# Patient Record
Sex: FEMALE | Race: WHITE | NOT HISPANIC OR LATINO | Employment: PART TIME | URBAN - METROPOLITAN AREA
[De-identification: names, ages, dates, MRNs, and addresses within clinical notes are randomized per-mention and may not be internally consistent; named-entity substitution may affect disease eponyms.]

---

## 2017-03-13 ENCOUNTER — TRANSCRIBE ORDERS (OUTPATIENT)
Dept: LAB | Facility: HOSPITAL | Age: 32
End: 2017-03-13

## 2017-03-13 ENCOUNTER — LAB (OUTPATIENT)
Dept: LAB | Facility: HOSPITAL | Age: 32
End: 2017-03-13

## 2017-03-13 DIAGNOSIS — Z98.84 STATUS POST LAPAROSCOPIC SLEEVE GASTRECTOMY: Primary | ICD-10-CM

## 2017-03-13 DIAGNOSIS — E55.9 VITAMIN D DEFICIENCY DISEASE: ICD-10-CM

## 2017-03-13 DIAGNOSIS — Z98.84 STATUS POST BARIATRIC SURGERY: ICD-10-CM

## 2017-03-13 DIAGNOSIS — R63.4 LOSS OF WEIGHT: Primary | ICD-10-CM

## 2017-03-13 DIAGNOSIS — Z98.84 STATUS POST LAPAROSCOPIC SLEEVE GASTRECTOMY: ICD-10-CM

## 2017-03-13 LAB
25(OH)D3 SERPL-MCNC: 34.8 NG/ML
ALBUMIN SERPL-MCNC: 4.2 G/DL (ref 3.2–4.8)
ALBUMIN/GLOB SERPL: 1.7 G/DL (ref 1.5–2.5)
ALP SERPL-CCNC: 82 U/L (ref 25–100)
ALT SERPL W P-5'-P-CCNC: 12 U/L (ref 7–40)
ANION GAP SERPL CALCULATED.3IONS-SCNC: 6 MMOL/L (ref 3–11)
ARTICHOKE IGE QN: 157 MG/DL (ref 0–130)
AST SERPL-CCNC: 14 U/L (ref 0–33)
BILIRUB SERPL-MCNC: 0.3 MG/DL (ref 0.3–1.2)
BUN BLD-MCNC: 9 MG/DL (ref 9–23)
BUN/CREAT SERPL: 11.3 (ref 7–25)
CALCIUM SPEC-SCNC: 9.7 MG/DL (ref 8.7–10.4)
CHLORIDE SERPL-SCNC: 104 MMOL/L (ref 99–109)
CHOLEST SERPL-MCNC: 214 MG/DL (ref 0–200)
CO2 SERPL-SCNC: 27 MMOL/L (ref 20–31)
CREAT BLD-MCNC: 0.8 MG/DL (ref 0.6–1.3)
DEPRECATED RDW RBC AUTO: 42.4 FL (ref 37–54)
ERYTHROCYTE [DISTWIDTH] IN BLOOD BY AUTOMATED COUNT: 13.2 % (ref 11.3–14.5)
FERRITIN SERPL-MCNC: 179 NG/ML (ref 10–291)
FOLATE SERPL-MCNC: 13.86 NG/ML (ref 3.2–20)
GFR SERPL CREATININE-BSD FRML MDRD: 84 ML/MIN/1.73
GLOBULIN UR ELPH-MCNC: 2.5 GM/DL
GLUCOSE BLD-MCNC: 82 MG/DL (ref 70–100)
HBA1C MFR BLD: 5.4 % (ref 4.8–5.6)
HCT VFR BLD AUTO: 39.6 % (ref 34.5–44)
HDLC SERPL-MCNC: 56 MG/DL (ref 40–60)
HGB BLD-MCNC: 12.7 G/DL (ref 11.5–15.5)
IRON 24H UR-MRATE: 68 MCG/DL (ref 50–175)
MAGNESIUM SERPL-MCNC: 2 MG/DL (ref 1.3–2.7)
MCH RBC QN AUTO: 28.1 PG (ref 27–31)
MCHC RBC AUTO-ENTMCNC: 32.1 G/DL (ref 32–36)
MCV RBC AUTO: 87.6 FL (ref 80–99)
PHOSPHATE SERPL-MCNC: 4.1 MG/DL (ref 2.4–5.1)
PLATELET # BLD AUTO: 356 10*3/MM3 (ref 150–450)
PMV BLD AUTO: 9.6 FL (ref 6–12)
POTASSIUM BLD-SCNC: 3.9 MMOL/L (ref 3.5–5.5)
PREALB SERPL-MCNC: 26.8 MG/DL (ref 10–40)
PROT SERPL-MCNC: 6.7 G/DL (ref 5.7–8.2)
RBC # BLD AUTO: 4.52 10*6/MM3 (ref 3.89–5.14)
SODIUM BLD-SCNC: 137 MMOL/L (ref 132–146)
TRIGL SERPL-MCNC: 137 MG/DL (ref 0–150)
TSH SERPL DL<=0.05 MIU/L-ACNC: 1.16 MIU/ML (ref 0.35–5.35)
WBC NRBC COR # BLD: 8.64 10*3/MM3 (ref 3.5–10.8)

## 2017-03-13 PROCEDURE — 83735 ASSAY OF MAGNESIUM: CPT | Performed by: SURGERY

## 2017-03-13 PROCEDURE — 84590 ASSAY OF VITAMIN A: CPT | Performed by: SURGERY

## 2017-03-13 PROCEDURE — 82746 ASSAY OF FOLIC ACID SERUM: CPT | Performed by: SURGERY

## 2017-03-13 PROCEDURE — 84446 ASSAY OF VITAMIN E: CPT | Performed by: SURGERY

## 2017-03-13 PROCEDURE — 36415 COLL VENOUS BLD VENIPUNCTURE: CPT | Performed by: SURGERY

## 2017-03-13 PROCEDURE — 85027 COMPLETE CBC AUTOMATED: CPT | Performed by: SURGERY

## 2017-03-13 PROCEDURE — 83970 ASSAY OF PARATHORMONE: CPT | Performed by: SURGERY

## 2017-03-13 PROCEDURE — 84425 ASSAY OF VITAMIN B-1: CPT | Performed by: SURGERY

## 2017-03-13 PROCEDURE — 82728 ASSAY OF FERRITIN: CPT | Performed by: SURGERY

## 2017-03-13 PROCEDURE — 84443 ASSAY THYROID STIM HORMONE: CPT | Performed by: SURGERY

## 2017-03-13 PROCEDURE — 83921 ORGANIC ACID SINGLE QUANT: CPT | Performed by: SURGERY

## 2017-03-13 PROCEDURE — 83540 ASSAY OF IRON: CPT | Performed by: SURGERY

## 2017-03-13 PROCEDURE — 80053 COMPREHEN METABOLIC PANEL: CPT | Performed by: SURGERY

## 2017-03-13 PROCEDURE — 83036 HEMOGLOBIN GLYCOSYLATED A1C: CPT | Performed by: SURGERY

## 2017-03-13 PROCEDURE — 80061 LIPID PANEL: CPT | Performed by: SURGERY

## 2017-03-13 PROCEDURE — 84100 ASSAY OF PHOSPHORUS: CPT | Performed by: SURGERY

## 2017-03-13 PROCEDURE — 82306 VITAMIN D 25 HYDROXY: CPT | Performed by: SURGERY

## 2017-03-13 PROCEDURE — 84134 ASSAY OF PREALBUMIN: CPT | Performed by: SURGERY

## 2017-03-14 LAB
CALCIUM SERPL-MCNC: 8.9 MG/DL (ref 8.7–10.2)
INTACT PTH: NORMAL
PTH-INTACT SERPL-MCNC: 32 PG/ML (ref 15–65)

## 2017-03-15 LAB
A-TOCOPHEROL VIT E SERPL-MCNC: 13.2 MG/L (ref 5.3–16.8)
VIT A SERPL-MCNC: 74 UG/DL (ref 20–65)

## 2017-03-16 LAB
METHYLMALONATE SERPL-SCNC: 120 NMOL/L (ref 0–378)
VIT B1 BLD-SCNC: 267.1 NMOL/L (ref 66.5–200)

## 2017-05-05 ENCOUNTER — OFFICE VISIT (OUTPATIENT)
Dept: INTERNAL MEDICINE | Facility: CLINIC | Age: 32
End: 2017-05-05

## 2017-05-05 DIAGNOSIS — F40.243 FEAR OF FLYING: Primary | ICD-10-CM

## 2017-05-05 PROCEDURE — 99211 OFF/OP EST MAY X REQ PHY/QHP: CPT | Performed by: FAMILY MEDICINE

## 2017-05-05 RX ORDER — ALPRAZOLAM 0.25 MG/1
0.25 TABLET ORAL AS NEEDED
Qty: 4 TABLET | Refills: 0 | Status: SHIPPED | OUTPATIENT
Start: 2017-05-05 | End: 2018-04-18 | Stop reason: SDUPTHER

## 2017-07-03 ENCOUNTER — TRANSCRIBE ORDERS (OUTPATIENT)
Dept: LAB | Facility: HOSPITAL | Age: 32
End: 2017-07-03

## 2017-07-03 ENCOUNTER — APPOINTMENT (OUTPATIENT)
Dept: LAB | Facility: HOSPITAL | Age: 32
End: 2017-07-03

## 2017-07-03 DIAGNOSIS — E78.5 HYPERLIPIDEMIA, UNSPECIFIED HYPERLIPIDEMIA TYPE: ICD-10-CM

## 2017-07-03 DIAGNOSIS — Z98.84 S/P LAPAROSCOPIC SLEEVE GASTRECTOMY: Primary | ICD-10-CM

## 2017-07-03 DIAGNOSIS — E55.9 UNSPECIFIED VITAMIN D DEFICIENCY: ICD-10-CM

## 2017-07-03 DIAGNOSIS — R63.4 LOSS OF WEIGHT: ICD-10-CM

## 2017-07-03 LAB
25(OH)D3 SERPL-MCNC: 31.5 NG/ML
ALBUMIN SERPL-MCNC: 4.1 G/DL (ref 3.2–4.8)
ALBUMIN/GLOB SERPL: 1.7 G/DL (ref 1.5–2.5)
ALP SERPL-CCNC: 81 U/L (ref 25–100)
ALT SERPL W P-5'-P-CCNC: 13 U/L (ref 7–40)
ANION GAP SERPL CALCULATED.3IONS-SCNC: 13 MMOL/L (ref 3–11)
ARTICHOKE IGE QN: 149 MG/DL (ref 0–130)
AST SERPL-CCNC: 13 U/L (ref 0–33)
BILIRUB SERPL-MCNC: 0.4 MG/DL (ref 0.3–1.2)
BUN BLD-MCNC: 8 MG/DL (ref 9–23)
BUN/CREAT SERPL: 10 (ref 7–25)
CALCIUM SPEC-SCNC: 10.2 MG/DL (ref 8.7–10.4)
CHLORIDE SERPL-SCNC: 101 MMOL/L (ref 99–109)
CHOLEST SERPL-MCNC: 207 MG/DL (ref 0–200)
CO2 SERPL-SCNC: 26 MMOL/L (ref 20–31)
CREAT BLD-MCNC: 0.8 MG/DL (ref 0.6–1.3)
CRP SERPL-MCNC: 1.53 MG/DL (ref 0–1)
DEPRECATED RDW RBC AUTO: 43.5 FL (ref 37–54)
ERYTHROCYTE [DISTWIDTH] IN BLOOD BY AUTOMATED COUNT: 13 % (ref 11.3–14.5)
FERRITIN SERPL-MCNC: 194 NG/ML (ref 10–291)
FOLATE SERPL-MCNC: 12.76 NG/ML (ref 3.2–20)
GFR SERPL CREATININE-BSD FRML MDRD: 84 ML/MIN/1.73
GLOBULIN UR ELPH-MCNC: 2.4 GM/DL
GLUCOSE BLD-MCNC: 83 MG/DL (ref 70–100)
HBA1C MFR BLD: 5.2 % (ref 4.8–5.6)
HCT VFR BLD AUTO: 41.1 % (ref 34.5–44)
HDLC SERPL-MCNC: 50 MG/DL (ref 40–60)
HGB BLD-MCNC: 12.7 G/DL (ref 11.5–15.5)
IRON 24H UR-MRATE: 80 MCG/DL (ref 50–175)
MAGNESIUM SERPL-MCNC: 2 MG/DL (ref 1.3–2.7)
MCH RBC QN AUTO: 28.5 PG (ref 27–31)
MCHC RBC AUTO-ENTMCNC: 30.9 G/DL (ref 32–36)
MCV RBC AUTO: 92.2 FL (ref 80–99)
PHOSPHATE SERPL-MCNC: 4.4 MG/DL (ref 2.4–5.1)
PLATELET # BLD AUTO: 349 10*3/MM3 (ref 150–450)
PMV BLD AUTO: 9.8 FL (ref 6–12)
POTASSIUM BLD-SCNC: 4.1 MMOL/L (ref 3.5–5.5)
PREALB SERPL-MCNC: 27.2 MG/DL (ref 10–40)
PROT SERPL-MCNC: 6.5 G/DL (ref 5.7–8.2)
RBC # BLD AUTO: 4.46 10*6/MM3 (ref 3.89–5.14)
SODIUM BLD-SCNC: 140 MMOL/L (ref 132–146)
TRIGL SERPL-MCNC: 142 MG/DL (ref 0–150)
TSH SERPL DL<=0.05 MIU/L-ACNC: 1.42 MIU/ML (ref 0.35–5.35)
WBC NRBC COR # BLD: 8.34 10*3/MM3 (ref 3.5–10.8)

## 2017-07-03 PROCEDURE — 84425 ASSAY OF VITAMIN B-1: CPT | Performed by: PHYSICIAN ASSISTANT

## 2017-07-03 PROCEDURE — 80061 LIPID PANEL: CPT | Performed by: PHYSICIAN ASSISTANT

## 2017-07-03 PROCEDURE — 80053 COMPREHEN METABOLIC PANEL: CPT | Performed by: PHYSICIAN ASSISTANT

## 2017-07-03 PROCEDURE — 84134 ASSAY OF PREALBUMIN: CPT | Performed by: PHYSICIAN ASSISTANT

## 2017-07-03 PROCEDURE — 83921 ORGANIC ACID SINGLE QUANT: CPT | Performed by: PHYSICIAN ASSISTANT

## 2017-07-03 PROCEDURE — 83970 ASSAY OF PARATHORMONE: CPT | Performed by: PHYSICIAN ASSISTANT

## 2017-07-03 PROCEDURE — 82728 ASSAY OF FERRITIN: CPT | Performed by: PHYSICIAN ASSISTANT

## 2017-07-03 PROCEDURE — 84443 ASSAY THYROID STIM HORMONE: CPT | Performed by: PHYSICIAN ASSISTANT

## 2017-07-03 PROCEDURE — 86140 C-REACTIVE PROTEIN: CPT | Performed by: PHYSICIAN ASSISTANT

## 2017-07-03 PROCEDURE — 83540 ASSAY OF IRON: CPT | Performed by: PHYSICIAN ASSISTANT

## 2017-07-03 PROCEDURE — 83735 ASSAY OF MAGNESIUM: CPT | Performed by: PHYSICIAN ASSISTANT

## 2017-07-03 PROCEDURE — 85027 COMPLETE CBC AUTOMATED: CPT | Performed by: PHYSICIAN ASSISTANT

## 2017-07-03 PROCEDURE — 82746 ASSAY OF FOLIC ACID SERUM: CPT | Performed by: PHYSICIAN ASSISTANT

## 2017-07-03 PROCEDURE — 84446 ASSAY OF VITAMIN E: CPT | Performed by: PHYSICIAN ASSISTANT

## 2017-07-03 PROCEDURE — 36415 COLL VENOUS BLD VENIPUNCTURE: CPT | Performed by: PHYSICIAN ASSISTANT

## 2017-07-03 PROCEDURE — 82306 VITAMIN D 25 HYDROXY: CPT | Performed by: PHYSICIAN ASSISTANT

## 2017-07-03 PROCEDURE — 84100 ASSAY OF PHOSPHORUS: CPT | Performed by: PHYSICIAN ASSISTANT

## 2017-07-03 PROCEDURE — 83036 HEMOGLOBIN GLYCOSYLATED A1C: CPT | Performed by: PHYSICIAN ASSISTANT

## 2017-07-03 PROCEDURE — 84590 ASSAY OF VITAMIN A: CPT | Performed by: PHYSICIAN ASSISTANT

## 2017-07-05 LAB
CALCIUM SERPL-MCNC: 9.5 MG/DL (ref 8.7–10.2)
INTACT PTH: NORMAL
PTH-INTACT SERPL-MCNC: 29 PG/ML (ref 15–65)

## 2017-07-07 LAB
A-TOCOPHEROL VIT E SERPL-MCNC: 10.6 MG/L (ref 5.3–16.8)
METHYLMALONATE SERPL-SCNC: 119 NMOL/L (ref 0–378)
VIT A SERPL-MCNC: 62 UG/DL (ref 20–65)
VIT B1 BLD-SCNC: 171.7 NMOL/L (ref 66.5–200)

## 2017-08-23 ENCOUNTER — HOSPITAL ENCOUNTER (OUTPATIENT)
Dept: ULTRASOUND IMAGING | Facility: HOSPITAL | Age: 32
Discharge: HOME | End: 2017-08-23
Admitting: NURSE PRACTITIONER

## 2017-08-23 ENCOUNTER — OFFICE VISIT (OUTPATIENT)
Dept: INTERNAL MEDICINE | Facility: CLINIC | Age: 32
End: 2017-08-23

## 2017-08-23 VITALS
HEIGHT: 65 IN | SYSTOLIC BLOOD PRESSURE: 104 MMHG | DIASTOLIC BLOOD PRESSURE: 60 MMHG | TEMPERATURE: 98.2 F | RESPIRATION RATE: 14 BRPM | HEART RATE: 76 BPM | WEIGHT: 191.19 LBS | BODY MASS INDEX: 31.85 KG/M2 | OXYGEN SATURATION: 99 %

## 2017-08-23 DIAGNOSIS — R22.1 NODULE OF NECK: Primary | ICD-10-CM

## 2017-08-23 PROCEDURE — 99213 OFFICE O/P EST LOW 20 MIN: CPT | Performed by: NURSE PRACTITIONER

## 2017-08-23 PROCEDURE — 76536 US EXAM OF HEAD AND NECK: CPT

## 2017-08-23 NOTE — PROGRESS NOTES
Chief Complaint / Reason:      Chief Complaint   Patient presents with   • Neck Mass     knot to neck, no pain.        Subjective     HPI  Patient presents today with complaints of knot on neck that she has noticed in the last week. Denies pain, fever, weight loss, night sweats, or upper respiratory issues. Patient does have a cat that is de clawed and denies any exposure to ticks. 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 She has tried Motrin 800 mg, tinge unit,  and prednisone 40 mg    History taken from: patient    PMH/FH/Social History were reviewed and updated appropriately in the electronic medical record.     Review of Systems:   Review of Systems   Constitutional: Negative for activity change, appetite change, chills and fatigue.   HENT: Negative.         Knot on right side of neck    Respiratory: Negative.    Cardiovascular: Negative.    Gastrointestinal: Negative.    Skin: Negative.    Hematological: Negative for adenopathy. Does not bruise/bleed easily.     All other systems were reviewed and are negative.  Exceptions are noted in the subjective or above.      Objective     Vital Signs  Vitals:    08/23/17 0956   BP: 104/60   Pulse: 76   Resp: 14   Temp: 98.2 °F (36.8 °C)   SpO2: 99%       Body mass index is 31.82 kg/(m^2).    Physical Exam   Constitutional: She is oriented to person, place, and time. She appears well-developed and well-nourished.   HENT:   Head: Normocephalic.   Right Ear: External ear normal. Tympanic membrane is erythematous and bulging.   Left Ear: External ear normal. Tympanic membrane is not erythematous and not bulging.   Mouth/Throat: Mucous membranes are dry. Posterior oropharyngeal erythema present.   Neck: Normal range of motion and full passive range of motion without pain.       Cardiovascular: Normal rate, regular rhythm, normal heart sounds and intact distal pulses.    Pulmonary/Chest: Effort normal and breath sounds normal.   Abdominal: Soft. Bowel sounds are normal.    Lymphadenopathy:     She has no cervical adenopathy.   Neurological: She is alert and oriented to person, place, and time.   Skin: Skin is warm and dry.   Psychiatric: She has a normal mood and affect. Her behavior is normal. Judgment and thought content normal.   Nursing note and vitals reviewed.      Medication Review:     Current Outpatient Prescriptions:   •  ALPRAZolam (XANAX) 0.25 MG tablet, Take 1 tablet by mouth As Needed for Anxiety., Disp: 4 tablet, Rfl: 0  •  CRYSELLE-28 0.3-30 MG-MCG per tablet, take 1 tablet by mouth once daily, Disp: , Rfl: 1  •  omeprazole (priLOSEC) 20 MG capsule, take 1 capsule by mouth twice a day, Disp: , Rfl: 0  •  urea (CARMOL) 20 % cream, , Disp: , Rfl: 0    Assessment/Plan   Kelly was seen today for neck mass.    Diagnoses and all orders for this visit:    Nodule of neck  -     US Head Neck Soft Tissue  Recommend NSAIDs   May use heat applied to neck.   Collaborated with Dr. Hamlin regarding patient  Will contact patient once US available   Follow up as needed.     Padmaja Law, APRN  08/23/2017

## 2017-09-26 ENCOUNTER — TRANSCRIBE ORDERS (OUTPATIENT)
Dept: LAB | Facility: HOSPITAL | Age: 32
End: 2017-09-26

## 2017-09-26 ENCOUNTER — LAB (OUTPATIENT)
Dept: LAB | Facility: HOSPITAL | Age: 32
End: 2017-09-26

## 2017-09-26 DIAGNOSIS — E78.5 HYPERLIPIDEMIA, UNSPECIFIED HYPERLIPIDEMIA TYPE: Primary | ICD-10-CM

## 2017-09-26 DIAGNOSIS — Z98.84 BARIATRIC SURGERY STATUS: ICD-10-CM

## 2017-09-26 DIAGNOSIS — E78.5 HYPERLIPIDEMIA, UNSPECIFIED HYPERLIPIDEMIA TYPE: ICD-10-CM

## 2017-09-26 DIAGNOSIS — R63.4 LOSS OF WEIGHT: ICD-10-CM

## 2017-09-26 DIAGNOSIS — E55.9 UNSPECIFIED VITAMIN D DEFICIENCY: ICD-10-CM

## 2017-09-26 LAB
25(OH)D3 SERPL-MCNC: 29.5 NG/ML
ALBUMIN SERPL-MCNC: 4.2 G/DL (ref 3.2–4.8)
ALBUMIN/GLOB SERPL: 1.8 G/DL (ref 1.5–2.5)
ALP SERPL-CCNC: 80 U/L (ref 25–100)
ALT SERPL W P-5'-P-CCNC: 9 U/L (ref 7–40)
ANION GAP SERPL CALCULATED.3IONS-SCNC: 14 MMOL/L (ref 3–11)
ARTICHOKE IGE QN: 160 MG/DL (ref 0–130)
AST SERPL-CCNC: 11 U/L (ref 0–33)
BILIRUB SERPL-MCNC: 0.4 MG/DL (ref 0.3–1.2)
BUN BLD-MCNC: 9 MG/DL (ref 9–23)
BUN/CREAT SERPL: 11.3 (ref 7–25)
CALCIUM SPEC-SCNC: 9.2 MG/DL (ref 8.7–10.4)
CHLORIDE SERPL-SCNC: 105 MMOL/L (ref 99–109)
CHOLEST SERPL-MCNC: 214 MG/DL (ref 0–200)
CO2 SERPL-SCNC: 20 MMOL/L (ref 20–31)
CREAT BLD-MCNC: 0.8 MG/DL (ref 0.6–1.3)
DEPRECATED RDW RBC AUTO: 41 FL (ref 37–54)
ERYTHROCYTE [DISTWIDTH] IN BLOOD BY AUTOMATED COUNT: 12.8 % (ref 11.3–14.5)
FERRITIN SERPL-MCNC: 166 NG/ML (ref 10–291)
FOLATE SERPL-MCNC: 11.56 NG/ML (ref 3.2–20)
GFR SERPL CREATININE-BSD FRML MDRD: 84 ML/MIN/1.73
GLOBULIN UR ELPH-MCNC: 2.3 GM/DL
GLUCOSE BLD-MCNC: 82 MG/DL (ref 70–100)
HBA1C MFR BLD: 5.1 % (ref 4.8–5.6)
HCT VFR BLD AUTO: 40.8 % (ref 34.5–44)
HDLC SERPL-MCNC: 50 MG/DL (ref 40–60)
HGB BLD-MCNC: 13.3 G/DL (ref 11.5–15.5)
IRON 24H UR-MRATE: 88 MCG/DL (ref 50–175)
IRON SATN MFR SERPL: 23 % (ref 15–50)
MAGNESIUM SERPL-MCNC: 2 MG/DL (ref 1.3–2.7)
MCH RBC QN AUTO: 28.9 PG (ref 27–31)
MCHC RBC AUTO-ENTMCNC: 32.6 G/DL (ref 32–36)
MCV RBC AUTO: 88.5 FL (ref 80–99)
PHOSPHATE SERPL-MCNC: 4 MG/DL (ref 2.4–5.1)
PLATELET # BLD AUTO: 331 10*3/MM3 (ref 150–450)
PMV BLD AUTO: 9.7 FL (ref 6–12)
POTASSIUM BLD-SCNC: 4.1 MMOL/L (ref 3.5–5.5)
PREALB SERPL-MCNC: 27.4 MG/DL (ref 10–40)
PROT SERPL-MCNC: 6.5 G/DL (ref 5.7–8.2)
RBC # BLD AUTO: 4.61 10*6/MM3 (ref 3.89–5.14)
SODIUM BLD-SCNC: 139 MMOL/L (ref 132–146)
TIBC SERPL-MCNC: 390 MCG/DL (ref 250–450)
TRIGL SERPL-MCNC: 158 MG/DL (ref 0–150)
TSH SERPL DL<=0.05 MIU/L-ACNC: 1.41 MIU/ML (ref 0.35–5.35)
WBC NRBC COR # BLD: 7.06 10*3/MM3 (ref 3.5–10.8)

## 2017-09-26 PROCEDURE — 82746 ASSAY OF FOLIC ACID SERUM: CPT | Performed by: SURGERY

## 2017-09-26 PROCEDURE — 84100 ASSAY OF PHOSPHORUS: CPT | Performed by: SURGERY

## 2017-09-26 PROCEDURE — 84590 ASSAY OF VITAMIN A: CPT | Performed by: SURGERY

## 2017-09-26 PROCEDURE — 83550 IRON BINDING TEST: CPT | Performed by: SURGERY

## 2017-09-26 PROCEDURE — 80061 LIPID PANEL: CPT | Performed by: SURGERY

## 2017-09-26 PROCEDURE — 83735 ASSAY OF MAGNESIUM: CPT | Performed by: SURGERY

## 2017-09-26 PROCEDURE — 84443 ASSAY THYROID STIM HORMONE: CPT | Performed by: SURGERY

## 2017-09-26 PROCEDURE — 80053 COMPREHEN METABOLIC PANEL: CPT | Performed by: SURGERY

## 2017-09-26 PROCEDURE — 36415 COLL VENOUS BLD VENIPUNCTURE: CPT

## 2017-09-26 PROCEDURE — 84134 ASSAY OF PREALBUMIN: CPT | Performed by: SURGERY

## 2017-09-26 PROCEDURE — 83921 ORGANIC ACID SINGLE QUANT: CPT | Performed by: SURGERY

## 2017-09-26 PROCEDURE — 84425 ASSAY OF VITAMIN B-1: CPT | Performed by: SURGERY

## 2017-09-26 PROCEDURE — 82728 ASSAY OF FERRITIN: CPT | Performed by: SURGERY

## 2017-09-26 PROCEDURE — 84446 ASSAY OF VITAMIN E: CPT | Performed by: SURGERY

## 2017-09-26 PROCEDURE — 82306 VITAMIN D 25 HYDROXY: CPT | Performed by: SURGERY

## 2017-09-26 PROCEDURE — 83036 HEMOGLOBIN GLYCOSYLATED A1C: CPT | Performed by: SURGERY

## 2017-09-26 PROCEDURE — 83970 ASSAY OF PARATHORMONE: CPT | Performed by: SURGERY

## 2017-09-26 PROCEDURE — 83540 ASSAY OF IRON: CPT | Performed by: SURGERY

## 2017-09-26 PROCEDURE — 85027 COMPLETE CBC AUTOMATED: CPT | Performed by: SURGERY

## 2017-09-27 LAB — PTH-INTACT SERPL-MCNC: 39 PG/ML (ref 15–65)

## 2017-09-29 LAB
A-TOCOPHEROL VIT E SERPL-MCNC: 12.1 MG/L (ref 5.3–16.8)
METHYLMALONATE SERPL-SCNC: 125 NMOL/L (ref 0–378)
VIT A SERPL-MCNC: 79 UG/DL (ref 20–65)
VIT B1 BLD-SCNC: 157.6 NMOL/L (ref 66.5–200)

## 2017-10-16 ENCOUNTER — OFFICE VISIT (OUTPATIENT)
Dept: RETAIL CLINIC | Facility: CLINIC | Age: 32
End: 2017-10-16

## 2017-10-16 DIAGNOSIS — Z23 INFLUENZA VACCINE ADMINISTERED: Primary | ICD-10-CM

## 2017-10-16 NOTE — PROGRESS NOTES
Subjective   Kelly Oliva is a 31 y.o. female.     Patient presents to clinic today for a flu vaccination. Denies previous severe reaction to vaccinations. Denies acute moderate or severe illness with fever. See scanned documents.     Diagnosis for this visit:  Influenza vaccine administered [Z23]    Most Recent Immunizations   Administered Date(s) Administered   • Flu Vaccine Quad PF >18YRS 10/04/2016   • Flu Vaccine Quad PF >36MO 10/16/2017   • Tdap 12/05/2016       Notes:  You have been given the flu vaccination today. You have been given a copy of the vaccine information sheet (VIS) and verbalized understanding of risks and benefits of receiving the vaccine. You have been counseled on common side effects such as low grade fever, headache, and injection site tenderness/redness that may occur over next 1-2 days. Report serious symptoms such as swelling or trouble breathing immediately or go to the nearest emergency room. You have voiced understanding of all instructions.

## 2018-04-15 ENCOUNTER — PATIENT MESSAGE (OUTPATIENT)
Dept: INTERNAL MEDICINE | Facility: CLINIC | Age: 33
End: 2018-04-15

## 2018-04-15 DIAGNOSIS — F40.243 FEAR OF FLYING: ICD-10-CM

## 2018-04-18 RX ORDER — ALPRAZOLAM 0.25 MG/1
0.25 TABLET ORAL AS NEEDED
Qty: 4 TABLET | Refills: 0 | OUTPATIENT
Start: 2018-04-18 | End: 2018-05-30 | Stop reason: SDUPTHER

## 2018-04-18 NOTE — TELEPHONE ENCOUNTER
Gwen Wilburn MA 4/17/2018 10:51 AM EDT        ----- Message -----  From: Kelly Oliva  Sent: 4/15/2018 9:48 PM  To: Thais Oliva  Clinical Pool  Subject: Prescription Question     Dr. Yang,     Is there any way to get a refill on my xanax I take for fear of flying? My fiancé and I made a last minute decision to fly to [place] next week for his birthday! We are going to [facility] in July for our honeymoon, so I will definitely make an appointment to come see you before that trip! If you can't refill I understand. Thanks!    :)  Kelly

## 2018-05-30 ENCOUNTER — OFFICE VISIT (OUTPATIENT)
Dept: INTERNAL MEDICINE | Facility: CLINIC | Age: 33
End: 2018-05-30

## 2018-05-30 VITALS
HEART RATE: 69 BPM | WEIGHT: 198 LBS | SYSTOLIC BLOOD PRESSURE: 100 MMHG | TEMPERATURE: 98.2 F | HEIGHT: 65 IN | OXYGEN SATURATION: 99 % | DIASTOLIC BLOOD PRESSURE: 68 MMHG | BODY MASS INDEX: 32.99 KG/M2

## 2018-05-30 DIAGNOSIS — E55.9 VITAMIN D DEFICIENCY: ICD-10-CM

## 2018-05-30 DIAGNOSIS — F40.243 FEAR OF FLYING: Primary | ICD-10-CM

## 2018-05-30 DIAGNOSIS — Q68.0: ICD-10-CM

## 2018-05-30 PROCEDURE — 99213 OFFICE O/P EST LOW 20 MIN: CPT | Performed by: FAMILY MEDICINE

## 2018-05-30 RX ORDER — VITAMIN A ACETATE, .BETA.-CAROTENE, ASCORBIC ACID, CHOLECALCIFEROL, .ALPHA.-TOCOPHEROL ACETATE, DL-, THIAMINE MONONITRATE, RIBOFLAVIN, NIACINAMIDE, PYRIDOXINE HYDROCHLORIDE, FOLIC ACID, CYANOCOBALAMIN, CALCIUM CARBONATE, FERROUS FUMARATE, ZINC OXIDE, AND CUPRIC OXIDE 2000; 2000; 120; 400; 22; 1.84; 3; 20; 10; 1; 12; 200; 27; 25; 2 [IU]/1; [IU]/1; MG/1; [IU]/1; MG/1; MG/1; MG/1; MG/1; MG/1; MG/1; UG/1; MG/1; MG/1; MG/1; MG/1
1 TABLET ORAL DAILY
Refills: 3 | COMMUNITY
Start: 2018-04-06

## 2018-05-30 RX ORDER — ERGOCALCIFEROL 1.25 MG/1
50000 CAPSULE ORAL WEEKLY
Qty: 4 CAPSULE | Refills: 3 | Status: ON HOLD | OUTPATIENT
Start: 2018-05-30 | End: 2019-04-08

## 2018-05-30 RX ORDER — ALPRAZOLAM 0.5 MG/1
0.5 TABLET ORAL AS NEEDED
Qty: 10 TABLET | Refills: 0 | Status: ON HOLD | OUTPATIENT
Start: 2018-05-30 | End: 2019-04-08

## 2018-09-25 ENCOUNTER — LAB (OUTPATIENT)
Dept: LAB | Facility: HOSPITAL | Age: 33
End: 2018-09-25

## 2018-09-25 ENCOUNTER — TRANSCRIBE ORDERS (OUTPATIENT)
Dept: LAB | Facility: HOSPITAL | Age: 33
End: 2018-09-25

## 2018-09-25 DIAGNOSIS — Z34.81 PRENATAL CARE, SUBSEQUENT PREGNANCY, FIRST TRIMESTER: ICD-10-CM

## 2018-09-25 DIAGNOSIS — Z3A.08 8 WEEKS GESTATION OF PREGNANCY: ICD-10-CM

## 2018-09-25 DIAGNOSIS — Z3A.08 8 WEEKS GESTATION OF PREGNANCY: Primary | ICD-10-CM

## 2018-09-25 LAB
ABO GROUP BLD: NORMAL
BASOPHILS # BLD AUTO: 0.02 10*3/MM3 (ref 0–0.2)
BASOPHILS NFR BLD AUTO: 0.3 % (ref 0–1)
BLD GP AB SCN SERPL QL: NEGATIVE
DEPRECATED RDW RBC AUTO: 41.3 FL (ref 37–54)
EOSINOPHIL # BLD AUTO: 0.2 10*3/MM3 (ref 0–0.3)
EOSINOPHIL NFR BLD AUTO: 2.6 % (ref 0–3)
ERYTHROCYTE [DISTWIDTH] IN BLOOD BY AUTOMATED COUNT: 12.9 % (ref 11.3–14.5)
GLUCOSE BLD-MCNC: 78 MG/DL (ref 70–100)
HBV SURFACE AG SERPL QL IA: NORMAL
HCT VFR BLD AUTO: 39.7 % (ref 34.5–44)
HCV AB SER DONR QL: NORMAL
HGB BLD-MCNC: 12.8 G/DL (ref 11.5–15.5)
HIV1+2 AB SER QL: NORMAL
IMM GRANULOCYTES # BLD: 0.01 10*3/MM3 (ref 0–0.03)
IMM GRANULOCYTES NFR BLD: 0.1 % (ref 0–0.6)
LYMPHOCYTES # BLD AUTO: 2.14 10*3/MM3 (ref 0.6–4.8)
LYMPHOCYTES NFR BLD AUTO: 27.8 % (ref 24–44)
MCH RBC QN AUTO: 28.7 PG (ref 27–31)
MCHC RBC AUTO-ENTMCNC: 32.2 G/DL (ref 32–36)
MCV RBC AUTO: 89 FL (ref 80–99)
MONOCYTES # BLD AUTO: 0.41 10*3/MM3 (ref 0–1)
MONOCYTES NFR BLD AUTO: 5.3 % (ref 0–12)
NEUTROPHILS # BLD AUTO: 4.92 10*3/MM3 (ref 1.5–8.3)
NEUTROPHILS NFR BLD AUTO: 64 % (ref 41–71)
PLATELET # BLD AUTO: 360 10*3/MM3 (ref 150–450)
PMV BLD AUTO: 9.6 FL (ref 6–12)
RBC # BLD AUTO: 4.46 10*6/MM3 (ref 3.89–5.14)
RH BLD: POSITIVE
RUBV IGG SER QL: NORMAL
RUBV IGG SER-ACNC: 121 IU/ML
WBC NRBC COR # BLD: 7.69 10*3/MM3 (ref 3.5–10.8)

## 2018-09-25 PROCEDURE — 86803 HEPATITIS C AB TEST: CPT

## 2018-09-25 PROCEDURE — 82947 ASSAY GLUCOSE BLOOD QUANT: CPT | Performed by: OBSTETRICS & GYNECOLOGY

## 2018-09-25 PROCEDURE — 80081 OBSTETRIC PANEL INC HIV TSTG: CPT

## 2018-09-25 PROCEDURE — 36415 COLL VENOUS BLD VENIPUNCTURE: CPT | Performed by: OBSTETRICS & GYNECOLOGY

## 2018-09-26 LAB — RPR SER QL: NORMAL

## 2019-02-11 ENCOUNTER — APPOINTMENT (OUTPATIENT)
Dept: LAB | Facility: HOSPITAL | Age: 34
End: 2019-02-11

## 2019-02-11 ENCOUNTER — TRANSCRIBE ORDERS (OUTPATIENT)
Dept: LAB | Facility: HOSPITAL | Age: 34
End: 2019-02-11

## 2019-02-11 DIAGNOSIS — Z3A.28 28 WEEKS GESTATION OF PREGNANCY: Primary | ICD-10-CM

## 2019-02-11 DIAGNOSIS — Z34.83 PRENATAL CARE, SUBSEQUENT PREGNANCY, THIRD TRIMESTER: ICD-10-CM

## 2019-02-11 LAB
BLD GP AB SCN SERPL QL: NEGATIVE
DEPRECATED RDW RBC AUTO: 45.7 FL (ref 37–54)
ERYTHROCYTE [DISTWIDTH] IN BLOOD BY AUTOMATED COUNT: 14.1 % (ref 11.3–14.5)
GLUCOSE 1H P 100 G GLC PO SERPL-MCNC: 141 MG/DL (ref 65–140)
HCT VFR BLD AUTO: 35.1 % (ref 34.5–44)
HGB BLD-MCNC: 11.2 G/DL (ref 11.5–15.5)
MCH RBC QN AUTO: 28.8 PG (ref 27–31)
MCHC RBC AUTO-ENTMCNC: 31.9 G/DL (ref 32–36)
MCV RBC AUTO: 90.2 FL (ref 80–99)
PLATELET # BLD AUTO: 237 10*3/MM3 (ref 150–450)
PMV BLD AUTO: 9.1 FL (ref 6–12)
RBC # BLD AUTO: 3.89 10*6/MM3 (ref 3.89–5.14)
WBC NRBC COR # BLD: 10.63 10*3/MM3 (ref 3.5–10.8)

## 2019-02-11 PROCEDURE — 85027 COMPLETE CBC AUTOMATED: CPT | Performed by: OBSTETRICS & GYNECOLOGY

## 2019-02-11 PROCEDURE — 82950 GLUCOSE TEST: CPT | Performed by: OBSTETRICS & GYNECOLOGY

## 2019-02-11 PROCEDURE — 86850 RBC ANTIBODY SCREEN: CPT | Performed by: OBSTETRICS & GYNECOLOGY

## 2019-02-11 PROCEDURE — 36415 COLL VENOUS BLD VENIPUNCTURE: CPT | Performed by: OBSTETRICS & GYNECOLOGY

## 2019-02-15 ENCOUNTER — LAB (OUTPATIENT)
Dept: LAB | Facility: HOSPITAL | Age: 34
End: 2019-02-15

## 2019-02-15 ENCOUNTER — TRANSCRIBE ORDERS (OUTPATIENT)
Dept: ADMINISTRATIVE | Facility: HOSPITAL | Age: 34
End: 2019-02-15

## 2019-02-15 DIAGNOSIS — Z34.83 PRENATAL CARE, SUBSEQUENT PREGNANCY, THIRD TRIMESTER: Primary | ICD-10-CM

## 2019-02-15 DIAGNOSIS — Z3A.29 29 WEEKS GESTATION OF PREGNANCY: ICD-10-CM

## 2019-02-15 DIAGNOSIS — Z34.83 PRENATAL CARE, SUBSEQUENT PREGNANCY, THIRD TRIMESTER: ICD-10-CM

## 2019-02-15 LAB
GLUCOSE 1H P 100 G GLC PO SERPL-MCNC: 221 MG/DL (ref 74–110)
GLUCOSE 2H P 100 G GLC PO SERPL-MCNC: 109 MG/DL
GLUCOSE 3H P 100 G GLC PO SERPL-MCNC: 62 MG/DL
GLUCOSE P FAST SERPL-MCNC: 79 MG/DL (ref 74–98)

## 2019-02-15 PROCEDURE — 82951 GLUCOSE TOLERANCE TEST (GTT): CPT

## 2019-02-15 PROCEDURE — 36415 COLL VENOUS BLD VENIPUNCTURE: CPT

## 2019-02-15 PROCEDURE — 82952 GTT-ADDED SAMPLES: CPT

## 2019-02-20 ENCOUNTER — IMMUNIZATION (OUTPATIENT)
Dept: RETAIL CLINIC | Facility: CLINIC | Age: 34
End: 2019-02-20

## 2019-02-20 ENCOUNTER — TRANSCRIBE ORDERS (OUTPATIENT)
Dept: NUTRITION | Facility: HOSPITAL | Age: 34
End: 2019-02-20

## 2019-02-20 DIAGNOSIS — O24.410 DIET CONTROLLED GESTATIONAL DIABETES MELLITUS (GDM), ANTEPARTUM: Primary | ICD-10-CM

## 2019-02-20 DIAGNOSIS — Z23 NEED FOR DIPHTHERIA-TETANUS-PERTUSSIS (TDAP) VACCINE: Primary | ICD-10-CM

## 2019-02-20 NOTE — PROGRESS NOTES
Thad Astorga is a 33 y.o. female.     Patient presents to clinic today for a tetanus vaccination. Patient is 30 weeks pregnant.  Denies previous severe reaction to vaccinations. Denies acute moderate or severe illness with fever. See scanned documents.     Diagnosis for this visit:  Need for diphtheria-tetanus-pertussis (Tdap) vaccine [Z23]    Most Recent Immunizations   Administered Date(s) Administered   • Flu Vaccine Quad PF >18YRS 10/04/2016   • Flu Vaccine Quad PF >36MO 10/16/2017   • Tdap 02/20/2019       Notes:  You have been given the tetanus vaccination today. You have been given a copy of the vaccine information sheet (VIS) and verbalized understanding of risks and benefits of receiving the vaccine. You have been counseled on common side effects such as low grade fever, headache, and injection site tenderness/redness that may occur over next 1-2 days. Report serious symptoms such as swelling or trouble breathing immediately or go to the nearest emergency room. You have voiced understanding of all instructions.

## 2019-02-26 ENCOUNTER — HOSPITAL ENCOUNTER (OUTPATIENT)
Dept: NUTRITION | Facility: HOSPITAL | Age: 34
Setting detail: RECURRING SERIES
Discharge: HOME | End: 2019-02-26

## 2019-02-26 PROCEDURE — G0108 DIAB MANAGE TRN  PER INDIV: HCPCS | Performed by: DIETITIAN, REGISTERED

## 2019-04-08 ENCOUNTER — ANESTHESIA EVENT (OUTPATIENT)
Dept: LABOR AND DELIVERY | Facility: HOSPITAL | Age: 34
End: 2019-04-08

## 2019-04-08 ENCOUNTER — ANESTHESIA (OUTPATIENT)
Dept: LABOR AND DELIVERY | Facility: HOSPITAL | Age: 34
End: 2019-04-08

## 2019-04-08 ENCOUNTER — HOSPITAL ENCOUNTER (INPATIENT)
Facility: HOSPITAL | Age: 34
LOS: 3 days | Discharge: HOME | End: 2019-04-11
Attending: OBSTETRICS & GYNECOLOGY | Admitting: OBSTETRICS & GYNECOLOGY

## 2019-04-08 PROBLEM — Z3A.36 36 WEEKS GESTATION OF PREGNANCY: Status: RESOLVED | Noted: 2019-04-08 | Resolved: 2019-04-08

## 2019-04-08 PROBLEM — Z3A.36 36 WEEKS GESTATION OF PREGNANCY: Status: ACTIVE | Noted: 2019-04-08

## 2019-04-08 PROBLEM — O42.011 PRETERM PREMATURE RUPTURE OF MEMBRANES (PPROM) WITH ONSET OF LABOR WITHIN 24 HOURS OF RUPTURE IN FIRST TRIMESTER, ANTEPARTUM: Status: RESOLVED | Noted: 2019-04-08 | Resolved: 2019-04-08

## 2019-04-08 PROBLEM — O42.011 PRETERM PREMATURE RUPTURE OF MEMBRANES (PPROM) WITH ONSET OF LABOR WITHIN 24 HOURS OF RUPTURE IN FIRST TRIMESTER, ANTEPARTUM: Status: ACTIVE | Noted: 2019-04-08

## 2019-04-08 LAB
ABO GROUP BLD: NORMAL
ALP SERPL-CCNC: 141 U/L (ref 39–117)
ALT SERPL W P-5'-P-CCNC: 10 U/L (ref 1–33)
AST SERPL-CCNC: 15 U/L (ref 1–32)
BILIRUB SERPL-MCNC: 0.2 MG/DL (ref 0.2–1.2)
BLD GP AB SCN SERPL QL: NEGATIVE
CREAT BLD-MCNC: 0.66 MG/DL (ref 0.57–1)
DEPRECATED RDW RBC AUTO: 46.7 FL (ref 37–54)
ERYTHROCYTE [DISTWIDTH] IN BLOOD BY AUTOMATED COUNT: 14.7 % (ref 12.3–15.4)
HCT VFR BLD AUTO: 36.3 % (ref 34–46.6)
HGB BLD-MCNC: 11.7 G/DL (ref 12–15.9)
LDH SERPL-CCNC: 220 U/L (ref 135–214)
MCH RBC QN AUTO: 28.5 PG (ref 26.6–33)
MCHC RBC AUTO-ENTMCNC: 32.2 G/DL (ref 31.5–35.7)
MCV RBC AUTO: 88.3 FL (ref 79–97)
PLATELET # BLD AUTO: 216 10*3/MM3 (ref 140–450)
PMV BLD AUTO: 10.5 FL (ref 6–12)
RBC # BLD AUTO: 4.11 10*6/MM3 (ref 3.77–5.28)
RH BLD: POSITIVE
T&S EXPIRATION DATE: NORMAL
URATE SERPL-MCNC: 6.6 MG/DL (ref 2.4–5.7)
WBC NRBC COR # BLD: 10.66 10*3/MM3 (ref 3.4–10.8)

## 2019-04-08 PROCEDURE — 82565 ASSAY OF CREATININE: CPT | Performed by: ADVANCED PRACTICE MIDWIFE

## 2019-04-08 PROCEDURE — 25010000002 ROPIVACAINE PER 1 MG: Performed by: NURSE ANESTHETIST, CERTIFIED REGISTERED

## 2019-04-08 PROCEDURE — 51703 INSERT BLADDER CATH COMPLEX: CPT

## 2019-04-08 PROCEDURE — 82247 BILIRUBIN TOTAL: CPT | Performed by: ADVANCED PRACTICE MIDWIFE

## 2019-04-08 PROCEDURE — 25010000002 MAGNESIUM SULFATE-LACT RINGERS 40 GM/580ML SOLUTION: Performed by: OBSTETRICS & GYNECOLOGY

## 2019-04-08 PROCEDURE — 59025 FETAL NON-STRESS TEST: CPT

## 2019-04-08 PROCEDURE — 85027 COMPLETE CBC AUTOMATED: CPT | Performed by: ADVANCED PRACTICE MIDWIFE

## 2019-04-08 PROCEDURE — 25010000002 PENICILLIN G POTASSIUM PER 600000 UNITS: Performed by: ADVANCED PRACTICE MIDWIFE

## 2019-04-08 PROCEDURE — 86900 BLOOD TYPING SEROLOGIC ABO: CPT | Performed by: ADVANCED PRACTICE MIDWIFE

## 2019-04-08 PROCEDURE — 84460 ALANINE AMINO (ALT) (SGPT): CPT | Performed by: ADVANCED PRACTICE MIDWIFE

## 2019-04-08 PROCEDURE — 84075 ASSAY ALKALINE PHOSPHATASE: CPT | Performed by: ADVANCED PRACTICE MIDWIFE

## 2019-04-08 PROCEDURE — 86850 RBC ANTIBODY SCREEN: CPT | Performed by: ADVANCED PRACTICE MIDWIFE

## 2019-04-08 PROCEDURE — 84550 ASSAY OF BLOOD/URIC ACID: CPT | Performed by: ADVANCED PRACTICE MIDWIFE

## 2019-04-08 PROCEDURE — 25010000002 ONDANSETRON PER 1 MG: Performed by: NURSE ANESTHETIST, CERTIFIED REGISTERED

## 2019-04-08 PROCEDURE — 0HQ9XZZ REPAIR PERINEUM SKIN, EXTERNAL APPROACH: ICD-10-PCS | Performed by: OBSTETRICS & GYNECOLOGY

## 2019-04-08 PROCEDURE — 83615 LACTATE (LD) (LDH) ENZYME: CPT | Performed by: ADVANCED PRACTICE MIDWIFE

## 2019-04-08 PROCEDURE — C1755 CATHETER, INTRASPINAL: HCPCS | Performed by: ANESTHESIOLOGY

## 2019-04-08 PROCEDURE — C1755 CATHETER, INTRASPINAL: HCPCS

## 2019-04-08 PROCEDURE — 25010000002 FENTANYL CITRATE (PF) 100 MCG/2ML SOLUTION: Performed by: NURSE ANESTHETIST, CERTIFIED REGISTERED

## 2019-04-08 PROCEDURE — 86901 BLOOD TYPING SEROLOGIC RH(D): CPT | Performed by: ADVANCED PRACTICE MIDWIFE

## 2019-04-08 PROCEDURE — 84450 TRANSFERASE (AST) (SGOT): CPT | Performed by: ADVANCED PRACTICE MIDWIFE

## 2019-04-08 RX ORDER — TRISODIUM CITRATE DIHYDRATE AND CITRIC ACID MONOHYDRATE 500; 334 MG/5ML; MG/5ML
30 SOLUTION ORAL ONCE
Status: DISCONTINUED | OUTPATIENT
Start: 2019-04-08 | End: 2019-04-09

## 2019-04-08 RX ORDER — MAGNESIUM SULFATE HEPTAHYDRATE 40 MG/ML
2 INJECTION, SOLUTION INTRAVENOUS
Status: DISCONTINUED | OUTPATIENT
Start: 2019-04-08 | End: 2019-04-08

## 2019-04-08 RX ORDER — MAGNESIUM CARB/ALUMINUM HYDROX 105-160MG
30 TABLET,CHEWABLE ORAL ONCE
Status: DISCONTINUED | OUTPATIENT
Start: 2019-04-08 | End: 2019-04-09 | Stop reason: HOSPADM

## 2019-04-08 RX ORDER — ACETAMINOPHEN 500 MG
1000 TABLET ORAL EVERY 6 HOURS PRN
Status: DISCONTINUED | OUTPATIENT
Start: 2019-04-08 | End: 2019-04-09

## 2019-04-08 RX ORDER — LIDOCAINE HYDROCHLORIDE AND EPINEPHRINE 15; 5 MG/ML; UG/ML
INJECTION, SOLUTION EPIDURAL AS NEEDED
Status: DISCONTINUED | OUTPATIENT
Start: 2019-04-08 | End: 2019-04-09 | Stop reason: SURG

## 2019-04-08 RX ORDER — BUTORPHANOL TARTRATE 1 MG/ML
1 INJECTION, SOLUTION INTRAMUSCULAR; INTRAVENOUS
Status: DISCONTINUED | OUTPATIENT
Start: 2019-04-08 | End: 2019-04-09 | Stop reason: HOSPADM

## 2019-04-08 RX ORDER — SODIUM CHLORIDE, SODIUM LACTATE, POTASSIUM CHLORIDE, CALCIUM CHLORIDE 600; 310; 30; 20 MG/100ML; MG/100ML; MG/100ML; MG/100ML
125 INJECTION, SOLUTION INTRAVENOUS CONTINUOUS
Status: DISCONTINUED | OUTPATIENT
Start: 2019-04-08 | End: 2019-04-09

## 2019-04-08 RX ORDER — ROPIVACAINE HYDROCHLORIDE 2 MG/ML
15 INJECTION, SOLUTION EPIDURAL; INFILTRATION; PERINEURAL CONTINUOUS
Status: DISCONTINUED | OUTPATIENT
Start: 2019-04-08 | End: 2019-04-09

## 2019-04-08 RX ORDER — PENICILLIN G 3000000 [IU]/50ML
3 INJECTION, SOLUTION INTRAVENOUS EVERY 4 HOURS
Status: DISCONTINUED | OUTPATIENT
Start: 2019-04-08 | End: 2019-04-09

## 2019-04-08 RX ORDER — LIDOCAINE HYDROCHLORIDE 10 MG/ML
5 INJECTION, SOLUTION EPIDURAL; INFILTRATION; INTRACAUDAL; PERINEURAL AS NEEDED
Status: DISCONTINUED | OUTPATIENT
Start: 2019-04-08 | End: 2019-04-09 | Stop reason: HOSPADM

## 2019-04-08 RX ORDER — SODIUM CHLORIDE 0.9 % (FLUSH) 0.9 %
1-10 SYRINGE (ML) INJECTION AS NEEDED
Status: DISCONTINUED | OUTPATIENT
Start: 2019-04-08 | End: 2019-04-09 | Stop reason: HOSPADM

## 2019-04-08 RX ORDER — CETIRIZINE HYDROCHLORIDE 10 MG/1
10 TABLET ORAL DAILY
COMMUNITY
End: 2020-06-07 | Stop reason: HOSPADM

## 2019-04-08 RX ORDER — MAGNESIUM SULFATE HEPTAHYDRATE 40 MG/ML
4 INJECTION, SOLUTION INTRAVENOUS ONCE
Status: COMPLETED | OUTPATIENT
Start: 2019-04-08 | End: 2019-04-08

## 2019-04-08 RX ORDER — METOCLOPRAMIDE HYDROCHLORIDE 5 MG/ML
10 INJECTION INTRAMUSCULAR; INTRAVENOUS ONCE AS NEEDED
Status: DISCONTINUED | OUTPATIENT
Start: 2019-04-08 | End: 2019-04-09 | Stop reason: HOSPADM

## 2019-04-08 RX ORDER — EPHEDRINE SULFATE/0.9% NACL/PF 25 MG/5 ML
10 SYRINGE (ML) INTRAVENOUS
Status: DISCONTINUED | OUTPATIENT
Start: 2019-04-08 | End: 2019-04-09 | Stop reason: HOSPADM

## 2019-04-08 RX ORDER — DIPHENHYDRAMINE HYDROCHLORIDE 50 MG/ML
12.5 INJECTION INTRAMUSCULAR; INTRAVENOUS EVERY 8 HOURS PRN
Status: DISCONTINUED | OUTPATIENT
Start: 2019-04-08 | End: 2019-04-09 | Stop reason: HOSPADM

## 2019-04-08 RX ORDER — FENTANYL CITRATE 50 UG/ML
INJECTION, SOLUTION INTRAMUSCULAR; INTRAVENOUS AS NEEDED
Status: DISCONTINUED | OUTPATIENT
Start: 2019-04-08 | End: 2019-04-09 | Stop reason: SURG

## 2019-04-08 RX ORDER — SODIUM CHLORIDE 0.9 % (FLUSH) 0.9 %
3 SYRINGE (ML) INJECTION EVERY 12 HOURS SCHEDULED
Status: DISCONTINUED | OUTPATIENT
Start: 2019-04-08 | End: 2019-04-09 | Stop reason: HOSPADM

## 2019-04-08 RX ORDER — ONDANSETRON 2 MG/ML
4 INJECTION INTRAMUSCULAR; INTRAVENOUS ONCE AS NEEDED
Status: COMPLETED | OUTPATIENT
Start: 2019-04-08 | End: 2019-04-08

## 2019-04-08 RX ORDER — MAGNESIUM SULF/RINGERS LACTATE 40 G/500ML
2 PLASTIC BAG, INJECTION (ML) INTRAVENOUS CONTINUOUS
Status: DISCONTINUED | OUTPATIENT
Start: 2019-04-08 | End: 2019-04-09

## 2019-04-08 RX ORDER — OXYTOCIN-SODIUM CHLORIDE 0.9% IV SOLN 30 UNIT/500ML 30-0.9/5 UT/ML-%
2-30 SOLUTION INTRAVENOUS
Status: DISCONTINUED | OUTPATIENT
Start: 2019-04-08 | End: 2019-04-09

## 2019-04-08 RX ORDER — RANITIDINE 150 MG/1
150 TABLET ORAL 2 TIMES DAILY
COMMUNITY
End: 2019-04-11 | Stop reason: HOSPADM

## 2019-04-08 RX ADMIN — PENICILLIN G POTASSIUM 5 MILLION UNITS: 5000000 INJECTION, POWDER, FOR SOLUTION INTRAMUSCULAR; INTRAVENOUS at 09:53

## 2019-04-08 RX ADMIN — MAGNESIUM SULFATE IN WATER 4 G: 40 INJECTION, SOLUTION INTRAVENOUS at 13:09

## 2019-04-08 RX ADMIN — Medication 2 G/HR: at 13:25

## 2019-04-08 RX ADMIN — FENTANYL CITRATE 100 MCG: 50 INJECTION, SOLUTION INTRAMUSCULAR; INTRAVENOUS at 14:42

## 2019-04-08 RX ADMIN — ROPIVACAINE HYDROCHLORIDE 15 ML/HR: 2 INJECTION, SOLUTION EPIDURAL; INFILTRATION at 14:45

## 2019-04-08 RX ADMIN — ROPIVACAINE HYDROCHLORIDE 10 ML: 5 INJECTION, SOLUTION EPIDURAL; INFILTRATION; PERINEURAL at 14:44

## 2019-04-08 RX ADMIN — OXYTOCIN 2 MILLI-UNITS/MIN: 10 INJECTION INTRAVENOUS at 13:22

## 2019-04-08 RX ADMIN — SODIUM CHLORIDE, POTASSIUM CHLORIDE, SODIUM LACTATE AND CALCIUM CHLORIDE 125 ML/HR: 600; 310; 30; 20 INJECTION, SOLUTION INTRAVENOUS at 09:43

## 2019-04-08 RX ADMIN — ACETAMINOPHEN 1000 MG: 500 TABLET, FILM COATED ORAL at 11:43

## 2019-04-08 RX ADMIN — ONDANSETRON 4 MG: 2 INJECTION INTRAMUSCULAR; INTRAVENOUS at 17:00

## 2019-04-08 RX ADMIN — LIDOCAINE HYDROCHLORIDE AND EPINEPHRINE 2 ML: 15; 5 INJECTION, SOLUTION EPIDURAL at 14:42

## 2019-04-08 RX ADMIN — PENICILLIN G 3 MILLION UNITS: 3000000 INJECTION, SOLUTION INTRAVENOUS at 14:14

## 2019-04-08 RX ADMIN — LIDOCAINE HYDROCHLORIDE AND EPINEPHRINE 3 ML: 15; 5 INJECTION, SOLUTION EPIDURAL at 14:39

## 2019-04-08 RX ADMIN — PENICILLIN G 3 MILLION UNITS: 3000000 INJECTION, SOLUTION INTRAVENOUS at 18:49

## 2019-04-08 NOTE — PROGRESS NOTES
In to meet patient  Was having some decelerations  Placed an IUPC and a fetal scalp lead  Pitocin is at 10 mu/ml    FHT's:  110's-120's with minimal variablity  CTN:  Every 3-5  Will have her start pushing soon

## 2019-04-08 NOTE — ANESTHESIA PROCEDURE NOTES
Labor Epidural      Patient reassessed immediately prior to procedure    Patient location during procedure: floor  Performed By  Anesthesiologist: Evgeny Pruett MD  CRNA: Pallavi Abel CRNA  Preanesthetic Checklist  Completed: patient identified, surgical consent, pre-op evaluation, timeout performed, IV checked, risks and benefits discussed and monitors and equipment checked  Prep:  Pt Position:sitting  Sterile Tech:cap, gloves, mask and sterile barrier  Prep:DuraPrep  Monitoring:blood pressure monitoring  Epidural Block Procedure:  Approach:midline  Guidance:palpation technique  Needle Type:Tuohy  Needle Gauge:17 G  Loss of Resistance Medium: air  Loss of Resistance: 6cm  Cath Depth at skin:11 cm  Paresthesia: none  Aspiration:negative  Test Dose:negative  Number of Attempts: 1  Post Assessment:  Dressing:occlusive dressing applied and secured with tape  Pt Tolerance:patient tolerated the procedure well with no apparent complications  Complications:no

## 2019-04-08 NOTE — H&P
[facility]  Obstetric History and Physical    Chief Complaint   Patient presents with   • Rupture of Membranes       Subjective     Patient is a 33 y.o. female  currently at 36w5d, who presents with SROM at 0445 this morning.  Occasional contractions.  Baby active.    Her prenatal care is benign.  Her previous obstetric/gynecological history  is non-contributory.    The following portions of the patients history were reviewed and updated as appropriate: current medications, allergies, past medical history, past surgical history, past family history, past social history and problem list .       Prenatal Information:       External Prenatal Results     Pregnancy Outside Results - Transcribed From Office Records - See Scanned Records For Details     Test Value Date Time    Hgb 11.2 g/dL 19 1412    Hct 35.1 % 19 1412    ABO O  18 1131    Rh Positive  18 1131    Antibody Screen Negative  19 1412    Glucose Fasting GTT       Glucose Tolerance Test 1 hour       Glucose Tolerance Test 3 hour       Gonorrhea (discrete)       Chlamydia (discrete)       RPR Non-Reactive  18 1131    VDRL       Syphilis Antibody       Rubella 121.0 IU/mL 18 1131      Immune  18 1131    HBsAg Non-Reactive  18 1131    Herpes Simplex Virus PCR       Herpes Simplex VIrus Culture       HIV Non-Reactive  18 1131    Hep C RNA Quant PCR       Hep C Antibody Non-Reactive  18 1131    AFP       Group B Strep Not performed      GBS Susceptibility to Clindamycin       GBS Susceptibility to Erythromycin       Fetal Fibronectin       Genetic Testing, Maternal Blood             Drug Screening     Test Value Date Time    Urine Drug Screen       Amphetamine Screen       Barbiturate Screen       Benzodiazepine Screen       Methadone Screen       Phencyclidine Screen       Opiates Screen       THC Screen       Cocaine Screen       Propoxyphene Screen       Buprenorphine Screen        Methamphetamine Screen       Oxycodone Screen       Tricyclic Antidepressants Screen                     Past OB History:       Obstetric History       T0      L0     SAB0   TAB0   Ectopic0   Molar0   Multiple0   Live Births0       # Outcome Date GA Lbr David/2nd Weight Sex Delivery Anes PTL Lv   1 Current                   Past Medical History: Past Medical History:   Diagnosis Date   • Abnormal Pap smear of cervix    • GERD (gastroesophageal reflux disease)    • PONV (postoperative nausea and vomiting)       Past Surgical History Past Surgical History:   Procedure Laterality Date   • GASTRIC SLEEVE LAPAROSCOPIC  2016   • LEEP     • OTOPLASTY        Family History: Family History   Problem Relation Age of Onset   • Hyperlipidemia Mother    • Hypertension Mother    • Hypertension Father    • Hyperlipidemia Father    • Diabetes Paternal Grandmother    • No Known Problems Brother    • Hydrocephalus Maternal Grandmother    • Hypertension Maternal Grandmother    • Hyperlipidemia Maternal Grandmother    • Heart disease Maternal Grandfather    • Hyperlipidemia Maternal Grandfather    • Hypertension Maternal Grandfather    • Heart attack Maternal Grandfather       Social History:  reports that she has never smoked. She has never used smokeless tobacco.   reports that she does not drink alcohol.   reports that she does not use drugs.        General ROS: Pertinent items are noted in HPI, all other systems reviewed and negative    Objective     Vitals:    19 0805   BP:    Pulse:    Resp: 16   Temp: 98.1 °F (36.7 °C)     Weight: Weight:  [106 kg (233 lb)] 106 kg (233 lb)     Physical Examination:   General Appearance: alert, well appearing, in no apparent distress  Lungs: clear to auscultation, no wheezes, rales or rhonchi, symmetric air entry  Heart: regular rate and rhythm, no murmurs  Abdomen: FHT present and Gravid uterus, soft, non tender between contractions  Back exam: no CVA tenderness    Extremities: no redness or tenderness in the calves or thighs, no edema  Skin: normal coloration and turgor, no rashes      Presentation: Vertex   Cervix: Exam by: Method: sterile exam per RN   Dilation:  2-3cm   Effacement: Cervical Effacement: 70-80%   Station:  -2         Fetal Heart Rate Assessment   Method:  EFM   Beats/min: Fetal HR (beats/min): 130   Baseline: Fetal Heart Baseline Rate: normal range   Varibility: Fetal HR Variability: moderate (amplitude range 6 to 25 bpm)   Accels: Fetal HR Accelerations: greater than/equal to 15 bpm, lasting at least 15 seconds   Decels: Fetal HR Decelerations: absent   Tracing Category:       Uterine Assessment   Method: Method: external tocotransducer   Frequency (min): Contraction Frequency (Minutes): occas   Ctx Count in 10 min:     Duration:     Intensity: Contraction Intensity: mild by palpation   Intensity by IUPC:     Resting Tone: Uterine Resting Tone: soft by palpation   Resting Tone by IUPC:     [place] Units:       Laboratory Results: Pending       premature rupture of membranes (PPROM) with onset of labor within 24 hours of rupture in first trimester, antepartum    36 weeks gestation of pregnancy        Assessment:  1.  Intrauterine pregnancy at 36w5d weeks gestation with reactive, reassuring fetal status.    2.  labor  with ROM  3.  Obstetrical history  is non-contributory.  4.  GBS status: Not done  Plan:  1. fetal and uterine monitoring  continuously, labor augmentation  Pitocin, analgesia with  parenteral narcotics and epidural and antibiotic for GBS  2. Plan of care has been reviewed with patient and partner  3.  Risks, benefits of treatment plan have been discussed.  4.  All questions have been answered.        Kylee Perez CNM  2019  9:35 AM

## 2019-04-08 NOTE — PROGRESS NOTES
"04/08/19  1:04 PM  Klely Cuello Gauri      ASSESSMENTS: Starting to breathe through contractions.  Encouraged epidural if desired.    /79   Pulse 68   Temp 98.1 °F (36.7 °C) (Oral)   Resp 16   Ht 165.1 cm (65\")   Wt 106 kg (233 lb)   Breastfeeding? Yes   BMI 38.77 kg/m²     Fetal Heart Rate Assessment   Method: Fetal HR Assessment Method: external   Beats/min: Fetal HR (beats/min): 125   Baseline: Fetal Heart Baseline Rate: normal range   Varibility: Fetal HR Variability: moderate (amplitude range 6 to 25 bpm)   Accels: Fetal HR Accelerations: greater than/equal to 15 bpm, lasting at least 15 seconds   Decels: Fetal HR Decelerations: absent   Tracing Category:       Uterine Assessment   Method: Method: external tocotransducer   Frequency (min): Contraction Frequency (Minutes): x1   Ctx Count in 10 min:     Duration:     Intensity: Contraction Intensity: no contractions   Intensity by IUPC:     Resting Tone: Uterine Resting Tone: soft by palpation   Resting Tone by IUPC:     [place] Units:                                Presentation: Vertex   Cervix: Exam by: Method: sterile exam per CNM   Dilation: Cervical Dilation (cm): 4   Effacement: Cervical Effacement: 100%   Station: Fetal Station: -2            Lab Results   Component Value Date    WBC 10.66 04/08/2019    HGB 11.7 (L) 04/08/2019    HCT 36.3 04/08/2019    MCV 88.3 04/08/2019     04/08/2019    GLU 93 07/08/2016    URICACID 6.6 (H) 04/08/2019    AST 15 04/08/2019    ALT 10 04/08/2019     (H) 04/08/2019     Results from last 7 days   Lab Units 04/08/19  0943   ABO TYPING  O   RH TYPING  Positive   ANTIBODY SCREEN  Negative       PLAN:Consult with Dr Shay Beatty Magnesium sulfate 4 Gm bolus 2 Gm hour.   Pitocin Augmentation.    Kylee Perez CNM  1:04 PM  04/08/19  "

## 2019-04-08 NOTE — ANESTHESIA PREPROCEDURE EVALUATION
Anesthesia Evaluation     Patient summary reviewed and Nursing notes reviewed   history of anesthetic complications: PONV               Airway   Mallampati: III  Neck ROM: full  Dental      Pulmonary - negative pulmonary ROS   Cardiovascular     (+) hypertension,       Neuro/Psych- negative ROS  GI/Hepatic/Renal/Endo    (+)  GERD,      Musculoskeletal (-) negative ROS    Abdominal    Substance History - negative use     OB/GYN    (+) Pregnant, pregnancy induced hypertension        Other - negative ROS                     Anesthesia Plan    ASA 3     epidural     Anesthetic plan, all risks, benefits, and alternatives have been provided, discussed and informed consent has been obtained with: patient.

## 2019-04-09 LAB
BASOPHILS # BLD AUTO: 0.01 10*3/MM3 (ref 0–0.2)
BASOPHILS NFR BLD AUTO: 0.1 % (ref 0–1.5)
DEPRECATED RDW RBC AUTO: 48 FL (ref 37–54)
EOSINOPHIL # BLD AUTO: 0.15 10*3/MM3 (ref 0–0.4)
EOSINOPHIL NFR BLD AUTO: 1.1 % (ref 0.3–6.2)
ERYTHROCYTE [DISTWIDTH] IN BLOOD BY AUTOMATED COUNT: 15 % (ref 12.3–15.4)
HCT VFR BLD AUTO: 33.4 % (ref 34–46.6)
HGB BLD-MCNC: 11.2 G/DL (ref 12–15.9)
IMM GRANULOCYTES # BLD AUTO: 0.07 10*3/MM3 (ref 0–0.05)
IMM GRANULOCYTES NFR BLD AUTO: 0.5 % (ref 0–0.5)
LYMPHOCYTES # BLD AUTO: 2.08 10*3/MM3 (ref 0.7–3.1)
LYMPHOCYTES NFR BLD AUTO: 15.4 % (ref 19.6–45.3)
MCH RBC QN AUTO: 29.9 PG (ref 26.6–33)
MCHC RBC AUTO-ENTMCNC: 33.5 G/DL (ref 31.5–35.7)
MCV RBC AUTO: 89.1 FL (ref 79–97)
MONOCYTES # BLD AUTO: 0.57 10*3/MM3 (ref 0.1–0.9)
MONOCYTES NFR BLD AUTO: 4.2 % (ref 5–12)
NEUTROPHILS # BLD AUTO: 10.67 10*3/MM3 (ref 1.4–7)
NEUTROPHILS NFR BLD AUTO: 79.2 % (ref 42.7–76)
PLATELET # BLD AUTO: 215 10*3/MM3 (ref 140–450)
PMV BLD AUTO: 10.3 FL (ref 6–12)
RBC # BLD AUTO: 3.75 10*6/MM3 (ref 3.77–5.28)
WBC NRBC COR # BLD: 13.48 10*3/MM3 (ref 3.4–10.8)

## 2019-04-09 PROCEDURE — 25010000002 MAGNESIUM SULFATE-LACT RINGERS 40 GM/580ML SOLUTION: Performed by: OBSTETRICS & GYNECOLOGY

## 2019-04-09 PROCEDURE — 85025 COMPLETE CBC W/AUTO DIFF WBC: CPT | Performed by: ADVANCED PRACTICE MIDWIFE

## 2019-04-09 RX ORDER — OXYTOCIN-SODIUM CHLORIDE 0.9% IV SOLN 30 UNIT/500ML 30-0.9/5 UT/ML-%
650 SOLUTION INTRAVENOUS ONCE
Status: DISCONTINUED | OUTPATIENT
Start: 2019-04-09 | End: 2019-04-09 | Stop reason: HOSPADM

## 2019-04-09 RX ORDER — LABETALOL HYDROCHLORIDE 5 MG/ML
20-80 INJECTION, SOLUTION INTRAVENOUS
Status: ACTIVE | OUTPATIENT
Start: 2019-04-09 | End: 2019-04-10

## 2019-04-09 RX ORDER — DOCUSATE SODIUM 100 MG/1
100 CAPSULE, LIQUID FILLED ORAL 2 TIMES DAILY PRN
Status: DISCONTINUED | OUTPATIENT
Start: 2019-04-09 | End: 2019-04-11 | Stop reason: HOSPADM

## 2019-04-09 RX ORDER — MAGNESIUM SULF/RINGERS LACTATE 40 G/500ML
2 PLASTIC BAG, INJECTION (ML) INTRAVENOUS CONTINUOUS
Status: ACTIVE | OUTPATIENT
Start: 2019-04-09 | End: 2019-04-10

## 2019-04-09 RX ORDER — CARBOPROST TROMETHAMINE 250 UG/ML
250 INJECTION, SOLUTION INTRAMUSCULAR AS NEEDED
Status: DISCONTINUED | OUTPATIENT
Start: 2019-04-09 | End: 2019-04-09 | Stop reason: HOSPADM

## 2019-04-09 RX ORDER — METHYLERGONOVINE MALEATE 0.2 MG/ML
200 INJECTION INTRAVENOUS ONCE AS NEEDED
Status: DISCONTINUED | OUTPATIENT
Start: 2019-04-09 | End: 2019-04-09 | Stop reason: HOSPADM

## 2019-04-09 RX ORDER — PRENATAL VIT/IRON FUM/FOLIC AC 27MG-0.8MG
1 TABLET ORAL DAILY
Status: DISCONTINUED | OUTPATIENT
Start: 2019-04-09 | End: 2019-04-11 | Stop reason: HOSPADM

## 2019-04-09 RX ORDER — LABETALOL HYDROCHLORIDE 5 MG/ML
20 INJECTION, SOLUTION INTRAVENOUS ONCE
Status: COMPLETED | OUTPATIENT
Start: 2019-04-09 | End: 2019-04-09

## 2019-04-09 RX ORDER — MISOPROSTOL 200 UG/1
800 TABLET ORAL AS NEEDED
Status: DISCONTINUED | OUTPATIENT
Start: 2019-04-09 | End: 2019-04-09 | Stop reason: HOSPADM

## 2019-04-09 RX ORDER — SODIUM CHLORIDE 0.9 % (FLUSH) 0.9 %
1-10 SYRINGE (ML) INJECTION AS NEEDED
Status: DISCONTINUED | OUTPATIENT
Start: 2019-04-09 | End: 2019-04-11 | Stop reason: HOSPADM

## 2019-04-09 RX ORDER — OXYTOCIN-SODIUM CHLORIDE 0.9% IV SOLN 30 UNIT/500ML 30-0.9/5 UT/ML-%
85 SOLUTION INTRAVENOUS ONCE
Status: DISCONTINUED | OUTPATIENT
Start: 2019-04-09 | End: 2019-04-09 | Stop reason: HOSPADM

## 2019-04-09 RX ORDER — LANOLIN 100 %
OINTMENT (GRAM) TOPICAL
Status: DISCONTINUED | OUTPATIENT
Start: 2019-04-09 | End: 2019-04-11 | Stop reason: HOSPADM

## 2019-04-09 RX ORDER — IBUPROFEN 600 MG/1
600 TABLET ORAL EVERY 6 HOURS PRN
Status: DISCONTINUED | OUTPATIENT
Start: 2019-04-09 | End: 2019-04-11 | Stop reason: HOSPADM

## 2019-04-09 RX ORDER — IBUPROFEN 600 MG/1
600 TABLET ORAL EVERY 6 HOURS PRN
Status: DISCONTINUED | OUTPATIENT
Start: 2019-04-09 | End: 2019-04-09 | Stop reason: HOSPADM

## 2019-04-09 RX ORDER — NIFEDIPINE 30 MG/1
30 TABLET, EXTENDED RELEASE ORAL
Status: DISCONTINUED | OUTPATIENT
Start: 2019-04-09 | End: 2019-04-11 | Stop reason: HOSPADM

## 2019-04-09 RX ORDER — ONDANSETRON 2 MG/ML
4 INJECTION INTRAMUSCULAR; INTRAVENOUS EVERY 6 HOURS PRN
Status: DISCONTINUED | OUTPATIENT
Start: 2019-04-09 | End: 2019-04-11 | Stop reason: HOSPADM

## 2019-04-09 RX ADMIN — ACETAMINOPHEN 1000 MG: 500 TABLET, FILM COATED ORAL at 03:26

## 2019-04-09 RX ADMIN — ACETAMINOPHEN 1000 MG: 500 TABLET, FILM COATED ORAL at 09:05

## 2019-04-09 RX ADMIN — IBUPROFEN 600 MG: 600 TABLET, FILM COATED ORAL at 16:55

## 2019-04-09 RX ADMIN — Medication 2 G/HR: at 06:32

## 2019-04-09 RX ADMIN — WITCH HAZEL 1 PAD: 500 SOLUTION RECTAL; TOPICAL at 09:06

## 2019-04-09 RX ADMIN — PRENATAL VIT W/ FE FUMARATE-FA TAB 27-0.8 MG 1 TABLET: 27-0.8 TAB at 09:39

## 2019-04-09 RX ADMIN — NIFEDIPINE 30 MG: 30 TABLET, FILM COATED, EXTENDED RELEASE ORAL at 18:02

## 2019-04-09 RX ADMIN — Medication: at 09:06

## 2019-04-09 RX ADMIN — IBUPROFEN 600 MG: 600 TABLET, FILM COATED ORAL at 11:46

## 2019-04-09 RX ADMIN — LABETALOL 20 MG/4 ML (5 MG/ML) INTRAVENOUS SYRINGE 20 MG: at 17:07

## 2019-04-09 RX ADMIN — IBUPROFEN 600 MG: 600 TABLET, FILM COATED ORAL at 22:59

## 2019-04-09 NOTE — LACTATION NOTE
This note was copied from a baby's chart.     04/09/19 1240   Nutrition   Feeding Method breastfeeding   Feeding Tolerance/Success arousal required;coordinated suck;coordinated swallow;sleepy   Satiety Cues cessation of sucking   Feeding Interventions arousal required;latch assistance provided;other (see comments)  (nipple shield used to keep baby actively sucking)   Nutrition Interventions lactation consult initiated   Additional Documentation LATCH Score (Group)   Breastfeeding Session   Breastfeeding Time, Left (min) 10   Breastfeeding breastfeeding, bilateral   Infant Positioning cross-cradle;clutch/football   Effective Latch During Feeding yes   Suck/Swallow Coordination present   Signs of Milk Transfer audible swallow   LATCH Score   Latch 1-->repeated attempts, holds nipple in mouth, stimulate to suck   Audible Swallowing 1-->a few with stimulation   Type of Nipple 2-->everted (after stimulation)   Comfort (Breast/Nipple) 2-->soft/nontender   Hold (Positioning) 1-->minimal assist, teach one side, mother does other, staff holds   Latch Score 7

## 2019-04-09 NOTE — L&D DELIVERY NOTE
[facility]  Vaginal Delivery Note    Delivery     Delivery: Vaginal, Spontaneous     YOB: 2018    Time of Birth:  Gestational Age 9:58 PM   36w5d     Anesthesia:    Epidural    Delivering clinician:   MATT Parada CNM   Forceps?   No   Vacuum? No    Shoulder dystocia present: No        Delivery narrative:  Patient at 36w5d pushed to deliver a viable female infant over a first degree laceration with epidural anesthesia. Infant's head delivered atraumatically. After delivery of the fetal head, a loose nuchal cord was found and reduced. The anterior shoulder and body delivered atraumatically. Vigorous infant was placed on mom's abdomen where one minute of delayed cord clamping was allowed and the cord was milked x4. The cord was then clamped x2 and cut by FOB. Placenta delivered spontaneously and appeared to be intact. An accessory lobe was noted. Laceration repaired with 2-0 Vicryl rapide.  ml. Mother and infant stable, bonding.         Infant    Findings: female  infant     Infant observations: Weight: 2930 g (6 lb 7.4 oz)   Length: 19  in  Observations/Comments:         Apgars:    @ 1 minute /    9   @ 5 minutes   Infant Name: Chioma Cuello     Placenta, Cord, and Fluid    Placenta delivered  Spontaneous  at   4/8/2019 10:07 PM     Cord: 3 vessels  present.   Nuchal Cord?  yes; Number of nuchal loops present:  1    Cord blood obtained: Yes    Cord gases obtained:  No              Repair    Episiotomy: None    Lacerations: Yes  Laceration Information  Laceration Repaired?   Perineal: 1st  Yes    Periurethral:         Labial:         Sulcus:         Vaginal:         Cervical:           Suture used for repair: 2-0 Vicryl rapide   Estimated Blood Loss:  400 ml           Complications  hypertension    Disposition  Mother to Remain in LD/transfer to APU  in stable condition currently.  Patient will remain on Magnesium sulfate for 24 hours.   Baby to remains with mom  in stable condition  currently.      Dory Parada CNM  04/08/19  10:39 PM

## 2019-04-09 NOTE — PROGRESS NOTES
4/9/2019  PPD #1    Subjective   Kelly reported feeling well this am but has developed a headache this afternoon. She is currently on magnesium sulfate.   Patient describes her lochia as less than menses.  Pain is well controlled       Objective   Temp: Temp:  [97.7 °F (36.5 °C)-98.6 °F (37 °C)] 98.1 °F (36.7 °C) Temp src: Oral   BP: BP: (133-171)/() 136/76        Pulse: Heart Rate:  [] 78  RR: Resp:  [16-18] 18    General:  No acute distress   Abdomen: Fundus firm and beneath umbilicus   Pelvis: deferred     Lab Results   Component Value Date    WBC 13.48 (H) 04/09/2019    HGB 11.2 (L) 04/09/2019    HCT 33.4 (L) 04/09/2019    MCV 89.1 04/09/2019     04/09/2019    GLU 93 07/08/2016    URICACID 6.6 (H) 04/08/2019    AST 15 04/08/2019    ALT 10 04/08/2019     (H) 04/08/2019    HEPBSAG Non-Reactive 09/25/2018     Results from last 7 days   Lab Units 04/08/19  0943   ABO TYPING  O   RH TYPING  Positive   ANTIBODY SCREEN  Negative       Assessment  1. PPD# 1 after vaginal delivery  2.  Preeclampsia:  Continue magnesium sulfate, labetalol protocol and scheduled procardia.      This note has been electronically signed.    Radha Chaudhry MD  5:25 PM  April 9, 2019

## 2019-04-09 NOTE — ANESTHESIA POSTPROCEDURE EVALUATION
Patient: Kelly Astorga    Procedure Summary     Date:  04/08/19 Room / Location:      Anesthesia Start:  1433 Anesthesia Stop:  2158    Procedure:  LABOR ANALGESIA Diagnosis:      Scheduled Providers:   Provider:  Evgeny Pruett MD    Anesthesia Type:  epidural ASA Status:  3          Anesthesia Type: epidural  Last vitals  BP   139/83 (04/09/19 0622)   Temp   97.8 °F (36.6 °C) (04/09/19 0425)   Pulse   107 (04/09/19 0622)   Resp   16 (04/09/19 0622)     SpO2         Post Anesthesia Care and Evaluation    Patient location during evaluation: bedside  Patient participation: complete - patient participated  Level of consciousness: awake and awake and alert  Pain score: 0  Pain management: satisfactory to patient  Airway patency: patent  Anesthetic complications: No anesthetic complications  PONV Status: none  Cardiovascular status: acceptable  Respiratory status: acceptable  Hydration status: acceptable  Post Neuraxial Block status: Motor and sensory function returned to baseline and No signs or symptoms of PDPH

## 2019-04-09 NOTE — LACTATION NOTE
04/09/19 1240   Maternal Information   Date of Referral 04/09/19   Person Making Referral other (see comments);nurse  (courtesy)   Maternal Reason for Referral breastfeeding currently   Infant Reason for Referral 35-37 weeks gestation   Maternal Assessment   Breast Size Issue none   Breast Shape wide;angled   Breast Density soft   Nipples Bilateral:;everted   Maternal Infant Feeding   Maternal Emotional State anxious;assist needed   Infant Positioning cross-cradle;clutch/football   Signs of Milk Transfer audible swallow   Pain with Feeding no   Comfort Measures Before/During Feeding infant position adjusted;latch adjusted;maternal position adjusted   Nipple Shape After Feeding, Left Breast round;symmetrical;appropriately projected   Nipple Shape After Feeding, Right round;symmetrical;appropriately projected   Latch Assistance yes   Equipment Type   Breast Pump Type double electric, personal   Breast Pump Flange Type hard   Breast Pump Flange Size 24 mm   Reproductive Interventions   Breast Care: Breastfeeding frequency of feeding adjusted;nipple shield utilized   Breastfeeding Assistance assisted with positioning;electric breast pump used;feeding cue recognition promoted;feeding on demand promoted;feeding session observed;infant latch-on verified;infant stimulated to wakeful state;infant suck/swallow verified;nipple shield utilized;support offered   Breastfeeding Support diary/feeding log utilized;encouragement provided;lactation counseling provided   Breast Pumping   Breast Pumping Interventions post-feed pumping encouraged;frequent pumping encouraged   Breast Pumping double electric breast pump utilized

## 2019-04-10 RX ADMIN — IBUPROFEN 600 MG: 600 TABLET, FILM COATED ORAL at 05:36

## 2019-04-10 RX ADMIN — WITCH HAZEL 1 PAD: 500 SOLUTION RECTAL; TOPICAL at 11:23

## 2019-04-10 RX ADMIN — IBUPROFEN 600 MG: 600 TABLET, FILM COATED ORAL at 13:24

## 2019-04-10 RX ADMIN — NIFEDIPINE 30 MG: 30 TABLET, FILM COATED, EXTENDED RELEASE ORAL at 18:31

## 2019-04-10 RX ADMIN — HYDROCORTISONE 2.5% 1 APPLICATION: 25 CREAM TOPICAL at 11:23

## 2019-04-10 RX ADMIN — IBUPROFEN 600 MG: 600 TABLET, FILM COATED ORAL at 19:34

## 2019-04-10 NOTE — PLAN OF CARE
Problem: Patient Care Overview  Goal: Plan of Care Review  Outcome: Ongoing (interventions implemented as appropriate)   04/10/19 1410   Coping/Psychosocial   Plan of Care Reviewed With patient   Plan of Care Review   Progress no change   OTHER   Outcome Summary u/ small lochia     Goal: Individualization and Mutuality  Outcome: Ongoing (interventions implemented as appropriate)    Goal: Discharge Needs Assessment  Outcome: Ongoing (interventions implemented as appropriate)    Goal: Interprofessional Rounds/Family Conf  Outcome: Ongoing (interventions implemented as appropriate)      Problem: Breastfeeding (Adult,Obstetrics,Pediatric)  Goal: Signs and Symptoms of Listed Potential Problems Will be Absent, Minimized or Managed (Breastfeeding)  Outcome: Ongoing (interventions implemented as appropriate)      Problem: Postpartum (Vaginal Delivery) (Adult,Obstetrics,Pediatric)  Goal: Signs and Symptoms of Listed Potential Problems Will be Absent, Minimized or Managed (Postpartum)  Outcome: Ongoing (interventions implemented as appropriate)

## 2019-04-10 NOTE — PLAN OF CARE
Problem: Patient Care Overview  Goal: Plan of Care Review  Outcome: Ongoing (interventions implemented as appropriate)   04/10/19 0221   Coping/Psychosocial   Plan of Care Reviewed With patient;spouse   Plan of Care Review   Progress improving   OTHER   Outcome Summary VSS, PP assessment WNL, pain well controlled with Ibuprofen, breastfeeding well and pumping.      Goal: Individualization and Mutuality  Outcome: Ongoing (interventions implemented as appropriate)    Goal: Discharge Needs Assessment  Outcome: Ongoing (interventions implemented as appropriate)      Problem: Breastfeeding (Adult,Obstetrics,Pediatric)  Goal: Signs and Symptoms of Listed Potential Problems Will be Absent, Minimized or Managed (Breastfeeding)  Outcome: Ongoing (interventions implemented as appropriate)      Problem: Postpartum (Vaginal Delivery) (Adult,Obstetrics,Pediatric)  Goal: Signs and Symptoms of Listed Potential Problems Will be Absent, Minimized or Managed (Postpartum)  Outcome: Ongoing (interventions implemented as appropriate)

## 2019-04-10 NOTE — PROGRESS NOTES
4/10/2019    Name:Kelly Astorga    MR#:[id number]    PPD2 Vaginal delivery     Subjective   33 y.o. yo Female  s/p CS at 36w5d doing well. Pain well controlled, lochia appropriate, tolerating diet. Denies headache, visual change or epigastric pain. Edema continues. S/p 24 hours magnesium sulfate.     Patient Active Problem List   Diagnosis   • S/P laparoscopic sleeve gastrectomy   •  (normal spontaneous vaginal delivery)        Objective    Vitals  Temp:  Temp:  [98 °F (36.7 °C)-98.1 °F (36.7 °C)] 98 °F (36.7 °C)  Temp src: Oral  BP:  BP: (133-171)/(71-98) 138/79  Pulse:  Heart Rate:  [75-97] 81  RR:   Resp:  [16-20] 20    General Awake, alert, no distress  Abdomen Soft, non-distended, fundus firm, below umbilicus, appropriately tender  Extremities Calves NT bilaterally     I/O last 3 completed shifts:  In: -   Out: 6629 [Urine:6300; Blood:329]    Lab Results   Component Value Date    WBC 13.48 (H) 2019    HGB 11.2 (L) 2019    HCT 33.4 (L) 2019    MCV 89.1 2019     2019    GLU 93 2016    URICACID 6.6 (H) 2019    AST 15 2019    ALT 10 2019     (H) 2019    HEPBSAG Non-Reactive 2018     Results from last 7 days   Lab Units 19  0943   ABO TYPING  O   RH TYPING  Positive   ANTIBODY SCREEN  Negative       Infant: female       Assessment   1.  PPD2  s/p magnesium sulfate for preeclampsia. BP stable on scheduled procardia.     Plan: Doing well.  Continue to monitor bp values.           Radha Chaudhry MD  4/10/2019 8:02 AM     Chart amended at 17:43 to reflect vaginal delivery not  delivery.

## 2019-04-11 VITALS
TEMPERATURE: 98.6 F | HEART RATE: 82 BPM | BODY MASS INDEX: 38.82 KG/M2 | DIASTOLIC BLOOD PRESSURE: 63 MMHG | HEIGHT: 65 IN | RESPIRATION RATE: 16 BRPM | SYSTOLIC BLOOD PRESSURE: 135 MMHG | WEIGHT: 233 LBS

## 2019-04-11 PROBLEM — O14.93 PRE-ECLAMPSIA IN THIRD TRIMESTER: Status: ACTIVE | Noted: 2019-04-11

## 2019-04-11 RX ORDER — NIFEDIPINE 30 MG/1
30 TABLET, FILM COATED, EXTENDED RELEASE ORAL
Qty: 30 TABLET | Refills: 1 | Status: SHIPPED | OUTPATIENT
Start: 2019-04-11 | End: 2019-08-16

## 2019-04-11 RX ORDER — IBUPROFEN 600 MG/1
600 TABLET ORAL EVERY 6 HOURS PRN
Qty: 20 TABLET | Refills: 0 | Status: SHIPPED | OUTPATIENT
Start: 2019-04-11 | End: 2019-10-12

## 2019-04-11 RX ADMIN — PRENATAL VIT W/ FE FUMARATE-FA TAB 27-0.8 MG 1 TABLET: 27-0.8 TAB at 07:54

## 2019-04-11 RX ADMIN — DOCUSATE SODIUM 100 MG: 100 CAPSULE, LIQUID FILLED ORAL at 07:54

## 2019-04-11 RX ADMIN — IBUPROFEN 600 MG: 600 TABLET, FILM COATED ORAL at 06:20

## 2019-04-11 NOTE — LACTATION NOTE
Mother states infant NW without shield. Reviewed ss adq infant l/o, positioning and suck. Reviewed importance of BF freq, duration and ss adq infant intake. Instructed to call if questions or concerns. VU

## 2019-04-11 NOTE — PROGRESS NOTES
4/11/2019  PPD #3    Subjective   Kelly feels well.  Denies headache, visual change, epigastric pain.   Patient describes her lochia as less than menses.  Pain is well controlled       Objective   Temp: Temp:  [97.7 °F (36.5 °C)-99 °F (37.2 °C)] 97.7 °F (36.5 °C) Temp src: Oral   BP: BP: (110-145)/(55-85) 140/85        Pulse: Heart Rate:  [77-99] 77  RR: Resp:  [16-18] 16    General:  No acute distress   Abdomen: Fundus firm and beneath umbilicus   Pelvis: deferred     Lab Results   Component Value Date    WBC 13.48 (H) 04/09/2019    HGB 11.2 (L) 04/09/2019    HCT 33.4 (L) 04/09/2019    MCV 89.1 04/09/2019     04/09/2019    HEPBSAG Non-Reactive 09/25/2018    AST 15 04/08/2019    URICACID 6.6 (H) 04/08/2019    GLU 93 07/08/2016       Assessment  1. PPD# 3 after vaginal delivery, preeclampsia s/p magnesium sulfate on daily procardia.     Plan  1. Discharge to home  2. Follow up with [facility]  in 1-2  weeks        This note has been electronically signed.    Radha Chaudhry MD  7:35 AM  April 11, 2019

## 2019-04-11 NOTE — DISCHARGE SUMMARY
Psychiatric  Vaginal delivery discharge summary      Patient: Kelly Astorga      MR#:[id number]  Admission  Diagnosis:   Patient Active Problem List   Diagnosis   • S/P laparoscopic sleeve gastrectomy   •  (normal spontaneous vaginal delivery)   • Pre-eclampsia in third trimester     Discharge Diagnosis: vaginal delivery preeclampsia    Date of Admission: 2019  Date of Discharge:  2019    Procedures:  Vaginal, Spontaneous     2019    9:58 PM      Service:  Obstetrics    Hospital Course:  Patient underwent vaginal delivery.  She was diagnosed with preeclampsia and received 24 hours of magnesium sulfate.  Her bp was controlled on scheduled nifedipine. She remained in the hospital for 3 days following delivery.  During that time she remained afebrile and hemodynamically stable.  On the day of discharge, she was eating, ambulating and voiding without difficulty.      Labs:    Lab Results   Component Value Date    WBC 13.48 (H) 2019    HGB 11.2 (L) 2019    HCT 33.4 (L) 2019    MCV 89.1 2019     2019    GLU 93 2016    URICACID 6.6 (H) 2019    AST 15 2019    ALT 10 2019     (H) 2019     Results from last 7 days   Lab Units 19  0943   ABO TYPING  O   RH TYPING  Positive   ANTIBODY SCREEN  Negative       Discharge Medications     Discharge Medications      New Medications      Instructions Start Date   ibuprofen 600 MG tablet  Commonly known as:  ADVIL,MOTRIN   600 mg, Oral, Every 6 Hours PRN      NIFEdipine CC 30 MG 24 hr tablet  Commonly known as:  ADALAT CC   30 mg, Oral, Every 24 Hours Scheduled         Continue These Medications      Instructions Start Date   cetirizine 10 MG tablet  Commonly known as:  zyrTEC   10 mg, Oral, Daily      PNV PRENATAL PLUS MULTIVITAMIN 27-1 MG tablet   1 tablet, Oral, Daily,          Stop These Medications    raNITIdine 150 MG tablet  Commonly known as:  ZANTAC             Discharge Disposition:  To Home    Discharge Condition:  Stable    Discharge Diet: Regular    Activity at Discharge: Pelvic rest    Follow-up Appointments  Follow up with [facility] in 4-6 weeks.     Radha Chaudhry MD  04/11/19  7:39 AM

## 2019-04-11 NOTE — PLAN OF CARE
Problem: Patient Care Overview  Goal: Individualization and Mutuality  Outcome: Outcome(s) achieved Date Met: 04/11/19    Goal: Discharge Needs Assessment  Outcome: Outcome(s) achieved Date Met: 04/11/19    Goal: Interprofessional Rounds/Family Conf  Outcome: Outcome(s) achieved Date Met: 04/11/19      Problem: Labor (Cervical Ripen, Induct, Augment) (Adult,Obstetrics,Pediatric)  Goal: Signs and Symptoms of Listed Potential Problems Will be Absent, Minimized or Managed (Labor)  Outcome: Outcome(s) achieved Date Met: 04/11/19      Problem: Breastfeeding (Adult,Obstetrics,Pediatric)  Goal: Signs and Symptoms of Listed Potential Problems Will be Absent, Minimized or Managed (Breastfeeding)  Outcome: Outcome(s) achieved Date Met: 04/11/19      Problem: Postpartum (Vaginal Delivery) (Adult,Obstetrics,Pediatric)  Goal: Signs and Symptoms of Listed Potential Problems Will be Absent, Minimized or Managed (Postpartum)  Outcome: Outcome(s) achieved Date Met: 04/11/19

## 2019-04-13 ENCOUNTER — APPOINTMENT (OUTPATIENT)
Dept: CT IMAGING | Facility: HOSPITAL | Age: 34
End: 2019-04-13

## 2019-04-13 ENCOUNTER — HOSPITAL ENCOUNTER (EMERGENCY)
Facility: HOSPITAL | Age: 34
Discharge: HOME | End: 2019-04-13
Attending: EMERGENCY MEDICINE | Admitting: EMERGENCY MEDICINE

## 2019-04-13 VITALS
SYSTOLIC BLOOD PRESSURE: 147 MMHG | BODY MASS INDEX: 36.32 KG/M2 | HEART RATE: 101 BPM | WEIGHT: 218 LBS | TEMPERATURE: 98.8 F | HEIGHT: 65 IN | DIASTOLIC BLOOD PRESSURE: 93 MMHG | RESPIRATION RATE: 18 BRPM | OXYGEN SATURATION: 99 %

## 2019-04-13 DIAGNOSIS — R00.0 TACHYCARDIA: Primary | ICD-10-CM

## 2019-04-13 LAB
ALBUMIN SERPL-MCNC: 3.8 G/DL (ref 3.5–5)
ALBUMIN/GLOB SERPL: 1.2 G/DL (ref 1–2)
ALP SERPL-CCNC: 137 U/L (ref 38–126)
ALT SERPL W P-5'-P-CCNC: 29 U/L (ref 13–69)
ANION GAP SERPL CALCULATED.3IONS-SCNC: 13.7 MMOL/L (ref 10–20)
AST SERPL-CCNC: 29 U/L (ref 15–46)
BASOPHILS # BLD AUTO: 0.05 10*3/MM3 (ref 0–0.2)
BASOPHILS NFR BLD AUTO: 0.4 % (ref 0–1.5)
BILIRUB SERPL-MCNC: 0.2 MG/DL (ref 0.2–1.3)
BUN BLD-MCNC: 12 MG/DL (ref 7–20)
BUN/CREAT SERPL: 17.1 (ref 7.1–23.5)
CALCIUM SPEC-SCNC: 9.3 MG/DL (ref 8.4–10.2)
CHLORIDE SERPL-SCNC: 104 MMOL/L (ref 98–107)
CO2 SERPL-SCNC: 25 MMOL/L (ref 26–30)
CREAT BLD-MCNC: 0.7 MG/DL (ref 0.6–1.3)
DEPRECATED RDW RBC AUTO: 47.7 FL (ref 37–54)
EOSINOPHIL # BLD AUTO: 0.35 10*3/MM3 (ref 0–0.4)
EOSINOPHIL NFR BLD AUTO: 3.1 % (ref 0.3–6.2)
ERYTHROCYTE [DISTWIDTH] IN BLOOD BY AUTOMATED COUNT: 14.7 % (ref 12.3–15.4)
GFR SERPL CREATININE-BSD FRML MDRD: 97 ML/MIN/1.73
GLOBULIN UR ELPH-MCNC: 3.2 GM/DL
GLUCOSE BLD-MCNC: 108 MG/DL (ref 74–98)
HCT VFR BLD AUTO: 35.1 % (ref 34–46.6)
HGB BLD-MCNC: 11.5 G/DL (ref 12–15.9)
IMM GRANULOCYTES # BLD AUTO: 0.16 10*3/MM3 (ref 0–0.05)
IMM GRANULOCYTES NFR BLD AUTO: 1.4 % (ref 0–0.5)
LYMPHOCYTES # BLD AUTO: 2.98 10*3/MM3 (ref 0.7–3.1)
LYMPHOCYTES NFR BLD AUTO: 26.7 % (ref 19.6–45.3)
MAGNESIUM SERPL-MCNC: 1.8 MG/DL (ref 1.6–2.3)
MCH RBC QN AUTO: 29.2 PG (ref 26.6–33)
MCHC RBC AUTO-ENTMCNC: 32.8 G/DL (ref 31.5–35.7)
MCV RBC AUTO: 89.1 FL (ref 79–97)
MONOCYTES # BLD AUTO: 0.65 10*3/MM3 (ref 0.1–0.9)
MONOCYTES NFR BLD AUTO: 5.8 % (ref 5–12)
NEUTROPHILS # BLD AUTO: 6.96 10*3/MM3 (ref 1.4–7)
NEUTROPHILS NFR BLD AUTO: 62.6 % (ref 42.7–76)
NRBC BLD AUTO-RTO: 0 /100 WBC (ref 0–0)
PLATELET # BLD AUTO: 232 10*3/MM3 (ref 140–450)
PMV BLD AUTO: 8.8 FL (ref 6–12)
POTASSIUM BLD-SCNC: 3.7 MMOL/L (ref 3.5–5.1)
PROT SERPL-MCNC: 7 G/DL (ref 6.3–8.2)
RBC # BLD AUTO: 3.94 10*6/MM3 (ref 3.77–5.28)
SODIUM BLD-SCNC: 139 MMOL/L (ref 137–145)
TROPONIN I SERPL-MCNC: <0.012 NG/ML (ref 0–0.03)
WBC NRBC COR # BLD: 11.15 10*3/MM3 (ref 3.4–10.8)

## 2019-04-13 PROCEDURE — 84484 ASSAY OF TROPONIN QUANT: CPT | Performed by: EMERGENCY MEDICINE

## 2019-04-13 PROCEDURE — 71275 CT ANGIOGRAPHY CHEST: CPT

## 2019-04-13 PROCEDURE — 96374 THER/PROPH/DIAG INJ IV PUSH: CPT

## 2019-04-13 PROCEDURE — 85025 COMPLETE CBC W/AUTO DIFF WBC: CPT | Performed by: EMERGENCY MEDICINE

## 2019-04-13 PROCEDURE — 93005 ELECTROCARDIOGRAM TRACING: CPT | Performed by: EMERGENCY MEDICINE

## 2019-04-13 PROCEDURE — 99284 EMERGENCY DEPT VISIT MOD MDM: CPT

## 2019-04-13 PROCEDURE — 83735 ASSAY OF MAGNESIUM: CPT | Performed by: EMERGENCY MEDICINE

## 2019-04-13 PROCEDURE — 96361 HYDRATE IV INFUSION ADD-ON: CPT

## 2019-04-13 PROCEDURE — 25010000002 IOPAMIDOL 61 % SOLUTION: Performed by: EMERGENCY MEDICINE

## 2019-04-13 PROCEDURE — 93005 ELECTROCARDIOGRAM TRACING: CPT

## 2019-04-13 PROCEDURE — 80053 COMPREHEN METABOLIC PANEL: CPT | Performed by: EMERGENCY MEDICINE

## 2019-04-13 RX ORDER — DILTIAZEM HYDROCHLORIDE 5 MG/ML
10 INJECTION INTRAVENOUS ONCE
Status: COMPLETED | OUTPATIENT
Start: 2019-04-13 | End: 2019-04-13

## 2019-04-13 RX ORDER — SODIUM CHLORIDE 0.9 % (FLUSH) 0.9 %
10 SYRINGE (ML) INJECTION AS NEEDED
Status: DISCONTINUED | OUTPATIENT
Start: 2019-04-13 | End: 2019-04-14 | Stop reason: HOSPADM

## 2019-04-13 RX ADMIN — IOPAMIDOL 100 ML: 612 INJECTION, SOLUTION INTRAVENOUS at 23:03

## 2019-04-13 RX ADMIN — SODIUM CHLORIDE 1000 ML: 9 INJECTION, SOLUTION INTRAVENOUS at 21:34

## 2019-04-13 RX ADMIN — DILTIAZEM HYDROCHLORIDE 10 MG: 5 INJECTION INTRAVENOUS at 21:20

## 2019-04-14 NOTE — ED PROVIDER NOTES
Subjective   History of Present Illness   33-year-old female who is 1 week status post delivery complicated by preeclampsia requiring 24 hours of magnesium post delivery now presenting with palpitations and racing heart rate.  On arrival, her heart rate was 162.  This is despite taking nifedipine daily since delivery.  Denies any active chest pain, shortness of breath, or other symptoms other than anxiety related to her rapid heart rate.    Review of Systems   Cardiovascular: Positive for palpitations.   All other systems reviewed and are negative.      Past Medical History:   Diagnosis Date   • Abnormal Pap smear of cervix    • GERD (gastroesophageal reflux disease)    • PONV (postoperative nausea and vomiting)        No Known Allergies    Past Surgical History:   Procedure Laterality Date   • GASTRIC SLEEVE LAPAROSCOPIC  09/2016   • LEEP  2013   • OTOPLASTY  2005       Family History   Problem Relation Age of Onset   • Hyperlipidemia Mother    • Hypertension Mother    • Hypertension Father    • Hyperlipidemia Father    • Diabetes Paternal Grandmother    • No Known Problems Brother    • Hydrocephalus Maternal Grandmother    • Hypertension Maternal Grandmother    • Hyperlipidemia Maternal Grandmother    • Heart disease Maternal Grandfather    • Hyperlipidemia Maternal Grandfather    • Hypertension Maternal Grandfather    • Heart attack Maternal Grandfather        Social History     Socioeconomic History   • Marital status:      Spouse name: Not on file   • Number of children: Not on file   • Years of education: Not on file   • Highest education level: Not on file   Tobacco Use   • Smoking status: Never Smoker   • Smokeless tobacco: Never Used   Substance and Sexual Activity   • Alcohol use: No   • Drug use: No           Objective   Physical Exam   Constitutional: She is oriented to person, place, and time. She appears well-developed and well-nourished. No distress.   HENT:   Head: Normocephalic.    Mouth/Throat: Oropharynx is clear and moist. No oropharyngeal exudate.   Eyes: No scleral icterus.   Neck: Neck supple. No tracheal deviation present.   Cardiovascular: Regular rhythm, normal heart sounds and intact distal pulses. Exam reveals no gallop and no friction rub.   No murmur heard.  Tachycardic, regular   Pulmonary/Chest: Effort normal and breath sounds normal. No stridor. No respiratory distress. She has no wheezes. She has no rales.   Abdominal: Soft. She exhibits no distension and no mass. There is no tenderness. There is no rebound and no guarding. No hernia.   Musculoskeletal: She exhibits no edema or deformity.   Neurological: She is alert and oriented to person, place, and time.   Skin: Skin is warm and dry. She is not diaphoretic. No erythema. No pallor.   Psychiatric: Her behavior is normal.   Anxious   Nursing note and vitals reviewed.      Procedures           ED Course  ED Course as of Apr 13 2340   Sat Apr 13, 2019 2138 EKG: Interpreted by me.  Tachycardia with a rate of 145 with what appear to be P waves throughout.  Appears to be either sinus tachycardia or atrial flutter.  No ST elevation or depression.  Intervals appear normal.  [AI]   2155 Repeat EKG: Interpreted by me.  Sinus tachycardia with a rate of 116, low voltage to the chest leads.  Normal intervals.  No ST elevation or depression.  Abnormal EKG.  [AI]      ED Course User Index  [AI] Ricki Diaz MD                  [facility]   33-year-old female here with palpitations in the setting of a rapid heart rate 1 week status post delivery.  Afebrile, vital signs are normal for heart rate initially 162 now 120s status post 10 mg of diltiazem IV.  Her EKG does appear to show either sinus tachycardia or atrial fibrillation, more likely the former.  Will give fluids, send lab work, get a CT scan to evaluate for blood clot and reassess.    11:40 PM labs and CT scan are unremarkable.  Heart rate is in the 100s-110.  Patient feels much  more comfortable and no longer appears anxious.  Offered to call her on-call OB/GYN and stated patient states that she feels comfortable not doing this and she will follow-up with him on her own.  Discussed return to care precautions and will discharge home.    Final diagnoses:   Tachycardia            Ricki Diaz MD  04/13/19 4851

## 2019-04-17 ENCOUNTER — LAB (OUTPATIENT)
Dept: LAB | Facility: HOSPITAL | Age: 34
End: 2019-04-17

## 2019-04-17 ENCOUNTER — TRANSCRIBE ORDERS (OUTPATIENT)
Dept: LAB | Facility: HOSPITAL | Age: 34
End: 2019-04-17

## 2019-04-17 DIAGNOSIS — R50.9 FEVER, UNSPECIFIED FEVER CAUSE: Primary | ICD-10-CM

## 2019-04-17 DIAGNOSIS — R50.9 FEVER, UNSPECIFIED FEVER CAUSE: ICD-10-CM

## 2019-04-17 LAB
ALP SERPL-CCNC: 130 U/L (ref 39–117)
ALT SERPL W P-5'-P-CCNC: 21 U/L (ref 1–33)
ANION GAP SERPL CALCULATED.3IONS-SCNC: 17.2 MMOL/L
AST SERPL-CCNC: 19 U/L (ref 1–32)
BASOPHILS # BLD AUTO: 0.03 10*3/MM3 (ref 0–0.2)
BASOPHILS NFR BLD AUTO: 0.3 % (ref 0–1.5)
BILIRUB SERPL-MCNC: 0.3 MG/DL (ref 0.2–1.2)
BUN BLD-MCNC: 7 MG/DL (ref 6–20)
BUN/CREAT SERPL: 8.3 (ref 7–25)
CALCIUM SPEC-SCNC: 9.6 MG/DL (ref 8.6–10.5)
CHLORIDE SERPL-SCNC: 100 MMOL/L (ref 98–107)
CO2 SERPL-SCNC: 23.8 MMOL/L (ref 22–29)
CREAT BLD-MCNC: 0.63 MG/DL (ref 0.57–1)
CREAT BLD-MCNC: 0.84 MG/DL (ref 0.57–1)
DEPRECATED RDW RBC AUTO: 48.8 FL (ref 37–54)
EOSINOPHIL # BLD AUTO: 0.04 10*3/MM3 (ref 0–0.4)
EOSINOPHIL NFR BLD AUTO: 0.5 % (ref 0.3–6.2)
ERYTHROCYTE [DISTWIDTH] IN BLOOD BY AUTOMATED COUNT: 14.7 % (ref 12.3–15.4)
GFR SERPL CREATININE-BSD FRML MDRD: 79 ML/MIN/1.73
GLUCOSE BLD-MCNC: 84 MG/DL (ref 65–99)
HCT VFR BLD AUTO: 40.6 % (ref 34–46.6)
HGB BLD-MCNC: 12.7 G/DL (ref 12–15.9)
IMM GRANULOCYTES # BLD AUTO: 0.09 10*3/MM3 (ref 0–0.05)
IMM GRANULOCYTES NFR BLD AUTO: 1 % (ref 0–0.5)
LDH SERPL-CCNC: 240 U/L (ref 135–214)
LYMPHOCYTES # BLD AUTO: 1.22 10*3/MM3 (ref 0.7–3.1)
LYMPHOCYTES NFR BLD AUTO: 13.9 % (ref 19.6–45.3)
MCH RBC QN AUTO: 28.5 PG (ref 26.6–33)
MCHC RBC AUTO-ENTMCNC: 31.3 G/DL (ref 31.5–35.7)
MCV RBC AUTO: 91 FL (ref 79–97)
MONOCYTES # BLD AUTO: 0.42 10*3/MM3 (ref 0.1–0.9)
MONOCYTES NFR BLD AUTO: 4.8 % (ref 5–12)
NEUTROPHILS # BLD AUTO: 6.99 10*3/MM3 (ref 1.4–7)
NEUTROPHILS NFR BLD AUTO: 79.5 % (ref 42.7–76)
NRBC BLD AUTO-RTO: 0.1 /100 WBC (ref 0–0)
PLATELET # BLD AUTO: 275 10*3/MM3 (ref 140–450)
PMV BLD AUTO: 9.3 FL (ref 6–12)
POTASSIUM BLD-SCNC: 3.9 MMOL/L (ref 3.5–5.2)
RBC # BLD AUTO: 4.46 10*6/MM3 (ref 3.77–5.28)
SODIUM BLD-SCNC: 141 MMOL/L (ref 136–145)
URATE SERPL-MCNC: 5.7 MG/DL (ref 2.4–5.7)
WBC NRBC COR # BLD: 8.79 10*3/MM3 (ref 3.4–10.8)

## 2019-04-17 PROCEDURE — 84460 ALANINE AMINO (ALT) (SGPT): CPT

## 2019-04-17 PROCEDURE — 87186 SC STD MICRODIL/AGAR DIL: CPT

## 2019-04-17 PROCEDURE — 84075 ASSAY ALKALINE PHOSPHATASE: CPT

## 2019-04-17 PROCEDURE — 82247 BILIRUBIN TOTAL: CPT

## 2019-04-17 PROCEDURE — 84550 ASSAY OF BLOOD/URIC ACID: CPT

## 2019-04-17 PROCEDURE — 87086 URINE CULTURE/COLONY COUNT: CPT

## 2019-04-17 PROCEDURE — 87040 BLOOD CULTURE FOR BACTERIA: CPT

## 2019-04-17 PROCEDURE — 36415 COLL VENOUS BLD VENIPUNCTURE: CPT

## 2019-04-17 PROCEDURE — 80048 BASIC METABOLIC PNL TOTAL CA: CPT

## 2019-04-17 PROCEDURE — 82565 ASSAY OF CREATININE: CPT

## 2019-04-17 PROCEDURE — 85025 COMPLETE CBC W/AUTO DIFF WBC: CPT

## 2019-04-17 PROCEDURE — 87077 CULTURE AEROBIC IDENTIFY: CPT

## 2019-04-17 PROCEDURE — 84450 TRANSFERASE (AST) (SGOT): CPT

## 2019-04-17 PROCEDURE — 87147 CULTURE TYPE IMMUNOLOGIC: CPT

## 2019-04-17 PROCEDURE — 83615 LACTATE (LD) (LDH) ENZYME: CPT

## 2019-04-19 LAB
BACTERIA SPEC AEROBE CULT: ABNORMAL
BACTERIA SPEC AEROBE CULT: ABNORMAL

## 2019-04-22 LAB — BACTERIA SPEC AEROBE CULT: NORMAL

## 2019-05-01 ENCOUNTER — OFFICE VISIT (OUTPATIENT)
Dept: INTERNAL MEDICINE | Facility: CLINIC | Age: 34
End: 2019-05-01

## 2019-05-01 VITALS
HEIGHT: 65 IN | TEMPERATURE: 98 F | DIASTOLIC BLOOD PRESSURE: 78 MMHG | WEIGHT: 220.1 LBS | OXYGEN SATURATION: 97 % | HEART RATE: 78 BPM | SYSTOLIC BLOOD PRESSURE: 142 MMHG | BODY MASS INDEX: 36.67 KG/M2

## 2019-05-01 DIAGNOSIS — S39.012A LUMBAR STRAIN, INITIAL ENCOUNTER: Primary | ICD-10-CM

## 2019-05-01 PROCEDURE — 99213 OFFICE O/P EST LOW 20 MIN: CPT | Performed by: FAMILY MEDICINE

## 2019-05-01 RX ORDER — METHOCARBAMOL 500 MG/1
500 TABLET, FILM COATED ORAL 3 TIMES DAILY PRN
Qty: 90 TABLET | Refills: 0 | Status: SHIPPED | OUTPATIENT
Start: 2019-05-01 | End: 2019-05-16

## 2019-05-01 NOTE — PATIENT INSTRUCTIONS
Low Back Strain Rehab  Ask your health care provider which exercises are safe for you. Do exercises exactly as told by your health care provider and adjust them as directed. It is normal to feel mild stretching, pulling, tightness, or discomfort as you do these exercises, but you should stop right away if you feel sudden pain or your pain gets worse. Do not begin these exercises until told by your health care provider.  Stretching and range of motion exercises  These exercises warm up your muscles and joints and improve the movement and flexibility of your back. These exercises also help to relieve pain, numbness, and tingling.  Exercise A: Single knee to chest    1. Lie on your back on a firm surface with both legs straight.  2. Bend one of your knees. Use your hands to move your knee up toward your chest until you feel a gentle stretch in your lower back and buttock.  ? Hold your leg in this position by holding onto the front of your knee.  ? Keep your other leg as straight as possible.  3. Hold for __________ seconds.  4. Slowly return to the starting position.  5. Repeat with your other leg.  Repeat __________ times. Complete this exercise __________ times a day.  Exercise B: Prone extension on elbows    1. Lie on your abdomen on a firm surface.  2. Prop yourself up on your elbows.  3. Use your arms to help lift your chest up until you feel a gentle stretch in your abdomen and your lower back.  ? This will place some of your body weight on your elbows. If this is uncomfortable, try stacking pillows under your chest.  ? Your hips should stay down, against the surface that you are lying on. Keep your hip and back muscles relaxed.  4. Hold for __________ seconds.  5. Slowly relax your upper body and return to the starting position.  Repeat __________ times. Complete this exercise __________ times a day.  Strengthening exercises  These exercises build strength and endurance in your back. Endurance is the ability to  "use your muscles for a long time, even after they get tired.  Exercise C: Pelvic tilt  1. Lie on your back on a firm surface. Bend your knees and keep your feet flat.  2. Tense your abdominal muscles. Tip your pelvis up toward the ceiling and flatten your lower back into the floor.  ? To help with this exercise, you may place a small towel under your lower back and try to push your back into the towel.  3. Hold for __________ seconds.  4. Let your muscles relax completely before you repeat this exercise.  Repeat __________ times. Complete this exercise __________ times a day.  Exercise D: Alternating arm and leg raises    1. Get on your hands and knees on a firm surface. If you are on a hard floor, you may want to use padding to cushion your knees, such as an exercise mat.  2. Line up your arms and legs. Your hands should be below your shoulders, and your knees should be below your hips.  3. Lift your left leg behind you. At the same time, raise your right arm and straighten it in front of you.  ? Do not lift your leg higher than your hip.  ? Do not lift your arm higher than your shoulder.  ? Keep your abdominal and back muscles tight.  ? Keep your hips facing the ground.  ? Do not arch your back.  ? Keep your balance carefully, and do not hold your breath.  4. Hold for __________ seconds.  5. Slowly return to the starting position and repeat with your right leg and your left arm.  Repeat __________ times. Complete this exercise __________times a day.  Exercise J: Single leg lower with bent knees  1. Lie on your back on a firm surface.  2. Tense your abdominal muscles and lift your feet off the floor, one foot at a time, so your knees and hips are bent in an \"L\" shape (at about 90 degrees).  ? Your knees should be over your hips and your lower legs should be parallel to the floor.  3. Keeping your abdominal muscles tense and your knee bent, slowly lower one of your legs so your toe touches the ground.  4. Lift your " leg back up to return to the starting position.  ? Do not hold your breath.  ? Do not let your back arch. Keep your back flat against the ground.  5. Repeat with your other leg.  Repeat __________ times. Complete this exercise __________ times a day.  Posture and body mechanics    Body mechanics refers to the movements and positions of your body while you do your daily activities. Posture is part of body mechanics. Good posture and healthy body mechanics can help to relieve stress in your body's tissues and joints. Good posture means that your spine is in its natural S-curve position (your spine is neutral), your shoulders are pulled back slightly, and your head is not tipped forward. The following are general guidelines for applying improved posture and body mechanics to your everyday activities.  Standing    · When standing, keep your spine neutral and your feet about hip-width apart. Keep a slight bend in your knees. Your ears, shoulders, and hips should line up.  · When you do a task in which you  one place for a long time, place one foot up on a stable object that is 2-4 inches (5-10 cm) high, such as a footstool. This helps keep your spine neutral.  Sitting    · When sitting, keep your spine neutral and keep your feet flat on the floor. Use a footrest, if necessary, and keep your thighs parallel to the floor. Avoid rounding your shoulders, and avoid tilting your head forward.  · When working at a desk or a computer, keep your desk at a height where your hands are slightly lower than your elbows. Slide your chair under your desk so you are close enough to maintain good posture.  · When working at a computer, place your monitor at a height where you are looking straight ahead and you do not have to tilt your head forward or downward to look at the screen.  Resting    · When lying down and resting, avoid positions that are most painful for you.  · If you have pain with activities such as sitting, bending,  stooping, or squatting (flexion-based activities), lie in a position in which your body does not bend very much. For example, avoid curling up on your side with your arms and knees near your chest (fetal position).  · If you have pain with activities such as standing for a long time or reaching with your arms (extension-based activities), lie with your spine in a neutral position and bend your knees slightly. Try the following positions:  ? Lying on your side with a pillow between your knees.  ? Lying on your back with a pillow under your knees.  Lifting    · When lifting objects, keep your feet at least shoulder-width apart and tighten your abdominal muscles.  · Bend your knees and hips and keep your spine neutral. It is important to lift using the strength of your legs, not your back. Do not lock your knees straight out.  · Always ask for help to lift heavy or awkward objects.  This information is not intended to replace advice given to you by your health care provider. Make sure you discuss any questions you have with your health care provider.  Document Released: 12/18/2006 Document Revised: 08/24/2017 Document Reviewed: 09/28/2016  [employer] Interactive Patient Education © 2019 [employer]    Low Back Strain Rehab  Ask your health care provider which exercises are safe for you. Do exercises exactly as told by your health care provider and adjust them as directed. It is normal to feel mild stretching, pulling, tightness, or discomfort as you do these exercises, but you should stop right away if you feel sudden pain or your pain gets worse. Do not begin these exercises until told by your health care provider.  Stretching and range of motion exercises  These exercises warm up your muscles and joints and improve the movement and flexibility of your back. These exercises also help to relieve pain, numbness, and tingling.  Exercise A: Single knee to chest    6. Lie on your back on a firm surface with both legs  straight.  7. Bend one of your knees. Use your hands to move your knee up toward your chest until you feel a gentle stretch in your lower back and buttock.  ? Hold your leg in this position by holding onto the front of your knee.  ? Keep your other leg as straight as possible.  8. Hold for __________ seconds.  9. Slowly return to the starting position.  10. Repeat with your other leg.  Repeat __________ times. Complete this exercise __________ times a day.  Exercise B: Prone extension on elbows    6. Lie on your abdomen on a firm surface.  7. Prop yourself up on your elbows.  8. Use your arms to help lift your chest up until you feel a gentle stretch in your abdomen and your lower back.  ? This will place some of your body weight on your elbows. If this is uncomfortable, try stacking pillows under your chest.  ? Your hips should stay down, against the surface that you are lying on. Keep your hip and back muscles relaxed.  9. Hold for __________ seconds.  10. Slowly relax your upper body and return to the starting position.  Repeat __________ times. Complete this exercise __________ times a day.  Strengthening exercises  These exercises build strength and endurance in your back. Endurance is the ability to use your muscles for a long time, even after they get tired.  Exercise C: Pelvic tilt  5. Lie on your back on a firm surface. Bend your knees and keep your feet flat.  6. Tense your abdominal muscles. Tip your pelvis up toward the ceiling and flatten your lower back into the floor.  ? To help with this exercise, you may place a small towel under your lower back and try to push your back into the towel.  7. Hold for __________ seconds.  8. Let your muscles relax completely before you repeat this exercise.  Repeat __________ times. Complete this exercise __________ times a day.  Exercise D: Alternating arm and leg raises    6. Get on your hands and knees on a firm surface. If you are on a hard floor, you may want to  "use padding to cushion your knees, such as an exercise mat.  7. Line up your arms and legs. Your hands should be below your shoulders, and your knees should be below your hips.  8. Lift your left leg behind you. At the same time, raise your right arm and straighten it in front of you.  ? Do not lift your leg higher than your hip.  ? Do not lift your arm higher than your shoulder.  ? Keep your abdominal and back muscles tight.  ? Keep your hips facing the ground.  ? Do not arch your back.  ? Keep your balance carefully, and do not hold your breath.  9. Hold for __________ seconds.  10. Slowly return to the starting position and repeat with your right leg and your left arm.  Repeat __________ times. Complete this exercise __________times a day.  Exercise J: Single leg lower with bent knees  6. Lie on your back on a firm surface.  7. Tense your abdominal muscles and lift your feet off the floor, one foot at a time, so your knees and hips are bent in an \"L\" shape (at about 90 degrees).  ? Your knees should be over your hips and your lower legs should be parallel to the floor.  8. Keeping your abdominal muscles tense and your knee bent, slowly lower one of your legs so your toe touches the ground.  9. Lift your leg back up to return to the starting position.  ? Do not hold your breath.  ? Do not let your back arch. Keep your back flat against the ground.  10. Repeat with your other leg.  Repeat __________ times. Complete this exercise __________ times a day.  Posture and body mechanics    Body mechanics refers to the movements and positions of your body while you do your daily activities. Posture is part of body mechanics. Good posture and healthy body mechanics can help to relieve stress in your body's tissues and joints. Good posture means that your spine is in its natural S-curve position (your spine is neutral), your shoulders are pulled back slightly, and your head is not tipped forward. The following are general " guidelines for applying improved posture and body mechanics to your everyday activities.  Standing    · When standing, keep your spine neutral and your feet about hip-width apart. Keep a slight bend in your knees. Your ears, shoulders, and hips should line up.  · When you do a task in which you  one place for a long time, place one foot up on a stable object that is 2-4 inches (5-10 cm) high, such as a footstool. This helps keep your spine neutral.  Sitting    · When sitting, keep your spine neutral and keep your feet flat on the floor. Use a footrest, if necessary, and keep your thighs parallel to the floor. Avoid rounding your shoulders, and avoid tilting your head forward.  · When working at a desk or a computer, keep your desk at a height where your hands are slightly lower than your elbows. Slide your chair under your desk so you are close enough to maintain good posture.  · When working at a computer, place your monitor at a height where you are looking straight ahead and you do not have to tilt your head forward or downward to look at the screen.  Resting    · When lying down and resting, avoid positions that are most painful for you.  · If you have pain with activities such as sitting, bending, stooping, or squatting (flexion-based activities), lie in a position in which your body does not bend very much. For example, avoid curling up on your side with your arms and knees near your chest (fetal position).  · If you have pain with activities such as standing for a long time or reaching with your arms (extension-based activities), lie with your spine in a neutral position and bend your knees slightly. Try the following positions:  ? Lying on your side with a pillow between your knees.  ? Lying on your back with a pillow under your knees.  Lifting    · When lifting objects, keep your feet at least shoulder-width apart and tighten your abdominal muscles.  · Bend your knees and hips and keep your spine  neutral. It is important to lift using the strength of your legs, not your back. Do not lock your knees straight out.  · Always ask for help to lift heavy or awkward objects.  This information is not intended to replace advice given to you by your health care provider. Make sure you discuss any questions you have with your health care provider.  Document Released: 12/18/2006 Document Revised: 08/24/2017 Document Reviewed: 09/28/2016  [email or web address] Interactive Patient Education © 2019 [employer]

## 2019-05-01 NOTE — ASSESSMENT & PLAN NOTE
Patient has been advised to do stretches at home.  Will also treat with Robaxin up to 3 times a day as needed for low back pain.  She may continue Tylenol/ibuprofen as needed for pain control as well.

## 2019-05-01 NOTE — PROGRESS NOTES
Kelly Astorga is a 33 y.o. female.    Chief Complaint   Patient presents with   • Back Pain     x 1 week        HPI   Patient has been complaining of back - left, lower pain for 1.5 weeks. Pain is a 3 on a scale of 0-10. Pain is ache. Pain radiates to nowhere. Pain is worse with physical activity, standing and walking, better with OTC NSAIDS and acetaminophen. Symptoms are unchanged.  Patient has tried chiropractics with no relief.  Patient recently had a baby.  She is not sure if she may have strained her back putting the car seat into the car.  She has taken Robaxin in the past with good response and did not cause drowsiness.    The following portions of the patient's history were reviewed and updated as appropriate: allergies, current medications, past family history, past medical history, past social history, past surgical history and problem list.     No Known Allergies      Current Outpatient Medications:   •  cetirizine (zyrTEC) 10 MG tablet, Take 10 mg by mouth Daily., Disp: , Rfl:   •  ibuprofen (ADVIL,MOTRIN) 600 MG tablet, Take 1 tablet by mouth Every 6 (Six) Hours As Needed for Mild Pain ., Disp: 20 tablet, Rfl: 0  •  NIFEdipine XL (ADALAT CC) 30 MG 24 hr tablet, Take 1 tablet by mouth Daily., Disp: 30 tablet, Rfl: 1  •  Prenatal Vit-Fe Fumarate-FA (PNV PRENATAL PLUS MULTIVITAMIN) 27-1 MG tablet, Take 1 tablet by mouth Daily. 200001, Disp: , Rfl: 3  •  methocarbamol (ROBAXIN) 500 MG tablet, Take 1 tablet by mouth 3 (Three) Times a Day As Needed for Muscle Spasms., Disp: 90 tablet, Rfl: 0    ROS    Review of Systems   Constitutional: Negative for chills, fatigue and fever.   HENT: Negative for congestion, postnasal drip and sore throat.    Respiratory: Negative for cough, shortness of breath and wheezing.    Cardiovascular: Negative for chest pain and leg swelling.   Gastrointestinal: Negative for abdominal pain, constipation, diarrhea, nausea and vomiting.   Musculoskeletal: Positive for  "back pain. Negative for arthralgias.   Skin: Negative for color change and rash.   Neurological: Negative for weakness and headache.   Psychiatric/Behavioral: Negative for depressed mood. The patient is not nervous/anxious.        Vitals:    05/01/19 1108   BP: 142/78   BP Location: Left arm   Patient Position: Sitting   Cuff Size: Adult   Pulse: 78   Temp: 98 °F (36.7 °C)   TempSrc: Oral   SpO2: 97%   Weight: 99.8 kg (220 lb 1.6 oz)   Height: 165.1 cm (65\")     Body mass index is 36.63 kg/m².    Physical Exam     Physical Exam   Constitutional: She is oriented to person, place, and time. She appears well-developed and well-nourished. No distress.   HENT:   Head: Normocephalic and atraumatic.   Right Ear: Tympanic membrane and external ear normal.   Left Ear: Tympanic membrane and external ear normal.   Mouth/Throat: Oropharynx is clear and moist.   Eyes: Conjunctivae and EOM are normal. Pupils are equal, round, and reactive to light.   Neck: Normal range of motion. Neck supple.   Cardiovascular: Normal rate and regular rhythm.   No murmur heard.  Pulmonary/Chest: Effort normal and breath sounds normal. No respiratory distress. She has no wheezes.   Abdominal: Soft. Bowel sounds are normal. She exhibits no distension. There is no tenderness.   Musculoskeletal: Normal range of motion. She exhibits no edema.        Lumbar back: She exhibits deformity (Increased fullness to left lumbar region) and spasm. She exhibits normal range of motion and no tenderness.   Lymphadenopathy:     She has no cervical adenopathy.   Neurological: She is alert and oriented to person, place, and time. No cranial nerve deficit.   Skin: Skin is warm and dry.   Psychiatric: She has a normal mood and affect.       Assessment/Plan    Problem List Items Addressed This Visit        Musculoskeletal and Integument    Lumbar strain, initial encounter - Primary     Patient has been advised to do stretches at home.  Will also treat with Robaxin up to " 3 times a day as needed for low back pain.  She may continue Tylenol/ibuprofen as needed for pain control as well.               New Medications Ordered This Visit   Medications   • methocarbamol (ROBAXIN) 500 MG tablet     Sig: Take 1 tablet by mouth 3 (Three) Times a Day As Needed for Muscle Spasms.     Dispense:  90 tablet     Refill:  0       No orders of the defined types were placed in this encounter.      Return if symptoms worsen or fail to improve, for Annual.    Alyx Sy, DO

## 2019-05-16 ENCOUNTER — OFFICE VISIT (OUTPATIENT)
Dept: INTERNAL MEDICINE | Facility: CLINIC | Age: 34
End: 2019-05-16

## 2019-05-16 VITALS
OXYGEN SATURATION: 98 % | DIASTOLIC BLOOD PRESSURE: 84 MMHG | WEIGHT: 212.6 LBS | HEIGHT: 65 IN | BODY MASS INDEX: 35.42 KG/M2 | SYSTOLIC BLOOD PRESSURE: 128 MMHG | TEMPERATURE: 98.3 F | HEART RATE: 86 BPM

## 2019-05-16 DIAGNOSIS — Z00.00 WELL ADULT EXAM: Primary | ICD-10-CM

## 2019-05-16 DIAGNOSIS — R55 NEAR SYNCOPE: ICD-10-CM

## 2019-05-16 DIAGNOSIS — Z23 NEED FOR VACCINATION AGAINST HEPATITIS A: ICD-10-CM

## 2019-05-16 DIAGNOSIS — M99.05 SOMATIC DYSFUNCTION OF PELVIS REGION: ICD-10-CM

## 2019-05-16 PROBLEM — S39.012A LUMBAR STRAIN, INITIAL ENCOUNTER: Status: RESOLVED | Noted: 2019-05-01 | Resolved: 2019-05-16

## 2019-05-16 PROCEDURE — 99395 PREV VISIT EST AGE 18-39: CPT | Performed by: FAMILY MEDICINE

## 2019-05-16 PROCEDURE — 98925 OSTEOPATH MANJ 1-2 REGIONS: CPT | Performed by: FAMILY MEDICINE

## 2019-05-16 PROCEDURE — 90471 IMMUNIZATION ADMIN: CPT | Performed by: FAMILY MEDICINE

## 2019-05-16 PROCEDURE — 90632 HEPA VACCINE ADULT IM: CPT | Performed by: FAMILY MEDICINE

## 2019-05-16 NOTE — PROGRESS NOTES
Kelly Astorga is a 33 y.o. female.    Chief Complaint   Patient presents with   • Annual Exam       HPI   Patient presents today for annual exam.  She does follow with gynecology regularly and has had a recent Pap smear.  She does have regular dental exams.  She is not up-to-date on eye exams.  She does not exercise regularly.   She has had a Tdap recently.  She has had her flu shot as well.  She would like to have the hepatitis A vaccine today.      She reports hip pain over the last couple of weeks.   She did have left lumbar pain previously.  She states that this has resolved, but has now traveled down to her left hip.  It is an aching pain that seems to come and go.  It is better with over-the-counter NSAIDs.  She does report a pulling sensation down into her buttock and leg.      Patient also reports a history of near syncope several times in the last year or so.  She states that [place] where she became very dizzy and nearly passed out.  She states that this [place].  She states that she had to lay down on the steps so that she would not pass out.  She states that she felt as if her heart rate was very slow at the time.  She has had these spells in the past and has had an echocardiogram which was normal.  She states her EKG was also normal at that time.  She has never had a Holter monitor test done or a tilt table test.    Procedures  Muscle energy, somatic dysfunction to left hip.  Patient was evaluated through routine etiopathic exam.  Muscle energy x2.  Pain did not resolve, but landmarks did seem to normalize after manipulation was performed.  ASIS, patella, and medial malleolus were aligned.  Patient tolerated the procedure well without any complication.    The following portions of the patient's history were reviewed and updated as appropriate: allergies, current medications, past family history, past medical  history, past social history, past surgical history and problem list.     Past Medical History:   Diagnosis Date   • Abnormal Pap smear of cervix    • GERD (gastroesophageal reflux disease)    • PONV (postoperative nausea and vomiting)        Past Surgical History:   Procedure Laterality Date   • GASTRIC SLEEVE LAPAROSCOPIC  09/2016   • LEEP  2013   • OTOPLASTY  2005       Family History   Problem Relation Age of Onset   • Hyperlipidemia Mother    • Hypertension Mother    • Hypertension Father    • Hyperlipidemia Father    • Diabetes Paternal Grandmother    • No Known Problems Brother    • Hydrocephalus Maternal Grandmother    • Hypertension Maternal Grandmother    • Hyperlipidemia Maternal Grandmother    • Heart disease Maternal Grandfather    • Hyperlipidemia Maternal Grandfather    • Hypertension Maternal Grandfather    • Heart attack Maternal Grandfather        Social History     Socioeconomic History   • Marital status:      Spouse name: Not on file   • Number of children: Not on file   • Years of education: Not on file   • Highest education level: Not on file   Tobacco Use   • Smoking status: Never Smoker   • Smokeless tobacco: Never Used   Substance and Sexual Activity   • Alcohol use: No   • Drug use: No       No Known Allergies      Current Outpatient Medications:   •  cetirizine (zyrTEC) 10 MG tablet, Take 10 mg by mouth Daily., Disp: , Rfl:   •  ibuprofen (ADVIL,MOTRIN) 600 MG tablet, Take 1 tablet by mouth Every 6 (Six) Hours As Needed for Mild Pain ., Disp: 20 tablet, Rfl: 0  •  NIFEdipine XL (ADALAT CC) 30 MG 24 hr tablet, Take 1 tablet by mouth Daily., Disp: 30 tablet, Rfl: 1  •  Prenatal Vit-Fe Fumarate-FA (PNV PRENATAL PLUS MULTIVITAMIN) 27-1 MG tablet, Take 1 tablet by mouth Daily. 200001, Disp: , Rfl: 3    ROS    Review of Systems   Constitutional: Negative for chills, fatigue and fever.   HENT: Negative for congestion, postnasal drip and sore throat.    Eyes: Negative for blurred vision  "and visual disturbance.   Respiratory: Negative for cough, shortness of breath and wheezing.    Cardiovascular: Negative for chest pain and leg swelling.   Gastrointestinal: Negative for abdominal pain, constipation, diarrhea, nausea and vomiting.   Endocrine: Negative for cold intolerance and heat intolerance.   Genitourinary: Negative for dysuria and frequency.   Musculoskeletal: Negative for arthralgias and back pain.        Left hip pain   Skin: Negative for color change and rash.   Allergic/Immunologic: Negative for environmental allergies.   Neurological: Positive for light-headedness. Negative for weakness, numbness and headache.   Hematological: Does not bruise/bleed easily.   Psychiatric/Behavioral: Negative for depressed mood. The patient is not nervous/anxious.        Vitals:    05/16/19 1000   BP: 128/84   BP Location: Left arm   Patient Position: Sitting   Cuff Size: Adult   Pulse: 86   Temp: 98.3 °F (36.8 °C)   TempSrc: Oral   SpO2: 98%   Weight: 96.4 kg (212 lb 9.6 oz)   Height: 165.1 cm (65\")     Body mass index is 35.38 kg/m².    Physical Exam     Physical Exam   Constitutional: She is oriented to person, place, and time. She appears well-developed and well-nourished. No distress.   HENT:   Head: Normocephalic and atraumatic.   Right Ear: Tympanic membrane and external ear normal.   Left Ear: Tympanic membrane and external ear normal.   Mouth/Throat: Oropharynx is clear and moist.   Eyes: Conjunctivae and EOM are normal. Pupils are equal, round, and reactive to light.   Neck: Normal range of motion. Neck supple.   Cardiovascular: Normal rate and regular rhythm.   No murmur heard.  Pulmonary/Chest: Effort normal and breath sounds normal. No respiratory distress. She has no wheezes.   Abdominal: Soft. Bowel sounds are normal. She exhibits no distension. There is no tenderness.   Musculoskeletal: Normal range of motion. She exhibits no edema.   Positive standing flexion test.  Caudad placement of the " ASIS, patella, and medial malleolus on the left.   Lymphadenopathy:     She has no cervical adenopathy.   Neurological: She is alert and oriented to person, place, and time. She displays normal reflexes. No cranial nerve deficit.   Skin: Skin is warm and dry.   Psychiatric: She has a normal mood and affect. Her behavior is normal.       Assessment/Plan    Problem List Items Addressed This Visit        Cardiovascular and Mediastinum    Near syncope     Near syncope occurs off and on and very rare.  However, we will proceed with routine lab work.  If normal, patient may benefit from a Holter monitor.  However, she has been informed in the past that this may not result anything if she is not actually having the spell.         Relevant Orders    CBC & Differential    Comprehensive Metabolic Panel    TSH    Vitamin B12    Ferritin    Iron Profile    T4, Free       Other    Somatic dysfunction of pelvis region     OMT was done through muscle energy today.  Patient's landmarks did realign after manipulation.  However, patient did report she still had pain.  She was encouraged to do stretches and exercises at home to help with this issue and may take over-the-counter NSAIDs.         Well adult exam - Primary     Normal physical exam findings with the exception of somatic dysfunction of the pelvis.  Patient did receive a hepatitis A vaccine today.  She is up-to-date on all other vaccines.  Patient was counseled today on vaccinations, healthy diet and exercise, dental exams, eye exams.  She is up-to-date on Pap smear as well.  Mental health was addressed as well.           Other Visit Diagnoses     Need for vaccination against hepatitis A        Relevant Orders    Hepatitis A Vaccine Adult IM (Completed)          No orders of the defined types were placed in this encounter.      Orders Placed This Encounter   Procedures   • Hepatitis A Vaccine Adult IM       No Follow-up on file.      Alyx Sy DO

## 2019-05-16 NOTE — ASSESSMENT & PLAN NOTE
Near syncope occurs off and on and very rare.  However, we will proceed with routine lab work.  If normal, patient may benefit from a Holter monitor.  However, she has been informed in the past that this may not result anything if she is not actually having the spell.

## 2019-05-16 NOTE — ASSESSMENT & PLAN NOTE
Normal physical exam findings with the exception of somatic dysfunction of the pelvis.  Patient did receive a hepatitis A vaccine today.  She is up-to-date on all other vaccines.  Patient was counseled today on vaccinations, healthy diet and exercise, dental exams, eye exams.  She is up-to-date on Pap smear as well.  Mental health was addressed as well.

## 2019-05-16 NOTE — ASSESSMENT & PLAN NOTE
OMT was done through muscle energy today.  Patient's landmarks did realign after manipulation.  However, patient did report she still had pain.  She was encouraged to do stretches and exercises at home to help with this issue and may take over-the-counter NSAIDs.

## 2019-05-20 ENCOUNTER — APPOINTMENT (OUTPATIENT)
Dept: LAB | Facility: HOSPITAL | Age: 34
End: 2019-05-20

## 2019-05-20 LAB
ALBUMIN SERPL-MCNC: 4.5 G/DL (ref 3.5–5)
ALBUMIN/GLOB SERPL: 1.4 G/DL (ref 1–2)
ALP SERPL-CCNC: 137 U/L (ref 38–126)
ALT SERPL W P-5'-P-CCNC: 115 U/L (ref 13–69)
ANION GAP SERPL CALCULATED.3IONS-SCNC: 16.2 MMOL/L (ref 10–20)
AST SERPL-CCNC: 69 U/L (ref 15–46)
BASOPHILS # BLD AUTO: 0.03 10*3/MM3 (ref 0–0.2)
BASOPHILS NFR BLD AUTO: 0.4 % (ref 0–1.5)
BILIRUB SERPL-MCNC: 0.4 MG/DL (ref 0.2–1.3)
BUN BLD-MCNC: 10 MG/DL (ref 7–20)
BUN/CREAT SERPL: 12.5 (ref 7.1–23.5)
CALCIUM SPEC-SCNC: 9.6 MG/DL (ref 8.4–10.2)
CHLORIDE SERPL-SCNC: 100 MMOL/L (ref 98–107)
CO2 SERPL-SCNC: 29 MMOL/L (ref 26–30)
CREAT BLD-MCNC: 0.8 MG/DL (ref 0.6–1.3)
DEPRECATED RDW RBC AUTO: 41.7 FL (ref 37–54)
EOSINOPHIL # BLD AUTO: 0.26 10*3/MM3 (ref 0–0.4)
EOSINOPHIL NFR BLD AUTO: 3.2 % (ref 0.3–6.2)
ERYTHROCYTE [DISTWIDTH] IN BLOOD BY AUTOMATED COUNT: 13.5 % (ref 12.3–15.4)
FERRITIN SERPL-MCNC: 15.3 NG/ML (ref 6.24–137)
GFR SERPL CREATININE-BSD FRML MDRD: 83 ML/MIN/1.73
GLOBULIN UR ELPH-MCNC: 3.3 GM/DL
GLUCOSE BLD-MCNC: 87 MG/DL (ref 74–98)
HCT VFR BLD AUTO: 39.2 % (ref 34–46.6)
HGB BLD-MCNC: 12.9 G/DL (ref 12–15.9)
IMM GRANULOCYTES # BLD AUTO: 0.04 10*3/MM3 (ref 0–0.05)
IMM GRANULOCYTES NFR BLD AUTO: 0.5 % (ref 0–0.5)
IRON 24H UR-MRATE: 65 MCG/DL (ref 37–181)
IRON SATN MFR SERPL: 15 % (ref 11–46)
LYMPHOCYTES # BLD AUTO: 2.23 10*3/MM3 (ref 0.7–3.1)
LYMPHOCYTES NFR BLD AUTO: 27.3 % (ref 19.6–45.3)
MCH RBC QN AUTO: 28.1 PG (ref 26.6–33)
MCHC RBC AUTO-ENTMCNC: 32.9 G/DL (ref 31.5–35.7)
MCV RBC AUTO: 85.4 FL (ref 79–97)
MONOCYTES # BLD AUTO: 0.43 10*3/MM3 (ref 0.1–0.9)
MONOCYTES NFR BLD AUTO: 5.3 % (ref 5–12)
NEUTROPHILS # BLD AUTO: 5.19 10*3/MM3 (ref 1.7–7)
NEUTROPHILS NFR BLD AUTO: 63.3 % (ref 42.7–76)
NRBC BLD AUTO-RTO: 0 /100 WBC (ref 0–0.2)
PLATELET # BLD AUTO: 305 10*3/MM3 (ref 140–450)
PMV BLD AUTO: 9.2 FL (ref 6–12)
POTASSIUM BLD-SCNC: 4.2 MMOL/L (ref 3.5–5.1)
PROT SERPL-MCNC: 7.8 G/DL (ref 6.3–8.2)
RBC # BLD AUTO: 4.59 10*6/MM3 (ref 3.77–5.28)
SODIUM BLD-SCNC: 141 MMOL/L (ref 137–145)
T4 FREE SERPL-MCNC: 0.69 NG/DL (ref 0.78–2.19)
TIBC SERPL-MCNC: 448 MCG/DL (ref 261–497)
TSH SERPL DL<=0.05 MIU/L-ACNC: 1.78 MIU/ML (ref 0.47–4.68)
VIT B12 BLD-MCNC: 657 PG/ML (ref 239–931)
WBC NRBC COR # BLD: 8.18 10*3/MM3 (ref 3.4–10.8)

## 2019-05-20 PROCEDURE — 85025 COMPLETE CBC W/AUTO DIFF WBC: CPT | Performed by: FAMILY MEDICINE

## 2019-05-20 PROCEDURE — 36415 COLL VENOUS BLD VENIPUNCTURE: CPT | Performed by: FAMILY MEDICINE

## 2019-05-20 PROCEDURE — 83550 IRON BINDING TEST: CPT | Performed by: FAMILY MEDICINE

## 2019-05-20 PROCEDURE — 82728 ASSAY OF FERRITIN: CPT | Performed by: FAMILY MEDICINE

## 2019-05-20 PROCEDURE — 84439 ASSAY OF FREE THYROXINE: CPT | Performed by: FAMILY MEDICINE

## 2019-05-20 PROCEDURE — 83540 ASSAY OF IRON: CPT | Performed by: FAMILY MEDICINE

## 2019-05-20 PROCEDURE — 80053 COMPREHEN METABOLIC PANEL: CPT | Performed by: FAMILY MEDICINE

## 2019-05-20 PROCEDURE — 82607 VITAMIN B-12: CPT | Performed by: FAMILY MEDICINE

## 2019-05-20 PROCEDURE — 84443 ASSAY THYROID STIM HORMONE: CPT | Performed by: FAMILY MEDICINE

## 2019-05-21 DIAGNOSIS — R74.8 ABNORMAL LIVER ENZYMES: Primary | ICD-10-CM

## 2019-05-21 DIAGNOSIS — R74.8 ELEVATED ALKALINE PHOSPHATASE LEVEL: ICD-10-CM

## 2019-05-29 ENCOUNTER — APPOINTMENT (OUTPATIENT)
Dept: LAB | Facility: HOSPITAL | Age: 34
End: 2019-05-29

## 2019-05-29 LAB — GGT SERPL-CCNC: 59 U/L (ref 12–58)

## 2019-05-29 PROCEDURE — 83516 IMMUNOASSAY NONANTIBODY: CPT | Performed by: FAMILY MEDICINE

## 2019-05-29 PROCEDURE — 82977 ASSAY OF GGT: CPT | Performed by: FAMILY MEDICINE

## 2019-05-29 PROCEDURE — 36415 COLL VENOUS BLD VENIPUNCTURE: CPT | Performed by: FAMILY MEDICINE

## 2019-05-30 LAB — DEPRECATED MITOCHONDRIA M2 IGG SER-ACNC: <20 UNITS (ref 0–20)

## 2019-06-03 ENCOUNTER — APPOINTMENT (OUTPATIENT)
Dept: LAB | Facility: HOSPITAL | Age: 34
End: 2019-06-03

## 2019-06-03 ENCOUNTER — TRANSCRIBE ORDERS (OUTPATIENT)
Dept: LAB | Facility: HOSPITAL | Age: 34
End: 2019-06-03

## 2019-06-03 DIAGNOSIS — R53.81 DEBILITY: Primary | ICD-10-CM

## 2019-06-03 PROCEDURE — 81001 URINALYSIS AUTO W/SCOPE: CPT | Performed by: OBSTETRICS & GYNECOLOGY

## 2019-06-04 DIAGNOSIS — R79.89 ELEVATED LFTS: Primary | ICD-10-CM

## 2019-06-04 DIAGNOSIS — R74.8 ELEVATED SERUM GGT LEVEL: ICD-10-CM

## 2019-06-04 LAB
BACTERIA UR QL AUTO: ABNORMAL /HPF
BILIRUB UR QL STRIP: NEGATIVE
CLARITY UR: ABNORMAL
COLOR UR: YELLOW
GLUCOSE UR STRIP-MCNC: NEGATIVE MG/DL
HGB UR QL STRIP.AUTO: NEGATIVE
HYALINE CASTS UR QL AUTO: ABNORMAL /LPF
KETONES UR QL STRIP: NEGATIVE
LEUKOCYTE ESTERASE UR QL STRIP.AUTO: ABNORMAL
NITRITE UR QL STRIP: NEGATIVE
PH UR STRIP.AUTO: 7 [PH] (ref 5–8)
PROT UR QL STRIP: NEGATIVE
RBC # UR: ABNORMAL /HPF
REF LAB TEST METHOD: ABNORMAL
SP GR UR STRIP: 1.02 (ref 1–1.03)
SQUAMOUS #/AREA URNS HPF: ABNORMAL /HPF
UROBILINOGEN UR QL STRIP: ABNORMAL
WBC UR QL AUTO: ABNORMAL /HPF

## 2019-06-11 ENCOUNTER — HOSPITAL ENCOUNTER (OUTPATIENT)
Dept: ULTRASOUND IMAGING | Facility: HOSPITAL | Age: 34
Discharge: HOME | End: 2019-06-11
Admitting: FAMILY MEDICINE

## 2019-06-11 DIAGNOSIS — Z13.220 LIPID SCREENING: ICD-10-CM

## 2019-06-11 DIAGNOSIS — R79.89 ELEVATED LFTS: ICD-10-CM

## 2019-06-11 DIAGNOSIS — R79.89 LOW T4: Primary | ICD-10-CM

## 2019-06-11 PROCEDURE — 76705 ECHO EXAM OF ABDOMEN: CPT

## 2019-06-12 ENCOUNTER — APPOINTMENT (OUTPATIENT)
Dept: LAB | Facility: HOSPITAL | Age: 34
End: 2019-06-12

## 2019-06-12 LAB
ALBUMIN SERPL-MCNC: 4.3 G/DL (ref 3.5–5)
ALBUMIN/GLOB SERPL: 1.3 G/DL (ref 1–2)
ALP SERPL-CCNC: 97 U/L (ref 38–126)
ALT SERPL W P-5'-P-CCNC: 41 U/L (ref 13–69)
ANION GAP SERPL CALCULATED.3IONS-SCNC: 12.2 MMOL/L (ref 10–20)
AST SERPL-CCNC: 37 U/L (ref 15–46)
BILIRUB SERPL-MCNC: 0.4 MG/DL (ref 0.2–1.3)
BUN BLD-MCNC: 11 MG/DL (ref 7–20)
BUN/CREAT SERPL: 13.8 (ref 7.1–23.5)
CALCIUM SPEC-SCNC: 9.1 MG/DL (ref 8.4–10.2)
CHLORIDE SERPL-SCNC: 103 MMOL/L (ref 98–107)
CHOLEST SERPL-MCNC: 226 MG/DL (ref 0–199)
CO2 SERPL-SCNC: 27 MMOL/L (ref 26–30)
CREAT BLD-MCNC: 0.8 MG/DL (ref 0.6–1.3)
GFR SERPL CREATININE-BSD FRML MDRD: 83 ML/MIN/1.73
GLOBULIN UR ELPH-MCNC: 3.3 GM/DL
GLUCOSE BLD-MCNC: 86 MG/DL (ref 74–98)
HDLC SERPL-MCNC: 58 MG/DL (ref 40–60)
LDLC SERPL CALC-MCNC: 125 MG/DL (ref 0–99)
LDLC/HDLC SERPL: 2.16 {RATIO}
POTASSIUM BLD-SCNC: 4.2 MMOL/L (ref 3.5–5.1)
PROT SERPL-MCNC: 7.6 G/DL (ref 6.3–8.2)
SODIUM BLD-SCNC: 138 MMOL/L (ref 137–145)
T4 FREE SERPL-MCNC: 0.83 NG/DL (ref 0.78–2.19)
TRIGL SERPL-MCNC: 213 MG/DL
TSH SERPL DL<=0.05 MIU/L-ACNC: 1.5 MIU/ML (ref 0.47–4.68)
VLDLC SERPL-MCNC: 42.6 MG/DL

## 2019-06-12 PROCEDURE — 84439 ASSAY OF FREE THYROXINE: CPT | Performed by: FAMILY MEDICINE

## 2019-06-12 PROCEDURE — 84443 ASSAY THYROID STIM HORMONE: CPT | Performed by: FAMILY MEDICINE

## 2019-06-12 PROCEDURE — 80053 COMPREHEN METABOLIC PANEL: CPT | Performed by: FAMILY MEDICINE

## 2019-06-12 PROCEDURE — 80061 LIPID PANEL: CPT | Performed by: FAMILY MEDICINE

## 2019-06-12 PROCEDURE — 36415 COLL VENOUS BLD VENIPUNCTURE: CPT | Performed by: FAMILY MEDICINE

## 2019-08-16 ENCOUNTER — APPOINTMENT (OUTPATIENT)
Dept: LAB | Facility: HOSPITAL | Age: 34
End: 2019-08-16

## 2019-08-16 ENCOUNTER — OFFICE VISIT (OUTPATIENT)
Dept: INTERNAL MEDICINE | Facility: CLINIC | Age: 34
End: 2019-08-16

## 2019-08-16 VITALS
BODY MASS INDEX: 35.49 KG/M2 | SYSTOLIC BLOOD PRESSURE: 133 MMHG | TEMPERATURE: 97.2 F | DIASTOLIC BLOOD PRESSURE: 52 MMHG | WEIGHT: 213 LBS | HEIGHT: 65 IN | RESPIRATION RATE: 15 BRPM | HEART RATE: 74 BPM | OXYGEN SATURATION: 100 %
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
## 2019-08-16 DIAGNOSIS — M25.50 ARTHRALGIA, UNSPECIFIED JOINT: ICD-10-CM

## 2019-08-16 DIAGNOSIS — R20.2 TINGLING SENSATION IN FACE: Primary | ICD-10-CM

## 2019-08-16 LAB
ALBUMIN SERPL-MCNC: 4.7 G/DL (ref 3.5–5.2)
ALBUMIN/GLOB SERPL: 1.8 G/DL
ALP SERPL-CCNC: 82 U/L (ref 39–117)
ALT SERPL W P-5'-P-CCNC: 19 U/L (ref 1–33)
ANION GAP SERPL CALCULATED.3IONS-SCNC: 14.2 MMOL/L (ref 5–15)
AST SERPL-CCNC: 16 U/L (ref 1–32)
BILIRUB SERPL-MCNC: 0.3 MG/DL (ref 0.2–1.2)
BUN BLD-MCNC: 9 MG/DL (ref 6–20)
BUN/CREAT SERPL: 10.5 (ref 7–25)
CALCIUM SPEC-SCNC: 9.7 MG/DL (ref 8.6–10.5)
CHLORIDE SERPL-SCNC: 100 MMOL/L (ref 98–107)
CO2 SERPL-SCNC: 24.8 MMOL/L (ref 22–29)
CREAT BLD-MCNC: 0.86 MG/DL (ref 0.57–1)
GFR SERPL CREATININE-BSD FRML MDRD: 76 ML/MIN/1.73
GLOBULIN UR ELPH-MCNC: 2.6 GM/DL
GLUCOSE BLD-MCNC: 86 MG/DL (ref 65–99)
MAGNESIUM SERPL-MCNC: 1.9 MG/DL (ref 1.6–2.6)
PHOSPHATE SERPL-MCNC: 3 MG/DL (ref 2.5–4.5)
POTASSIUM BLD-SCNC: 4 MMOL/L (ref 3.5–5.2)
PROT SERPL-MCNC: 7.3 G/DL (ref 6–8.5)
SODIUM BLD-SCNC: 139 MMOL/L (ref 136–145)
VIT B12 BLD-MCNC: 432 PG/ML (ref 211–946)

## 2019-08-16 PROCEDURE — 86618 LYME DISEASE ANTIBODY: CPT | Performed by: NURSE PRACTITIONER

## 2019-08-16 PROCEDURE — 86757 RICKETTSIA ANTIBODY: CPT | Performed by: NURSE PRACTITIONER

## 2019-08-16 PROCEDURE — 84100 ASSAY OF PHOSPHORUS: CPT | Performed by: NURSE PRACTITIONER

## 2019-08-16 PROCEDURE — 86038 ANTINUCLEAR ANTIBODIES: CPT | Performed by: NURSE PRACTITIONER

## 2019-08-16 PROCEDURE — 83735 ASSAY OF MAGNESIUM: CPT | Performed by: NURSE PRACTITIONER

## 2019-08-16 PROCEDURE — 36415 COLL VENOUS BLD VENIPUNCTURE: CPT | Performed by: NURSE PRACTITIONER

## 2019-08-16 PROCEDURE — 80053 COMPREHEN METABOLIC PANEL: CPT | Performed by: NURSE PRACTITIONER

## 2019-08-16 PROCEDURE — 82525 ASSAY OF COPPER: CPT | Performed by: NURSE PRACTITIONER

## 2019-08-16 PROCEDURE — 99213 OFFICE O/P EST LOW 20 MIN: CPT | Performed by: NURSE PRACTITIONER

## 2019-08-16 PROCEDURE — 82607 VITAMIN B-12: CPT | Performed by: NURSE PRACTITIONER

## 2019-08-16 PROCEDURE — 84630 ASSAY OF ZINC: CPT | Performed by: NURSE PRACTITIONER

## 2019-08-16 RX ORDER — NORGESTREL AND ETHINYL ESTRADIOL 0.3-0.03MG
KIT ORAL
COMMUNITY
Start: 2019-07-28 | End: 2019-10-12

## 2019-08-16 NOTE — PROGRESS NOTES
Chief Complaint / Reason:      Chief Complaint   Patient presents with   • Tingling     on face. happended numerous times.        Subjective     HPI  Patient presents today with complaints of tingling on face she stated that symptoms started back in November on the left side while she was pregnant and it has happened intermittently since then and recently it is over on the right side.  Patient denies any trauma or injury to head in the past.  She denies any numbness tremors blurred vision dental issues or sensation loss.  Patient did have MRI in 2016 which showed trace areas of mucosal thickening in paranasal sinuses.  Denies fever or chills.  Patient was on blood pressure medicine but states blood pressure has been stable and she has not been taking the nifedipine.  Patient is on birth control and denies any history of blood clots.  Patient has had past gastric sleeve surgery. Had tick bites in the past.   History taken from: patient    PMH/FH/Social History were reviewed and updated appropriately in the electronic medical record.   Past Medical History:   Diagnosis Date   • Abnormal Pap smear of cervix    • GERD (gastroesophageal reflux disease)    • PONV (postoperative nausea and vomiting)      Past Surgical History:   Procedure Laterality Date   • GASTRIC SLEEVE LAPAROSCOPIC  09/2016   • LEEP  2013   • OTOPLASTY  2005     Social History     Socioeconomic History   • Marital status:      Spouse name: Not on file   • Number of children: Not on file   • Years of education: Not on file   • Highest education level: Not on file   Tobacco Use   • Smoking status: Never Smoker   • Smokeless tobacco: Never Used   Substance and Sexual Activity   • Alcohol use: No   • Drug use: No     Family History   Problem Relation Age of Onset   • Hyperlipidemia Mother    • Hypertension Mother    • Hypertension Father    • Hyperlipidemia Father    • Diabetes Paternal Grandmother    • No Known Problems Brother    • Hydrocephalus  Maternal Grandmother    • Hypertension Maternal Grandmother    • Hyperlipidemia Maternal Grandmother    • Heart disease Maternal Grandfather    • Hyperlipidemia Maternal Grandfather    • Hypertension Maternal Grandfather    • Heart attack Maternal Grandfather        Review of Systems:   Review of Systems   Constitutional: Negative.    Eyes:        Eye twitching      Respiratory: Negative.    Cardiovascular: Negative.    Musculoskeletal: Positive for arthralgias.   Neurological: Positive for numbness (facial tingling).   Psychiatric/Behavioral: Positive for sleep disturbance.         All other systems were reviewed and are negative.  Exceptions are noted in the subjective or above.      Objective     Vital Signs  Vitals:    08/16/19 1022   BP: 133/52   Pulse: 74   Resp: 15   Temp: 97.2 °F (36.2 °C)   SpO2: 100%       Body mass index is 35.45 kg/m².    Physical Exam   Constitutional: She is oriented to person, place, and time. She appears well-developed and well-nourished.   Eyes: Conjunctivae and EOM are normal. Pupils are equal, round, and reactive to light.   Cardiovascular: Normal rate, regular rhythm, normal heart sounds and intact distal pulses.   Pulmonary/Chest: Effort normal and breath sounds normal.   Abdominal: Soft. Bowel sounds are normal.   Musculoskeletal: Normal range of motion.   Neurological: She is alert and oriented to person, place, and time. No sensory deficit. Coordination normal.   Skin: Skin is warm and dry. Capillary refill takes less than 2 seconds.   Psychiatric: She has a normal mood and affect. Her behavior is normal. Judgment and thought content normal.   Nursing note and vitals reviewed.           Results Review:    I reviewed the patient's previous clinical results.       Medication Review:     Current Outpatient Medications:   •  cetirizine (zyrTEC) 10 MG tablet, Take 10 mg by mouth Daily., Disp: , Rfl:   •  ibuprofen (ADVIL,MOTRIN) 600 MG tablet, Take 1 tablet by mouth Every 6 (Six)  Hours As Needed for Mild Pain ., Disp: 20 tablet, Rfl: 0  •  NIFEdipine XL (ADALAT CC) 30 MG 24 hr tablet, Take 1 tablet by mouth Daily., Disp: 30 tablet, Rfl: 1  •  Prenatal Vit-Fe Fumarate-FA (PNV PRENATAL PLUS MULTIVITAMIN) 27-1 MG tablet, Take 1 tablet by mouth Daily. 200001, Disp: , Rfl: 3  •  LOW-OGESTREL 0.3-30 MG-MCG per tablet, , Disp: , Rfl:     Assessment/Plan   Kelly was seen today for tingling.    Diagnoses and all orders for this visit:    Tingling sensation in face  -     Vitamin B12  -     LUZ ELENA  -     Comprehensive metabolic panel  -     Magnesium  -     Phosphorus  -     Lyme, Total Antibody Test / Reflex  -     Big Pool SF (IgG / M)  -     Zinc  -     Copper, Serum  Discussed worsening s/s and differential diagnosis   Arthralgia, unspecified joint  -     LUZ ELENA  -     Lyme, Total Antibody Test / Reflex  -     Big Pool SF (IgG / M)  otc medications advised  Tick bite, initial encounter  -     Lyme, Total Antibody Test / Reflex  -     Big Pool SF (IgG / M)  Recommend prevention       Return if symptoms worsen or fail to improve.    Padmaja Law, APRN  08/16/2019

## 2019-08-17 LAB — B BURGDOR IGG+IGM SER-ACNC: <0.91 ISR (ref 0–0.9)

## 2019-08-19 LAB — ANA SER QL: NEGATIVE

## 2019-08-20 LAB
COPPER SERPL-MCNC: 201 UG/DL (ref 72–166)
R RICKETTSI IGG SER QL IA: NEGATIVE
R RICKETTSI IGM TITR SER: 0.25 INDEX (ref 0–0.89)
ZINC SERPL-MCNC: 69 UG/DL (ref 56–134)

## 2019-08-22 DIAGNOSIS — R79.0 ABNORMAL BLOOD LEVEL OF COPPER: ICD-10-CM

## 2019-08-22 DIAGNOSIS — R20.2 TINGLING SENSATION IN FACE: Primary | ICD-10-CM

## 2019-08-26 ENCOUNTER — APPOINTMENT (OUTPATIENT)
Dept: LAB | Facility: HOSPITAL | Age: 34
End: 2019-08-26

## 2019-08-26 PROCEDURE — 82525 ASSAY OF COPPER: CPT | Performed by: NURSE PRACTITIONER

## 2019-08-26 PROCEDURE — 36415 COLL VENOUS BLD VENIPUNCTURE: CPT | Performed by: NURSE PRACTITIONER

## 2019-08-28 LAB — COPPER SERPL-MCNC: 211 UG/DL (ref 72–166)

## 2019-10-12 ENCOUNTER — IMMUNIZATION (OUTPATIENT)
Dept: RETAIL CLINIC | Facility: CLINIC | Age: 34
End: 2019-10-12

## 2019-10-12 DIAGNOSIS — Z23 INFLUENZA VACCINE ADMINISTERED: Primary | ICD-10-CM

## 2019-10-12 PROCEDURE — 90471 IMMUNIZATION ADMIN: CPT | Performed by: NURSE PRACTITIONER

## 2019-10-12 PROCEDURE — 90674 CCIIV4 VAC NO PRSV 0.5 ML IM: CPT | Performed by: NURSE PRACTITIONER

## 2019-10-12 NOTE — PROGRESS NOTES
Thad Astorga is a 33 y.o. female.     Patient presents to clinic today for a flu vaccination. Denies previous severe reaction to vaccinations. Denies acute moderate or severe illness with fever. See scanned documents.     Diagnosis for this visit:  Influenza vaccine administered [Z23]    Most Recent Immunizations   Administered Date(s) Administered   • Flu Vaccine Quad PF >18YRS 10/04/2016   • Flu Vaccine Quad PF >36MO 10/16/2017   • Flucelvax Quad Vial =>4yrs 10/12/2019   • Hepatitis A 05/16/2019   • Tdap 02/20/2019       Notes:  You have been given the flu vaccination today. You have been given a copy of the vaccine information sheet (VIS) and verbalized understanding of risks and benefits of receiving the vaccine. You have been counseled on common side effects such as low grade fever, headache, and injection site tenderness/redness that may occur over next 1-2 days. Report serious symptoms such as swelling or trouble breathing immediately or go to the nearest emergency room. You have voiced understanding of all instructions.

## 2019-10-30 ENCOUNTER — TRANSCRIBE ORDERS (OUTPATIENT)
Dept: LAB | Facility: HOSPITAL | Age: 34
End: 2019-10-30

## 2019-10-30 ENCOUNTER — LAB (OUTPATIENT)
Dept: LAB | Facility: HOSPITAL | Age: 34
End: 2019-10-30

## 2019-10-30 DIAGNOSIS — Z3A.01 LESS THAN 8 WEEKS GESTATION OF PREGNANCY: Primary | ICD-10-CM

## 2019-10-30 DIAGNOSIS — Z34.81 PRENATAL CARE, SUBSEQUENT PREGNANCY, FIRST TRIMESTER: ICD-10-CM

## 2019-10-30 DIAGNOSIS — Z3A.01 LESS THAN 8 WEEKS GESTATION OF PREGNANCY: ICD-10-CM

## 2019-10-30 LAB
ABO GROUP BLD: NORMAL
BASOPHILS # BLD AUTO: 0.03 10*3/MM3 (ref 0–0.2)
BASOPHILS NFR BLD AUTO: 0.3 % (ref 0–1.5)
BLD GP AB SCN SERPL QL: NEGATIVE
DEPRECATED RDW RBC AUTO: 39.2 FL (ref 37–54)
EOSINOPHIL # BLD AUTO: 0.18 10*3/MM3 (ref 0–0.4)
EOSINOPHIL NFR BLD AUTO: 2.1 % (ref 0.3–6.2)
ERYTHROCYTE [DISTWIDTH] IN BLOOD BY AUTOMATED COUNT: 12.8 % (ref 12.3–15.4)
GLUCOSE BLD-MCNC: 82 MG/DL (ref 65–99)
HBV SURFACE AG SERPL QL IA: NORMAL
HCT VFR BLD AUTO: 35.4 % (ref 34–46.6)
HCV AB SER DONR QL: NORMAL
HGB BLD-MCNC: 12.1 G/DL (ref 12–15.9)
HIV1+2 AB SER QL: NORMAL
IMM GRANULOCYTES # BLD AUTO: 0.05 10*3/MM3 (ref 0–0.05)
IMM GRANULOCYTES NFR BLD AUTO: 0.6 % (ref 0–0.5)
LYMPHOCYTES # BLD AUTO: 2.15 10*3/MM3 (ref 0.7–3.1)
LYMPHOCYTES NFR BLD AUTO: 25 % (ref 19.6–45.3)
MCH RBC QN AUTO: 29.1 PG (ref 26.6–33)
MCHC RBC AUTO-ENTMCNC: 34.2 G/DL (ref 31.5–35.7)
MCV RBC AUTO: 85.1 FL (ref 79–97)
MONOCYTES # BLD AUTO: 0.39 10*3/MM3 (ref 0.1–0.9)
MONOCYTES NFR BLD AUTO: 4.5 % (ref 5–12)
NEUTROPHILS # BLD AUTO: 5.8 10*3/MM3 (ref 1.7–7)
NEUTROPHILS NFR BLD AUTO: 67.5 % (ref 42.7–76)
NRBC BLD AUTO-RTO: 0 /100 WBC (ref 0–0.2)
PLATELET # BLD AUTO: 337 10*3/MM3 (ref 140–450)
PMV BLD AUTO: 9.8 FL (ref 6–12)
RBC # BLD AUTO: 4.16 10*6/MM3 (ref 3.77–5.28)
RH BLD: POSITIVE
TSH SERPL DL<=0.05 MIU/L-ACNC: 1.3 UIU/ML (ref 0.27–4.2)
WBC NRBC COR # BLD: 8.6 10*3/MM3 (ref 3.4–10.8)

## 2019-10-30 PROCEDURE — 82947 ASSAY GLUCOSE BLOOD QUANT: CPT | Performed by: OBSTETRICS & GYNECOLOGY

## 2019-10-30 PROCEDURE — 84443 ASSAY THYROID STIM HORMONE: CPT | Performed by: OBSTETRICS & GYNECOLOGY

## 2019-10-30 PROCEDURE — 86803 HEPATITIS C AB TEST: CPT

## 2019-10-30 PROCEDURE — 80081 OBSTETRIC PANEL INC HIV TSTG: CPT

## 2019-10-30 PROCEDURE — 36415 COLL VENOUS BLD VENIPUNCTURE: CPT | Performed by: OBSTETRICS & GYNECOLOGY

## 2019-10-31 LAB
RPR SER QL: NORMAL
RUBV IGG SERPL IA-ACNC: POSITIVE

## 2020-03-03 ENCOUNTER — APPOINTMENT (OUTPATIENT)
Dept: LAB | Facility: HOSPITAL | Age: 35
End: 2020-03-03

## 2020-03-03 ENCOUNTER — TRANSCRIBE ORDERS (OUTPATIENT)
Dept: LAB | Facility: HOSPITAL | Age: 35
End: 2020-03-03

## 2020-03-03 DIAGNOSIS — R55 SYNCOPE AND COLLAPSE: Primary | ICD-10-CM

## 2020-03-03 LAB
DEPRECATED RDW RBC AUTO: 40.8 FL (ref 37–54)
ERYTHROCYTE [DISTWIDTH] IN BLOOD BY AUTOMATED COUNT: 13.2 % (ref 12.3–15.4)
GLUCOSE BLD-MCNC: 82 MG/DL (ref 65–99)
HCT VFR BLD AUTO: 31.6 % (ref 34–46.6)
HGB BLD-MCNC: 10.5 G/DL (ref 12–15.9)
MCH RBC QN AUTO: 28.2 PG (ref 26.6–33)
MCHC RBC AUTO-ENTMCNC: 33.2 G/DL (ref 31.5–35.7)
MCV RBC AUTO: 84.9 FL (ref 79–97)
PLATELET # BLD AUTO: 217 10*3/MM3 (ref 140–450)
PMV BLD AUTO: 9.3 FL (ref 6–12)
RBC # BLD AUTO: 3.72 10*6/MM3 (ref 3.77–5.28)
TSH SERPL DL<=0.05 MIU/L-ACNC: 1.4 UIU/ML (ref 0.27–4.2)
WBC NRBC COR # BLD: 8.36 10*3/MM3 (ref 3.4–10.8)

## 2020-03-03 PROCEDURE — 82947 ASSAY GLUCOSE BLOOD QUANT: CPT | Performed by: OBSTETRICS & GYNECOLOGY

## 2020-03-03 PROCEDURE — 36415 COLL VENOUS BLD VENIPUNCTURE: CPT | Performed by: OBSTETRICS & GYNECOLOGY

## 2020-03-03 PROCEDURE — 84443 ASSAY THYROID STIM HORMONE: CPT | Performed by: OBSTETRICS & GYNECOLOGY

## 2020-03-03 PROCEDURE — 85027 COMPLETE CBC AUTOMATED: CPT | Performed by: OBSTETRICS & GYNECOLOGY

## 2020-03-11 ENCOUNTER — HOSPITAL ENCOUNTER (OUTPATIENT)
Dept: MRI IMAGING | Facility: HOSPITAL | Age: 35
Discharge: HOME | End: 2020-03-11
Admitting: INTERNAL MEDICINE

## 2020-03-11 ENCOUNTER — TRANSCRIBE ORDERS (OUTPATIENT)
Dept: ADMINISTRATIVE | Facility: HOSPITAL | Age: 35
End: 2020-03-11

## 2020-03-11 DIAGNOSIS — D15.1 BENIGN NEOPLASM OF HEART: ICD-10-CM

## 2020-03-11 DIAGNOSIS — D15.1 BENIGN NEOPLASM OF HEART: Primary | ICD-10-CM

## 2020-03-11 PROCEDURE — 75557 CARDIAC MRI FOR MORPH: CPT

## 2020-03-17 ENCOUNTER — LAB (OUTPATIENT)
Dept: LAB | Facility: HOSPITAL | Age: 35
End: 2020-03-17

## 2020-03-17 ENCOUNTER — TRANSCRIBE ORDERS (OUTPATIENT)
Dept: LAB | Facility: HOSPITAL | Age: 35
End: 2020-03-17

## 2020-03-17 DIAGNOSIS — Z34.83 PRENATAL CARE, SUBSEQUENT PREGNANCY, THIRD TRIMESTER: ICD-10-CM

## 2020-03-17 DIAGNOSIS — Z3A.28 28 WEEKS GESTATION OF PREGNANCY: Primary | ICD-10-CM

## 2020-03-17 LAB
BLD GP AB SCN SERPL QL: NEGATIVE
DEPRECATED RDW RBC AUTO: 41.5 FL (ref 37–54)
ERYTHROCYTE [DISTWIDTH] IN BLOOD BY AUTOMATED COUNT: 13.5 % (ref 12.3–15.4)
GLUCOSE 1H P 100 G GLC PO SERPL-MCNC: 193 MG/DL (ref 65–140)
HCT VFR BLD AUTO: 32.1 % (ref 34–46.6)
HGB BLD-MCNC: 10.3 G/DL (ref 12–15.9)
MCH RBC QN AUTO: 27.2 PG (ref 26.6–33)
MCHC RBC AUTO-ENTMCNC: 32.1 G/DL (ref 31.5–35.7)
MCV RBC AUTO: 84.7 FL (ref 79–97)
PLATELET # BLD AUTO: 224 10*3/MM3 (ref 140–450)
PMV BLD AUTO: 9.2 FL (ref 6–12)
RBC # BLD AUTO: 3.79 10*6/MM3 (ref 3.77–5.28)
WBC NRBC COR # BLD: 8.69 10*3/MM3 (ref 3.4–10.8)

## 2020-03-17 PROCEDURE — 36415 COLL VENOUS BLD VENIPUNCTURE: CPT | Performed by: OBSTETRICS & GYNECOLOGY

## 2020-03-17 PROCEDURE — 85027 COMPLETE CBC AUTOMATED: CPT

## 2020-03-17 PROCEDURE — 82950 GLUCOSE TEST: CPT | Performed by: OBSTETRICS & GYNECOLOGY

## 2020-03-17 PROCEDURE — 86850 RBC ANTIBODY SCREEN: CPT | Performed by: OBSTETRICS & GYNECOLOGY

## 2020-03-23 ENCOUNTER — OFFICE VISIT (OUTPATIENT)
Dept: ENDOCRINOLOGY | Facility: CLINIC | Age: 35
End: 2020-03-23

## 2020-03-23 VITALS
HEART RATE: 91 BPM | SYSTOLIC BLOOD PRESSURE: 120 MMHG | BODY MASS INDEX: 38.62 KG/M2 | OXYGEN SATURATION: 100 % | DIASTOLIC BLOOD PRESSURE: 70 MMHG | WEIGHT: 231.8 LBS | HEIGHT: 65 IN

## 2020-03-23 DIAGNOSIS — O24.419 GESTATIONAL DIABETES MELLITUS (GDM) IN THIRD TRIMESTER, GESTATIONAL DIABETES METHOD OF CONTROL UNSPECIFIED: Primary | ICD-10-CM

## 2020-03-23 LAB
GLUCOSE BLDC GLUCOMTR-MCNC: 142 MG/DL (ref 70–130)
HBA1C MFR BLD: 5.2 %

## 2020-03-23 PROCEDURE — 99243 OFF/OP CNSLTJ NEW/EST LOW 30: CPT | Performed by: INTERNAL MEDICINE

## 2020-03-23 PROCEDURE — 83036 HEMOGLOBIN GLYCOSYLATED A1C: CPT | Performed by: INTERNAL MEDICINE

## 2020-03-23 PROCEDURE — 82947 ASSAY GLUCOSE BLOOD QUANT: CPT | Performed by: INTERNAL MEDICINE

## 2020-03-23 RX ORDER — BLOOD-GLUCOSE METER
KIT MISCELLANEOUS
Qty: 1 EACH | Refills: 0 | Status: SHIPPED | OUTPATIENT
Start: 2020-03-23 | End: 2020-08-06

## 2020-03-23 RX ORDER — OMEPRAZOLE 20 MG/1
20 CAPSULE, DELAYED RELEASE ORAL DAILY
COMMUNITY
End: 2020-06-07 | Stop reason: HOSPADM

## 2020-03-23 NOTE — PROGRESS NOTES
"  Chief Complaint   Patient presents with   • Gestational Diabetes     New Patient - 2nd pregnancy, 1st time with GDM. 28w3d        Referring Provider  Radha Chaudhry MD     JERONIMO Astorga is a 34 y.o. female had concerns including Gestational Diabetes (New Patient - 2nd pregnancy, 1st time with GDM. 28w3d).      Pt was referred by Dr. Chaudhry as a new consult for gestational diabetes.     Gestational diabetes was diagnosed last week.  OGTT results: 3/17/20 50 gm screen   Prior GDM: no, one prior pregnancy  Weeks of gestation: 28.3  Gender: girl, will have an US in 4 weeks  Weight was about 10 lbs higher this pregnancy than her last.     Pt has a history of gastric sleeve. Has had pre-syncopal episodes and has seen cardiology but is now thinking they could be related to blood glucose. Tends to happen after eating sweets or high carb meal.     Diet: \"Not good\"  Breakfast: sausage biscuit, pop-tart, donuts, cereal, on occasion eggs and jones  Lunch: meat, fries/chips and bread   Dinner: same  Snacks: chips, cookies, milkshakes, oreos  Drinks: gatorade, regular soda      Past Medical History:   Diagnosis Date   • Abnormal Pap smear of cervix    • GERD (gastroesophageal reflux disease)    • Gestational diabetes    • PONV (postoperative nausea and vomiting)      Past Surgical History:   Procedure Laterality Date   • GASTRIC SLEEVE LAPAROSCOPIC  09/2016   • LEEP  2013   • OTOPLASTY  2005      Family History   Problem Relation Age of Onset   • Hyperlipidemia Mother    • Hypertension Mother    • Hypertension Father    • Hyperlipidemia Father    • Diabetes Paternal Grandmother    • No Known Problems Brother    • Hydrocephalus Maternal Grandmother    • Hypertension Maternal Grandmother    • Hyperlipidemia Maternal Grandmother    • Heart disease Maternal Grandfather    • Hyperlipidemia Maternal Grandfather    • Hypertension Maternal Grandfather    • Heart attack Maternal Grandfather       Social " "History     Socioeconomic History   • Marital status:      Spouse name: Not on file   • Number of children: Not on file   • Years of education: Not on file   • Highest education level: Not on file   Tobacco Use   • Smoking status: Never Smoker   • Smokeless tobacco: Never Used   Substance and Sexual Activity   • Alcohol use: No   • Drug use: No   • Sexual activity: Yes     Partners: Male     Birth control/protection: None      No Known Allergies   Current Outpatient Medications on File Prior to Visit   Medication Sig Dispense Refill   • cetirizine (zyrTEC) 10 MG tablet Take 10 mg by mouth Daily.     • omeprazole (priLOSEC) 20 MG capsule Take 20 mg by mouth Daily.     • Prenatal Vit-Fe Fumarate-FA (PNV PRENATAL PLUS MULTIVITAMIN) 27-1 MG tablet Take 1 tablet by mouth Daily. [id number]  3     No current facility-administered medications on file prior to visit.         Review of Systems   HENT: Positive for rhinorrhea. Negative for congestion.    Eyes: Negative.    Respiratory: Positive for shortness of breath. Negative for chest tightness.    Cardiovascular: Negative.    Gastrointestinal: Negative.  Positive for GERD and indigestion.   Endocrine: Negative.    Genitourinary: Negative.    Musculoskeletal: Positive for joint swelling and myalgias.   Skin: Negative.    Allergic/Immunologic: Negative.    Neurological: Positive for dizziness and weakness.   Hematological: Negative.    Psychiatric/Behavioral: Negative.       ROS was reviewed and verified. All other ROS negative.    /70 (BP Location: Left arm, Patient Position: Sitting, Cuff Size: Adult)   Pulse 91   Ht 165.1 cm (65\")   Wt 105 kg (231 lb 12.8 oz)   SpO2 100%   Breastfeeding No   BMI 38.57 kg/m²      Physical Exam  Constitutional:  well developed; well nourished  no acute distress   ENT/Thyroid: no thyromegaly  no palpable nodules   Eyes: EOM intact  Conjunctiva: clear   Respiratory:  breathing is unlabored  clear to auscultation bilaterally "   Cardiovascular:  regular rate and rhythm  S1, S2 normal  systolic murmur: 2/6   Chest:  Not performed.   Abdomen: Not performed.   : Not performed.   Musculoskeletal: negative findings:  ROM of all joints is normal, no deformities present   Skin: dry and warm   Neuro: normal without focal findings, mental status, speech normal, alert and oriented x3 and LUMA   Psych: oriented to time, place and person, mood and affect are within normal limits     CMP:  Lab Results   Component Value Date    GLU 93 07/08/2016    BUN 9 08/16/2019    CREATININE 0.86 08/16/2019    EGFRIFNONA 76 08/16/2019    BCR 10.5 08/16/2019     08/16/2019    K 4.0 08/16/2019    CO2 24.8 08/16/2019    CALCIUM 9.7 08/16/2019    ALBUMIN 4.70 08/16/2019    LABIL2 1.3 07/08/2016    BILITOT 0.3 08/16/2019    ALKPHOS 82 08/16/2019    AST 16 08/16/2019    ALT 19 08/16/2019     Lipid Panel:  Lab Results   Component Value Date    CHOL 226 (H) 06/12/2019    TRIG 213 (H) 06/12/2019    HDL 58 06/12/2019    VLDL 42.6 06/12/2019     (H) 06/12/2019     HbA1c:  Lab Results   Component Value Date    HGBA1C 5.2 03/23/2020    HGBA1C 5.10 09/26/2017     Glucose:  Lab Results   Component Value Date    POCGLU 142 (A) 03/23/2020       Assessment and Plan    Kelly was seen today for gestational diabetes.    Diagnoses and all orders for this visit:    Gestational diabetes mellitus (GDM) in third trimester, gestational diabetes method of control unspecified  28.3 with second pregnancy, girl. First pregnancy with GDM. History of gastric sleeve.   Management of gestational diabetes was discussed in detail.  Patient was provided with dietary guidelines including carbohydrate targets with meals/snacks as well as BG targets were provided: Fasting less than 95, 2 hours postprandial less than 140, 1 hour postprandial less than 120.    Potential complications related to uncontrolled gestational diabetes were also discussed including, but not limited to, LGA and  macrosomia, stillbirth, polyhydramnios,  morbidity including hypoglycemia, increased risk for DM2 beyond pregnancy and risk for GDM with future pregnancies.  Discussed expected changes and potential increases in blood sugar during pregnancy.  I have asked that she monitor her BG's 6-8 times per day, fasting, 1 hour postprandial, 2 hours postprandial and nightly and return in 4 weeks for follow-up.  If BG's are not at target despite dietary modifications, will add medication. Pt was advised to contact our office prior to follow-up if BGs are not at target.   If insurance does not cover strips adequately, she may need to purchase an over-the-counter meter and strips in order to monitor BG's as directed.  -     POC Glycosylated Hemoglobin (Hb A1C)  -     POC Glucose  -     glucose blood test strip; Use 8 times daily to monitor BG  -     glucose monitor monitoring kit; Use to monitor BG up to 8 times daily  -     Lancets Misc. kit; Use to monitor BG up to 8 times daily         Return in about 1 month (around 2020) for next scheduled follow up. The patient was instructed to contact the clinic with any interval questions or concerns.    Angelique Danielson, DO   Endocrinologist    Please note that portions of this document were completed with a voice recognition program. Efforts were made to edit the dictations, but occasionally words are mis-transcribed.

## 2020-04-06 DIAGNOSIS — O24.419 GESTATIONAL DIABETES MELLITUS (GDM) IN THIRD TRIMESTER, GESTATIONAL DIABETES METHOD OF CONTROL UNSPECIFIED: Primary | ICD-10-CM

## 2020-04-14 ENCOUNTER — TELEPHONE (OUTPATIENT)
Dept: ENDOCRINOLOGY | Facility: CLINIC | Age: 35
End: 2020-04-14

## 2020-04-14 NOTE — TELEPHONE ENCOUNTER
Pt has an appt on 4/27/2020  She was not sure if she needs this appt    Please advise  pts number [phone]

## 2020-04-27 ENCOUNTER — TELEMEDICINE (OUTPATIENT)
Dept: ENDOCRINOLOGY | Facility: CLINIC | Age: 35
End: 2020-04-27

## 2020-04-27 VITALS — WEIGHT: 228 LBS | HEIGHT: 65 IN | BODY MASS INDEX: 37.99 KG/M2

## 2020-04-27 DIAGNOSIS — O24.414 INSULIN CONTROLLED GESTATIONAL DIABETES MELLITUS (GDM) IN THIRD TRIMESTER: Primary | ICD-10-CM

## 2020-04-27 PROCEDURE — 99213 OFFICE O/P EST LOW 20 MIN: CPT | Performed by: INTERNAL MEDICINE

## 2020-04-27 RX ORDER — FERROUS SULFATE 325(65) MG
TABLET ORAL
COMMUNITY
End: 2020-06-07 | Stop reason: HOSPADM

## 2020-04-27 NOTE — PROGRESS NOTES
"Chief Complaint   Patient presents with   • Gestational Diabetes     follow-up        HPI   Kelly Astorga is a 34 y.o. female had concerns including Gestational Diabetes (follow-up).      Visit was conducted via telemedicine. Consent was obtained from the patient to conduct a video visit.    She is checking blood sugars 4 times per day.  Currently she is on lantus 24 units     She is 33.4 weeks with a girl and baby is measuring on schedule. Her last OB appt was last Monday and baby.   She will see them again today.   She delivered her last baby at 36.5 and had postpartum preeclampsia.      The following portions of the patient's history were reviewed and updated as appropriate: allergies, current medications, past family history, past medical history, past social history, past surgical history and problem list.    Review of Systems   Constitutional: Negative.    HENT: Negative.    Eyes: Negative.    Respiratory: Negative.    Cardiovascular: Negative.    Gastrointestinal: Negative.    Endocrine: Negative.    Genitourinary: Negative.    Musculoskeletal: Negative.    Skin: Negative.    Allergic/Immunologic: Negative.    Neurological: Negative.    Hematological: Negative.    Psychiatric/Behavioral: Negative.       ROS was reviewed and verified. All other ROS negative.    Ht 165.1 cm (65\")   Wt 103 kg (228 lb)   BMI 37.94 kg/m²      Physical Exam     Constitutional:  no acute distress   ENT/Thyroid: Not examined    Eyes: Not examined    Respiratory:  No conversational dyspnea    Cardiovascular:  Not performed.   Chest:  Not performed.   Abdomen: Not performed.   : Not performed.   Musculoskeletal: Not examined   Skin: not performed.   Neuro: mental status, speech normal, alert and oriented x3   Psych: oriented to time, place and person, mood and affect are within normal limits     HbA1c:  Lab Results   Component Value Date    HGBA1C 5.2 03/23/2020    HGBA1C 5.10 09/26/2017     Glucose:    Lab Results "   Component Value Date    POCGLU 142 (A) 03/23/2020     Assessment and Plan    Kelly was seen today for gestational diabetes.    Diagnoses and all orders for this visit:    Insulin controlled gestational diabetes mellitus (GDM) in third trimester    Gestational diabetes mellitus (GDM) in third trimester, gestational diabetes method of control unspecified  Now controlled after titration of insulin at 33.4 weeks with a girl and baby measuring on schedule.   Fasting BGs at target on 24 units lantus at night. All post-prandial BGs at target.   Discussed that once pt goes into labor - she will no longer need to monitor BG levels and take insulin. Can mostly resume normal diet being cautious of portions and carbs with a goal to get back to pre-pregnancy weight and to normal weight if additional to lose.  Send BG logs every 1-2 weeks.   -     Insulin Glargine (LANTUS SOLOSTAR) 100 UNIT/ML injection pen; Inject 24 Units under the skin into the appropriate area as directed Every Night. Increase by 2 units q2days until fasting <95, mdd 40 units         Return in about 3 weeks (around 5/18/2020) for next scheduled follow up. The patient was instructed to contact the clinic with any interval questions or concerns.    Angeliqeu Danielson, DO   Endocrinologist    Please note that portions of this document were completed with a voice recognition program. Efforts were made to edit the dictations, but occasionally words are mis-transcribed.

## 2020-05-21 ENCOUNTER — TELEMEDICINE (OUTPATIENT)
Dept: ENDOCRINOLOGY | Facility: CLINIC | Age: 35
End: 2020-05-21

## 2020-05-21 VITALS — BODY MASS INDEX: 38.65 KG/M2 | WEIGHT: 232 LBS | HEIGHT: 65 IN

## 2020-05-21 DIAGNOSIS — O24.414 INSULIN CONTROLLED GESTATIONAL DIABETES MELLITUS (GDM) IN THIRD TRIMESTER: Primary | ICD-10-CM

## 2020-05-21 PROCEDURE — 99213 OFFICE O/P EST LOW 20 MIN: CPT | Performed by: INTERNAL MEDICINE

## 2020-05-21 NOTE — PROGRESS NOTES
"Chief Complaint   Patient presents with   • Gestational Diabetes     follow-up        HPI   Kelly Astorga is a 34 y.o. female had concerns including Gestational Diabetes (follow-up).      Visit was conducted via telemedicine. Consent was obtained from the patient to conduct a video visit.    She is checking blood sugars 4+ times per day.  Fasting BG's are controlled ranging 79 to 90s.   She is currently on Lantus 24 units nightly.  Has not required prandial insulin.  She had one episode of hypoglycemia on 513 when her blood sugar dropped to 63 before lunch.  After treating for hypoglycemia and eating lunch her post blood sugar at 1 hour was 145.  She had one other 2-hour post blood sugar of 125 but all of her other postprandial BG's are at target.    She is 37 weeks with a girl and baby is measuring on schedule.    Planned for induction on June 4 at 39 weeks.    The following portions of the patient's history were reviewed and updated as appropriate: allergies, current medications, past family history, past medical history, past social history, past surgical history and problem list.    Review of Systems   Constitutional: Positive for fatigue.   Eyes: Negative.    Cardiovascular: Positive for leg swelling.   Gastrointestinal: Negative.    Genitourinary: Negative.       ROS was reviewed and verified. All other ROS negative.    Ht 165.1 cm (65\")   Wt 105 kg (232 lb)   BMI 38.61 kg/m²      Physical Exam     Constitutional:  no acute distress   ENT/Thyroid: Not examined    Eyes: Not examined    Respiratory:  No conversational dyspnea    Cardiovascular:  Not performed.   Chest:  Not performed.   Abdomen: Not performed.   : Not performed.   Musculoskeletal: Not examined   Skin: not performed.   Neuro: mental status, speech normal, alert and oriented x3   Psych: oriented to time, place and person, mood and affect are within normal limits     HbA1c:  Lab Results   Component Value Date    HGBA1C 5.2 " 2020    HGBA1C 5.10 2017     Glucose:    Lab Results   Component Value Date    POCGLU 142 (A) 2020     Assessment and Plan    Kelly was seen today for gestational diabetes.    Diagnoses and all orders for this visit:    Insulin controlled gestational diabetes mellitus (GDM) in third trimester  Patient is 37 weeks with a girl and measuring on schedule.  GDM controlled on Lantus 24 units nightly.  Still with rare postprandial hyperglycemia which she has been able to manage with diet.  Discussed at this point her blood sugar should plateau.  If they trend down significantly, call the office.  Continue to check blood sugars 4 times daily and send BG logs weekly.  Once induction is started, insulin is no longer needed.  Once labor has started or baby is delivered (by ), BG monitoring routinely is no longer needed. She can resume her normal diet though I recommend she avoid excess carbs or concentrated sweets.   Discussed potential post-delivery complications with baby (including low BGs and possible need for longer hospitalization or supplementation of feedings with either pumped milk or formula).   Discussed the increased long-term risk to develop type 2 diabetes and potential gestational diabetes again with future pregnancies.  Call the office if BG's are above 126 fasting or above 200 at any time.  Recommended the patient screen her blood sugars on occasion after baby is delivered.  Advised that she lose all weight gained during pregnancy plus extra if possible.  For potential future pregnancies, start the diet advised during gestational diabetes at the first sign of pregnancy.   Follow-up in 2 to 3 months to screen for possible persistent BG issues.      Return in about 3 months (around 2020) for next scheduled follow up. The patient was instructed to contact the clinic with any interval questions or concerns.    Angelique Danielson, DO   Endocrinologist    Please note that portions of  this document were completed with a voice recognition program. Efforts were made to edit the dictations, but occasionally words are mis-transcribed.

## 2020-06-01 ENCOUNTER — APPOINTMENT (OUTPATIENT)
Dept: PREADMISSION TESTING | Facility: HOSPITAL | Age: 35
End: 2020-06-01

## 2020-06-01 PROCEDURE — U0002 COVID-19 LAB TEST NON-CDC: HCPCS

## 2020-06-01 PROCEDURE — C9803 HOPD COVID-19 SPEC COLLECT: HCPCS

## 2020-06-01 PROCEDURE — U0004 COV-19 TEST NON-CDC HGH THRU: HCPCS

## 2020-06-02 LAB
REF LAB TEST METHOD: NORMAL
SARS-COV-2 RNA RESP QL NAA+PROBE: NOT DETECTED

## 2020-06-04 ENCOUNTER — HOSPITAL ENCOUNTER (INPATIENT)
Dept: LABOR AND DELIVERY | Facility: HOSPITAL | Age: 35
LOS: 2 days | Discharge: HOME | End: 2020-06-07
Attending: OBSTETRICS & GYNECOLOGY | Admitting: OBSTETRICS & GYNECOLOGY

## 2020-06-04 LAB
ALP SERPL-CCNC: 170 U/L (ref 39–117)
ALT SERPL W P-5'-P-CCNC: 11 U/L (ref 1–33)
AST SERPL-CCNC: 18 U/L (ref 1–32)
BILIRUB SERPL-MCNC: <0.2 MG/DL (ref 0.2–1.2)
CREAT BLD-MCNC: 0.69 MG/DL (ref 0.57–1)
DEPRECATED RDW RBC AUTO: 44.4 FL (ref 37–54)
ERYTHROCYTE [DISTWIDTH] IN BLOOD BY AUTOMATED COUNT: 14.3 % (ref 12.3–15.4)
GLUCOSE BLDC GLUCOMTR-MCNC: 128 MG/DL (ref 70–130)
HCT VFR BLD AUTO: 33.1 % (ref 34–46.6)
HGB BLD-MCNC: 10.6 G/DL (ref 12–15.9)
LDH SERPL-CCNC: 210 U/L (ref 135–214)
MCH RBC QN AUTO: 27.5 PG (ref 26.6–33)
MCHC RBC AUTO-ENTMCNC: 32 G/DL (ref 31.5–35.7)
MCV RBC AUTO: 86 FL (ref 79–97)
PLATELET # BLD AUTO: 237 10*3/MM3 (ref 140–450)
PMV BLD AUTO: 9.8 FL (ref 6–12)
RBC # BLD AUTO: 3.85 10*6/MM3 (ref 3.77–5.28)
URATE SERPL-MCNC: 5.5 MG/DL (ref 2.4–5.7)
WBC NRBC COR # BLD: 9.59 10*3/MM3 (ref 3.4–10.8)

## 2020-06-04 PROCEDURE — 82962 GLUCOSE BLOOD TEST: CPT

## 2020-06-04 PROCEDURE — 86850 RBC ANTIBODY SCREEN: CPT | Performed by: OBSTETRICS & GYNECOLOGY

## 2020-06-04 PROCEDURE — 84460 ALANINE AMINO (ALT) (SGPT): CPT | Performed by: OBSTETRICS & GYNECOLOGY

## 2020-06-04 PROCEDURE — 82565 ASSAY OF CREATININE: CPT | Performed by: OBSTETRICS & GYNECOLOGY

## 2020-06-04 PROCEDURE — 84450 TRANSFERASE (AST) (SGOT): CPT | Performed by: OBSTETRICS & GYNECOLOGY

## 2020-06-04 PROCEDURE — 83615 LACTATE (LD) (LDH) ENZYME: CPT | Performed by: OBSTETRICS & GYNECOLOGY

## 2020-06-04 PROCEDURE — 86901 BLOOD TYPING SEROLOGIC RH(D): CPT | Performed by: OBSTETRICS & GYNECOLOGY

## 2020-06-04 PROCEDURE — 82247 BILIRUBIN TOTAL: CPT | Performed by: OBSTETRICS & GYNECOLOGY

## 2020-06-04 PROCEDURE — 84075 ASSAY ALKALINE PHOSPHATASE: CPT | Performed by: OBSTETRICS & GYNECOLOGY

## 2020-06-04 PROCEDURE — 85027 COMPLETE CBC AUTOMATED: CPT | Performed by: OBSTETRICS & GYNECOLOGY

## 2020-06-04 PROCEDURE — 86900 BLOOD TYPING SEROLOGIC ABO: CPT | Performed by: OBSTETRICS & GYNECOLOGY

## 2020-06-04 PROCEDURE — 84550 ASSAY OF BLOOD/URIC ACID: CPT | Performed by: OBSTETRICS & GYNECOLOGY

## 2020-06-04 PROCEDURE — 36415 COLL VENOUS BLD VENIPUNCTURE: CPT | Performed by: OBSTETRICS & GYNECOLOGY

## 2020-06-04 RX ORDER — SODIUM CHLORIDE, SODIUM LACTATE, POTASSIUM CHLORIDE, CALCIUM CHLORIDE 600; 310; 30; 20 MG/100ML; MG/100ML; MG/100ML; MG/100ML
125 INJECTION, SOLUTION INTRAVENOUS CONTINUOUS
Status: DISCONTINUED | OUTPATIENT
Start: 2020-06-04 | End: 2020-06-05

## 2020-06-04 RX ADMIN — SODIUM CHLORIDE, POTASSIUM CHLORIDE, SODIUM LACTATE AND CALCIUM CHLORIDE 125 ML/HR: 600; 310; 30; 20 INJECTION, SOLUTION INTRAVENOUS at 23:17

## 2020-06-05 ENCOUNTER — ANESTHESIA (OUTPATIENT)
Dept: LABOR AND DELIVERY | Facility: HOSPITAL | Age: 35
End: 2020-06-05

## 2020-06-05 ENCOUNTER — ANESTHESIA EVENT (OUTPATIENT)
Dept: LABOR AND DELIVERY | Facility: HOSPITAL | Age: 35
End: 2020-06-05

## 2020-06-05 PROBLEM — Z3A.39 39 WEEKS GESTATION OF PREGNANCY: Status: ACTIVE | Noted: 2020-06-05

## 2020-06-05 LAB
ABO GROUP BLD: NORMAL
BLD GP AB SCN SERPL QL: NEGATIVE
GLUCOSE BLDC GLUCOMTR-MCNC: 120 MG/DL (ref 70–130)
GLUCOSE BLDC GLUCOMTR-MCNC: 218 MG/DL (ref 70–130)
GLUCOSE BLDC GLUCOMTR-MCNC: 75 MG/DL (ref 70–130)
GLUCOSE BLDC GLUCOMTR-MCNC: 78 MG/DL (ref 70–130)
GLUCOSE BLDC GLUCOMTR-MCNC: 81 MG/DL (ref 70–130)
GLUCOSE BLDC GLUCOMTR-MCNC: 89 MG/DL (ref 70–130)
RH BLD: POSITIVE
T&S EXPIRATION DATE: NORMAL

## 2020-06-05 PROCEDURE — 59025 FETAL NON-STRESS TEST: CPT

## 2020-06-05 PROCEDURE — 3E033VJ INTRODUCTION OF OTHER HORMONE INTO PERIPHERAL VEIN, PERCUTANEOUS APPROACH: ICD-10-PCS | Performed by: OBSTETRICS & GYNECOLOGY

## 2020-06-05 PROCEDURE — C1755 CATHETER, INTRASPINAL: HCPCS | Performed by: ANESTHESIOLOGY

## 2020-06-05 PROCEDURE — 82962 GLUCOSE BLOOD TEST: CPT

## 2020-06-05 PROCEDURE — 25010000002 ONDANSETRON PER 1 MG: Performed by: ANESTHESIOLOGY

## 2020-06-05 PROCEDURE — 51702 INSERT TEMP BLADDER CATH: CPT

## 2020-06-05 PROCEDURE — 25010000002 PENICILLIN G POTASSIUM PER 600000 UNITS: Performed by: OBSTETRICS & GYNECOLOGY

## 2020-06-05 PROCEDURE — C1755 CATHETER, INTRASPINAL: HCPCS

## 2020-06-05 PROCEDURE — 25010000002 FENTANYL CITRATE (PF) 100 MCG/2ML SOLUTION: Performed by: ANESTHESIOLOGY

## 2020-06-05 PROCEDURE — 25010000002 ROPIVACAINE PER 1 MG: Performed by: ANESTHESIOLOGY

## 2020-06-05 RX ORDER — PENICILLIN G 3000000 [IU]/50ML
3 INJECTION, SOLUTION INTRAVENOUS EVERY 4 HOURS
Status: DISCONTINUED | OUTPATIENT
Start: 2020-06-05 | End: 2020-06-05 | Stop reason: HOSPADM

## 2020-06-05 RX ORDER — BUPIVACAINE HYDROCHLORIDE 2.5 MG/ML
INJECTION, SOLUTION EPIDURAL; INFILTRATION; INTRACAUDAL AS NEEDED
Status: DISCONTINUED | OUTPATIENT
Start: 2020-06-05 | End: 2020-06-05 | Stop reason: SURG

## 2020-06-05 RX ORDER — SIMETHICONE 80 MG
80 TABLET,CHEWABLE ORAL 4 TIMES DAILY PRN
Status: DISCONTINUED | OUTPATIENT
Start: 2020-06-05 | End: 2020-06-07 | Stop reason: HOSPADM

## 2020-06-05 RX ORDER — IBUPROFEN 600 MG/1
600 TABLET ORAL EVERY 6 HOURS PRN
Status: DISCONTINUED | OUTPATIENT
Start: 2020-06-05 | End: 2020-06-07 | Stop reason: HOSPADM

## 2020-06-05 RX ORDER — PROMETHAZINE HYDROCHLORIDE 12.5 MG/1
12.5 TABLET ORAL EVERY 4 HOURS PRN
Status: DISCONTINUED | OUTPATIENT
Start: 2020-06-05 | End: 2020-06-07 | Stop reason: HOSPADM

## 2020-06-05 RX ORDER — ONDANSETRON 2 MG/ML
4 INJECTION INTRAMUSCULAR; INTRAVENOUS EVERY 6 HOURS PRN
Status: DISCONTINUED | OUTPATIENT
Start: 2020-06-05 | End: 2020-06-05 | Stop reason: HOSPADM

## 2020-06-05 RX ORDER — DIPHENHYDRAMINE HYDROCHLORIDE 50 MG/ML
12.5 INJECTION INTRAMUSCULAR; INTRAVENOUS EVERY 8 HOURS PRN
Status: DISCONTINUED | OUTPATIENT
Start: 2020-06-05 | End: 2020-06-05 | Stop reason: HOSPADM

## 2020-06-05 RX ORDER — CARBOPROST TROMETHAMINE 250 UG/ML
250 INJECTION, SOLUTION INTRAMUSCULAR AS NEEDED
Status: DISCONTINUED | OUTPATIENT
Start: 2020-06-05 | End: 2020-06-05 | Stop reason: HOSPADM

## 2020-06-05 RX ORDER — SODIUM CHLORIDE 0.9 % (FLUSH) 0.9 %
3 SYRINGE (ML) INJECTION EVERY 12 HOURS SCHEDULED
Status: DISCONTINUED | OUTPATIENT
Start: 2020-06-05 | End: 2020-06-05 | Stop reason: HOSPADM

## 2020-06-05 RX ORDER — BISACODYL 10 MG
10 SUPPOSITORY, RECTAL RECTAL DAILY PRN
Status: DISCONTINUED | OUTPATIENT
Start: 2020-06-06 | End: 2020-06-07 | Stop reason: HOSPADM

## 2020-06-05 RX ORDER — MISOPROSTOL 200 UG/1
800 TABLET ORAL AS NEEDED
Status: DISCONTINUED | OUTPATIENT
Start: 2020-06-05 | End: 2020-06-05 | Stop reason: HOSPADM

## 2020-06-05 RX ORDER — ACETAMINOPHEN 325 MG/1
650 TABLET ORAL EVERY 4 HOURS PRN
Status: DISCONTINUED | OUTPATIENT
Start: 2020-06-05 | End: 2020-06-05 | Stop reason: HOSPADM

## 2020-06-05 RX ORDER — ONDANSETRON 2 MG/ML
4 INJECTION INTRAMUSCULAR; INTRAVENOUS ONCE AS NEEDED
Status: COMPLETED | OUTPATIENT
Start: 2020-06-05 | End: 2020-06-05

## 2020-06-05 RX ORDER — FENTANYL CITRATE 50 UG/ML
INJECTION, SOLUTION INTRAMUSCULAR; INTRAVENOUS AS NEEDED
Status: DISCONTINUED | OUTPATIENT
Start: 2020-06-05 | End: 2020-06-05 | Stop reason: SURG

## 2020-06-05 RX ORDER — TRISODIUM CITRATE DIHYDRATE AND CITRIC ACID MONOHYDRATE 500; 334 MG/5ML; MG/5ML
30 SOLUTION ORAL ONCE
Status: DISCONTINUED | OUTPATIENT
Start: 2020-06-05 | End: 2020-06-05 | Stop reason: HOSPADM

## 2020-06-05 RX ORDER — FAMOTIDINE 10 MG/ML
20 INJECTION, SOLUTION INTRAVENOUS ONCE AS NEEDED
Status: DISCONTINUED | OUTPATIENT
Start: 2020-06-05 | End: 2020-06-05 | Stop reason: HOSPADM

## 2020-06-05 RX ORDER — OXYTOCIN-SODIUM CHLORIDE 0.9% IV SOLN 30 UNIT/500ML 30-0.9/5 UT/ML-%
2-20 SOLUTION INTRAVENOUS
Status: DISCONTINUED | OUTPATIENT
Start: 2020-06-05 | End: 2020-06-05 | Stop reason: HOSPADM

## 2020-06-05 RX ORDER — ONDANSETRON 4 MG/1
4 TABLET, FILM COATED ORAL EVERY 6 HOURS PRN
Status: DISCONTINUED | OUTPATIENT
Start: 2020-06-05 | End: 2020-06-05 | Stop reason: HOSPADM

## 2020-06-05 RX ORDER — EPHEDRINE SULFATE/0.9% NACL/PF 25 MG/5 ML
10 SYRINGE (ML) INTRAVENOUS
Status: DISCONTINUED | OUTPATIENT
Start: 2020-06-05 | End: 2020-06-05 | Stop reason: HOSPADM

## 2020-06-05 RX ORDER — OXYTOCIN-SODIUM CHLORIDE 0.9% IV SOLN 30 UNIT/500ML 30-0.9/5 UT/ML-%
650 SOLUTION INTRAVENOUS ONCE
Status: DISCONTINUED | OUTPATIENT
Start: 2020-06-05 | End: 2020-06-05 | Stop reason: HOSPADM

## 2020-06-05 RX ORDER — MAGNESIUM CARB/ALUMINUM HYDROX 105-160MG
30 TABLET,CHEWABLE ORAL ONCE
Status: DISCONTINUED | OUTPATIENT
Start: 2020-06-05 | End: 2020-06-05 | Stop reason: HOSPADM

## 2020-06-05 RX ORDER — SODIUM CHLORIDE 0.9 % (FLUSH) 0.9 %
1-10 SYRINGE (ML) INJECTION AS NEEDED
Status: DISCONTINUED | OUTPATIENT
Start: 2020-06-05 | End: 2020-06-07 | Stop reason: HOSPADM

## 2020-06-05 RX ORDER — ROPIVACAINE HYDROCHLORIDE 2 MG/ML
15 INJECTION, SOLUTION EPIDURAL; INFILTRATION; PERINEURAL CONTINUOUS
Status: DISCONTINUED | OUTPATIENT
Start: 2020-06-05 | End: 2020-06-05

## 2020-06-05 RX ORDER — ACETAMINOPHEN 325 MG/1
650 TABLET ORAL EVERY 4 HOURS PRN
Status: DISCONTINUED | OUTPATIENT
Start: 2020-06-05 | End: 2020-06-07 | Stop reason: HOSPADM

## 2020-06-05 RX ORDER — SODIUM CHLORIDE, SODIUM LACTATE, POTASSIUM CHLORIDE, CALCIUM CHLORIDE 600; 310; 30; 20 MG/100ML; MG/100ML; MG/100ML; MG/100ML
125 INJECTION, SOLUTION INTRAVENOUS CONTINUOUS
Status: DISCONTINUED | OUTPATIENT
Start: 2020-06-05 | End: 2020-06-05

## 2020-06-05 RX ORDER — LANOLIN
CREAM (ML) TOPICAL
Status: DISCONTINUED | OUTPATIENT
Start: 2020-06-05 | End: 2020-06-07 | Stop reason: HOSPADM

## 2020-06-05 RX ORDER — DOCUSATE SODIUM 100 MG/1
100 CAPSULE, LIQUID FILLED ORAL 2 TIMES DAILY
Status: DISCONTINUED | OUTPATIENT
Start: 2020-06-05 | End: 2020-06-07 | Stop reason: HOSPADM

## 2020-06-05 RX ORDER — HYDROCORTISONE 25 MG/G
CREAM TOPICAL 2 TIMES DAILY
Status: DISCONTINUED | OUTPATIENT
Start: 2020-06-05 | End: 2020-06-07 | Stop reason: HOSPADM

## 2020-06-05 RX ORDER — ONDANSETRON 4 MG/1
4 TABLET, FILM COATED ORAL EVERY 6 HOURS PRN
Status: DISCONTINUED | OUTPATIENT
Start: 2020-06-05 | End: 2020-06-07 | Stop reason: HOSPADM

## 2020-06-05 RX ORDER — METHYLERGONOVINE MALEATE 0.2 MG/ML
200 INJECTION INTRAVENOUS ONCE AS NEEDED
Status: DISCONTINUED | OUTPATIENT
Start: 2020-06-05 | End: 2020-06-05 | Stop reason: HOSPADM

## 2020-06-05 RX ORDER — METOCLOPRAMIDE HYDROCHLORIDE 5 MG/ML
10 INJECTION INTRAMUSCULAR; INTRAVENOUS ONCE AS NEEDED
Status: DISCONTINUED | OUTPATIENT
Start: 2020-06-05 | End: 2020-06-05 | Stop reason: HOSPADM

## 2020-06-05 RX ORDER — LIDOCAINE HYDROCHLORIDE 10 MG/ML
5 INJECTION, SOLUTION EPIDURAL; INFILTRATION; INTRACAUDAL; PERINEURAL AS NEEDED
Status: DISCONTINUED | OUTPATIENT
Start: 2020-06-05 | End: 2020-06-05 | Stop reason: HOSPADM

## 2020-06-05 RX ORDER — HYDROCORTISONE 25 MG/G
1 CREAM TOPICAL AS NEEDED
Status: DISCONTINUED | OUTPATIENT
Start: 2020-06-05 | End: 2020-06-07 | Stop reason: HOSPADM

## 2020-06-05 RX ORDER — OXYTOCIN-SODIUM CHLORIDE 0.9% IV SOLN 30 UNIT/500ML 30-0.9/5 UT/ML-%
85 SOLUTION INTRAVENOUS ONCE
Status: COMPLETED | OUTPATIENT
Start: 2020-06-05 | End: 2020-06-05

## 2020-06-05 RX ORDER — BUTORPHANOL TARTRATE 1 MG/ML
1 INJECTION, SOLUTION INTRAMUSCULAR; INTRAVENOUS
Status: DISCONTINUED | OUTPATIENT
Start: 2020-06-05 | End: 2020-06-05 | Stop reason: HOSPADM

## 2020-06-05 RX ORDER — LIDOCAINE HYDROCHLORIDE AND EPINEPHRINE 15; 5 MG/ML; UG/ML
INJECTION, SOLUTION EPIDURAL AS NEEDED
Status: DISCONTINUED | OUTPATIENT
Start: 2020-06-05 | End: 2020-06-05 | Stop reason: SURG

## 2020-06-05 RX ORDER — LIDOCAINE HYDROCHLORIDE AND EPINEPHRINE 20; 5 MG/ML; UG/ML
INJECTION, SOLUTION EPIDURAL; INFILTRATION; INTRACAUDAL; PERINEURAL AS NEEDED
Status: DISCONTINUED | OUTPATIENT
Start: 2020-06-05 | End: 2020-06-05 | Stop reason: SURG

## 2020-06-05 RX ORDER — ONDANSETRON 2 MG/ML
4 INJECTION INTRAMUSCULAR; INTRAVENOUS EVERY 6 HOURS PRN
Status: DISCONTINUED | OUTPATIENT
Start: 2020-06-05 | End: 2020-06-07 | Stop reason: HOSPADM

## 2020-06-05 RX ORDER — SODIUM CHLORIDE 0.9 % (FLUSH) 0.9 %
10 SYRINGE (ML) INJECTION AS NEEDED
Status: DISCONTINUED | OUTPATIENT
Start: 2020-06-05 | End: 2020-06-05 | Stop reason: HOSPADM

## 2020-06-05 RX ORDER — PROMETHAZINE HYDROCHLORIDE 25 MG/ML
12.5 INJECTION, SOLUTION INTRAMUSCULAR; INTRAVENOUS EVERY 4 HOURS PRN
Status: DISCONTINUED | OUTPATIENT
Start: 2020-06-05 | End: 2020-06-07 | Stop reason: HOSPADM

## 2020-06-05 RX ADMIN — LIDOCAINE HYDROCHLORIDE,EPINEPHRINE BITARTRATE 5 ML: 20; .005 INJECTION, SOLUTION EPIDURAL; INFILTRATION; INTRACAUDAL; PERINEURAL at 17:45

## 2020-06-05 RX ADMIN — ACETAMINOPHEN 650 MG: 325 TABLET, FILM COATED ORAL at 23:07

## 2020-06-05 RX ADMIN — IBUPROFEN 600 MG: 600 TABLET, FILM COATED ORAL at 19:29

## 2020-06-05 RX ADMIN — LIDOCAINE HYDROCHLORIDE AND EPINEPHRINE 2 ML: 15; 5 INJECTION, SOLUTION EPIDURAL at 10:09

## 2020-06-05 RX ADMIN — BUPIVACAINE HYDROCHLORIDE 5 ML: 2.5 INJECTION, SOLUTION EPIDURAL; INFILTRATION; INTRACAUDAL; PERINEURAL at 17:45

## 2020-06-05 RX ADMIN — PENICILLIN G 3 MILLION UNITS: 3000000 INJECTION, SOLUTION INTRAVENOUS at 13:29

## 2020-06-05 RX ADMIN — PENICILLIN G 3 MILLION UNITS: 3000000 INJECTION, SOLUTION INTRAVENOUS at 09:08

## 2020-06-05 RX ADMIN — ONDANSETRON 4 MG: 2 INJECTION INTRAMUSCULAR; INTRAVENOUS at 14:16

## 2020-06-05 RX ADMIN — ROPIVACAINE HYDROCHLORIDE 15 ML/HR: 2 INJECTION, SOLUTION EPIDURAL; INFILTRATION at 10:16

## 2020-06-05 RX ADMIN — OXYTOCIN 85 ML/HR: 10 INJECTION, SOLUTION INTRAMUSCULAR; INTRAVENOUS at 19:29

## 2020-06-05 RX ADMIN — LIDOCAINE HYDROCHLORIDE AND EPINEPHRINE 3 ML: 15; 5 INJECTION, SOLUTION EPIDURAL at 10:08

## 2020-06-05 RX ADMIN — FENTANYL CITRATE 100 MCG: 50 INJECTION, SOLUTION INTRAMUSCULAR; INTRAVENOUS at 10:11

## 2020-06-05 RX ADMIN — OXYTOCIN 2 MILLI-UNITS/MIN: 10 INJECTION, SOLUTION INTRAMUSCULAR; INTRAVENOUS at 00:47

## 2020-06-05 RX ADMIN — BUPIVACAINE HYDROCHLORIDE 10 ML: 2.5 INJECTION, SOLUTION EPIDURAL; INFILTRATION; INTRACAUDAL; PERINEURAL at 10:12

## 2020-06-05 RX ADMIN — DOCUSATE SODIUM 100 MG: 100 CAPSULE, LIQUID FILLED ORAL at 23:07

## 2020-06-05 RX ADMIN — SODIUM CHLORIDE 5 MILLION UNITS: 900 INJECTION INTRAVENOUS at 00:47

## 2020-06-05 RX ADMIN — PENICILLIN G 3 MILLION UNITS: 3000000 INJECTION, SOLUTION INTRAVENOUS at 05:38

## 2020-06-05 RX ADMIN — HYDROCORTISONE: 25 CREAM TOPICAL at 23:07

## 2020-06-05 NOTE — ANESTHESIA PREPROCEDURE EVALUATION
Anesthesia Evaluation     Patient summary reviewed and Nursing notes reviewed   history of anesthetic complications: PONV  NPO Solid Status: > 6 hours  NPO Liquid Status: < 2 hours           Airway   Mallampati: II  TM distance: >3 FB  Neck ROM: full  No difficulty expected  Dental      Pulmonary - negative pulmonary ROS   Cardiovascular - negative cardio ROS        Neuro/Psych- negative ROS  GI/Hepatic/Renal/Endo    (+)  GERD,      Musculoskeletal (-) negative ROS    Abdominal    Substance History - negative use     OB/GYN    (+) Pregnant,         Other                        Anesthesia Plan    ASA 2     epidural       Anesthetic plan, all risks, benefits, and alternatives have been provided, discussed and informed consent has been obtained with: patient.  Use of blood products discussed with patient .

## 2020-06-05 NOTE — H&P
27[facility]  Obstetric History and Physical    No chief complaint on file.      Subjective     Patient is a 34 y.o. female  currently at 39w1d, who presents for induction of labor  for GDM  with favorable cervix.    Her prenatal care is complicated by gestational diabetes, obesity and history of gastric sleeve.  Her previous obstetric/gynecological history is noted for is non-contributory.    The following portions of the patients history were reviewed and updated as appropriate: current medications, allergies, past medical history, past surgical history, past family history, past social history and problem list .       Prenatal Information:   Maternal Prenatal Labs  Blood Type ABO Type   Date Value Ref Range Status   2020 O  Final      Rh Status RH type   Date Value Ref Range Status   2020 Positive  Final      Antibody Screen Antibody Screen   Date Value Ref Range Status   2020 Negative  Final      Gonnorhea No results found for: GCCX   Chlamydia No results found for: CLAMYDCU   RPR No results found for: RPR   Syphilis Antibody No results found for: SYPHILIS   Rubella No results found for: RUBELLAIGGIN   Hepatitis B Surface Antigen No results found for: HEPBSAG   HIV-1 Antibody No results found for: LABHIV1   Hepatitis C Antibody No results found for: HEPCAB   Rapid Urin Drug Screen No results found for: AMPMETHU, BARBITSCNUR, LABBENZSCN, LABMETHSCN, LABOPIASCN, THCURSCR, COCAINEUR, AMPHETSCREEN, PROPOXSCN, BUPRENORSCNU, METAMPSCNUR, OXYCODONESCN, TRICYCLICSCN   Group B Strep Culture No results found for: GBSANTIGEN                 Past OB History:       OB History    Para Term  AB Living   2 1 0 1 0 1   SAB TAB Ectopic Molar Multiple Live Births   0 0 0 0 0 1      # Outcome Date GA Lbr David/2nd Weight Sex Delivery Anes PTL Lv   2 Current            1  04/08 36w5d 12:15 / 04:58 2930 g (6 lb 7.4 oz) F Vag-Spont EPI  NIKHIL      Name: CHAR CERRATO       Apgar1: 8  Apgar5: 9       Past Medical History: Past Medical History:   Diagnosis Date   • Abnormal Pap smear of cervix    • GERD (gastroesophageal reflux disease)    • PONV (postoperative nausea and vomiting)       Past Surgical History Past Surgical History:   Procedure Laterality Date   • GASTRIC SLEEVE LAPAROSCOPIC  09/2016   • LEEP  2013   • OTOPLASTY  2005      Family History: Family History   Problem Relation Age of Onset   • Hyperlipidemia Mother    • Hypertension Mother    • Hypertension Father    • Hyperlipidemia Father    • Diabetes Paternal Grandmother    • No Known Problems Brother    • Hydrocephalus Maternal Grandmother    • Hypertension Maternal Grandmother    • Hyperlipidemia Maternal Grandmother    • Heart disease Maternal Grandfather    • Hyperlipidemia Maternal Grandfather    • Hypertension Maternal Grandfather    • Heart attack Maternal Grandfather       Social History:  reports that she has never smoked. She has never used smokeless tobacco.   reports that she does not drink alcohol.   reports that she does not use drugs.        REVIEW OF SYSTEMS              Reports fetal movement is normal             Denies leakage of amniotic fluid.             Denies vaginal bleeding             She reports No contractions             All other systems reviewed and are negative    Objective       Vital Signs Range for the last 24 hours  Temperature: Temp:  [98 °F (36.7 °C)-98.3 °F (36.8 °C)] 98.2 °F (36.8 °C)   Temp Source: Temp src: Oral   BP: BP: (113-141)/(55-80) 137/65   Pulse: Heart Rate:  [54-91] 59   Respirations: Resp:  [16-18] 18   SPO2: SpO2:  [98 %-100 %] 99 %   O2 Amount (l/min):     O2 Devices     Weight: Weight:  [107 kg (235 lb)] 107 kg (235 lb)     FHR:  Reactive NST, category 1    Constitutional:  Well developed, well nourished, no acute distress, well-groomed.   Respiratory:  Lungs are clear to auscultation bilaterally, normal breath sounds.   Cardiovascular:  Normal rate and rhythm, no  murmurs.   Gastrointestinal:  Soft, gravid, nontender.  Uterus: Soft, nontender. Fundus appropriate for dates.  Neurologic:  Alert & oriented x 3,  no focal deficits noted.   Psychiatric:  Speech and behavior appropriate.   Extremities: no cyanosis, clubbing or edema, no evidence of DVT.  Cervix: 4/70/-3, vertex, arom clear        Labs:  Lab Results   Component Value Date    WBC 9.59 06/04/2020    HGB 10.6 (L) 06/04/2020    HCT 33.1 (L) 06/04/2020    MCV 86.0 06/04/2020     06/04/2020    GLU 93 07/08/2016    URICACID 5.5 06/04/2020    AST 18 06/04/2020    ALT 11 06/04/2020     06/04/2020     Results from last 7 days   Lab Units 06/04/20  2251   ABO TYPING  O   RH TYPING  Positive   ANTIBODY SCREEN  Negative           Assessment/Plan       39 weeks gestation of pregnancy        Assessment:  1.  Intrauterine pregnancy at 39w1d weeks gestation with reactive fetal status.    2.   induction of labor  for GDM  with favorable cervix. Serial FSBG.  3.  Obstetrical history significant for is non-contributory.  4.  GBS status: No results found for: STREPGPB    Plan:  1.Oxytocin induction  2. Plan of care has been reviewed with patient and questions answered.  3.  Risks, benefits of treatment plan have been discussed.  4.  All questions have been answered.        Radha Chaudhry MD  6/5/2020  11:38

## 2020-06-05 NOTE — L&D DELIVERY NOTE
[facility]    Vaginal Delivery Note    [facility]      Patient name:  Kelly Astorga  : 1985  MRN: [id number]  CSN: [id number]  Date of Service:  20    Current Gestational Age:  39w1d  A2DM      Delivery details     Delivery:       YOB: 2019    Time of Birth: 6:20 PM      Anesthesia: Epidural     Delivering clinician:      Forceps?   No   Vacuum? No    Shoulder dystocia present: No      Delivery narrative:  Patient delivered a viable male over an intact perineum.  At the time of delivery, the head retracted against the perineum and a shoulder dystocia was anticipated.  Assistance was requested. The patient was placed flat and her knees were displaced toward her chest.  She delivered the shoulders with the first expulsive effort with minimal outward traction.  No dystocia was encountered.  The  cried on the perineum and was placed on the maternal abdomen.  The cord was doubly clamped and cut.  The perineum was intact.  Placenta delivered intact with a 3 vessel cord.  Fundal massage was performed with good uterine tone. DRT was called to assist with baby secondary to decreased O2 saturation. Please see their report.   mL. Baby weight and apgars are pending at the time of this document creation.         Disposition  Mother to Mother Baby/Postpartum  in stable condition currently.  Baby to remains with mom  in stable condition currently.      Radha Chaudhry MD  20  18:35

## 2020-06-06 PROBLEM — Z3A.39 39 WEEKS GESTATION OF PREGNANCY: Status: RESOLVED | Noted: 2020-06-05 | Resolved: 2020-06-06

## 2020-06-06 PROBLEM — O14.93 PRE-ECLAMPSIA IN THIRD TRIMESTER: Status: RESOLVED | Noted: 2019-04-11 | Resolved: 2020-06-06

## 2020-06-06 PROBLEM — R55 NEAR SYNCOPE: Status: RESOLVED | Noted: 2019-05-16 | Resolved: 2020-06-06

## 2020-06-06 LAB
BASOPHILS # BLD AUTO: 0.02 10*3/MM3 (ref 0–0.2)
BASOPHILS NFR BLD AUTO: 0.2 % (ref 0–1.5)
DEPRECATED RDW RBC AUTO: 44.9 FL (ref 37–54)
EOSINOPHIL # BLD AUTO: 0.15 10*3/MM3 (ref 0–0.4)
EOSINOPHIL NFR BLD AUTO: 1.6 % (ref 0.3–6.2)
ERYTHROCYTE [DISTWIDTH] IN BLOOD BY AUTOMATED COUNT: 14.3 % (ref 12.3–15.4)
GLUCOSE BLDC GLUCOMTR-MCNC: 104 MG/DL (ref 70–130)
GLUCOSE BLDC GLUCOMTR-MCNC: 140 MG/DL (ref 70–130)
GLUCOSE BLDC GLUCOMTR-MCNC: 95 MG/DL (ref 70–130)
HCT VFR BLD AUTO: 28.5 % (ref 34–46.6)
HGB BLD-MCNC: 8.9 G/DL (ref 12–15.9)
IMM GRANULOCYTES # BLD AUTO: 0.07 10*3/MM3 (ref 0–0.05)
IMM GRANULOCYTES NFR BLD AUTO: 0.7 % (ref 0–0.5)
LYMPHOCYTES # BLD AUTO: 1.75 10*3/MM3 (ref 0.7–3.1)
LYMPHOCYTES NFR BLD AUTO: 18.6 % (ref 19.6–45.3)
MCH RBC QN AUTO: 27.2 PG (ref 26.6–33)
MCHC RBC AUTO-ENTMCNC: 31.2 G/DL (ref 31.5–35.7)
MCV RBC AUTO: 87.2 FL (ref 79–97)
MONOCYTES # BLD AUTO: 0.57 10*3/MM3 (ref 0.1–0.9)
MONOCYTES NFR BLD AUTO: 6.1 % (ref 5–12)
NEUTROPHILS # BLD AUTO: 6.84 10*3/MM3 (ref 1.7–7)
NEUTROPHILS NFR BLD AUTO: 72.8 % (ref 42.7–76)
NRBC BLD AUTO-RTO: 0 /100 WBC (ref 0–0.2)
PLATELET # BLD AUTO: 174 10*3/MM3 (ref 140–450)
PMV BLD AUTO: 9.7 FL (ref 6–12)
RBC # BLD AUTO: 3.27 10*6/MM3 (ref 3.77–5.28)
WBC NRBC COR # BLD: 9.4 10*3/MM3 (ref 3.4–10.8)

## 2020-06-06 PROCEDURE — 82962 GLUCOSE BLOOD TEST: CPT

## 2020-06-06 PROCEDURE — 85025 COMPLETE CBC W/AUTO DIFF WBC: CPT | Performed by: OBSTETRICS & GYNECOLOGY

## 2020-06-06 RX ORDER — FERROUS SULFATE 325(65) MG
325 TABLET ORAL 2 TIMES DAILY WITH MEALS
Status: DISCONTINUED | OUTPATIENT
Start: 2020-06-06 | End: 2020-06-07 | Stop reason: HOSPADM

## 2020-06-06 RX ADMIN — HYDROCORTISONE: 25 CREAM TOPICAL at 08:15

## 2020-06-06 RX ADMIN — FERROUS SULFATE TAB 325 MG (65 MG ELEMENTAL FE) 325 MG: 325 (65 FE) TAB at 18:26

## 2020-06-06 RX ADMIN — DOCUSATE SODIUM 100 MG: 100 CAPSULE, LIQUID FILLED ORAL at 20:35

## 2020-06-06 RX ADMIN — ACETAMINOPHEN 650 MG: 325 TABLET, FILM COATED ORAL at 15:54

## 2020-06-06 RX ADMIN — WITCH HAZEL 1 PAD: 500 SOLUTION RECTAL; TOPICAL at 08:15

## 2020-06-06 RX ADMIN — Medication: at 08:15

## 2020-06-06 RX ADMIN — IBUPROFEN 600 MG: 600 TABLET, FILM COATED ORAL at 04:43

## 2020-06-06 RX ADMIN — FERROUS SULFATE TAB 325 MG (65 MG ELEMENTAL FE) 325 MG: 325 (65 FE) TAB at 12:18

## 2020-06-06 RX ADMIN — DOCUSATE SODIUM 100 MG: 100 CAPSULE, LIQUID FILLED ORAL at 08:03

## 2020-06-06 RX ADMIN — IBUPROFEN 600 MG: 600 TABLET, FILM COATED ORAL at 12:19

## 2020-06-06 RX ADMIN — IBUPROFEN 600 MG: 600 TABLET, FILM COATED ORAL at 20:35

## 2020-06-06 RX ADMIN — ACETAMINOPHEN 650 MG: 325 TABLET, FILM COATED ORAL at 08:03

## 2020-06-06 NOTE — PROGRESS NOTES
[place]  Vaginal Delivery Progress Note    Subjective     Doing well, pain controlled, lochia less than menses, voiding without difficulty      Objective     Vital Signs Range for the last 24 hours  Temperature: Temp:  [97.8 °F (36.6 °C)-98.6 °F (37 °C)] 98 °F (36.7 °C)   Temp Source: Temp src: Oral   BP: BP: (108-146)/(54-72) 118/54   Pulse: Heart Rate:  [] 86   Respirations: Resp:  [16-20] 18   SPO2: SpO2:  [98 %-100 %] 98 %   O2 Amount (l/min):     O2 Devices           Physical Exam:  General:  no acute distresss.  Abdomen: Soft, non-tender, fundus firm  Lochia: less than a normal period,  Perineum: not inspected  Extremities: normal, atraumatic, no cyanosis, and trace edema.       Lab results reviewed:  Yes    Lab Results   Component Value Date    WBC 9.40 06/06/2020    HGB 8.9 (L) 06/06/2020    HCT 28.5 (L) 06/06/2020    MCV 87.2 06/06/2020     06/06/2020         Assessment/Plan       Spontaneous vaginal delivery    Postpartum anemia      Kelly Astorga is Day 1  post-partum       Plan:  Continue current care. Start ferrous sulfate BID      Kylee Perez CNM  6/6/2020  10:16

## 2020-06-06 NOTE — ANESTHESIA POSTPROCEDURE EVALUATION
Patient: Kelly Astorga    Procedure Summary     Date:  06/05/20 Room / Location:      Anesthesia Start:  0950 Anesthesia Stop:  1830    Procedure:  LABOR ANALGESIA Diagnosis:      Scheduled Providers:   Provider:  Elfego Zhou DO    Anesthesia Type:  epidural ASA Status:  2          Anesthesia Type: epidural    Vitals  Vitals Value Taken Time   /63 6/6/2020  3:00 PM   Temp 97.7 °F (36.5 °C) 6/6/2020  3:00 PM   Pulse 84 6/6/2020  3:00 PM   Resp 16 6/6/2020  3:00 PM   SpO2 93 % 6/5/2020 11:57 AM   Vitals shown include unvalidated device data.        Post Anesthesia Care and Evaluation    Patient location during evaluation: bedside  Patient participation: complete - patient participated  Level of consciousness: awake and awake and alert  Pain score: 0  Pain management: satisfactory to patient  Airway patency: patent  Anesthetic complications: No anesthetic complications  PONV Status: none  Cardiovascular status: acceptable  Respiratory status: acceptable  Hydration status: acceptable  Post Neuraxial Block status: Motor and sensory function returned to baseline and No signs or symptoms of PDPH

## 2020-06-07 VITALS
OXYGEN SATURATION: 98 % | SYSTOLIC BLOOD PRESSURE: 124 MMHG | RESPIRATION RATE: 14 BRPM | DIASTOLIC BLOOD PRESSURE: 59 MMHG | HEART RATE: 78 BPM | HEIGHT: 65 IN | TEMPERATURE: 98 F | BODY MASS INDEX: 39.15 KG/M2 | WEIGHT: 235 LBS

## 2020-06-07 RX ORDER — FERROUS SULFATE 325(65) MG
325 TABLET ORAL 2 TIMES DAILY WITH MEALS
Qty: 90 TABLET | Refills: 0 | Status: SHIPPED | OUTPATIENT
Start: 2020-06-07 | End: 2020-08-06

## 2020-06-07 RX ORDER — PSEUDOEPHEDRINE HCL 30 MG
100 TABLET ORAL 2 TIMES DAILY PRN
Start: 2020-06-07 | End: 2020-08-06

## 2020-06-07 RX ORDER — ACETAMINOPHEN 325 MG/1
650 TABLET ORAL EVERY 4 HOURS PRN
Start: 2020-06-07 | End: 2020-08-06

## 2020-06-07 RX ORDER — LANOLIN
CREAM (ML) TOPICAL
Start: 2020-06-07 | End: 2020-08-06

## 2020-06-07 RX ORDER — IBUPROFEN 600 MG/1
600 TABLET ORAL EVERY 6 HOURS PRN
Qty: 30 TABLET | Refills: 1 | Status: SHIPPED | OUTPATIENT
Start: 2020-06-07 | End: 2020-08-06

## 2020-06-07 RX ADMIN — IBUPROFEN 600 MG: 600 TABLET, FILM COATED ORAL at 06:50

## 2020-06-07 NOTE — DISCHARGE SUMMARY
[facility]  Vaginal Delivery Discharge Summary      Patient: Kelly Astorga      MR#:[id number]  Admission  Diagnosis: 39 weeks 1 days, A2GDM, induction of labor  Discharge Diagnosis: Spontaneous vaginal delivery.    Date of Admission: 6/4/2020  Date of Discharge:  6/7/2020    Procedures:  Vaginal, Spontaneous     6/5/2020    6:20 PM      Service:  Obstetrics    Hospital Course:  Patient underwent vaginal delivery and remained in the hospital for 2 days.  During that time she remained afebrile and hemodynamically stable.  On the day of discharge, she was eating, ambulating and voiding without difficulty.      Lab Results   Component Value Date    WBC 9.40 06/06/2020    HGB 8.9 (L) 06/06/2020    HCT 28.5 (L) 06/06/2020    MCV 87.2 06/06/2020     06/06/2020    GLU 93 07/08/2016    URICACID 5.5 06/04/2020    AST 18 06/04/2020    ALT 11 06/04/2020     06/04/2020     Results from last 7 days   Lab Units 06/04/20  2251   ABO TYPING  O   RH TYPING  Positive   ANTIBODY SCREEN  Negative       Discharge Medications     Discharge Medications      New Medications      Instructions Start Date   acetaminophen 325 MG tablet  Commonly known as:  TYLENOL   650 mg, Oral, Every 4 Hours PRN      benzocaine-menthol 20-0.5 % aerosol topical spray  Commonly known as:  DERMOPLAST   Topical, As Needed, OTC      docusate sodium 100 MG capsule   100 mg, Oral, 2 Times Daily PRN      ibuprofen 600 MG tablet  Commonly known as:  ADVIL,MOTRIN   600 mg, Oral, Every 6 Hours PRN      lanolin cream   Topical, Every 1 Hour PRN, OTC      witch hazel-glycerin pad  Commonly known as:  TUCKS   1 pad, Topical, As Needed, OTC         Changes to Medications      Instructions Start Date   ferrous sulfate 325 (65 FE) MG tablet  What changed:    · how much to take  · how to take this  · when to take this   325 mg, Oral, 2 Times Daily With Meals         Continue These Medications      Instructions Start Date   glucose monitor  monitoring kit   Use to monitor BG up to 8 times daily      Insulin Pen Needle 32G X 4 MM misc   1 each, Does not apply, Every Night at Bedtime      Lancets Misc. kit   Use to monitor BG up to 8 times daily      PNV Prenatal Plus Multivitamin 27-1 MG tablet   1 tablet, Oral, Daily, 200001         Stop These Medications    cetirizine 10 MG tablet  Commonly known as:  zyrTEC     glucose blood test strip     Insulin Glargine 100 UNIT/ML injection pen  Commonly known as:  LANTUS SOLOSTAR     omeprazole 20 MG capsule  Commonly known as:  priLOSEC            Discharge Disposition:  To Home    Discharge Condition:  Stable    Discharge Diet: regular    Activity at Discharge: Pelvic rest    Follow-up Appointments  Future Appointments   Date Time Provider Department Center   8/3/2020  2:15 PM Angelique Danielson DO [facility]  None         Kylee Perez CNM  06/07/20  11:55

## 2020-06-07 NOTE — PROGRESS NOTES
[place]  Vaginal Delivery Progress Note    Subjective     Doing well, pain controlled, lochia less than menses, voiding without difficulty. Breastfeeding going well.      Objective     Vital Signs Range for the last 24 hours  Temperature: Temp:  [97.7 °F (36.5 °C)-98.4 °F (36.9 °C)] 98 °F (36.7 °C)   Temp Source: Temp src: Oral   BP: BP: (124-136)/(59-72) 124/59   Pulse: Heart Rate:  [78-86] 78   Respirations: Resp:  [14-16] 14   SPO2:     O2 Amount (l/min):     O2 Devices           Physical Exam:  General:  no acute distresss.  Abdomen: Soft, non-tender, fundus firm  Lochia: less than a normal period,  Perineum: not inspected  Extremities: normal, atraumatic, no cyanosis, and trace edema.       Lab results reviewed:  Yes    Lab Results   Component Value Date    WBC 9.40 06/06/2020    HGB 8.9 (L) 06/06/2020    HCT 28.5 (L) 06/06/2020    MCV 87.2 06/06/2020     06/06/2020         Assessment/Plan       Spontaneous vaginal delivery    Postpartum anemia      Kelly Astorga is Day 2  post-partum       Plan:  Discharge home with standard precautions and return to clinic in 6 weeks.      Kylee Perez CNM  6/7/2020  11:50

## 2020-06-30 ENCOUNTER — LAB (OUTPATIENT)
Dept: LAB | Facility: HOSPITAL | Age: 35
End: 2020-06-30

## 2020-06-30 ENCOUNTER — TRANSCRIBE ORDERS (OUTPATIENT)
Dept: LAB | Facility: HOSPITAL | Age: 35
End: 2020-06-30

## 2020-06-30 DIAGNOSIS — R51.9 FACIAL PAIN: ICD-10-CM

## 2020-06-30 DIAGNOSIS — R51.9 FACIAL PAIN: Primary | ICD-10-CM

## 2020-06-30 LAB
ALP SERPL-CCNC: 125 U/L (ref 39–117)
ALT SERPL W P-5'-P-CCNC: 59 U/L (ref 1–33)
AST SERPL-CCNC: 44 U/L (ref 1–32)
BILIRUB SERPL-MCNC: 0.3 MG/DL (ref 0.2–1.2)
CREAT SERPL-MCNC: 0.81 MG/DL (ref 0.57–1)
LDH SERPL-CCNC: 211 U/L (ref 135–214)
URATE SERPL-MCNC: 8.4 MG/DL (ref 2.4–5.7)

## 2020-06-30 PROCEDURE — 36415 COLL VENOUS BLD VENIPUNCTURE: CPT | Performed by: NURSE PRACTITIONER

## 2020-06-30 PROCEDURE — 84550 ASSAY OF BLOOD/URIC ACID: CPT | Performed by: NURSE PRACTITIONER

## 2020-06-30 PROCEDURE — 83615 LACTATE (LD) (LDH) ENZYME: CPT | Performed by: NURSE PRACTITIONER

## 2020-06-30 PROCEDURE — 82565 ASSAY OF CREATININE: CPT | Performed by: NURSE PRACTITIONER

## 2020-06-30 PROCEDURE — 84156 ASSAY OF PROTEIN URINE: CPT

## 2020-06-30 PROCEDURE — 84460 ALANINE AMINO (ALT) (SGPT): CPT | Performed by: NURSE PRACTITIONER

## 2020-06-30 PROCEDURE — 82247 BILIRUBIN TOTAL: CPT | Performed by: NURSE PRACTITIONER

## 2020-06-30 PROCEDURE — 82570 ASSAY OF URINE CREATININE: CPT

## 2020-06-30 PROCEDURE — 84075 ASSAY ALKALINE PHOSPHATASE: CPT | Performed by: NURSE PRACTITIONER

## 2020-06-30 PROCEDURE — 84450 TRANSFERASE (AST) (SGOT): CPT | Performed by: NURSE PRACTITIONER

## 2020-07-01 LAB
CREAT UR-MCNC: 33.5 MG/DL
PROT UR-MCNC: 5 MG/DL

## 2020-07-14 ENCOUNTER — TRANSCRIBE ORDERS (OUTPATIENT)
Dept: LAB | Facility: HOSPITAL | Age: 35
End: 2020-07-14

## 2020-07-14 ENCOUNTER — LAB (OUTPATIENT)
Dept: LAB | Facility: HOSPITAL | Age: 35
End: 2020-07-14

## 2020-07-14 DIAGNOSIS — R79.89 ELEVATED LFTS: ICD-10-CM

## 2020-07-14 DIAGNOSIS — F41.8 POSTPARTUM ANXIETY: ICD-10-CM

## 2020-07-14 DIAGNOSIS — R79.89 ELEVATED LFTS: Primary | ICD-10-CM

## 2020-07-14 LAB
ALP SERPL-CCNC: 135 U/L (ref 39–117)
ALT SERPL W P-5'-P-CCNC: 65 U/L (ref 1–33)
AST SERPL-CCNC: 44 U/L (ref 1–32)
BILIRUB SERPL-MCNC: 0.3 MG/DL (ref 0–1.2)
CREAT SERPL-MCNC: 0.94 MG/DL (ref 0.57–1)
LDH SERPL-CCNC: 178 U/L (ref 135–214)
URATE SERPL-MCNC: 9 MG/DL (ref 2.4–5.7)

## 2020-07-14 PROCEDURE — 84550 ASSAY OF BLOOD/URIC ACID: CPT

## 2020-07-14 PROCEDURE — 84460 ALANINE AMINO (ALT) (SGPT): CPT

## 2020-07-14 PROCEDURE — 84450 TRANSFERASE (AST) (SGOT): CPT

## 2020-07-14 PROCEDURE — 84075 ASSAY ALKALINE PHOSPHATASE: CPT

## 2020-07-14 PROCEDURE — 82565 ASSAY OF CREATININE: CPT

## 2020-07-14 PROCEDURE — 82247 BILIRUBIN TOTAL: CPT

## 2020-07-14 PROCEDURE — 36415 COLL VENOUS BLD VENIPUNCTURE: CPT

## 2020-07-14 PROCEDURE — 83615 LACTATE (LD) (LDH) ENZYME: CPT

## 2020-07-27 ENCOUNTER — TRANSCRIBE ORDERS (OUTPATIENT)
Dept: LAB | Facility: HOSPITAL | Age: 35
End: 2020-07-27

## 2020-07-27 ENCOUNTER — LAB (OUTPATIENT)
Dept: LAB | Facility: HOSPITAL | Age: 35
End: 2020-07-27

## 2020-07-27 DIAGNOSIS — I67.83: ICD-10-CM

## 2020-07-27 DIAGNOSIS — I67.83: Primary | ICD-10-CM

## 2020-07-27 LAB
ALP SERPL-CCNC: 114 U/L (ref 39–117)
ALT SERPL W P-5'-P-CCNC: 45 U/L (ref 1–33)
AST SERPL-CCNC: 33 U/L (ref 1–32)
BASOPHILS # BLD AUTO: 0.02 10*3/MM3 (ref 0–0.2)
BASOPHILS NFR BLD AUTO: 0.3 % (ref 0–1.5)
BILIRUB SERPL-MCNC: 0.3 MG/DL (ref 0–1.2)
CREAT SERPL-MCNC: 0.76 MG/DL (ref 0.57–1)
DEPRECATED RDW RBC AUTO: 42.8 FL (ref 37–54)
EOSINOPHIL # BLD AUTO: 0.18 10*3/MM3 (ref 0–0.4)
EOSINOPHIL NFR BLD AUTO: 2.6 % (ref 0.3–6.2)
ERYTHROCYTE [DISTWIDTH] IN BLOOD BY AUTOMATED COUNT: 14.6 % (ref 12.3–15.4)
HCT VFR BLD AUTO: 35.7 % (ref 34–46.6)
HGB BLD-MCNC: 11.6 G/DL (ref 12–15.9)
IMM GRANULOCYTES # BLD AUTO: 0.03 10*3/MM3 (ref 0–0.05)
IMM GRANULOCYTES NFR BLD AUTO: 0.4 % (ref 0–0.5)
LDH SERPL-CCNC: 164 U/L (ref 135–214)
LYMPHOCYTES # BLD AUTO: 2.07 10*3/MM3 (ref 0.7–3.1)
LYMPHOCYTES NFR BLD AUTO: 29.4 % (ref 19.6–45.3)
MCH RBC QN AUTO: 26.4 PG (ref 26.6–33)
MCHC RBC AUTO-ENTMCNC: 32.5 G/DL (ref 31.5–35.7)
MCV RBC AUTO: 81.3 FL (ref 79–97)
MONOCYTES # BLD AUTO: 0.41 10*3/MM3 (ref 0.1–0.9)
MONOCYTES NFR BLD AUTO: 5.8 % (ref 5–12)
NEUTROPHILS NFR BLD AUTO: 4.34 10*3/MM3 (ref 1.7–7)
NEUTROPHILS NFR BLD AUTO: 61.5 % (ref 42.7–76)
NRBC BLD AUTO-RTO: 0.1 /100 WBC (ref 0–0.2)
PLATELET # BLD AUTO: 323 10*3/MM3 (ref 140–450)
PMV BLD AUTO: 9.6 FL (ref 6–12)
RBC # BLD AUTO: 4.39 10*6/MM3 (ref 3.77–5.28)
URATE SERPL-MCNC: 7.8 MG/DL (ref 2.4–5.7)
WBC # BLD AUTO: 7.05 10*3/MM3 (ref 3.4–10.8)

## 2020-07-27 PROCEDURE — 84550 ASSAY OF BLOOD/URIC ACID: CPT | Performed by: OBSTETRICS & GYNECOLOGY

## 2020-07-27 PROCEDURE — 82565 ASSAY OF CREATININE: CPT | Performed by: OBSTETRICS & GYNECOLOGY

## 2020-07-27 PROCEDURE — 82247 BILIRUBIN TOTAL: CPT | Performed by: OBSTETRICS & GYNECOLOGY

## 2020-07-27 PROCEDURE — 84450 TRANSFERASE (AST) (SGOT): CPT | Performed by: OBSTETRICS & GYNECOLOGY

## 2020-07-27 PROCEDURE — 83615 LACTATE (LD) (LDH) ENZYME: CPT | Performed by: OBSTETRICS & GYNECOLOGY

## 2020-07-27 PROCEDURE — 36415 COLL VENOUS BLD VENIPUNCTURE: CPT | Performed by: OBSTETRICS & GYNECOLOGY

## 2020-07-27 PROCEDURE — 85025 COMPLETE CBC W/AUTO DIFF WBC: CPT

## 2020-07-27 PROCEDURE — 84460 ALANINE AMINO (ALT) (SGPT): CPT | Performed by: OBSTETRICS & GYNECOLOGY

## 2020-07-27 PROCEDURE — 84075 ASSAY ALKALINE PHOSPHATASE: CPT | Performed by: OBSTETRICS & GYNECOLOGY

## 2020-07-31 ENCOUNTER — APPOINTMENT (OUTPATIENT)
Dept: CT IMAGING | Facility: HOSPITAL | Age: 35
End: 2020-07-31

## 2020-07-31 ENCOUNTER — HOSPITAL ENCOUNTER (EMERGENCY)
Facility: HOSPITAL | Age: 35
Discharge: HOME | End: 2020-07-31
Attending: EMERGENCY MEDICINE | Admitting: EMERGENCY MEDICINE

## 2020-07-31 VITALS
RESPIRATION RATE: 18 BRPM | TEMPERATURE: 97.8 F | BODY MASS INDEX: 35.82 KG/M2 | WEIGHT: 215 LBS | DIASTOLIC BLOOD PRESSURE: 76 MMHG | HEIGHT: 65 IN | HEART RATE: 88 BPM | OXYGEN SATURATION: 100 % | SYSTOLIC BLOOD PRESSURE: 144 MMHG
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
## 2020-07-31 DIAGNOSIS — S06.0X0A CONCUSSION WITHOUT LOSS OF CONSCIOUSNESS, INITIAL ENCOUNTER: ICD-10-CM

## 2020-07-31 LAB — B-HCG UR QL: NEGATIVE

## 2020-07-31 PROCEDURE — 99283 EMERGENCY DEPT VISIT LOW MDM: CPT

## 2020-07-31 PROCEDURE — 70450 CT HEAD/BRAIN W/O DYE: CPT

## 2020-07-31 PROCEDURE — 81025 URINE PREGNANCY TEST: CPT | Performed by: PHYSICIAN ASSISTANT
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
## 2020-08-04 ENCOUNTER — TELEMEDICINE (OUTPATIENT)
Dept: ENDOCRINOLOGY | Facility: CLINIC | Age: 35
End: 2020-08-04

## 2020-08-04 VITALS — HEIGHT: 65 IN | BODY MASS INDEX: 35.49 KG/M2 | WEIGHT: 213 LBS

## 2020-08-04 PROCEDURE — 99213 OFFICE O/P EST LOW 20 MIN: CPT | Performed by: INTERNAL MEDICINE

## 2020-08-04 NOTE — PROGRESS NOTES
"Chief Complaint   Patient presents with   • Gestational Diabetes     follow-up        HPI   Kelly Astorga is a 34 y.o. female had concerns including Gestational Diabetes (follow-up).      Visit was conducted via telemedicine. Consent was obtained from the patient to conduct a video visit.    Baby had some breathing problems due to fluid on her lungs and was in the NICU. Pt was on lantus 24 units once daily but did not require prandial insulin.     Baby is now 2 months old. Was 8lb 5 oz at delivery.     She is down 20 lbs since delivery - at her baseline weight before pregnancy.    Denies any new concerns.     The following portions of the patient's history were reviewed and updated as appropriate: allergies, current medications, past family history, past medical history, past social history, past surgical history and problem list.    Review of Systems   Constitutional: Negative.    HENT: Negative.    Eyes: Negative.    Respiratory: Negative.    Cardiovascular: Negative.    Gastrointestinal: Negative.    Endocrine: Negative.    Genitourinary: Negative.    Musculoskeletal: Negative.    Skin: Negative.    Allergic/Immunologic: Negative.    Neurological: Negative.    Hematological: Negative.    Psychiatric/Behavioral: Negative.       ROS was reviewed and verified. All other ROS negative.    Ht 165.1 cm (65\")   Wt 96.6 kg (213 lb)   LMP  (LMP Unknown)   Breastfeeding No   BMI 35.45 kg/m²      Constitutional:  no acute distress   ENT/Thyroid: Not examined    Eyes: Not examined    Respiratory:  No conversational dyspnea    Cardiovascular:  Not performed.   Chest:  Not performed.   Abdomen: Not performed.   : Not performed.   Musculoskeletal: Not examined   Skin: not performed.   Neuro: mental status, speech normal, alert and oriented x3   Psych: oriented to time, place and person, mood and affect are within normal limits     CMP:  Lab Results   Component Value Date    GLU 93 07/08/2016    BUN 9 " 08/16/2019    CREATININE 0.76 07/27/2020    EGFRIFNONA 76 08/16/2019    BCR 10.5 08/16/2019     08/16/2019    K 4.0 08/16/2019    CO2 24.8 08/16/2019    CALCIUM 9.7 08/16/2019    ALBUMIN 4.70 08/16/2019    LABIL2 1.3 07/08/2016    BILITOT 0.3 07/27/2020    ALKPHOS 114 07/27/2020    AST 33 (H) 07/27/2020    ALT 45 (H) 07/27/2020     Lipid Panel:  Lab Results   Component Value Date    CHOL 226 (H) 06/12/2019    TRIG 213 (H) 06/12/2019    HDL 58 06/12/2019    VLDL 42.6 06/12/2019     (H) 06/12/2019     HbA1c:  Lab Results   Component Value Date    HGBA1C 5.2 03/23/2020    HGBA1C 5.10 09/26/2017     Glucose:    Lab Results   Component Value Date    POCGLU 95 06/06/2020     TSH:  Lab Results   Component Value Date    TSH 1.400 03/03/2020       Assessment and Plan    Kelly was seen today for gestational diabetes.    Diagnoses and all orders for this visit:    Gestational diabetes mellitus (GDM), postpartum  Patient had insulin managed gestational diabetes during pregnancy requiring basal insulin 24 units nightly.  She did not require prandial insulin. A1c level is pending.   Baby was delivered 2 months ago weighing 8 pounds 5 ounces and did have some breathing difficulties due to excess fluid on her lungs, requiring about 4-hour NICU stay. They were discharged on time and had no further issues.  She has not checked her blood sugars postpartum, but is back to her prepregnancy weight and feeling well.  Advised she continue to try to lose additional weight if possible.  For potential future pregnancies, start the diet and glucose monitoring advised for gestational diabetes at the first sign of pregnancy as her risk for GDM is quite high. Risk for DM 2 is also about 2 times normal.   -     Hemoglobin A1c; Future     Return if she has future glucose problems. The patient was instructed to contact the clinic with any interval questions or concerns.    Angelique Danielson, DO   Endocrinologist    Please note that  portions of this document were completed with a voice recognition program. Efforts were made to edit the dictations, but occasionally words are mis-transcribed.

## 2020-08-06 ENCOUNTER — OFFICE VISIT (OUTPATIENT)
Dept: INTERNAL MEDICINE | Facility: CLINIC | Age: 35
End: 2020-08-06

## 2020-08-06 VITALS
OXYGEN SATURATION: 97 % | SYSTOLIC BLOOD PRESSURE: 115 MMHG | HEART RATE: 82 BPM | DIASTOLIC BLOOD PRESSURE: 78 MMHG | TEMPERATURE: 97.1 F | WEIGHT: 217 LBS | HEIGHT: 65 IN | BODY MASS INDEX: 36.15 KG/M2

## 2020-08-06 DIAGNOSIS — O24.419 GESTATIONAL DIABETES MELLITUS (GDM), ANTEPARTUM, GESTATIONAL DIABETES METHOD OF CONTROL UNSPECIFIED: ICD-10-CM

## 2020-08-06 DIAGNOSIS — Z86.2 H/O IRON DEFICIENCY ANEMIA: ICD-10-CM

## 2020-08-06 DIAGNOSIS — Z13.220 LIPID SCREENING: ICD-10-CM

## 2020-08-06 DIAGNOSIS — R74.8 ELEVATED LIVER ENZYMES: ICD-10-CM

## 2020-08-06 DIAGNOSIS — L02.419 AXILLARY ABSCESS: ICD-10-CM

## 2020-08-06 DIAGNOSIS — Z00.00 WELL ADULT EXAM: Primary | ICD-10-CM

## 2020-08-06 PROCEDURE — 99395 PREV VISIT EST AGE 18-39: CPT | Performed by: FAMILY MEDICINE

## 2020-08-06 RX ORDER — OMEPRAZOLE 20 MG/1
20 CAPSULE, DELAYED RELEASE ORAL DAILY
COMMUNITY
End: 2022-01-27 | Stop reason: HOSPADM

## 2020-08-06 RX ORDER — NORGESTREL-ETHINYL ESTRADIOL 0.3-0.03MG
TABLET ORAL
COMMUNITY
Start: 2020-07-14 | End: 2020-12-24

## 2020-08-06 RX ORDER — CLINDAMYCIN PHOSPHATE 10 MG/G
GEL TOPICAL 2 TIMES DAILY
Qty: 60 G | Refills: 5 | Status: SHIPPED | OUTPATIENT
Start: 2020-08-06 | End: 2020-08-13

## 2020-08-06 NOTE — ASSESSMENT & PLAN NOTE
Patient has been encouraged to wash the area with Hibiclens routinely.  She may use clindamycin gel as needed or daily to prevent additional boils or abscesses from developing.  Discussed with patient this likely is hormonal and she did not have a problem prior to pregnancy, but may take several months for her body to regulate.

## 2020-08-06 NOTE — ASSESSMENT & PLAN NOTE
Normal physical exam findings with the exception of small boil to right underarm.  Patient is been counseled today on healthy diet and exercise, vaccines, Pap smears, dental exams, eye exams.  Mental health is been addressed today as well.  Laboratory studies are being obtained for further evaluation of chronic conditions and screening.

## 2020-08-06 NOTE — PROGRESS NOTES
Kelly Astorga is a 34 y.o. female.    Chief Complaint   Patient presents with   • Annual Exam       HPI   Patient presents today for annual physical exam.  She reports that she is up to date on dental and eye exams.  Trying to eat healthy.  Not exercising regularly other than chasing toddlers.  She is up-to-date on tetanus vaccine and flu vaccine.  She denies any concerns with anxiety or depression.  She is up-to-date on Pap smear as well and will have a visit with gynecology in November for routine Pap smear.    Patient does have a history of elevated liver enzymes prior to pregnancy and throughout pregnancy.  They have come down since pregnancy.  However, she is interested in having this checked again.  She was found to be anemic as well during pregnancy, but labs did seem to improve.  Patient also had gestational diabetes.  She has not checked her glucose regularly since the birth.  She had a telehealth visit with her endocrinologist a few days ago.  She does need a follow-up A1c.    In addition, patient does complain of abscesses to her underarms that began during this recent pregnancy.  She tries to not shave the area, but when she does shave she seems to get another abscess appear.  She has taken Keflex for this issue, but they continue to come up.  She thought that this was likely hormonal and would go away after the birth, but it is not.    The following portions of the patient's history were reviewed and updated as appropriate: allergies, current medications, past family history, past medical history, past social history, past surgical history and problem list.     Past Medical History:   Diagnosis Date   • Abnormal Pap smear of cervix    • GERD (gastroesophageal reflux disease)    • PONV (postoperative nausea and vomiting)        Past Surgical History:   Procedure Laterality Date   • GASTRIC SLEEVE LAPAROSCOPIC  09/2016   • LEEP  2013   • OTOPLASTY  2005       Family History   Problem Relation  Age of Onset   • Hyperlipidemia Mother    • Hypertension Mother    • Hypertension Father    • Hyperlipidemia Father    • Diabetes Paternal Grandmother    • No Known Problems Brother    • Hydrocephalus Maternal Grandmother    • Hypertension Maternal Grandmother    • Hyperlipidemia Maternal Grandmother    • Heart disease Maternal Grandfather    • Hyperlipidemia Maternal Grandfather    • Hypertension Maternal Grandfather    • Heart attack Maternal Grandfather        Social History     Socioeconomic History   • Marital status:      Spouse name: Not on file   • Number of children: Not on file   • Years of education: Not on file   • Highest education level: Not on file   Tobacco Use   • Smoking status: Never Smoker   • Smokeless tobacco: Never Used   Substance and Sexual Activity   • Alcohol use: No   • Drug use: No   • Sexual activity: Yes     Partners: Male     Birth control/protection: OCP       No Known Allergies      Current Outpatient Medications:   •  CRYSELLE-28 0.3-30 MG-MCG per tablet, , Disp: , Rfl:   •  omeprazole (priLOSEC) 20 MG capsule, Take 20 mg by mouth Daily., Disp: , Rfl:   •  Prenatal Vit-Fe Fumarate-FA (PNV PRENATAL PLUS MULTIVITAMIN) 27-1 MG tablet, Take 1 tablet by mouth Daily. 200001, Disp: , Rfl: 3  •  clindamycin (CLINDAGEL) 1 % gel, Apply  topically to the appropriate area as directed 2 (Two) Times a Day., Disp: 60 g, Rfl: 5    ROS    Review of Systems   Constitutional: Negative for chills, fatigue and fever.   HENT: Negative for congestion, postnasal drip and sore throat.    Eyes: Negative for blurred vision and visual disturbance.   Respiratory: Negative for cough, shortness of breath and wheezing.    Cardiovascular: Negative for chest pain and leg swelling.   Gastrointestinal: Negative for abdominal pain, constipation, diarrhea, nausea and vomiting.   Endocrine: Negative for cold intolerance and heat intolerance.   Genitourinary: Negative for dysuria and frequency.   Musculoskeletal:  "Negative for arthralgias and back pain.   Skin: Positive for skin lesions. Negative for color change and rash.   Allergic/Immunologic: Negative for environmental allergies.   Neurological: Negative for weakness, numbness and headache.   Hematological: Does not bruise/bleed easily.   Psychiatric/Behavioral: Negative for depressed mood. The patient is not nervous/anxious.        Vitals:    08/06/20 1056   BP: 115/78   BP Location: Left arm   Patient Position: Sitting   Cuff Size: Adult   Pulse: 82   Temp: 97.1 °F (36.2 °C)   TempSrc: Temporal   SpO2: 97%   Weight: 98.4 kg (217 lb)   Height: 165.1 cm (65\")     Body mass index is 36.11 kg/m².    Physical Exam     Physical Exam   Constitutional: She is oriented to person, place, and time. She appears well-developed and well-nourished. No distress.   HENT:   Head: Normocephalic and atraumatic.   Right Ear: Tympanic membrane and external ear normal.   Left Ear: Tympanic membrane and external ear normal.   Eyes: Pupils are equal, round, and reactive to light. Conjunctivae and EOM are normal.   Neck: Normal range of motion. Neck supple.   Cardiovascular: Normal rate and regular rhythm.   No murmur heard.  Pulmonary/Chest: Effort normal and breath sounds normal. No respiratory distress. She has no wheezes.   Abdominal: Soft. Bowel sounds are normal. She exhibits no distension. There is no tenderness.   Musculoskeletal: Normal range of motion. She exhibits no edema.   Lymphadenopathy:     She has no cervical adenopathy.   Neurological: She is alert and oriented to person, place, and time. She displays normal reflexes. No cranial nerve deficit. She exhibits normal muscle tone.   Skin: Skin is warm and dry. There is erythema (Have centimeter diameter, raised, slightly erythematous, firm lesion to the right axilla that is slightly tender to palpation.).   Psychiatric: She has a normal mood and affect. Her behavior is normal.       Assessment/Plan    Problem List Items Addressed " This Visit        Other    Well adult exam - Primary     Normal physical exam findings with the exception of small boil to right underarm.  Patient is been counseled today on healthy diet and exercise, vaccines, Pap smears, dental exams, eye exams.  Mental health is been addressed today as well.  Laboratory studies are being obtained for further evaluation of chronic conditions and screening.         Axillary abscess     Patient has been encouraged to wash the area with Hibiclens routinely.  She may use clindamycin gel as needed or daily to prevent additional boils or abscesses from developing.  Discussed with patient this likely is hormonal and she did not have a problem prior to pregnancy, but may take several months for her body to regulate.           Other Visit Diagnoses     Elevated liver enzymes        Relevant Orders    Comprehensive Metabolic Panel    H/O iron deficiency anemia        Relevant Orders    CBC & Differential    Lipid screening        Relevant Orders    Lipid Panel    Gestational diabetes mellitus (GDM), antepartum, gestational diabetes method of control unspecified        Relevant Orders    Hemoglobin A1c          New Medications Ordered This Visit   Medications   • clindamycin (CLINDAGEL) 1 % gel     Sig: Apply  topically to the appropriate area as directed 2 (Two) Times a Day.     Dispense:  60 g     Refill:  5       No orders of the defined types were placed in this encounter.      Return in about 1 year (around 8/6/2021) for Annual.      Alyx Sy DO

## 2020-08-07 ENCOUNTER — LAB (OUTPATIENT)
Dept: LAB | Facility: HOSPITAL | Age: 35
End: 2020-08-07

## 2020-08-07 LAB
ALBUMIN SERPL-MCNC: 4.3 G/DL (ref 3.5–5.2)
ALBUMIN/GLOB SERPL: 1.5 G/DL
ALP SERPL-CCNC: 87 U/L (ref 39–117)
ALT SERPL W P-5'-P-CCNC: 27 U/L (ref 1–33)
ANION GAP SERPL CALCULATED.3IONS-SCNC: 10.3 MMOL/L (ref 5–15)
AST SERPL-CCNC: 22 U/L (ref 1–32)
BASOPHILS # BLD AUTO: 0.04 10*3/MM3 (ref 0–0.2)
BASOPHILS NFR BLD AUTO: 0.5 % (ref 0–1.5)
BILIRUB SERPL-MCNC: 0.4 MG/DL (ref 0–1.2)
BUN SERPL-MCNC: 7 MG/DL (ref 6–20)
BUN/CREAT SERPL: 8.2 (ref 7–25)
CALCIUM SPEC-SCNC: 9.5 MG/DL (ref 8.6–10.5)
CHLORIDE SERPL-SCNC: 104 MMOL/L (ref 98–107)
CHOLEST SERPL-MCNC: 206 MG/DL (ref 0–200)
CO2 SERPL-SCNC: 24.7 MMOL/L (ref 22–29)
CREAT SERPL-MCNC: 0.85 MG/DL (ref 0.57–1)
DEPRECATED RDW RBC AUTO: 45.1 FL (ref 37–54)
EOSINOPHIL # BLD AUTO: 0.19 10*3/MM3 (ref 0–0.4)
EOSINOPHIL NFR BLD AUTO: 2.4 % (ref 0.3–6.2)
ERYTHROCYTE [DISTWIDTH] IN BLOOD BY AUTOMATED COUNT: 15.1 % (ref 12.3–15.4)
GFR SERPL CREATININE-BSD FRML MDRD: 77 ML/MIN/1.73
GLOBULIN UR ELPH-MCNC: 2.8 GM/DL
GLUCOSE SERPL-MCNC: 80 MG/DL (ref 65–99)
HBA1C MFR BLD: 4.99 % (ref 4.8–5.6)
HCT VFR BLD AUTO: 36.7 % (ref 34–46.6)
HDLC SERPL-MCNC: 51 MG/DL (ref 40–60)
HGB BLD-MCNC: 12.3 G/DL (ref 12–15.9)
IMM GRANULOCYTES # BLD AUTO: 0.02 10*3/MM3 (ref 0–0.05)
IMM GRANULOCYTES NFR BLD AUTO: 0.3 % (ref 0–0.5)
LDLC SERPL CALC-MCNC: 116 MG/DL (ref 0–100)
LDLC/HDLC SERPL: 2.28 {RATIO}
LYMPHOCYTES # BLD AUTO: 2.85 10*3/MM3 (ref 0.7–3.1)
LYMPHOCYTES NFR BLD AUTO: 35.8 % (ref 19.6–45.3)
MCH RBC QN AUTO: 27.5 PG (ref 26.6–33)
MCHC RBC AUTO-ENTMCNC: 33.5 G/DL (ref 31.5–35.7)
MCV RBC AUTO: 81.9 FL (ref 79–97)
MONOCYTES # BLD AUTO: 0.34 10*3/MM3 (ref 0.1–0.9)
MONOCYTES NFR BLD AUTO: 4.3 % (ref 5–12)
NEUTROPHILS NFR BLD AUTO: 4.51 10*3/MM3 (ref 1.7–7)
NEUTROPHILS NFR BLD AUTO: 56.7 % (ref 42.7–76)
NRBC BLD AUTO-RTO: 0 /100 WBC (ref 0–0.2)
PLATELET # BLD AUTO: 333 10*3/MM3 (ref 140–450)
PMV BLD AUTO: 9.5 FL (ref 6–12)
POTASSIUM SERPL-SCNC: 4.2 MMOL/L (ref 3.5–5.2)
PROT SERPL-MCNC: 7.1 G/DL (ref 6–8.5)
RBC # BLD AUTO: 4.48 10*6/MM3 (ref 3.77–5.28)
SODIUM SERPL-SCNC: 139 MMOL/L (ref 136–145)
TRIGL SERPL-MCNC: 193 MG/DL (ref 0–150)
VLDLC SERPL-MCNC: 38.6 MG/DL (ref 5–40)
WBC # BLD AUTO: 7.95 10*3/MM3 (ref 3.4–10.8)

## 2020-08-07 PROCEDURE — 80053 COMPREHEN METABOLIC PANEL: CPT | Performed by: FAMILY MEDICINE

## 2020-08-07 PROCEDURE — 83036 HEMOGLOBIN GLYCOSYLATED A1C: CPT | Performed by: FAMILY MEDICINE

## 2020-08-07 PROCEDURE — 36415 COLL VENOUS BLD VENIPUNCTURE: CPT | Performed by: FAMILY MEDICINE

## 2020-08-07 PROCEDURE — 85025 COMPLETE CBC W/AUTO DIFF WBC: CPT | Performed by: FAMILY MEDICINE

## 2020-08-07 PROCEDURE — 80061 LIPID PANEL: CPT | Performed by: FAMILY MEDICINE

## 2020-08-13 RX ORDER — CLINDAMYCIN PHOSPHATE 11.9 MG/ML
SOLUTION TOPICAL 2 TIMES DAILY
Qty: 60 ML | Refills: 5 | Status: SHIPPED | OUTPATIENT
Start: 2020-08-13 | End: 2020-12-24

## 2020-08-29 ENCOUNTER — PATIENT MESSAGE (OUTPATIENT)
Dept: INTERNAL MEDICINE | Facility: CLINIC | Age: 35
End: 2020-08-29

## 2020-08-29 DIAGNOSIS — L73.2 HYDRADENITIS: Primary | ICD-10-CM

## 2020-09-08 NOTE — TELEPHONE ENCOUNTER
From: Kelly Astorga  To: Alyx Sy DO  Sent: 8/29/2020 10:41 PM EDT  Subject: Non-Urgent Medical Question    Hi Dr. Sy,  I have been using the cleocin topical solution BID, hibiclens soap daily, switched to just deodorant (no antiperspirant just in case it would help), and have not shaved my underarms for over 2 weeks. It has not helped the small abscess I keep getting. None of them get very large, but they are so tender. They will be there for 4-5 days then go away, but not before new ones appear. I've changed bras and shirts daily. I do not know what else to do! Any other suggestions? I don't know why this started all of a sudden during my last pregnancy!    Thanks!  Kelly

## 2020-09-18 ENCOUNTER — LAB (OUTPATIENT)
Dept: LAB | Facility: HOSPITAL | Age: 35
End: 2020-09-18

## 2020-09-18 ENCOUNTER — TRANSCRIBE ORDERS (OUTPATIENT)
Dept: LAB | Facility: HOSPITAL | Age: 35
End: 2020-09-18

## 2020-09-18 DIAGNOSIS — R59.9 SWELLING OF LYMPH NODES: Primary | ICD-10-CM

## 2020-09-18 DIAGNOSIS — R59.9 SWELLING OF LYMPH NODES: ICD-10-CM

## 2020-09-18 LAB — HETEROPH AB SER QL LA: NEGATIVE

## 2020-09-18 PROCEDURE — 36415 COLL VENOUS BLD VENIPUNCTURE: CPT

## 2020-09-18 PROCEDURE — 86308 HETEROPHILE ANTIBODY SCREEN: CPT

## 2020-09-28 ENCOUNTER — PATIENT MESSAGE (OUTPATIENT)
Dept: INTERNAL MEDICINE | Facility: CLINIC | Age: 35
End: 2020-09-28

## 2020-09-28 DIAGNOSIS — R59.0 LYMPHADENOPATHY, AXILLARY: Primary | ICD-10-CM

## 2020-09-30 NOTE — TELEPHONE ENCOUNTER
From: Kelly Astorga  To: Alyx Sy DO  Sent: 9/28/2020 8:35 AM EDT  Subject: Non-Urgent Medical Question    Dr. Sy,  I never heard back from the referral coordinator so I made an appt for derm about the axillary abscess. I went on 9/18/20 and he said it is not abscesses, it's lymphadenopathy. I'm not sure if you ever received his note. He tested for mono which was negative. Also prescribed doxy which I have been on for 10 days. It made the swelling go down on some that I had when I started the meds, but now I have a couple of new ones. They make my entire arms hurt for a few days after they start, I guess from pressing on nerves. He is not really sure what could be causing it either. I saw EUGENIA Dubois at [facility]. Is there any type of scan we could do to look into it further? He mentioned biopsy, but I hate to do that first. He also mentioned lymphoma, but I do not have any other signs such as weight loss, fatigue (more than normal fatigue with toddler/infant!), fevers, other lymph swelling. I just don't know what to do!  Thanks!

## 2020-10-02 ENCOUNTER — LAB (OUTPATIENT)
Dept: LAB | Facility: HOSPITAL | Age: 35
End: 2020-10-02

## 2020-10-02 PROCEDURE — 86757 RICKETTSIA ANTIBODY: CPT | Performed by: FAMILY MEDICINE

## 2020-10-02 PROCEDURE — 86618 LYME DISEASE ANTIBODY: CPT | Performed by: FAMILY MEDICINE

## 2020-10-02 PROCEDURE — 86611 BARTONELLA ANTIBODY: CPT | Performed by: FAMILY MEDICINE

## 2020-10-02 PROCEDURE — 36415 COLL VENOUS BLD VENIPUNCTURE: CPT | Performed by: FAMILY MEDICINE

## 2020-10-02 PROCEDURE — 86666 EHRLICHIA ANTIBODY: CPT | Performed by: FAMILY MEDICINE

## 2020-10-03 LAB — B BURGDOR IGG+IGM SER-ACNC: <0.91 ISR (ref 0–0.9)

## 2020-10-05 LAB
B HENSELAE IGG TITR SER IF: NEGATIVE TITER
B HENSELAE IGM TITR SER IF: NEGATIVE TITER
B QUINTANA IGG TITR SER IF: NEGATIVE TITER
B QUINTANA IGM TITR SER IF: NEGATIVE TITER

## 2020-10-06 LAB
A PHAGOCYTOPH IGG TITR SER IF: NEGATIVE {TITER}
A PHAGOCYTOPH IGM TITR SER IF: NEGATIVE {TITER}
E CHAFFEENSIS IGG TITR SER IF: NEGATIVE {TITER}
E CHAFFEENSIS IGM TITR SER IF: NEGATIVE {TITER}
R RICKETTSI IGG SER QL IA: NEGATIVE
R RICKETTSI IGM SER-ACNC: 0.13 INDEX (ref 0–0.89)

## 2020-10-14 DIAGNOSIS — L73.2 HYDRADENITIS: Primary | ICD-10-CM

## 2020-10-28 ENCOUNTER — PATIENT MESSAGE (OUTPATIENT)
Dept: INTERNAL MEDICINE | Facility: CLINIC | Age: 35
End: 2020-10-28

## 2020-10-30 RX ORDER — ONDANSETRON 8 MG/1
8 TABLET, ORALLY DISINTEGRATING ORAL EVERY 8 HOURS PRN
Qty: 20 TABLET | Refills: 0 | Status: SHIPPED | OUTPATIENT
Start: 2020-10-30 | End: 2020-12-24

## 2020-10-30 NOTE — TELEPHONE ENCOUNTER
From: Kelly Astorga  To: Alyx Sy DO  Sent: 10/28/2020 9:28 AM EDT  Subject: Prescription Question    Hi Dr. Sy,   We are flying to [place] next week and I was wondering if you can send in some zofran for me? I get motion sick so easy! Also, the referral coordinator still has not contacted me about a derm appt in [place]. I have some new areas of swelling that showed up in my left axillary yesterday with shooting pains to my breast and arm so I made an appointment with KEVIN on Friday. Hopefully we can find some answers then!     Kelly

## 2020-12-24 ENCOUNTER — HOSPITAL ENCOUNTER (OUTPATIENT)
Dept: GENERAL RADIOLOGY | Facility: HOSPITAL | Age: 35
Discharge: HOME | End: 2020-12-24

## 2020-12-24 ENCOUNTER — OFFICE VISIT (OUTPATIENT)
Dept: INTERNAL MEDICINE | Facility: CLINIC | Age: 35
End: 2020-12-24

## 2020-12-24 ENCOUNTER — LAB (OUTPATIENT)
Dept: LAB | Facility: HOSPITAL | Age: 35
End: 2020-12-24

## 2020-12-24 VITALS
SYSTOLIC BLOOD PRESSURE: 122 MMHG | DIASTOLIC BLOOD PRESSURE: 82 MMHG | TEMPERATURE: 98.2 F | BODY MASS INDEX: 36.82 KG/M2 | OXYGEN SATURATION: 99 % | RESPIRATION RATE: 16 BRPM | HEIGHT: 65 IN | WEIGHT: 221 LBS | HEART RATE: 81 BPM

## 2020-12-24 DIAGNOSIS — M25.551 RIGHT HIP PAIN: ICD-10-CM

## 2020-12-24 DIAGNOSIS — E55.9 VITAMIN D DEFICIENCY: ICD-10-CM

## 2020-12-24 DIAGNOSIS — M25.50 ARTHRALGIA, UNSPECIFIED JOINT: Primary | ICD-10-CM

## 2020-12-24 LAB
25(OH)D3 SERPL-MCNC: 37.7 NG/ML (ref 30–100)
CHROMATIN AB SERPL-ACNC: <10 IU/ML (ref 0–14)
URATE SERPL-MCNC: 7.9 MG/DL (ref 2.4–5.7)

## 2020-12-24 PROCEDURE — 82306 VITAMIN D 25 HYDROXY: CPT | Performed by: FAMILY MEDICINE

## 2020-12-24 PROCEDURE — 99214 OFFICE O/P EST MOD 30 MIN: CPT | Performed by: FAMILY MEDICINE

## 2020-12-24 PROCEDURE — 36415 COLL VENOUS BLD VENIPUNCTURE: CPT | Performed by: FAMILY MEDICINE

## 2020-12-24 PROCEDURE — 84550 ASSAY OF BLOOD/URIC ACID: CPT | Performed by: FAMILY MEDICINE

## 2020-12-24 PROCEDURE — 73502 X-RAY EXAM HIP UNI 2-3 VIEWS: CPT

## 2020-12-24 PROCEDURE — 86431 RHEUMATOID FACTOR QUANT: CPT | Performed by: FAMILY MEDICINE

## 2020-12-24 PROCEDURE — 86200 CCP ANTIBODY: CPT | Performed by: FAMILY MEDICINE

## 2020-12-24 RX ORDER — CETIRIZINE HYDROCHLORIDE 10 MG/1
10 TABLET ORAL DAILY
COMMUNITY
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
## 2020-12-24 NOTE — PATIENT INSTRUCTIONS
Hip Bursitis Rehab  Ask your health care provider which exercises are safe for you. Do exercises exactly as told by your health care provider and adjust them as directed. It is normal to feel mild stretching, pulling, tightness, or discomfort as you do these exercises. Stop right away if you feel sudden pain or your pain gets worse. Do not begin these exercises until told by your health care provider.  Stretching exercise  This exercise warms up your muscles and joints and improves the movement and flexibility of your hip. This exercise also helps to relieve pain and stiffness.  Iliotibial band stretch  An iliotibial band is a strong band of muscle tissue that runs from the outer side of your hip to the outer side of your thigh and knee.  1. Lie on your side with your left / right leg in the top position.  2. Bend your left / right knee and grab your ankle. Stretch out your bottom arm to help you balance.  3. Slowly bring your knee back so your thigh is behind your body.  4. Slowly lower your knee toward the floor until you feel a gentle stretch on the outside of your left / right thigh. If you do not feel a stretch and your knee will not fall farther, place the heel of your other foot on top of your knee and pull your knee down toward the floor with your foot.  5. Hold this position for __________ seconds.  6. Slowly return to the starting position.  Repeat __________ times. Complete this exercise __________ times a day.  Strengthening exercises  These exercises build strength and endurance in your hip and pelvis. Endurance is the ability to use your muscles for a long time, even after they get tired.  Bridge  This exercise strengthens the muscles that move your thigh backward (hip extensors).  1. Lie on your back on a firm surface with your knees bent and your feet flat on the floor.  2. Tighten your buttocks muscles and lift your buttocks off the floor until your trunk is level with your thighs.  ? Do not arch  your back.  ? You should feel the muscles working in your buttocks and the back of your thighs. If you do not feel these muscles, slide your feet 1-2 inches (2.5-5 cm) farther away from your buttocks.  ? If this exercise is too easy, try doing it with your arms crossed over your chest.  3. Hold this position for __________ seconds.  4. Slowly lower your hips to the starting position.  5. Let your muscles relax completely after each repetition.  Repeat __________ times. Complete this exercise __________ times a day.  Squats  This exercise strengthens the muscles in front of your thigh and knee (quadriceps).  1.  front of a table, with your feet and knees pointing straight ahead. You may rest your hands on the table for balance but not for support.  2. Slowly bend your knees and lower your hips like you are going to sit in a chair.  ? Keep your weight over your heels, not over your toes.  ? Keep your lower legs upright so they are parallel with the table legs.  ? Do not let your hips go lower than your knees.  ? Do not bend lower than told by your health care provider.  ? If your hip pain increases, do not bend as low.  3. Hold the squat position for __________ seconds.  4. Slowly push with your legs to return to standing. Do not use your hands to pull yourself to standing.  Repeat __________ times. Complete this exercise __________ times a day.  Hip hike  1. Stand sideways on a bottom step. Stand on your left / right leg with your other foot unsupported next to the step. You can hold on to the railing or wall for balance if needed.  2. Keep your knees straight and your torso square. Then lift your left / right hip up toward the ceiling.  3. Hold this position for __________ seconds.  4. Slowly let your left / right hip lower toward the floor, past the starting position. Your foot should get closer to the floor. Do not lean or bend your knees.  Repeat __________ times. Complete this exercise __________ times a  day.  Single leg stand  1. Without shoes, stand near a railing or in a doorway. You may hold on to the railing or door frame as needed for balance.  2. Squeeze your left / right buttock muscles, then lift up your other foot.  ? Do not let your left / right hip push out to the side.  ? It is helpful to  front of a mirror for this exercise so you can watch your hip.  3. Hold this position for __________ seconds.  Repeat __________ times. Complete this exercise __________ times a day.  This information is not intended to replace advice given to you by your health care provider. Make sure you discuss any questions you have with your health care provider.  Document Revised: 04/13/2020 Document Reviewed: 04/13/2020  [employer] Patient Education © 2020 [employer]    Hip Exercises  Ask your health care provider which exercises are safe for you. Do exercises exactly as told by your health care provider and adjust them as directed. It is normal to feel mild stretching, pulling, tightness, or discomfort as you do these exercises. Stop right away if you feel sudden pain or your pain gets worse. Do not begin these exercises until told by your health care provider.  Stretching and range-of-motion exercises  These exercises warm up your muscles and joints and improve the movement and flexibility of your hip. These exercises also help to relieve pain, numbness, and tingling. You may be asked to limit your range of motion if you had a hip replacement. Talk to your health care provider about these restrictions.  Hamstrings, supine    1. Lie on your back (supine position).  2. Loop a belt or towel over the ball of your left / right foot. The ball of your foot is on the walking surface, right under your toes.  3. Straighten your left / right knee and slowly pull on the belt or towel to raise your leg until you feel a gentle stretch behind your knee (hamstring).  ? Do not let your knee bend while you do this.  ? Keep your other  leg flat on the floor.  4. Hold this position for __________ seconds.  5. Slowly return your leg to the starting position.  Repeat __________ times. Complete this exercise __________ times a day.  Hip rotation    1. Lie on your back on a firm surface.  2. With your left / right hand, gently pull your left / right knee toward the shoulder that is on the same side of the body. Stop when your knee is pointing toward the ceiling.  3. Hold your left / right ankle with your other hand.  4. Keeping your knee steady, gently pull your left / right ankle toward your other shoulder until you feel a stretch in your buttocks.  ? Keep your hips and shoulders firmly planted while you do this stretch.  5. Hold this position for __________ seconds.  Repeat __________ times. Complete this exercise __________ times a day.  Seated stretch  This exercise is sometimes called hamstrings and adductors stretch.  1. Sit on the floor with your legs stretched wide. Keep your knees straight during this exercise.  2. Keeping your head and back in a straight line, bend at your waist to reach for your left foot (position A). You should feel a stretch in your right inner thigh (adductors).  3. Hold this position for __________ seconds. Then slowly return to the upright position.  4. Keeping your head and back in a straight line, bend at your waist to reach forward (position B). You should feel a stretch behind both of your thighs and knees (hamstrings).  5. Hold this position for __________ seconds. Then slowly return to the upright position.  6. Keeping your head and back in a straight line, bend at your waist to reach for your right foot (position C). You should feel a stretch in your left inner thigh (adductors).  7. Hold this position for __________ seconds. Then slowly return to the upright position.  Repeat __________ times. Complete this exercise __________ times a day.  Lunge  This exercise stretches the muscles of the hip (hip  flexors).  1. Place your left / right knee on the floor and bend your other knee so that is directly over your ankle. You should be half-kneeling.  2. Keep good posture with your head over your shoulders.  3. Tighten your buttocks to point your tailbone downward. This will prevent your back from arching too much.  4. You should feel a gentle stretch in the front of your left / right thigh and hip. If you do not feel a stretch, slide your other foot forward slightly and then slowly lunge forward with your chest up until your knee once again lines up over your ankle.  ? Make sure your tailbone continues to point downward.  5. Hold this position for __________ seconds.  6. Slowly return to the starting position.  Repeat __________ times. Complete this exercise __________ times a day.  Strengthening exercises  These exercises build strength and endurance in your hip. Endurance is the ability to use your muscles for a long time, even after they get tired.  Bridge  This exercise strengthens the muscles of your hip (hip extensors).  1. Lie on your back on a firm surface with your knees bent and your feet flat on the floor.  2. Tighten your buttocks muscles and lift your bottom off the floor until the trunk of your body and your hips are level with your thighs.  ? Do not arch your back.  ? You should feel the muscles working in your buttocks and the back of your thighs. If you do not feel these muscles, slide your feet 1-2 inches (2.5-5 cm) farther away from your buttocks.  3. Hold this position for __________ seconds.  4. Slowly lower your hips to the starting position.  5. Let your muscles relax completely between repetitions.  Repeat __________ times. Complete this exercise __________ times a day.  Straight leg raises, side-lying  This exercise strengthens the muscles that move the hip joint away from the center of the body (hip abductors).  1. Lie on your side with your left / right leg in the top position. Lie so your  head, shoulder, hip, and knee line up. You may bend your bottom knee slightly to help you balance.  2. Roll your hips slightly forward, so your hips are stacked directly over each other and your left / right knee is facing forward.  3. Leading with your heel, lift your top leg 4-6 inches (10-15 cm). You should feel the muscles in your top hip lifting.  ? Do not let your foot drift forward.  ? Do not let your knee roll toward the ceiling.  4. Hold this position for __________ seconds.  5. Slowly return to the starting position.  6. Let your muscles relax completely between repetitions.  Repeat __________ times. Complete this exercise __________ times a day.  Straight leg raises, side-lying  This exercise strengthens the muscles that move the hip joint toward the center of the body (hip adductors).  1. Lie on your side with your left / right leg in the bottom position. Lie so your head, shoulder, hip, and knee line up. You may place your upper foot in front to help you balance.  2. Roll your hips slightly forward, so your hips are stacked directly over each other and your left / right knee is facing forward.  3. Tense the muscles in your inner thigh and lift your bottom leg 4-6 inches (10-15 cm).  4. Hold this position for __________ seconds.  5. Slowly return to the starting position.  6. Let your muscles relax completely between repetitions.  Repeat __________ times. Complete this exercise __________ times a day.  Straight leg raises, supine  This exercise strengthens the muscles in the front of your thigh (quadriceps).  1. Lie on your back (supine position) with your left / right leg extended and your other knee bent.  2. Tense the muscles in the front of your left / right thigh. You should see your kneecap slide up or see increased dimpling just above your knee.  3. Keep these muscles tight as you raise your leg 4-6 inches (10-15 cm) off the floor. Do not let your knee bend.  4. Hold this position for __________  seconds.  5. Keep these muscles tense as you lower your leg.  6. Relax the muscles slowly and completely between repetitions.  Repeat __________ times. Complete this exercise __________ times a day.  Hip abductors, standing  This exercise strengthens the muscles that move the leg and hip joint away from the center of the body (hip abductors).  1. Tie one end of a rubber exercise band or tubing to a secure surface, such as a chair, table, or pole.  2. Loop the other end of the band or tubing around your left / right ankle.  3. Keeping your ankle with the band or tubing directly opposite the secured end, step away until there is tension in the tubing or band. Hold on to a chair, table, or pole as needed for balance.  4. Lift your left / right leg out to your side. While you do this:  ? Keep your back upright.  ? Keep your shoulders over your hips.  ? Keep your toes pointing forward.  ? Make sure to use your hip muscles to slowly lift your leg. Do not tip your body or forcefully lift your leg.  5. Hold this position for __________ seconds.  6. Slowly return to the starting position.  Repeat __________ times. Complete this exercise __________ times a day.  Squats  This exercise strengthens the muscles in the front of your thigh (quadriceps).  1.  a door frame so your feet and knees are in line with the frame. You may place your hands on the frame for balance.  2. Slowly bend your knees and lower your hips like you are going to sit in a chair.  ? Keep your lower legs in a straight-up-and-down position.  ? Do not let your hips go lower than your knees.  ? Do not bend your knees lower than told by your health care provider.  ? If your hip pain increases, do not bend as low.  3. Hold this position for ___________ seconds.  4. Slowly push with your legs to return to standing. Do not use your hands to pull yourself to standing.  Repeat __________ times. Complete this exercise __________ times a day.  This information  is not intended to replace advice given to you by your health care provider. Make sure you discuss any questions you have with your health care provider.  Document Revised: 07/23/2020 Document Reviewed: 10/29/2019  [employer] Patient Education © 2020 [employer]

## 2020-12-27 LAB — CCP IGA+IGG SERPL IA-ACNC: 8 UNITS (ref 0–19)

## 2020-12-27 RX ORDER — METHYLPREDNISOLONE 4 MG/1
TABLET ORAL
Qty: 1 EACH | Refills: 0 | Status: SHIPPED | OUTPATIENT
Start: 2020-12-27 | End: 2021-02-24 | Stop reason: SDUPTHER

## 2020-12-27 RX ORDER — ALLOPURINOL 300 MG/1
300 TABLET ORAL DAILY
Qty: 30 TABLET | Refills: 1 | Status: SHIPPED | OUTPATIENT
Start: 2020-12-27 | End: 2021-04-23 | Stop reason: SDDI

## 2021-01-22 ENCOUNTER — TRANSCRIBE ORDERS (OUTPATIENT)
Dept: LAB | Facility: HOSPITAL | Age: 36
End: 2021-01-22

## 2021-01-22 ENCOUNTER — LAB (OUTPATIENT)
Dept: LAB | Facility: HOSPITAL | Age: 36
End: 2021-01-22

## 2021-01-22 DIAGNOSIS — Z32.01 PREGNANCY EXAMINATION OR TEST, POSITIVE RESULT: ICD-10-CM

## 2021-01-22 DIAGNOSIS — N92.5 RETROGRADE MENSTRUATION: Primary | ICD-10-CM

## 2021-01-22 DIAGNOSIS — N92.5 RETROGRADE MENSTRUATION: ICD-10-CM

## 2021-01-22 PROCEDURE — 84702 CHORIONIC GONADOTROPIN TEST: CPT

## 2021-01-22 PROCEDURE — 84144 ASSAY OF PROGESTERONE: CPT | Performed by: NURSE PRACTITIONER

## 2021-01-22 PROCEDURE — 36415 COLL VENOUS BLD VENIPUNCTURE: CPT | Performed by: NURSE PRACTITIONER

## 2021-01-23 LAB
HCG INTACT+B SERPL-ACNC: 10.95 MIU/ML
PROGEST SERPL-MCNC: 2.31 NG/ML

## 2021-01-24 ENCOUNTER — LAB (OUTPATIENT)
Dept: LAB | Facility: HOSPITAL | Age: 36
End: 2021-01-24

## 2021-01-24 ENCOUNTER — TRANSCRIBE ORDERS (OUTPATIENT)
Dept: ADMINISTRATIVE | Facility: HOSPITAL | Age: 36
End: 2021-01-24

## 2021-01-24 DIAGNOSIS — N92.5 OTHER SPECIFIED IRREGULAR MENSTRUATION: Primary | ICD-10-CM

## 2021-01-24 DIAGNOSIS — N92.5 OTHER SPECIFIED IRREGULAR MENSTRUATION: ICD-10-CM

## 2021-01-24 DIAGNOSIS — Z32.01 PREGNANCY EXAMINATION OR TEST, POSITIVE RESULT: ICD-10-CM

## 2021-01-24 LAB
HCG INTACT+B SERPL-ACNC: 2.16 MIU/ML
PROGEST SERPL-MCNC: <0.05 NG/ML

## 2021-01-24 PROCEDURE — 36415 COLL VENOUS BLD VENIPUNCTURE: CPT

## 2021-01-24 PROCEDURE — 84702 CHORIONIC GONADOTROPIN TEST: CPT

## 2021-01-24 PROCEDURE — 84144 ASSAY OF PROGESTERONE: CPT

## 2021-02-23 ENCOUNTER — PATIENT MESSAGE (OUTPATIENT)
Dept: INTERNAL MEDICINE | Facility: CLINIC | Age: 36
End: 2021-02-23

## 2021-02-23 DIAGNOSIS — M79.89 CALCIFICATION OF SOFT TISSUE: ICD-10-CM

## 2021-02-23 DIAGNOSIS — E79.0 ELEVATED URIC ACID IN BLOOD: Primary | ICD-10-CM

## 2021-02-24 ENCOUNTER — LAB (OUTPATIENT)
Dept: LAB | Facility: HOSPITAL | Age: 36
End: 2021-02-24

## 2021-02-24 LAB — URATE SERPL-MCNC: 8.3 MG/DL (ref 2.4–5.7)

## 2021-02-24 PROCEDURE — 84550 ASSAY OF BLOOD/URIC ACID: CPT | Performed by: FAMILY MEDICINE

## 2021-02-24 PROCEDURE — 36415 COLL VENOUS BLD VENIPUNCTURE: CPT | Performed by: FAMILY MEDICINE

## 2021-02-24 RX ORDER — METHYLPREDNISOLONE 4 MG/1
TABLET ORAL
Qty: 1 EACH | Refills: 0 | Status: SHIPPED | OUTPATIENT
Start: 2021-02-24 | End: 2021-04-23

## 2021-02-24 RX ORDER — PROBENECID 500 MG/1
500 TABLET, FILM COATED ORAL 2 TIMES DAILY
Qty: 180 TABLET | Refills: 1 | Status: SHIPPED | OUTPATIENT
Start: 2021-02-24 | End: 2021-04-23 | Stop reason: SDDI

## 2021-02-24 NOTE — TELEPHONE ENCOUNTER
From: Kelly Astorga  To: Alyx Sy DO  Sent: 2/23/2021 7:28 PM EST  Subject: Non-Urgent Medical Question    Hi Dr. Sy,  So I took the steroids and allopurinol for a week (because of trying to get pregnant) and I felt amazing for about 6 weeks! Now I'm having the same awful bilateral elbow, back, hip, shoulder pain everyday. No tenderness or redness. I'm afraid to take the allopurinol due to trying to get pregnant again and the risks. I was wondering if we could do another Uric acid level, and maybe another round of prednisone for now? Not pregnant because currently on cycle. I was looking up probenacid, and seems safe to take in pregnancy but I've never heard much about it. Do you think I should try it? Or go see rheumatologist for further info and tests to see if there's a cause of the hyperuricemia? I'd rather have a healthy baby and be achy all over than take meds that are the least bit risky, but I'd like to feel good for the next few weeks at least! I have my Pap scheduled for 3/17 with dr. Chaudhry, so I can talk to her about that then also. Just wanted to see what you thought.     Thanks!  Kelly

## 2021-02-24 NOTE — TELEPHONE ENCOUNTER
Caller: KAYCEE CARPENTER[address] AT [place] [address] - [phone] [facility] [phone] FX    Relationship: Pharmacy  ROHIT Breen call back number: [phone]    Medication needed:   Requested Prescriptions     Pending Prescriptions Disp Refills   • probenecid (BENEMID) 500 MG tablet 180 tablet 1     Sig: Take 1 tablet by mouth 2 (Two) Times a Day. Take half a tablet BID for week 1.       When do you need the refill by: 02/24/21    What details did the patient provide when requesting the medication:  CONFIRM HOW PATIENT IS TO TAKE THE RX.    Does the patient have less than a 3 day supply:  [x] Yes  [] No    What is the patient's preferred pharmacy: KAYCEE LOGAN [address] [place] [address] [address] AT [address] - [phone] [facility] [phone] FX

## 2021-02-25 RX ORDER — PROBENECID 500 MG/1
500 TABLET, FILM COATED ORAL 2 TIMES DAILY
Qty: 180 TABLET | Refills: 1 | OUTPATIENT
Start: 2021-02-25

## 2021-03-03 ENCOUNTER — LAB (OUTPATIENT)
Dept: LAB | Facility: HOSPITAL | Age: 36
End: 2021-03-03

## 2021-03-03 LAB
CALCIUM SPEC-SCNC: 9.5 MG/DL (ref 8.6–10.5)
PHOSPHATE SERPL-MCNC: 3.5 MG/DL (ref 2.5–4.5)
PTH-INTACT SERPL-MCNC: 43.1 PG/ML (ref 15–65)

## 2021-03-03 PROCEDURE — 83970 ASSAY OF PARATHORMONE: CPT | Performed by: FAMILY MEDICINE

## 2021-03-03 PROCEDURE — 84100 ASSAY OF PHOSPHORUS: CPT | Performed by: FAMILY MEDICINE

## 2021-03-03 PROCEDURE — 82310 ASSAY OF CALCIUM: CPT | Performed by: FAMILY MEDICINE

## 2021-03-03 PROCEDURE — 36415 COLL VENOUS BLD VENIPUNCTURE: CPT | Performed by: FAMILY MEDICINE

## 2021-04-23 ENCOUNTER — OFFICE VISIT (OUTPATIENT)
Dept: INTERNAL MEDICINE | Facility: CLINIC | Age: 36
End: 2021-04-23

## 2021-04-23 ENCOUNTER — LAB (OUTPATIENT)
Dept: LAB | Facility: HOSPITAL | Age: 36
End: 2021-04-23

## 2021-04-23 ENCOUNTER — TRANSCRIBE ORDERS (OUTPATIENT)
Dept: LAB | Facility: HOSPITAL | Age: 36
End: 2021-04-23

## 2021-04-23 VITALS
DIASTOLIC BLOOD PRESSURE: 80 MMHG | SYSTOLIC BLOOD PRESSURE: 126 MMHG | OXYGEN SATURATION: 98 % | WEIGHT: 221.3 LBS | BODY MASS INDEX: 36.87 KG/M2 | HEART RATE: 66 BPM | TEMPERATURE: 97.7 F | HEIGHT: 65 IN

## 2021-04-23 DIAGNOSIS — D49.2 ODONTOGENIC TUMOR: Primary | ICD-10-CM

## 2021-04-23 DIAGNOSIS — Z32.01 POSITIVE URINE PREGNANCY TEST: ICD-10-CM

## 2021-04-23 DIAGNOSIS — R29.898 WEAKNESS OF EXTREMITY: ICD-10-CM

## 2021-04-23 DIAGNOSIS — R20.2 PARESTHESIAS: ICD-10-CM

## 2021-04-23 DIAGNOSIS — M25.50 ARTHRALGIA, UNSPECIFIED JOINT: Primary | ICD-10-CM

## 2021-04-23 DIAGNOSIS — E79.0 ELEVATED URIC ACID IN BLOOD: ICD-10-CM

## 2021-04-23 DIAGNOSIS — D49.2 NEOPLASM OF SKIN: ICD-10-CM

## 2021-04-23 LAB
HCG INTACT+B SERPL-ACNC: <0.5 MIU/ML
HCG SERPL QL: NEGATIVE

## 2021-04-23 PROCEDURE — 81374 HLA I TYPING 1 ANTIGEN LR: CPT | Performed by: FAMILY MEDICINE

## 2021-04-23 PROCEDURE — 86038 ANTINUCLEAR ANTIBODIES: CPT | Performed by: FAMILY MEDICINE

## 2021-04-23 PROCEDURE — 86225 DNA ANTIBODY NATIVE: CPT | Performed by: FAMILY MEDICINE

## 2021-04-23 PROCEDURE — 36415 COLL VENOUS BLD VENIPUNCTURE: CPT | Performed by: FAMILY MEDICINE

## 2021-04-23 PROCEDURE — 84702 CHORIONIC GONADOTROPIN TEST: CPT | Performed by: FAMILY MEDICINE

## 2021-04-23 PROCEDURE — 99214 OFFICE O/P EST MOD 30 MIN: CPT | Performed by: FAMILY MEDICINE

## 2021-04-23 PROCEDURE — 84703 CHORIONIC GONADOTROPIN ASSAY: CPT | Performed by: FAMILY MEDICINE

## 2021-04-26 LAB
ANA SER QL: NEGATIVE
DSDNA AB SER-ACNC: <1 IU/ML (ref 0–9)

## 2021-04-28 ENCOUNTER — PATIENT MESSAGE (OUTPATIENT)
Dept: INTERNAL MEDICINE | Facility: CLINIC | Age: 36
End: 2021-04-28

## 2021-04-28 DIAGNOSIS — E79.0 ELEVATED URIC ACID IN BLOOD: Primary | ICD-10-CM

## 2021-04-28 LAB — HLA-B27 QL NAA+PROBE: NEGATIVE

## 2021-04-28 RX ORDER — METHYLPREDNISOLONE 4 MG/1
TABLET ORAL
Qty: 21 EACH | Refills: 0 | Status: SHIPPED | OUTPATIENT
Start: 2021-04-28 | End: 2021-06-11

## 2021-04-28 RX ORDER — DICLOFENAC SODIUM 75 MG/1
75 TABLET, DELAYED RELEASE ORAL 2 TIMES DAILY
Qty: 60 TABLET | Refills: 2 | Status: SHIPPED | OUTPATIENT
Start: 2021-04-28 | End: 2021-06-11 | Stop reason: ALTCHOICE

## 2021-04-29 ENCOUNTER — LAB (OUTPATIENT)
Dept: LAB | Facility: HOSPITAL | Age: 36
End: 2021-04-29

## 2021-04-29 LAB — URATE SERPL-MCNC: 5.7 MG/DL (ref 2.4–5.7)

## 2021-04-29 PROCEDURE — 36415 COLL VENOUS BLD VENIPUNCTURE: CPT

## 2021-04-29 PROCEDURE — 84550 ASSAY OF BLOOD/URIC ACID: CPT | Performed by: FAMILY MEDICINE

## 2021-05-03 PROBLEM — M25.50 ARTHRALGIA: Status: ACTIVE | Noted: 2021-05-03

## 2021-05-03 PROBLEM — E79.0 ELEVATED URIC ACID IN BLOOD: Status: ACTIVE | Noted: 2021-05-03

## 2021-05-03 NOTE — PROGRESS NOTES
Kelly Astorga is a 35 y.o. female.    Chief Complaint   Patient presents with   • Gout       HPI   Patient presents today to follow-up on elevated uric acid level.  She is suspected to have gout resulting in body aches and pains.  She does have right wrist pain that seems to have improved with a brace.  She has been taking probenecid without improvement in body aches and pains.  She hurts all over at times.  It is somewhat difficult to get out of bed on some days.  Patient does report that she gets good relief with steroids.  Patient does feel weak in her extremities.  She also continues to have perioral paresthesias off and on.  She has had a negative LUZ ELENA level in addition to negative rheumatoid arthritis work-up in the past.  Patient is discouraged because she is wanting to have more children.  She knows she cannot take steroids during pregnancy.  She has concerns about other gout preventative medications during pregnancy as well.  She has had 2 chemical pregnancies since her last appointment.    The following portions of the patient's history were reviewed and updated as appropriate: allergies, current medications, past family history, past medical history, past social history, past surgical history and problem list.     No Known Allergies      Current Outpatient Medications:   •  cetirizine (ZyrTEC Allergy) 10 MG tablet, Take 10 mg by mouth Daily., Disp: , Rfl:   •  omeprazole (priLOSEC) 20 MG capsule, Take 20 mg by mouth Daily., Disp: , Rfl:   •  Prenatal Vit-Fe Fumarate-FA (PNV PRENATAL PLUS MULTIVITAMIN) 27-1 MG tablet, Take 1 tablet by mouth Daily. 200001, Disp: , Rfl: 3  •  diclofenac (VOLTAREN) 75 MG EC tablet, Take 1 tablet by mouth 2 (Two) Times a Day., Disp: 60 tablet, Rfl: 2  •  methylPREDNISolone (MEDROL) 4 MG dose pack, Take as directed on package instructions., Disp: 21 each, Rfl: 0    ROS    Review of Systems   Constitutional: Positive for fatigue. Negative for chills and fever.  "  Respiratory: Negative for cough and shortness of breath.    Cardiovascular: Negative for chest pain.   Musculoskeletal: Positive for arthralgias and joint swelling.   Neurological: Positive for weakness and numbness.       Vitals:    04/23/21 0951   BP: 126/80   BP Location: Left arm   Patient Position: Sitting   Cuff Size: Adult   Pulse: 66   Temp: 97.7 °F (36.5 °C)   TempSrc: Temporal   SpO2: 98%   Weight: 100 kg (221 lb 4.8 oz)   Height: 165.1 cm (65\")     Body mass index is 36.83 kg/m².    Physical Exam     Physical Exam  Constitutional:       General: She is not in acute distress.     Appearance: Normal appearance. She is well-developed.   HENT:      Head: Normocephalic and atraumatic.      Right Ear: External ear normal.      Left Ear: External ear normal.   Eyes:      Extraocular Movements: Extraocular movements intact.      Conjunctiva/sclera: Conjunctivae normal.   Cardiovascular:      Rate and Rhythm: Normal rate and regular rhythm.      Heart sounds: No murmur heard.     Pulmonary:      Effort: Pulmonary effort is normal. No respiratory distress.      Breath sounds: Normal breath sounds. No wheezing.   Abdominal:      General: Bowel sounds are normal. There is no distension.      Palpations: Abdomen is soft.      Tenderness: There is no abdominal tenderness.   Musculoskeletal:         General: No deformity.      Right lower leg: No edema.      Left lower leg: No edema.   Skin:     General: Skin is warm and dry.   Neurological:      Mental Status: She is alert and oriented to person, place, and time.      Cranial Nerves: No cranial nerve deficit.   Psychiatric:         Mood and Affect: Mood normal. Affect is tearful.         Behavior: Behavior normal.         Assessment/Plan    Problems Addressed this Visit        Musculoskeletal and Injuries    Arthralgia - Primary    Relevant Orders    LUZ ELENA With / DsDNA, RNP, Sjogrens A / B, Clark (Completed)    HLA-B27 Antigen (Completed)    Ambulatory Referral to " Rheumatology (Completed)    Anti-DNA Antibody, Double-stranded (Completed)    Elevated uric acid in blood      Other Visit Diagnoses     Positive urine pregnancy test        Relevant Orders    hCG, Serum, Qualitative (Completed)    hCG, Quantitative, Pregnancy (Completed)    Weakness of extremity        Relevant Orders    MRI Brain With & Without Contrast    Paresthesias        Relevant Orders    MRI Brain With & Without Contrast        Will obtain additional labs to rule out other causes of arthralgia.  If unremarkable and pregnancy test is negative, will treat with another round of steroids and diclofenac.  Patient to discuss safety of preventative gout medications in pregnancy with her obstetrician.  With patient's paresthesias and extreme weakness at time, there is concern for possible underlying neurological problem as well.  Will obtain an MRI of the brain for further evaluation.    No orders of the defined types were placed in this encounter.      No orders of the defined types were placed in this encounter.      Return if symptoms worsen or fail to improve.    Alyx Sy, DO

## 2021-05-10 ENCOUNTER — PATIENT MESSAGE (OUTPATIENT)
Dept: INTERNAL MEDICINE | Facility: CLINIC | Age: 36
End: 2021-05-10

## 2021-05-10 DIAGNOSIS — E79.0 ELEVATED URIC ACID IN BLOOD: Primary | ICD-10-CM

## 2021-05-11 NOTE — TELEPHONE ENCOUNTER
From: Kelly Astorga  To: Alyx Sy DO  Sent: 5/10/2021 8:45 PM EDT  Subject: Non-Urgent Medical Question    Dr. Sy,  I stopped the allopurinol and completed the steroids about 3 days ago. I should be ovulating about now so that's why I stopped. I feel like the Uric acid is maybe having a rebound effect because I am SOOO achy. Mostly elbows, back and knees. I know it was normal a couple of weeks ago, but I had been on the allopurinol for about 5-6 days when I had it checked. Do you care to order another Uric acid so I can see if it's up again now? I'll try to wait until I get in to the rheumatologist after this time/lab check before I bother you about it anymore.     Thanks,  Kelly

## 2021-05-12 ENCOUNTER — LAB (OUTPATIENT)
Dept: LAB | Facility: HOSPITAL | Age: 36
End: 2021-05-12

## 2021-05-12 LAB — URATE SERPL-MCNC: 5.9 MG/DL (ref 2.4–5.7)

## 2021-05-12 PROCEDURE — 84550 ASSAY OF BLOOD/URIC ACID: CPT | Performed by: FAMILY MEDICINE

## 2021-05-12 PROCEDURE — 36415 COLL VENOUS BLD VENIPUNCTURE: CPT | Performed by: FAMILY MEDICINE

## 2021-05-19 ENCOUNTER — APPOINTMENT (OUTPATIENT)
Dept: ULTRASOUND IMAGING | Facility: HOSPITAL | Age: 36
End: 2021-05-19

## 2021-05-19 ENCOUNTER — APPOINTMENT (OUTPATIENT)
Dept: MRI IMAGING | Facility: HOSPITAL | Age: 36
End: 2021-05-19

## 2021-05-21 ENCOUNTER — LAB (OUTPATIENT)
Dept: LAB | Facility: HOSPITAL | Age: 36
End: 2021-05-21

## 2021-05-21 ENCOUNTER — TRANSCRIBE ORDERS (OUTPATIENT)
Dept: LAB | Facility: HOSPITAL | Age: 36
End: 2021-05-21

## 2021-05-21 DIAGNOSIS — Z32.01 PREGNANCY EXAMINATION OR TEST, POSITIVE RESULT: ICD-10-CM

## 2021-05-21 DIAGNOSIS — Z32.01 PREGNANCY EXAMINATION OR TEST, POSITIVE RESULT: Primary | ICD-10-CM

## 2021-05-21 PROCEDURE — 36415 COLL VENOUS BLD VENIPUNCTURE: CPT

## 2021-05-21 PROCEDURE — 84702 CHORIONIC GONADOTROPIN TEST: CPT

## 2021-05-22 LAB — HCG INTACT+B SERPL-ACNC: 84.04 MIU/ML

## 2021-05-23 ENCOUNTER — TRANSCRIBE ORDERS (OUTPATIENT)
Dept: LAB | Facility: HOSPITAL | Age: 36
End: 2021-05-23

## 2021-05-23 ENCOUNTER — LAB (OUTPATIENT)
Dept: LAB | Facility: HOSPITAL | Age: 36
End: 2021-05-23

## 2021-05-23 DIAGNOSIS — Z32.01 PREGNANCY EXAMINATION OR TEST, POSITIVE RESULT: ICD-10-CM

## 2021-05-23 DIAGNOSIS — Z32.01 PREGNANCY EXAMINATION OR TEST, POSITIVE RESULT: Primary | ICD-10-CM

## 2021-05-23 LAB
HCG INTACT+B SERPL-ACNC: 168.5 MIU/ML
PROGEST SERPL-MCNC: 17.3 NG/ML

## 2021-05-23 PROCEDURE — 84702 CHORIONIC GONADOTROPIN TEST: CPT

## 2021-05-23 PROCEDURE — 36415 COLL VENOUS BLD VENIPUNCTURE: CPT

## 2021-05-23 PROCEDURE — 84144 ASSAY OF PROGESTERONE: CPT

## 2021-05-28 ENCOUNTER — APPOINTMENT (OUTPATIENT)
Dept: MRI IMAGING | Facility: HOSPITAL | Age: 36
End: 2021-05-28

## 2021-06-11 ENCOUNTER — E-VISIT (OUTPATIENT)
Dept: FAMILY MEDICINE CLINIC | Facility: TELEHEALTH | Age: 36
End: 2021-06-11

## 2021-06-11 DIAGNOSIS — H66.91 RIGHT OTITIS MEDIA, UNSPECIFIED OTITIS MEDIA TYPE: ICD-10-CM

## 2021-06-11 DIAGNOSIS — J32.9 SINUSITIS, UNSPECIFIED CHRONICITY, UNSPECIFIED LOCATION: Primary | ICD-10-CM

## 2021-06-11 PROCEDURE — 99422 OL DIG E/M SVC 11-20 MIN: CPT | Performed by: NURSE PRACTITIONER

## 2021-06-11 RX ORDER — AMOXICILLIN 875 MG/1
875 TABLET, COATED ORAL 2 TIMES DAILY
Qty: 20 TABLET | Refills: 0 | Status: SHIPPED | OUTPATIENT
Start: 2021-06-11 | End: 2021-06-21

## 2021-06-11 NOTE — PATIENT INSTRUCTIONS
Take antibiotic until gone. May take probiotic with antibiotic if prone to antibiotic induced diarrhea. Flonase is recommended for daily use by most ENTs if you have Allergic or Reactive sinus problems. It will help with nasal congestion, but takes several days to become fully effective and is not a fast acting medication. It is also recommended to start and continue Claritin, Zyrtec or Allegra daily to control postnasal drainage, if you are prone to reactive sinus issues due to weather or seasonal changes. Cool mist humidifier at bedside will help secretions remain thin and more easily drain, relieving the pressure in your sinuses. Follow up with PCP, Urgent Care or Video Visit if symptoms have not resolved in 7-10 days. If symptoms worse, go to Urgent Care or follow up with PCP. If you develop high fever, chest pain, shortness of breath or any life-threatening symptoms, go to nearest Emergency Department.   Sinusitis, Adult  Sinusitis is soreness and swelling (inflammation) of your sinuses. Sinuses are hollow spaces in the bones around your face. They are located:  · Around your eyes.  · In the middle of your forehead.  · Behind your nose.  · In your cheekbones.  Your sinuses and nasal passages are lined with a fluid called mucus. Mucus drains out of your sinuses. Swelling can trap mucus in your sinuses. This lets germs (bacteria, virus, or fungus) grow, which leads to infection. Most of the time, this condition is caused by a virus.  What are the causes?  This condition is caused by:  · Allergies.  · Asthma.  · Germs.  · Things that block your nose or sinuses.  · Growths in the nose (nasal polyps).  · Chemicals or irritants in the air.  · Fungus (rare).  What increases the risk?  You are more likely to develop this condition if:  · You have a weak body defense system (immune system).  · You do a lot of swimming or diving.  · You use nasal sprays too much.  · You smoke.  What are the signs or symptoms?  The main  symptoms of this condition are pain and a feeling of pressure around the sinuses. Other symptoms include:  · Stuffy nose (congestion).  · Runny nose (drainage).  · Swelling and warmth in the sinuses.  · Headache.  · Toothache.  · A cough that may get worse at night.  · Mucus that collects in the throat or the back of the nose (postnasal drip).  · Being unable to smell and taste.  · Being very tired (fatigue).  · A fever.  · Sore throat.  · Bad breath.  How is this diagnosed?  This condition is diagnosed based on:  · Your symptoms.  · Your medical history.  · A physical exam.  · Tests to find out if your condition is short-term (acute) or long-term (chronic). Your doctor may:  ? Check your nose for growths (polyps).  ? Check your sinuses using a tool that has a light (endoscope).  ? Check for allergies or germs.  ? Do imaging tests, such as an MRI or CT scan.  How is this treated?  Treatment for this condition depends on the cause and whether it is short-term or long-term.  · If caused by a virus, your symptoms should go away on their own within 10 days. You may be given medicines to relieve symptoms. They include:  ? Medicines that shrink swollen tissue in the nose.  ? Medicines that treat allergies (antihistamines).  ? A spray that treats swelling of the nostrils.   ? Rinses that help get rid of thick mucus in your nose (nasal saline washes).  · If caused by bacteria, your doctor may wait to see if you will get better without treatment. You may be given antibiotic medicine if you have:  ? A very bad infection.  ? A weak body defense system.  · If caused by growths in the nose, you may need to have surgery.  Follow these instructions at home:  Medicines  · Take, use, or apply over-the-counter and prescription medicines only as told by your doctor. These may include nasal sprays.  · If you were prescribed an antibiotic medicine, take it as told by your doctor. Do not stop taking the antibiotic even if you start to  feel better.  Hydrate and humidify    · Drink enough water to keep your pee (urine) pale yellow.  · Use a cool mist humidifier to keep the humidity level in your home above 50%.  · Breathe in steam for 10-15 minutes, 3-4 times a day, or as told by your doctor. You can do this in the bathroom while a hot shower is running.  · Try not to spend time in cool or dry air.  Rest  · Rest as much as you can.  · Sleep with your head raised (elevated).  · Make sure you get enough sleep each night.  General instructions    · Put a warm, moist washcloth on your face 3-4 times a day, or as often as told by your doctor. This will help with discomfort.  · Wash your hands often with soap and water. If there is no soap and water, use hand .  · Do not smoke. Avoid being around people who are smoking (secondhand smoke).  · Keep all follow-up visits as told by your doctor. This is important.  Contact a doctor if:  · You have a fever.  · Your symptoms get worse.  · Your symptoms do not get better within 10 days.  Get help right away if:  · You have a very bad headache.  · You cannot stop throwing up (vomiting).  · You have very bad pain or swelling around your face or eyes.  · You have trouble seeing.  · You feel confused.  · Your neck is stiff.  · You have trouble breathing.  Summary  · Sinusitis is swelling of your sinuses. Sinuses are hollow spaces in the bones around your face.  · This condition is caused by tissues in your nose that become inflamed or swollen. This traps germs. These can lead to infection.  · If you were prescribed an antibiotic medicine, take it as told by your doctor. Do not stop taking it even if you start to feel better.  · Keep all follow-up visits as told by your doctor. This is important.  This information is not intended to replace advice given to you by your health care provider. Make sure you discuss any questions you have with your health care provider.  Document Revised: 05/20/2019 Document  Reviewed: 05/20/2019  Elsevier Patient Education © 2021 [employer]vier Inc.

## 2021-06-14 ENCOUNTER — TRANSCRIBE ORDERS (OUTPATIENT)
Dept: LAB | Facility: HOSPITAL | Age: 36
End: 2021-06-14

## 2021-06-14 ENCOUNTER — LAB (OUTPATIENT)
Dept: LAB | Facility: HOSPITAL | Age: 36
End: 2021-06-14

## 2021-06-14 DIAGNOSIS — Z34.81 PRENATAL CARE, SUBSEQUENT PREGNANCY, FIRST TRIMESTER: ICD-10-CM

## 2021-06-14 DIAGNOSIS — Z3A.01 7 WEEKS GESTATION OF PREGNANCY: ICD-10-CM

## 2021-06-14 DIAGNOSIS — Z3A.01 7 WEEKS GESTATION OF PREGNANCY: Primary | ICD-10-CM

## 2021-06-14 LAB
ABO GROUP BLD: NORMAL
ALP SERPL-CCNC: 88 U/L (ref 39–117)
ALT SERPL W P-5'-P-CCNC: 9 U/L (ref 1–33)
AMPHET+METHAMPHET UR QL: NEGATIVE
AMPHETAMINES UR QL: NEGATIVE
AST SERPL-CCNC: 14 U/L (ref 1–32)
BARBITURATES UR QL SCN: NEGATIVE
BASOPHILS # BLD AUTO: 0.03 10*3/MM3 (ref 0–0.2)
BASOPHILS NFR BLD AUTO: 0.4 % (ref 0–1.5)
BENZODIAZ UR QL SCN: NEGATIVE
BILIRUB SERPL-MCNC: <0.2 MG/DL (ref 0–1.2)
BILIRUB UR QL STRIP: NEGATIVE
BLD GP AB SCN SERPL QL: NEGATIVE
BUPRENORPHINE SERPL-MCNC: NEGATIVE NG/ML
CANNABINOIDS SERPL QL: NEGATIVE
CLARITY UR: CLEAR
COCAINE UR QL: NEGATIVE
COLOR UR: YELLOW
CREAT SERPL-MCNC: 0.65 MG/DL (ref 0.57–1)
DEPRECATED RDW RBC AUTO: 40 FL (ref 37–54)
EOSINOPHIL # BLD AUTO: 0.17 10*3/MM3 (ref 0–0.4)
EOSINOPHIL NFR BLD AUTO: 2 % (ref 0.3–6.2)
ERYTHROCYTE [DISTWIDTH] IN BLOOD BY AUTOMATED COUNT: 13.1 % (ref 12.3–15.4)
GLUCOSE SERPL-MCNC: 77 MG/DL (ref 65–99)
GLUCOSE UR STRIP-MCNC: NEGATIVE MG/DL
HCT VFR BLD AUTO: 36.6 % (ref 34–46.6)
HCV AB SER DONR QL: NORMAL
HGB BLD-MCNC: 12.2 G/DL (ref 12–15.9)
HGB UR QL STRIP.AUTO: NEGATIVE
HIV1+2 AB SER QL: NORMAL
IMM GRANULOCYTES # BLD AUTO: 0.02 10*3/MM3 (ref 0–0.05)
IMM GRANULOCYTES NFR BLD AUTO: 0.2 % (ref 0–0.5)
KETONES UR QL STRIP: NEGATIVE
LDH SERPL-CCNC: 134 U/L (ref 135–214)
LEUKOCYTE ESTERASE UR QL STRIP.AUTO: NEGATIVE
LYMPHOCYTES # BLD AUTO: 2.2 10*3/MM3 (ref 0.7–3.1)
LYMPHOCYTES NFR BLD AUTO: 26.1 % (ref 19.6–45.3)
MCH RBC QN AUTO: 27.9 PG (ref 26.6–33)
MCHC RBC AUTO-ENTMCNC: 33.3 G/DL (ref 31.5–35.7)
MCV RBC AUTO: 83.8 FL (ref 79–97)
METHADONE UR QL SCN: NEGATIVE
MONOCYTES # BLD AUTO: 0.49 10*3/MM3 (ref 0.1–0.9)
MONOCYTES NFR BLD AUTO: 5.8 % (ref 5–12)
NEUTROPHILS NFR BLD AUTO: 5.51 10*3/MM3 (ref 1.7–7)
NEUTROPHILS NFR BLD AUTO: 65.5 % (ref 42.7–76)
NITRITE UR QL STRIP: NEGATIVE
NRBC BLD AUTO-RTO: 0 /100 WBC (ref 0–0.2)
OPIATES UR QL: NEGATIVE
OXYCODONE UR QL SCN: NEGATIVE
PCP UR QL SCN: NEGATIVE
PH UR STRIP.AUTO: 6.5 [PH] (ref 5–8)
PLATELET # BLD AUTO: 316 10*3/MM3 (ref 140–450)
PMV BLD AUTO: 9.5 FL (ref 6–12)
PROPOXYPH UR QL: NEGATIVE
PROT UR QL STRIP: NEGATIVE
RBC # BLD AUTO: 4.37 10*6/MM3 (ref 3.77–5.28)
RH BLD: POSITIVE
SP GR UR STRIP: 1.02 (ref 1–1.03)
TRICYCLICS UR QL SCN: NEGATIVE
URATE SERPL-MCNC: 6.1 MG/DL (ref 2.4–5.7)
UROBILINOGEN UR QL STRIP: NORMAL
WBC # BLD AUTO: 8.42 10*3/MM3 (ref 3.4–10.8)

## 2021-06-14 PROCEDURE — 86803 HEPATITIS C AB TEST: CPT

## 2021-06-14 PROCEDURE — 84550 ASSAY OF BLOOD/URIC ACID: CPT

## 2021-06-14 PROCEDURE — 84075 ASSAY ALKALINE PHOSPHATASE: CPT

## 2021-06-14 PROCEDURE — 80081 OBSTETRIC PANEL INC HIV TSTG: CPT

## 2021-06-14 PROCEDURE — 81003 URINALYSIS AUTO W/O SCOPE: CPT

## 2021-06-14 PROCEDURE — 87491 CHLMYD TRACH DNA AMP PROBE: CPT

## 2021-06-14 PROCEDURE — 87591 N.GONORRHOEAE DNA AMP PROB: CPT

## 2021-06-14 PROCEDURE — 83615 LACTATE (LD) (LDH) ENZYME: CPT

## 2021-06-14 PROCEDURE — 84460 ALANINE AMINO (ALT) (SGPT): CPT

## 2021-06-14 PROCEDURE — 82565 ASSAY OF CREATININE: CPT

## 2021-06-14 PROCEDURE — 82947 ASSAY GLUCOSE BLOOD QUANT: CPT

## 2021-06-14 PROCEDURE — 80306 DRUG TEST PRSMV INSTRMNT: CPT

## 2021-06-14 PROCEDURE — 84450 TRANSFERASE (AST) (SGOT): CPT

## 2021-06-14 PROCEDURE — 36415 COLL VENOUS BLD VENIPUNCTURE: CPT

## 2021-06-14 PROCEDURE — 82247 BILIRUBIN TOTAL: CPT

## 2021-06-15 LAB
HBV SURFACE AG SERPL QL IA: NORMAL
RPR SER QL: NORMAL
RUBV IGG SERPL IA-ACNC: 3.64 INDEX

## 2021-06-16 LAB
C TRACH RRNA SPEC QL NAA+PROBE: NEGATIVE
N GONORRHOEA RRNA SPEC QL NAA+PROBE: NEGATIVE

## 2021-07-12 ENCOUNTER — LAB (OUTPATIENT)
Dept: LAB | Facility: HOSPITAL | Age: 36
End: 2021-07-12

## 2021-07-12 ENCOUNTER — TRANSCRIBE ORDERS (OUTPATIENT)
Dept: LAB | Facility: HOSPITAL | Age: 36
End: 2021-07-12

## 2021-07-12 DIAGNOSIS — Z34.81 PRENATAL CARE, SUBSEQUENT PREGNANCY, FIRST TRIMESTER: Primary | ICD-10-CM

## 2021-07-12 LAB — GLUCOSE SERPL-MCNC: 91 MG/DL (ref 65–99)

## 2021-07-12 PROCEDURE — 82947 ASSAY GLUCOSE BLOOD QUANT: CPT | Performed by: OBSTETRICS & GYNECOLOGY

## 2021-07-12 PROCEDURE — 83036 HEMOGLOBIN GLYCOSYLATED A1C: CPT | Performed by: OBSTETRICS & GYNECOLOGY

## 2021-07-12 PROCEDURE — 36415 COLL VENOUS BLD VENIPUNCTURE: CPT | Performed by: OBSTETRICS & GYNECOLOGY

## 2021-07-13 LAB — HBA1C MFR BLD: 5.22 % (ref 4.8–5.6)

## 2021-08-13 ENCOUNTER — HOSPITAL ENCOUNTER (OUTPATIENT)
Dept: ULTRASOUND IMAGING | Facility: HOSPITAL | Age: 36
Discharge: HOME | End: 2021-08-13
Admitting: DERMATOLOGY

## 2021-08-13 DIAGNOSIS — D49.2 NEOPLASM OF SKIN: ICD-10-CM

## 2021-08-13 PROCEDURE — 76882 US LMTD JT/FCL EVL NVASC XTR: CPT

## 2021-08-26 ENCOUNTER — APPOINTMENT (OUTPATIENT)
Dept: WOMENS IMAGING | Facility: HOSPITAL | Age: 36
End: 2021-08-26

## 2021-08-26 ENCOUNTER — HOSPITAL ENCOUNTER (OUTPATIENT)
Facility: HOSPITAL | Age: 36
End: 2021-08-26
Attending: OBSTETRICS & GYNECOLOGY | Admitting: OBSTETRICS & GYNECOLOGY

## 2021-08-26 ENCOUNTER — HOSPITAL ENCOUNTER (OUTPATIENT)
Facility: HOSPITAL | Age: 36
Discharge: HOME | End: 2021-08-26
Attending: OBSTETRICS & GYNECOLOGY | Admitting: OBSTETRICS & GYNECOLOGY

## 2021-08-26 VITALS
WEIGHT: 220 LBS | HEART RATE: 111 BPM | DIASTOLIC BLOOD PRESSURE: 70 MMHG | SYSTOLIC BLOOD PRESSURE: 136 MMHG | TEMPERATURE: 98.1 F | HEIGHT: 65 IN | RESPIRATION RATE: 18 BRPM | BODY MASS INDEX: 36.65 KG/M2

## 2021-08-26 PROBLEM — O46.92 VAGINAL BLEEDING IN PREGNANCY, SECOND TRIMESTER: Status: ACTIVE | Noted: 2021-08-26

## 2021-08-26 LAB
DEPRECATED RDW RBC AUTO: 39.9 FL (ref 37–54)
ERYTHROCYTE [DISTWIDTH] IN BLOOD BY AUTOMATED COUNT: 13.2 % (ref 12.3–15.4)
HCT VFR BLD AUTO: 32.8 % (ref 34–46.6)
HGB BLD-MCNC: 10.5 G/DL (ref 12–15.9)
MCH RBC QN AUTO: 27.3 PG (ref 26.6–33)
MCHC RBC AUTO-ENTMCNC: 32 G/DL (ref 31.5–35.7)
MCV RBC AUTO: 85.4 FL (ref 79–97)
PLATELET # BLD AUTO: 233 10*3/MM3 (ref 140–450)
PMV BLD AUTO: 9.2 FL (ref 6–12)
RBC # BLD AUTO: 3.84 10*6/MM3 (ref 3.77–5.28)
WBC # BLD AUTO: 7.53 10*3/MM3 (ref 3.4–10.8)

## 2021-08-26 PROCEDURE — 85027 COMPLETE CBC AUTOMATED: CPT | Performed by: OBSTETRICS & GYNECOLOGY

## 2021-08-26 PROCEDURE — 99204 OFFICE O/P NEW MOD 45 MIN: CPT | Performed by: OBSTETRICS & GYNECOLOGY

## 2021-08-26 PROCEDURE — 76811 OB US DETAILED SNGL FETUS: CPT | Performed by: OBSTETRICS & GYNECOLOGY

## 2021-08-26 PROCEDURE — G0463 HOSPITAL OUTPT CLINIC VISIT: HCPCS

## 2021-08-26 PROCEDURE — 76811 OB US DETAILED SNGL FETUS: CPT

## 2021-08-26 PROCEDURE — 36415 COLL VENOUS BLD VENIPUNCTURE: CPT | Performed by: OBSTETRICS & GYNECOLOGY

## 2021-08-26 RX ORDER — ASPIRIN 81 MG/1
81 TABLET, CHEWABLE ORAL DAILY
COMMUNITY
End: 2022-01-27 | Stop reason: HOSPADM

## 2021-08-26 NOTE — H&P
"Kelly Cerrato  1985  [id number]  [id number]    CC: vag bleeding  HPI:  Patient is 35 y.o. female   currently at Unknown presents with c/o vag bleeding.  Pink-tinged vag d/c last pm. At ~0445 onset bright red bleeding with passage of clots.  Bleeding has lightened.  Pt denies any cramping.  Last intercourse Tues.  PNC comp by hx preeclampsia prior 2 pregnancies, prev LEEP, and obesity    PMH:  Current meds: PNV, imiprazole 20mg/d, baby ASA  Illnesses: GERD  Surgeries: gastric sleeve, LEEP, ear surg  Allergies: NKDA    Past OB History:       OB History    Para Term  AB Living   3 2 1 1 0 2   SAB TAB Ectopic Molar Multiple Live Births   0 0 0 0 0 2      # Outcome Date GA Lbr David/2nd Weight Sex Delivery Anes PTL Lv   3 Current            2 Term 20 39w1d 15:45 / 01:48 3760 g (8 lb 4.6 oz) F Vag-Spont EPI N NIKHIL      Name: CHAR CERRATO      Apgar1: 5  Apgar5: 7   1  19 36w5d 12:15 / 04:58 2930 g (6 lb 7.4 oz) F Vag-Spont EPI  NIKHIL      Name: CHAR CERRATO      Apgar1: 8  Apgar5: 9            SH: tob neg , EtOH neg, drugs neg  FH: heart dz pos , diabetes pos , cancer neg    General ROS: bleeding.   All other systems reviewed and are negative.      Physical Examination: General appearance - alert, well appearing, and in no distress  Vital signs - /70 (BP Location: Right arm, Patient Position: Lying)   Pulse 111   Temp 98.1 °F (36.7 °C) (Oral)   Resp 18   Ht 165.1 cm (65\")   Wt 99.8 kg (220 lb)   LMP 2021   BMI 36.61 kg/m²   HEENT: normocephalic, atraumatic,oropharynx clear, appearance of ears and nose normal  Neck - supple, no significant adenopathy, no thyromegaly  Lymphatics - no palpable lymphadenopathy in the neck or groin, no hepatosplenomegaly  Chest - clear to auscultation, no wheezes, rales or rhonchi, respiratory effort non-labored  Heart - normal rate, regular rhythm, no murmurs, rubs, clicks or gallops, no JVD, " no lower extremity edema  Abdomen - soft, nontender, nondistended, no masses, no hepatosplenomegaly  no rebound tenderness noted, bowel sounds normal  Vaginal Exam: deferred  Extremities - no pedal edema noted, no calf tend  Skin -warm and dry, normal coloration and turgor, no rashes, no suspicious skin lesions noted      Radiology US: active fetus, normal fluid, poss post partial previa vs clot near cervix    Assessment 1)IUP 17 weeks   2)vag bleeding- poss partial previa   3)hx preeclampsia prev 2 pregnancies   4)hx LEEP   5)obesity- s/p gastric sleeve    Plan 1)observe   2)check CBC   3)PDC scan this am   4)prob home after scan    Eric Valladares MD  8/26/2021  07:48 EDT

## 2021-10-04 ENCOUNTER — LAB (OUTPATIENT)
Dept: LAB | Facility: HOSPITAL | Age: 36
End: 2021-10-04

## 2021-10-04 ENCOUNTER — TRANSCRIBE ORDERS (OUTPATIENT)
Dept: LAB | Facility: HOSPITAL | Age: 36
End: 2021-10-04

## 2021-10-04 DIAGNOSIS — Z20.822 COVID-19 RULED OUT: ICD-10-CM

## 2021-10-04 DIAGNOSIS — Z20.822 COVID-19 RULED OUT: Primary | ICD-10-CM

## 2021-10-04 PROCEDURE — U0004 COV-19 TEST NON-CDC HGH THRU: HCPCS

## 2021-10-05 LAB — SARS-COV-2 RNA NOSE QL NAA+PROBE: DETECTED

## 2021-10-07 ENCOUNTER — E-VISIT (OUTPATIENT)
Dept: INTERNAL MEDICINE | Facility: CLINIC | Age: 36
End: 2021-10-07

## 2021-10-07 DIAGNOSIS — U07.1 COVID-19 VIRUS INFECTION: Primary | ICD-10-CM

## 2021-10-07 PROCEDURE — 99421 OL DIG E/M SVC 5-10 MIN: CPT | Performed by: FAMILY MEDICINE

## 2021-10-07 RX ORDER — ALBUTEROL SULFATE 0.63 MG/3ML
1 SOLUTION RESPIRATORY (INHALATION) EVERY 6 HOURS PRN
Qty: 360 ML | Refills: 2 | Status: SHIPPED | OUTPATIENT
Start: 2021-10-07 | End: 2021-11-15

## 2021-10-07 RX ORDER — ALBUTEROL SULFATE 90 UG/1
2 AEROSOL, METERED RESPIRATORY (INHALATION) EVERY 4 HOURS PRN
Qty: 18 G | Refills: 1 | Status: SHIPPED | OUTPATIENT
Start: 2021-10-07 | End: 2021-11-15

## 2021-10-07 NOTE — PROGRESS NOTES
Kelly Cuello Astorga    1985  [id number]    I have reviewed the e-Visit questionnaire and patient's answers, my assessment and plan are as follows:    CC  Positive COVID    HPI  Patient reports not feeling well for the past 4 days.  She tested positive for COVID 3 days ago.  She admits to cough, shortness of breath, fatigue, wheezing, chest tightness.  Oxygen drops upon ambulating.     Review of Systems - General ROS: negative for - fever  Respiratory ROS: positive for - cough, shortness of breath and wheezing      Diagnoses and all orders for this visit:    1. COVID-19 virus infection (Primary)    Other orders  -     albuterol sulfate  (90 Base) MCG/ACT inhaler; Inhale 2 puffs Every 4 (Four) Hours As Needed for Wheezing or Shortness of Air.  Dispense: 18 g; Refill: 1  -     albuterol (ACCUNEB) 0.63 MG/3ML nebulizer solution; Take 3 mL by nebulization Every 6 (Six) Hours As Needed for Wheezing or Shortness of Air.  Dispense: 360 mL; Refill: 2      Encouraged to continue tylenol.  May take OTC decongestants and cough suppressants if needed.  Albuterol inhaler and nebs have been sent in.     Any medications prescribed have been sent electronically to   [facility] [address] - [place], [place] - [address] - [phone]  - [phone]   [address]  [facility] [address]  Phone: [phone] Fax: [phone]    [facility] - [place] [address]  Phone: [phone] Fax: [phone]      7 minutes were spent reviewing the patient's questionnaire, formulating a treatment plan, and relaying information to the patient via [employer].    Alyx Sy DO  10/07/21  17:52 EDT

## 2021-10-10 ENCOUNTER — E-VISIT (OUTPATIENT)
Dept: FAMILY MEDICINE CLINIC | Facility: TELEHEALTH | Age: 36
End: 2021-10-10

## 2021-10-10 DIAGNOSIS — U07.1 COVID-19: Primary | ICD-10-CM

## 2021-10-10 PROCEDURE — 99421 OL DIG E/M SVC 5-10 MIN: CPT | Performed by: NURSE PRACTITIONER

## 2021-10-10 RX ORDER — AZITHROMYCIN 250 MG/1
TABLET, FILM COATED ORAL
Qty: 6 TABLET | Refills: 0 | Status: SHIPPED | OUTPATIENT
Start: 2021-10-10 | End: 2021-11-15

## 2021-10-10 NOTE — PROGRESS NOTES
I reviewed the patient's evisit. Patient has covid-19, is pregnant, concerned about pneumonia- has worsening SOA, chest tightness and fever. Emperic treatment sent for CAP r/t covid-19. Zpak sent to [facility]. I spent 5-10 minutes in the patient's chart for this e-visit.

## 2021-10-10 NOTE — PATIENT INSTRUCTIONS
"Per Epocrates: \"Azythrimycin may be used in pregnancy; possible risk of spontaneous  based on conflicting human data; no known risk of fetal harm for PO route based on animal data at 1.6x and 3.2x recommended human dose.\"    Azithromycin tablets  What is this medicine?  AZITHROMYCIN (az ith cassidyjosef RIBEIRO sin) is a macrolide antibiotic. It is used to treat or prevent certain kinds of bacterial infections. It will not work for colds, flu, or other viral infections.  This medicine may be used for other purposes; ask your health care provider or pharmacist if you have questions.  COMMON BRAND NAME(S): Zithromax, Zithromax Tri-Juan, Zithromax Z-Juan  What should I tell my health care provider before I take this medicine?  They need to know if you have any of these conditions:  · history of blood diseases, like leukemia  · history of irregular heartbeat  · kidney disease  · liver disease  · myasthenia gravis  · an unusual or allergic reaction to azithromycin, erythromycin, other macrolide antibiotics, foods, dyes, or preservatives  · pregnant or trying to get pregnant  · breast-feeding  How should I use this medicine?  Take this medicine by mouth with a full glass of water. Follow the directions on the prescription label. The tablets can be taken with food or on an empty stomach. If the medicine upsets your stomach, take it with food. Take your medicine at regular intervals. Do not take your medicine more often than directed. Take all of your medicine as directed even if you think your are better. Do not skip doses or stop your medicine early.  Talk to your pediatrician regarding the use of this medicine in children. While this drug may be prescribed for children as young as 6 months for selected conditions, precautions do apply.  Overdosage: If you think you have taken too much of this medicine contact a poison control center or emergency room at once.  NOTE: This medicine is only for you. Do not share this medicine with " others.  What if I miss a dose?  If you miss a dose, take it as soon as you can. If it is almost time for your next dose, take only that dose. Do not take double or extra doses.  What may interact with this medicine?  Do not take this medicine with any of the following medications:  · cisapride  · dronedarone  · pimozide  · thioridazine  This medicine may also interact with the following medications:  · antacids that contain aluminum or magnesium  · birth control pills  · colchicine  · cyclosporine  · digoxin  · ergot alkaloids like dihydroergotamine, ergotamine  · nelfinavir  · other medicines that prolong the QT interval (an abnormal heart rhythm)  · phenytoin  · warfarin  This list may not describe all possible interactions. Give your health care provider a list of all the medicines, herbs, non-prescription drugs, or dietary supplements you use. Also tell them if you smoke, drink alcohol, or use illegal drugs. Some items may interact with your medicine.  What should I watch for while using this medicine?  Tell your doctor or healthcare provider if your symptoms do not start to get better or if they get worse.  This medicine may cause serious skin reactions. They can happen weeks to months after starting the medicine. Contact your healthcare provider right away if you notice fevers or flu-like symptoms with a rash. The rash may be red or purple and then turn into blisters or peeling of the skin. Or, you might notice a red rash with swelling of the face, lips or lymph nodes in your neck or under your arms.  Do not treat diarrhea with over the counter products. Contact your doctor if you have diarrhea that lasts more than 2 days or if it is severe and watery.  This medicine can make you more sensitive to the sun. Keep out of the sun. If you cannot avoid being in the sun, wear protective clothing and use sunscreen. Do not use sun lamps or tanning beds/booths.  What side effects may I notice from receiving this  medicine?  Side effects that you should report to your doctor or health care professional as soon as possible:  · allergic reactions like skin rash, itching or hives, swelling of the face, lips, or tongue  · bloody or watery diarrhea  · breathing problems  · chest pain  · fast, irregular heartbeat  · muscle weakness  · rash, fever, and swollen lymph nodes  · redness, blistering, peeling, or loosening of the skin, including inside the mouth  · signs and symptoms of liver injury like dark yellow or brown urine; general ill feeling or flu-like symptoms; light-colored stools; loss of appetite; nausea; right upper belly pain; unusually weak or tired; yellowing of the eyes or skin  · white patches or sores in the mouth  · unusually weak or tired  Side effects that usually do not require medical attention (report to your doctor or health care professional if they continue or are bothersome):  · diarrhea  · nausea  · stomach pain  · vomiting  This list may not describe all possible side effects. Call your doctor for medical advice about side effects. You may report side effects to FDA at 4-062-FDA-1538.  Where should I keep my medicine?  Keep out of the reach of children.  Store at room temperature between 15 and 30 degrees C (59 and 86 degrees F). Throw away any unused medicine after the expiration date.  NOTE: This sheet is a summary. It may not cover all possible information. If you have questions about this medicine, talk to your doctor, pharmacist, or health care provider.  © 2021 Elsevier/Gold Standard (2020-03-26 17:19:20)

## 2021-11-05 ENCOUNTER — TRANSCRIBE ORDERS (OUTPATIENT)
Dept: LAB | Facility: HOSPITAL | Age: 36
End: 2021-11-05

## 2021-11-05 ENCOUNTER — LAB (OUTPATIENT)
Dept: LAB | Facility: HOSPITAL | Age: 36
End: 2021-11-05

## 2021-11-05 DIAGNOSIS — Z3A.28 28 WEEKS GESTATION OF PREGNANCY: Primary | ICD-10-CM

## 2021-11-05 DIAGNOSIS — Z34.83 PRENATAL CARE, SUBSEQUENT PREGNANCY, THIRD TRIMESTER: ICD-10-CM

## 2021-11-05 LAB
BLD GP AB SCN SERPL QL: NEGATIVE
DEPRECATED RDW RBC AUTO: 46 FL (ref 37–54)
ERYTHROCYTE [DISTWIDTH] IN BLOOD BY AUTOMATED COUNT: 15.8 % (ref 12.3–15.4)
GLUCOSE 1H P 100 G GLC PO SERPL-MCNC: 168 MG/DL (ref 65–139)
HCT VFR BLD AUTO: 27.5 % (ref 34–46.6)
HGB BLD-MCNC: 9.3 G/DL (ref 12–15.9)
MCH RBC QN AUTO: 27.8 PG (ref 26.6–33)
MCHC RBC AUTO-ENTMCNC: 33.8 G/DL (ref 31.5–35.7)
MCV RBC AUTO: 82.1 FL (ref 79–97)
PLATELET # BLD AUTO: 207 10*3/MM3 (ref 140–450)
PMV BLD AUTO: 9.4 FL (ref 6–12)
RBC # BLD AUTO: 3.35 10*6/MM3 (ref 3.77–5.28)
WBC # BLD AUTO: 6.71 10*3/MM3 (ref 3.4–10.8)

## 2021-11-05 PROCEDURE — 82950 GLUCOSE TEST: CPT | Performed by: OBSTETRICS & GYNECOLOGY

## 2021-11-05 PROCEDURE — 36415 COLL VENOUS BLD VENIPUNCTURE: CPT | Performed by: OBSTETRICS & GYNECOLOGY

## 2021-11-05 PROCEDURE — 85027 COMPLETE CBC AUTOMATED: CPT | Performed by: OBSTETRICS & GYNECOLOGY

## 2021-11-05 PROCEDURE — 86850 RBC ANTIBODY SCREEN: CPT | Performed by: OBSTETRICS & GYNECOLOGY

## 2021-11-15 ENCOUNTER — OFFICE VISIT (OUTPATIENT)
Dept: ENDOCRINOLOGY | Facility: CLINIC | Age: 36
End: 2021-11-15

## 2021-11-15 VITALS
SYSTOLIC BLOOD PRESSURE: 124 MMHG | WEIGHT: 233 LBS | BODY MASS INDEX: 38.82 KG/M2 | HEIGHT: 65 IN | DIASTOLIC BLOOD PRESSURE: 78 MMHG | OXYGEN SATURATION: 99 % | HEART RATE: 96 BPM

## 2021-11-15 DIAGNOSIS — O24.419 GESTATIONAL DIABETES MELLITUS (GDM) IN THIRD TRIMESTER, GESTATIONAL DIABETES METHOD OF CONTROL UNSPECIFIED: Primary | ICD-10-CM

## 2021-11-15 LAB
EXPIRATION DATE: NORMAL
EXPIRATION DATE: NORMAL
GLUCOSE BLDC GLUCOMTR-MCNC: 77 MG/DL (ref 70–130)
HBA1C MFR BLD: 5.3 %
Lab: NORMAL
Lab: NORMAL

## 2021-11-15 PROCEDURE — 82947 ASSAY GLUCOSE BLOOD QUANT: CPT | Performed by: INTERNAL MEDICINE

## 2021-11-15 PROCEDURE — 99214 OFFICE O/P EST MOD 30 MIN: CPT | Performed by: INTERNAL MEDICINE

## 2021-11-15 PROCEDURE — 83036 HEMOGLOBIN GLYCOSYLATED A1C: CPT | Performed by: INTERNAL MEDICINE

## 2021-11-15 RX ORDER — INSULIN DETEMIR 100 [IU]/ML
8 INJECTION, SOLUTION SUBCUTANEOUS DAILY
Qty: 15 ML | Refills: 2 | Status: SHIPPED | OUTPATIENT
Start: 2021-11-15 | End: 2021-11-17 | Stop reason: SDUPTHER

## 2021-11-15 NOTE — PROGRESS NOTES
"Chief Complaint   Patient presents with   • Gestational Diabetes        HPI   Kelly Astorga is a 35 y.o. female had concerns including Gestational Diabetes.    She is checking blood sugars infrequently at this time.  Fastings have been > 100 as recently as a month ago.   Starting at 14-15 weeks she felt like her postprandial glucose levels may have been high because she felt significant fatigue    She is 29.2 weeks with unknown gender and baby is measuring on schedule.    Is craving coke. Her grandmother is in hospice (has been for some time). Is currently eating out a lot. Due to history of gastric sleeve she eats small portions.     The following portions of the patient's history were reviewed and updated as appropriate: allergies, current medications, past family history, past medical history, past social history, past surgical history and problem list.    Review of Systems   Constitutional: Positive for fatigue.   Endocrine:        See HPI          /78   Pulse 96   Ht 165.1 cm (65\")   Wt 106 kg (233 lb)   LMP 04/24/2021   SpO2 99%   BMI 38.77 kg/m²      Physical Exam  Vitals reviewed.   Constitutional:       Appearance: Normal appearance. She is obese.   Cardiovascular:      Rate and Rhythm: Normal rate.   Pulmonary:      Effort: Pulmonary effort is normal.   Abdominal:      Comments: Gravid   Neurological:      General: No focal deficit present.      Mental Status: She is alert. Mental status is at baseline.   Psychiatric:         Mood and Affect: Mood normal.         Behavior: Behavior normal.          HbA1c:  Lab Results   Component Value Date    HGBA1C 5.3 11/15/2021    HGBA1C 5.22 07/12/2021     Glucose:    Lab Results   Component Value Date    POCGLU 77 11/15/2021     Lab Results   Component Value Date     (H) 11/05/2021       Assessment and Plan    Diagnoses and all orders for this visit:    1. Gestational diabetes mellitus (GDM) in third trimester, gestational diabetes " method of control unspecified (Primary)  29.2 weeks and patient is experiencing fasting hyperglycemia, when last checked all of her BG's were in the 100s.  During her previous pregnancy she required basal insulin only.  Initiate basal insulin with Levemir 8 units nightly and titrate up nightly until fasting BG's are less than 95.  She needs to check glucose levels at least 4 times daily.  Fasting target less than 95, 1 hour postprandial less than 140, 2 hours postprandial less than 120.  No more than 1-2 high blood sugars per week.  Send BG update on Thursday.  Discontinue regular soda.  Caution with carbs.  -     POC Glycosylated Hemoglobin (Hb  A1C)  -     POC Glucose, Blood  -     insulin detemir (Levemir FlexTouch) 100 UNIT/ML injection; Inject 8 Units under the skin into the appropriate area as directed Daily. Increase nightly by 2-4 units until fasting levels are < 95  Dispense: 15 mL; Refill: 2         Return in about 3 weeks (around 12/6/2021) for next scheduled follow up every 2-3 weeks until delivery. The patient was instructed to contact the clinic with any interval questions or concerns.    Angelique Danielson, DO   Endocrinologist    Please note that portions of this note were completed with a voice recognition program.

## 2021-11-16 ENCOUNTER — TELEPHONE (OUTPATIENT)
Dept: ENDOCRINOLOGY | Facility: CLINIC | Age: 36
End: 2021-11-16

## 2021-11-17 DIAGNOSIS — O24.419 GESTATIONAL DIABETES MELLITUS (GDM) IN THIRD TRIMESTER, GESTATIONAL DIABETES METHOD OF CONTROL UNSPECIFIED: ICD-10-CM

## 2021-11-17 RX ORDER — INSULIN DETEMIR 100 [IU]/ML
8 INJECTION, SOLUTION SUBCUTANEOUS DAILY
Qty: 15 ML | Refills: 2 | Status: SHIPPED | OUTPATIENT
Start: 2021-11-17 | End: 2021-12-06 | Stop reason: ALTCHOICE

## 2021-11-18 DIAGNOSIS — O24.414 INSULIN CONTROLLED GESTATIONAL DIABETES MELLITUS (GDM) IN THIRD TRIMESTER: Primary | ICD-10-CM

## 2021-12-06 ENCOUNTER — OFFICE VISIT (OUTPATIENT)
Dept: ENDOCRINOLOGY | Facility: CLINIC | Age: 36
End: 2021-12-06

## 2021-12-06 VITALS
OXYGEN SATURATION: 98 % | SYSTOLIC BLOOD PRESSURE: 116 MMHG | WEIGHT: 236 LBS | HEART RATE: 78 BPM | BODY MASS INDEX: 39.32 KG/M2 | HEIGHT: 65 IN | DIASTOLIC BLOOD PRESSURE: 62 MMHG

## 2021-12-06 DIAGNOSIS — O24.414 INSULIN CONTROLLED GESTATIONAL DIABETES MELLITUS (GDM) IN THIRD TRIMESTER: Primary | ICD-10-CM

## 2021-12-06 PROCEDURE — 99213 OFFICE O/P EST LOW 20 MIN: CPT | Performed by: INTERNAL MEDICINE

## 2021-12-06 RX ORDER — INSULIN GLARGINE 100 [IU]/ML
30 INJECTION, SOLUTION SUBCUTANEOUS NIGHTLY
COMMUNITY
Start: 2021-11-17 | End: 2022-01-27 | Stop reason: HOSPADM

## 2021-12-06 NOTE — PROGRESS NOTES
"Chief Complaint   Patient presents with   • Gestational Diabetes        HPI   Kelly Astorga is a 35 y.o. female had concerns including Gestational Diabetes.    She is checking blood sugars 4 times per day.    Is now on Basaglar 24 units nightly.  Fasting glucose levels range 82-94.  Postprandial BG's are most often at target though in the last week she had 3 levels that were above target at 1 hour ranging 141-161.  This tends to be when she eats a higher carb meal.  Is not having to significantly restrict her carbohydrates.    She is 32.2 weeks with a unknown gender and baby is measuring mostly on schedule.    The following portions of the patient's history were reviewed and updated as appropriate: allergies, current medications, past family history, past medical history, past social history, past surgical history and problem list.    Review of Systems   Constitutional: Negative.    Endocrine: Negative.           /62 (BP Location: Left arm, Patient Position: Sitting, Cuff Size: Adult)   Pulse 78   Ht 165.1 cm (65\")   Wt 107 kg (236 lb)   LMP 04/24/2021   SpO2 98%   BMI 39.27 kg/m²      Physical Exam  Vitals reviewed.   Constitutional:       Appearance: Normal appearance. She is obese.   Cardiovascular:      Rate and Rhythm: Normal rate.   Pulmonary:      Effort: Pulmonary effort is normal.   Neurological:      General: No focal deficit present.      Mental Status: She is alert. Mental status is at baseline.   Psychiatric:         Mood and Affect: Mood normal.         Behavior: Behavior normal.          HbA1c:  Lab Results   Component Value Date    HGBA1C 5.3 11/15/2021    HGBA1C 5.22 07/12/2021     Glucose:    Lab Results   Component Value Date    POCGLU 77 11/15/2021       Assessment and Plan    Diagnoses and all orders for this visit:    1. Insulin controlled gestational diabetes mellitus (GDM) in third trimester (Primary)  32.2 weeks.  Unknown gender.  Baby recently measured at 68th " percentile.    Fasting glucose levels are at target though she has occasional postprandial hyperglycemia due to diet.  For now, continue Basaglar 24 units nightly.  Titrate up on dose if fasting BG's tend to trend up.  Target is no more than 1-2 high glucose levels per week.  Send BG log update next week.       Return in about 2 weeks (around 12/20/2021) for next scheduled follow up. The patient was instructed to contact the clinic with any interval questions or concerns.    Angelique Danielson, DO   Endocrinologist    Please note that portions of this note were completed with a voice recognition program.

## 2021-12-20 ENCOUNTER — OFFICE VISIT (OUTPATIENT)
Dept: ENDOCRINOLOGY | Facility: CLINIC | Age: 36
End: 2021-12-20

## 2021-12-20 VITALS
HEIGHT: 65 IN | HEART RATE: 82 BPM | OXYGEN SATURATION: 98 % | SYSTOLIC BLOOD PRESSURE: 114 MMHG | WEIGHT: 235 LBS | DIASTOLIC BLOOD PRESSURE: 75 MMHG | BODY MASS INDEX: 39.15 KG/M2

## 2021-12-20 DIAGNOSIS — O24.414 INSULIN CONTROLLED GESTATIONAL DIABETES MELLITUS (GDM) IN THIRD TRIMESTER: ICD-10-CM

## 2021-12-20 LAB
EXPIRATION DATE: NORMAL
EXPIRATION DATE: NORMAL
GLUCOSE BLDC GLUCOMTR-MCNC: 76 MG/DL (ref 70–130)
HBA1C MFR BLD: 5 %
Lab: NORMAL
Lab: NORMAL

## 2021-12-20 PROCEDURE — 82947 ASSAY GLUCOSE BLOOD QUANT: CPT | Performed by: INTERNAL MEDICINE

## 2021-12-20 PROCEDURE — 83036 HEMOGLOBIN GLYCOSYLATED A1C: CPT | Performed by: INTERNAL MEDICINE

## 2021-12-20 PROCEDURE — 99214 OFFICE O/P EST MOD 30 MIN: CPT | Performed by: INTERNAL MEDICINE

## 2021-12-20 RX ORDER — FERROUS SULFATE 325(65) MG
325 TABLET ORAL
Status: ON HOLD | COMMUNITY
End: 2022-01-27 | Stop reason: SDUPTHER

## 2021-12-20 RX ORDER — INSULIN LISPRO 100 [IU]/ML
4 INJECTION, SOLUTION INTRAVENOUS; SUBCUTANEOUS 3 TIMES DAILY
Qty: 15 ML | Refills: 1 | Status: SHIPPED | OUTPATIENT
Start: 2021-12-20 | End: 2022-01-27 | Stop reason: HOSPADM

## 2021-12-20 NOTE — PROGRESS NOTES
"Chief Complaint   Patient presents with   • Gestational Diabetes        HPI   Kelly Astorga is a 36 y.o. female had concerns including Gestational Diabetes.    She is checking blood sugars 3-4 times per day.  Fasting BG's are all at targets.  2-3 times per week she has elevated postprandial levels, highest in the 160s.  Tends to be with higher carb meals.  She is not taking any prandial insulin at this time.    Currently on Basaglar 30 units nightly.  Saw OB today who noted her fluid levels of increased, not technically macrosomia she will follow up on Monday.  Baby is also breech.  Patient may require  at 39 weeks.    She is 34.2 weeks with unknown gender and baby is measuring slightly ahead of schedule.    The following portions of the patient's history were reviewed and updated as appropriate: allergies, current medications, past family history, past medical history, past social history, past surgical history and problem list.    Review of Systems   Endocrine:        See HPI   Psychiatric/Behavioral: Positive for stress.      ROS was reviewed and verified. All other ROS negative.    /75   Pulse 82   Ht 165.1 cm (65\")   Wt 107 kg (235 lb)   LMP 2021   SpO2 98%   BMI 39.11 kg/m²      Physical Exam  Vitals reviewed.   Constitutional:       Appearance: Normal appearance.   Cardiovascular:      Rate and Rhythm: Normal rate.   Pulmonary:      Effort: Pulmonary effort is normal.   Abdominal:      Comments: gravid   Neurological:      General: No focal deficit present.      Mental Status: She is alert. Mental status is at baseline.   Psychiatric:         Mood and Affect: Mood normal.         Behavior: Behavior normal.          HbA1c:  Lab Results   Component Value Date    HGBA1C 5.0 2021    HGBA1C 5.3 11/15/2021     Glucose:    Lab Results   Component Value Date    POCGLU 76 2021         Assessment and Plan    Diagnoses and all orders for this visit:    1. Insulin " controlled gestational diabetes mellitus (GDM) in third trimester  Uncontrolled with occasional postprandial hyperglycemia.  34.2 weeks, unknown gender.  Baby is measuring slightly head of schedule and fluid levels rising. More than 1-2 high glucose levels per week increases risk for polyhydramnios as well as macrosomia.  Glucose levels need to be improved.  Continue Basaglar 30 units nightly.  Add Humalog 4 units with high carb meals.  Send BG logs weekly.  -     POC Glucose, Blood  -     POC Glycosylated Hemoglobin (Hb A1C)  -     Insulin Lispro, 1 Unit Dial, (HumaLOG KwikPen) 100 UNIT/ML solution pen-injector; Inject 4 Units under the skin into the appropriate area as directed 3 (Three) Times a Day.  Dispense: 15 mL; Refill: 1         Return in about 3 weeks (around 1/10/2022) for next scheduled follow up. The patient was instructed to contact the clinic with any interval questions or concerns.    Angelique Danielson, DO   Endocrinologist    Please note that portions of this note were completed with a voice recognition program.

## 2022-01-10 ENCOUNTER — OFFICE VISIT (OUTPATIENT)
Dept: ENDOCRINOLOGY | Facility: CLINIC | Age: 37
End: 2022-01-10

## 2022-01-10 VITALS
HEIGHT: 65 IN | SYSTOLIC BLOOD PRESSURE: 122 MMHG | HEART RATE: 76 BPM | BODY MASS INDEX: 39.72 KG/M2 | DIASTOLIC BLOOD PRESSURE: 70 MMHG | WEIGHT: 238.4 LBS | OXYGEN SATURATION: 98 %

## 2022-01-10 DIAGNOSIS — O24.414 INSULIN CONTROLLED GESTATIONAL DIABETES MELLITUS (GDM) IN THIRD TRIMESTER: Primary | ICD-10-CM

## 2022-01-10 LAB
EXPIRATION DATE: ABNORMAL
GLUCOSE BLDC GLUCOMTR-MCNC: 60 MG/DL (ref 70–130)
Lab: ABNORMAL

## 2022-01-10 PROCEDURE — 99213 OFFICE O/P EST LOW 20 MIN: CPT | Performed by: INTERNAL MEDICINE

## 2022-01-10 PROCEDURE — 82947 ASSAY GLUCOSE BLOOD QUANT: CPT | Performed by: INTERNAL MEDICINE

## 2022-01-10 NOTE — PROGRESS NOTES
"Chief Complaint   Patient presents with   • Insulin controlled gestational diabetes mellitus (GDM) in th        HPI   Kelly Astorga is a 36 y.o. female had concerns including Insulin controlled gestational diabetes mellitus (GDM) in th.    She is checking blood sugars 4 times per day.  Fasting BG's range high 80s to 94.  Postprandial BG's are all within target range, highest at 2 hours 120.  She is on Basaglar 30 units nightly, Humalog 4 to 6 units with meals.  She may take up to 15 units with a high carb meal or pizza.    She is 37.2 weeks and baby is measuring on schedule.    The following portions of the patient's history were reviewed and updated as appropriate: allergies, current medications, past family history, past medical history, past social history, past surgical history and problem list.    Review of Systems   Constitutional: Positive for fatigue.   Skin:        Yeast in skin   Psychiatric/Behavioral: Positive for sleep disturbance.       /70 (BP Location: Left arm, Patient Position: Sitting, Cuff Size: Adult)   Pulse 76   Ht 165.1 cm (65\")   Wt 108 kg (238 lb 6.4 oz)   LMP 04/24/2021   SpO2 98%   BMI 39.67 kg/m²      Physical Exam  Vitals reviewed.   Constitutional:       Appearance: Normal appearance.   Cardiovascular:      Rate and Rhythm: Normal rate.   Pulmonary:      Effort: Pulmonary effort is normal.   Abdominal:      Comments: Gravid   Neurological:      General: No focal deficit present.      Mental Status: She is alert. Mental status is at baseline.   Psychiatric:         Mood and Affect: Mood normal.         Behavior: Behavior normal.          HbA1c:  Lab Results   Component Value Date    HGBA1C 5.0 12/20/2021    HGBA1C 5.3 11/15/2021     Glucose:    Lab Results   Component Value Date    POCGLU 60 (A) 01/10/2022         Assessment and Plan    Diagnoses and all orders for this visit:    1. Insulin controlled gestational diabetes mellitus (GDM) in third trimester " (Primary)  Controlled with fasting and postprandial BG's at target on Basaglar 30 units nightly and Humalog 4 to 15 units with meals.  Patient has an induction scheduled on 2022.  Insulin should be discontinued the evening of the induction.  Once labor/induction has started or baby is delivered (by ), BG monitoring routinely is no longer needed.  Insulin can be discontinued.  If checking glucose levels and she has a fasting >126 or anytime >200, call the office.   She should continue to avoid excess carbs or concentrated sweets/beverages.   Discussed the increased long-term risk to develop type 2 diabetes and potential gestational diabetes again with future pregnancies.  Advised that she lose all weight gained during pregnancy plus additional if needed.  For potential future pregnancies, start the diet and monitoring advised during gestational diabetes at the first sign of pregnancy.   Follow-up in 2 to 3 months to screen for possible persistent BG issues.  -     POC Glucose, Blood         Return in about 3 months (around 4/10/2022) for next scheduled follow up. The patient was instructed to contact the clinic with any interval questions or concerns.    Angelique Danielson, DO   Endocrinologist    Please note that portions of this note were completed with a voice recognition program.

## 2022-01-12 LAB — EXTERNAL GROUP B STREP ANTIGEN: POSITIVE

## 2022-01-21 ENCOUNTER — APPOINTMENT (OUTPATIENT)
Dept: PREADMISSION TESTING | Facility: HOSPITAL | Age: 37
End: 2022-01-21

## 2022-01-21 LAB — SARS-COV-2 RNA PNL SPEC NAA+PROBE: NOT DETECTED

## 2022-01-21 PROCEDURE — U0004 COV-19 TEST NON-CDC HGH THRU: HCPCS | Performed by: OBSTETRICS & GYNECOLOGY

## 2022-01-24 ENCOUNTER — HOSPITAL ENCOUNTER (OUTPATIENT)
Dept: LABOR AND DELIVERY | Facility: HOSPITAL | Age: 37
Discharge: HOME | End: 2022-01-24

## 2022-01-24 ENCOUNTER — HOSPITAL ENCOUNTER (INPATIENT)
Facility: HOSPITAL | Age: 37
LOS: 3 days | Discharge: HOME | End: 2022-01-27
Attending: OBSTETRICS & GYNECOLOGY | Admitting: OBSTETRICS & GYNECOLOGY

## 2022-01-24 PROBLEM — Z34.90 TERM PREGNANCY: Status: ACTIVE | Noted: 2022-01-24

## 2022-01-24 LAB
ABO GROUP BLD: NORMAL
ALP SERPL-CCNC: 164 U/L (ref 39–117)
ALT SERPL W P-5'-P-CCNC: 11 U/L (ref 1–33)
AST SERPL-CCNC: 16 U/L (ref 1–32)
BILIRUB SERPL-MCNC: 0.2 MG/DL (ref 0–1.2)
BLD GP AB SCN SERPL QL: NEGATIVE
CREAT SERPL-MCNC: 0.73 MG/DL (ref 0.57–1)
DEPRECATED RDW RBC AUTO: 45.2 FL (ref 37–54)
ERYTHROCYTE [DISTWIDTH] IN BLOOD BY AUTOMATED COUNT: 14.6 % (ref 12.3–15.4)
GLUCOSE BLDC GLUCOMTR-MCNC: 99 MG/DL (ref 70–130)
HCT VFR BLD AUTO: 33.2 % (ref 34–46.6)
HGB BLD-MCNC: 10.8 G/DL (ref 12–15.9)
LDH SERPL-CCNC: 291 U/L (ref 135–214)
MCH RBC QN AUTO: 27.8 PG (ref 26.6–33)
MCHC RBC AUTO-ENTMCNC: 32.5 G/DL (ref 31.5–35.7)
MCV RBC AUTO: 85.3 FL (ref 79–97)
PLATELET # BLD AUTO: 226 10*3/MM3 (ref 140–450)
PMV BLD AUTO: 9.4 FL (ref 6–12)
RBC # BLD AUTO: 3.89 10*6/MM3 (ref 3.77–5.28)
RH BLD: POSITIVE
T&S EXPIRATION DATE: NORMAL
URATE SERPL-MCNC: 5.5 MG/DL (ref 2.4–5.7)
WBC NRBC COR # BLD: 8.75 10*3/MM3 (ref 3.4–10.8)

## 2022-01-24 PROCEDURE — 84460 ALANINE AMINO (ALT) (SGPT): CPT | Performed by: OBSTETRICS & GYNECOLOGY

## 2022-01-24 PROCEDURE — 82247 BILIRUBIN TOTAL: CPT | Performed by: OBSTETRICS & GYNECOLOGY

## 2022-01-24 PROCEDURE — 86900 BLOOD TYPING SEROLOGIC ABO: CPT | Performed by: OBSTETRICS & GYNECOLOGY

## 2022-01-24 PROCEDURE — 83615 LACTATE (LD) (LDH) ENZYME: CPT | Performed by: OBSTETRICS & GYNECOLOGY

## 2022-01-24 PROCEDURE — 84450 TRANSFERASE (AST) (SGOT): CPT | Performed by: OBSTETRICS & GYNECOLOGY

## 2022-01-24 PROCEDURE — 85027 COMPLETE CBC AUTOMATED: CPT | Performed by: OBSTETRICS & GYNECOLOGY

## 2022-01-24 PROCEDURE — 25010000002 PENICILLIN G POTASSIUM PER 600000 UNITS: Performed by: OBSTETRICS & GYNECOLOGY

## 2022-01-24 PROCEDURE — 84550 ASSAY OF BLOOD/URIC ACID: CPT | Performed by: OBSTETRICS & GYNECOLOGY

## 2022-01-24 PROCEDURE — 82565 ASSAY OF CREATININE: CPT | Performed by: OBSTETRICS & GYNECOLOGY

## 2022-01-24 PROCEDURE — 82962 GLUCOSE BLOOD TEST: CPT

## 2022-01-24 PROCEDURE — 86901 BLOOD TYPING SEROLOGIC RH(D): CPT | Performed by: OBSTETRICS & GYNECOLOGY

## 2022-01-24 PROCEDURE — 84075 ASSAY ALKALINE PHOSPHATASE: CPT | Performed by: OBSTETRICS & GYNECOLOGY

## 2022-01-24 PROCEDURE — 86850 RBC ANTIBODY SCREEN: CPT | Performed by: OBSTETRICS & GYNECOLOGY

## 2022-01-24 RX ORDER — FAMOTIDINE 20 MG/1
20 TABLET, FILM COATED ORAL EVERY 12 HOURS SCHEDULED
Status: DISCONTINUED | OUTPATIENT
Start: 2022-01-24 | End: 2022-01-26 | Stop reason: HOSPADM

## 2022-01-24 RX ORDER — SODIUM CHLORIDE, SODIUM LACTATE, POTASSIUM CHLORIDE, CALCIUM CHLORIDE 600; 310; 30; 20 MG/100ML; MG/100ML; MG/100ML; MG/100ML
999 INJECTION, SOLUTION INTRAVENOUS CONTINUOUS
Status: ACTIVE | OUTPATIENT
Start: 2022-01-24 | End: 2022-01-24

## 2022-01-24 RX ORDER — PENICILLIN G 3000000 [IU]/50ML
3 INJECTION, SOLUTION INTRAVENOUS EVERY 4 HOURS
Status: DISCONTINUED | OUTPATIENT
Start: 2022-01-25 | End: 2022-01-26 | Stop reason: HOSPADM

## 2022-01-24 RX ORDER — ONDANSETRON 2 MG/ML
4 INJECTION INTRAMUSCULAR; INTRAVENOUS EVERY 6 HOURS PRN
Status: DISCONTINUED | OUTPATIENT
Start: 2022-01-24 | End: 2022-01-26 | Stop reason: HOSPADM

## 2022-01-24 RX ORDER — BUTORPHANOL TARTRATE 1 MG/ML
1 INJECTION, SOLUTION INTRAMUSCULAR; INTRAVENOUS
Status: DISCONTINUED | OUTPATIENT
Start: 2022-01-24 | End: 2022-01-26 | Stop reason: HOSPADM

## 2022-01-24 RX ORDER — FAMOTIDINE 10 MG/ML
20 INJECTION, SOLUTION INTRAVENOUS EVERY 12 HOURS SCHEDULED
Status: DISCONTINUED | OUTPATIENT
Start: 2022-01-24 | End: 2022-01-26 | Stop reason: HOSPADM

## 2022-01-24 RX ORDER — PROMETHAZINE HYDROCHLORIDE 12.5 MG/1
12.5 SUPPOSITORY RECTAL EVERY 6 HOURS PRN
Status: DISCONTINUED | OUTPATIENT
Start: 2022-01-24 | End: 2022-01-26 | Stop reason: HOSPADM

## 2022-01-24 RX ORDER — MAGNESIUM CARB/ALUMINUM HYDROX 105-160MG
TABLET,CHEWABLE ORAL
Status: DISCONTINUED
Start: 2022-01-24 | End: 2022-01-27 | Stop reason: HOSPADM

## 2022-01-24 RX ORDER — OXYTOCIN/0.9 % SODIUM CHLORIDE 30/500 ML
2-20 PLASTIC BAG, INJECTION (ML) INTRAVENOUS
Status: DISCONTINUED | OUTPATIENT
Start: 2022-01-25 | End: 2022-01-27 | Stop reason: HOSPADM

## 2022-01-24 RX ORDER — TERBUTALINE SULFATE 1 MG/ML
0.25 INJECTION, SOLUTION SUBCUTANEOUS AS NEEDED
Status: DISCONTINUED | OUTPATIENT
Start: 2022-01-24 | End: 2022-01-26 | Stop reason: HOSPADM

## 2022-01-24 RX ORDER — TRISODIUM CITRATE DIHYDRATE AND CITRIC ACID MONOHYDRATE 500; 334 MG/5ML; MG/5ML
30 SOLUTION ORAL ONCE AS NEEDED
Status: DISCONTINUED | OUTPATIENT
Start: 2022-01-24 | End: 2022-01-26 | Stop reason: HOSPADM

## 2022-01-24 RX ORDER — DEXTROSE MONOHYDRATE 25 G/50ML
25-50 INJECTION, SOLUTION INTRAVENOUS
Status: DISCONTINUED | OUTPATIENT
Start: 2022-01-24 | End: 2022-01-27 | Stop reason: HOSPADM

## 2022-01-24 RX ORDER — PROMETHAZINE HYDROCHLORIDE 12.5 MG/1
12.5 TABLET ORAL EVERY 6 HOURS PRN
Status: DISCONTINUED | OUTPATIENT
Start: 2022-01-24 | End: 2022-01-26 | Stop reason: HOSPADM

## 2022-01-24 RX ORDER — ONDANSETRON 4 MG/1
4 TABLET, FILM COATED ORAL EVERY 6 HOURS PRN
Status: DISCONTINUED | OUTPATIENT
Start: 2022-01-24 | End: 2022-01-26 | Stop reason: HOSPADM

## 2022-01-24 RX ORDER — ERYTHROMYCIN 5 MG/G
OINTMENT OPHTHALMIC
Status: DISCONTINUED
Start: 2022-01-24 | End: 2022-01-27 | Stop reason: HOSPADM

## 2022-01-24 RX ORDER — EPHEDRINE SULFATE 5 MG/ML
INJECTION INTRAVENOUS
Status: DISCONTINUED
Start: 2022-01-24 | End: 2022-01-27 | Stop reason: HOSPADM

## 2022-01-24 RX ORDER — ACETAMINOPHEN 325 MG/1
650 TABLET ORAL EVERY 4 HOURS PRN
Status: DISCONTINUED | OUTPATIENT
Start: 2022-01-24 | End: 2022-01-26 | Stop reason: HOSPADM

## 2022-01-24 RX ORDER — SODIUM CHLORIDE, SODIUM LACTATE, POTASSIUM CHLORIDE, CALCIUM CHLORIDE 600; 310; 30; 20 MG/100ML; MG/100ML; MG/100ML; MG/100ML
125 INJECTION, SOLUTION INTRAVENOUS CONTINUOUS
Status: DISCONTINUED | OUTPATIENT
Start: 2022-01-24 | End: 2022-01-27 | Stop reason: HOSPADM

## 2022-01-24 RX ORDER — SODIUM CHLORIDE, SODIUM LACTATE, POTASSIUM CHLORIDE, CALCIUM CHLORIDE 600; 310; 30; 20 MG/100ML; MG/100ML; MG/100ML; MG/100ML
999 INJECTION, SOLUTION INTRAVENOUS CONTINUOUS
Status: DISCONTINUED | OUTPATIENT
Start: 2022-01-24 | End: 2022-01-24

## 2022-01-24 RX ADMIN — SODIUM CHLORIDE 5 MILLION UNITS: 900 INJECTION INTRAVENOUS at 22:58

## 2022-01-24 RX ADMIN — SODIUM CHLORIDE, POTASSIUM CHLORIDE, SODIUM LACTATE AND CALCIUM CHLORIDE 125 ML/HR: 600; 310; 30; 20 INJECTION, SOLUTION INTRAVENOUS at 22:59

## 2022-01-24 RX ADMIN — SODIUM CHLORIDE, POTASSIUM CHLORIDE, SODIUM LACTATE AND CALCIUM CHLORIDE 1000 ML: 600; 310; 30; 20 INJECTION, SOLUTION INTRAVENOUS at 22:21

## 2022-01-24 RX ADMIN — FAMOTIDINE 20 MG: 20 TABLET, FILM COATED ORAL at 23:03

## 2022-01-25 ENCOUNTER — ANESTHESIA (OUTPATIENT)
Dept: LABOR AND DELIVERY | Facility: HOSPITAL | Age: 37
End: 2022-01-25

## 2022-01-25 ENCOUNTER — ANESTHESIA EVENT (OUTPATIENT)
Dept: LABOR AND DELIVERY | Facility: HOSPITAL | Age: 37
End: 2022-01-25

## 2022-01-25 LAB
ALP SERPL-CCNC: 141 U/L (ref 39–117)
ALT SERPL W P-5'-P-CCNC: 10 U/L (ref 1–33)
AST SERPL-CCNC: 19 U/L (ref 1–32)
BASOPHILS # BLD AUTO: 0.03 10*3/MM3 (ref 0–0.2)
BASOPHILS NFR BLD AUTO: 0.2 % (ref 0–1.5)
BILIRUB SERPL-MCNC: 0.2 MG/DL (ref 0–1.2)
CREAT SERPL-MCNC: 0.88 MG/DL (ref 0.57–1)
DEPRECATED RDW RBC AUTO: 43.8 FL (ref 37–54)
EOSINOPHIL # BLD AUTO: 0.14 10*3/MM3 (ref 0–0.4)
EOSINOPHIL NFR BLD AUTO: 0.9 % (ref 0.3–6.2)
ERYTHROCYTE [DISTWIDTH] IN BLOOD BY AUTOMATED COUNT: 14.3 % (ref 12.3–15.4)
GLUCOSE BLDC GLUCOMTR-MCNC: 158 MG/DL (ref 70–130)
GLUCOSE BLDC GLUCOMTR-MCNC: 71 MG/DL (ref 70–130)
GLUCOSE BLDC GLUCOMTR-MCNC: 73 MG/DL (ref 70–130)
GLUCOSE BLDC GLUCOMTR-MCNC: 74 MG/DL (ref 70–130)
GLUCOSE BLDC GLUCOMTR-MCNC: 76 MG/DL (ref 70–130)
GLUCOSE BLDC GLUCOMTR-MCNC: 77 MG/DL (ref 70–130)
GLUCOSE BLDC GLUCOMTR-MCNC: 78 MG/DL (ref 70–130)
GLUCOSE BLDC GLUCOMTR-MCNC: 78 MG/DL (ref 70–130)
GLUCOSE BLDC GLUCOMTR-MCNC: 79 MG/DL (ref 70–130)
GLUCOSE BLDC GLUCOMTR-MCNC: 80 MG/DL (ref 70–130)
HCT VFR BLD AUTO: 32 % (ref 34–46.6)
HGB BLD-MCNC: 10.6 G/DL (ref 12–15.9)
IMM GRANULOCYTES # BLD AUTO: 0.1 10*3/MM3 (ref 0–0.05)
IMM GRANULOCYTES NFR BLD AUTO: 0.7 % (ref 0–0.5)
LDH SERPL-CCNC: 281 U/L (ref 135–214)
LYMPHOCYTES # BLD AUTO: 1.27 10*3/MM3 (ref 0.7–3.1)
LYMPHOCYTES NFR BLD AUTO: 8.5 % (ref 19.6–45.3)
MCH RBC QN AUTO: 28 PG (ref 26.6–33)
MCHC RBC AUTO-ENTMCNC: 33.1 G/DL (ref 31.5–35.7)
MCV RBC AUTO: 84.7 FL (ref 79–97)
MONOCYTES # BLD AUTO: 0.64 10*3/MM3 (ref 0.1–0.9)
MONOCYTES NFR BLD AUTO: 4.3 % (ref 5–12)
NEUTROPHILS NFR BLD AUTO: 12.72 10*3/MM3 (ref 1.7–7)
NEUTROPHILS NFR BLD AUTO: 85.4 % (ref 42.7–76)
NRBC BLD AUTO-RTO: 0 /100 WBC (ref 0–0.2)
PLATELET # BLD AUTO: 217 10*3/MM3 (ref 140–450)
PMV BLD AUTO: 9.5 FL (ref 6–12)
RBC # BLD AUTO: 3.78 10*6/MM3 (ref 3.77–5.28)
URATE SERPL-MCNC: 4.9 MG/DL (ref 2.4–5.7)
WBC NRBC COR # BLD: 14.9 10*3/MM3 (ref 3.4–10.8)

## 2022-01-25 PROCEDURE — 83615 LACTATE (LD) (LDH) ENZYME: CPT | Performed by: OBSTETRICS & GYNECOLOGY

## 2022-01-25 PROCEDURE — 25010000002 ONDANSETRON PER 1 MG: Performed by: ANESTHESIOLOGY

## 2022-01-25 PROCEDURE — 25010000002 ONDANSETRON PER 1 MG: Performed by: OBSTETRICS & GYNECOLOGY

## 2022-01-25 PROCEDURE — 84460 ALANINE AMINO (ALT) (SGPT): CPT | Performed by: OBSTETRICS & GYNECOLOGY

## 2022-01-25 PROCEDURE — 82247 BILIRUBIN TOTAL: CPT | Performed by: OBSTETRICS & GYNECOLOGY

## 2022-01-25 PROCEDURE — 84450 TRANSFERASE (AST) (SGOT): CPT | Performed by: OBSTETRICS & GYNECOLOGY

## 2022-01-25 PROCEDURE — 25010000002 ROPIVACAINE PER 1 MG: Performed by: ANESTHESIOLOGY

## 2022-01-25 PROCEDURE — 25010000002 ROPIVACAINE PER 1 MG: Performed by: NURSE ANESTHETIST, CERTIFIED REGISTERED

## 2022-01-25 PROCEDURE — C1755 CATHETER, INTRASPINAL: HCPCS | Performed by: ANESTHESIOLOGY

## 2022-01-25 PROCEDURE — 25010000002 BUTORPHANOL PER 1 MG: Performed by: OBSTETRICS & GYNECOLOGY

## 2022-01-25 PROCEDURE — 84550 ASSAY OF BLOOD/URIC ACID: CPT | Performed by: OBSTETRICS & GYNECOLOGY

## 2022-01-25 PROCEDURE — 25010000002 PENICILLIN G POTASSIUM PER 600000 UNITS: Performed by: OBSTETRICS & GYNECOLOGY

## 2022-01-25 PROCEDURE — 82565 ASSAY OF CREATININE: CPT | Performed by: OBSTETRICS & GYNECOLOGY

## 2022-01-25 PROCEDURE — 4A1HX4Z MONITORING OF PRODUCTS OF CONCEPTION, CARDIAC ELECTRICAL ACTIVITY, EXTERNAL APPROACH: ICD-10-PCS | Performed by: OBSTETRICS & GYNECOLOGY

## 2022-01-25 PROCEDURE — 85025 COMPLETE CBC W/AUTO DIFF WBC: CPT | Performed by: OBSTETRICS & GYNECOLOGY

## 2022-01-25 PROCEDURE — 25010000002 METHYLERGONOVINE MALEATE PER 0.2 MG

## 2022-01-25 PROCEDURE — 59025 FETAL NON-STRESS TEST: CPT

## 2022-01-25 PROCEDURE — 84075 ASSAY ALKALINE PHOSPHATASE: CPT | Performed by: OBSTETRICS & GYNECOLOGY

## 2022-01-25 PROCEDURE — 10907ZC DRAINAGE OF AMNIOTIC FLUID, THERAPEUTIC FROM PRODUCTS OF CONCEPTION, VIA NATURAL OR ARTIFICIAL OPENING: ICD-10-PCS | Performed by: OBSTETRICS & GYNECOLOGY

## 2022-01-25 PROCEDURE — 82962 GLUCOSE BLOOD TEST: CPT

## 2022-01-25 PROCEDURE — 3E033VJ INTRODUCTION OF OTHER HORMONE INTO PERIPHERAL VEIN, PERCUTANEOUS APPROACH: ICD-10-PCS | Performed by: OBSTETRICS & GYNECOLOGY

## 2022-01-25 PROCEDURE — C1755 CATHETER, INTRASPINAL: HCPCS

## 2022-01-25 PROCEDURE — 51702 INSERT TEMP BLADDER CATH: CPT

## 2022-01-25 RX ORDER — METHYLERGONOVINE MALEATE 0.2 MG/ML
200 INJECTION INTRAVENOUS ONCE AS NEEDED
Status: COMPLETED | OUTPATIENT
Start: 2022-01-25 | End: 2022-01-25

## 2022-01-25 RX ORDER — ONDANSETRON 2 MG/ML
4 INJECTION INTRAMUSCULAR; INTRAVENOUS ONCE AS NEEDED
Status: COMPLETED | OUTPATIENT
Start: 2022-01-25 | End: 2022-01-25

## 2022-01-25 RX ORDER — FAMOTIDINE 10 MG/ML
20 INJECTION, SOLUTION INTRAVENOUS ONCE AS NEEDED
Status: DISCONTINUED | OUTPATIENT
Start: 2022-01-25 | End: 2022-01-26 | Stop reason: HOSPADM

## 2022-01-25 RX ORDER — MISOPROSTOL 200 UG/1
800 TABLET ORAL AS NEEDED
Status: DISCONTINUED | OUTPATIENT
Start: 2022-01-25 | End: 2022-01-26 | Stop reason: HOSPADM

## 2022-01-25 RX ORDER — OXYTOCIN/0.9 % SODIUM CHLORIDE 30/500 ML
85 PLASTIC BAG, INJECTION (ML) INTRAVENOUS ONCE
Status: COMPLETED | OUTPATIENT
Start: 2022-01-25 | End: 2022-01-25

## 2022-01-25 RX ORDER — EPHEDRINE SULFATE 5 MG/ML
10 INJECTION INTRAVENOUS
Status: DISCONTINUED | OUTPATIENT
Start: 2022-01-25 | End: 2022-01-26 | Stop reason: HOSPADM

## 2022-01-25 RX ORDER — TRISODIUM CITRATE DIHYDRATE AND CITRIC ACID MONOHYDRATE 500; 334 MG/5ML; MG/5ML
30 SOLUTION ORAL ONCE
Status: DISCONTINUED | OUTPATIENT
Start: 2022-01-25 | End: 2022-01-26 | Stop reason: HOSPADM

## 2022-01-25 RX ORDER — ROPIVACAINE HYDROCHLORIDE 5 MG/ML
INJECTION, SOLUTION EPIDURAL; INFILTRATION; PERINEURAL AS NEEDED
Status: DISCONTINUED | OUTPATIENT
Start: 2022-01-25 | End: 2022-01-25 | Stop reason: SURG

## 2022-01-25 RX ORDER — OXYTOCIN/0.9 % SODIUM CHLORIDE 30/500 ML
650 PLASTIC BAG, INJECTION (ML) INTRAVENOUS ONCE
Status: DISCONTINUED | OUTPATIENT
Start: 2022-01-25 | End: 2022-01-26 | Stop reason: HOSPADM

## 2022-01-25 RX ORDER — IBUPROFEN 600 MG/1
600 TABLET ORAL EVERY 6 HOURS PRN
Status: DISCONTINUED | OUTPATIENT
Start: 2022-01-25 | End: 2022-01-26 | Stop reason: SDUPTHER

## 2022-01-25 RX ORDER — ROPIVACAINE HYDROCHLORIDE 2 MG/ML
15 INJECTION, SOLUTION EPIDURAL; INFILTRATION; PERINEURAL CONTINUOUS
Status: DISCONTINUED | OUTPATIENT
Start: 2022-01-25 | End: 2022-01-27 | Stop reason: HOSPADM

## 2022-01-25 RX ORDER — DIPHENHYDRAMINE HYDROCHLORIDE 50 MG/ML
12.5 INJECTION INTRAMUSCULAR; INTRAVENOUS EVERY 8 HOURS PRN
Status: DISCONTINUED | OUTPATIENT
Start: 2022-01-25 | End: 2022-01-26 | Stop reason: HOSPADM

## 2022-01-25 RX ORDER — METOCLOPRAMIDE HYDROCHLORIDE 5 MG/ML
10 INJECTION INTRAMUSCULAR; INTRAVENOUS ONCE AS NEEDED
Status: DISCONTINUED | OUTPATIENT
Start: 2022-01-25 | End: 2022-01-26 | Stop reason: HOSPADM

## 2022-01-25 RX ORDER — METHYLERGONOVINE MALEATE 0.2 MG/ML
INJECTION INTRAVENOUS
Status: COMPLETED
Start: 2022-01-25 | End: 2022-01-25

## 2022-01-25 RX ORDER — CARBOPROST TROMETHAMINE 250 UG/ML
250 INJECTION, SOLUTION INTRAMUSCULAR AS NEEDED
Status: DISCONTINUED | OUTPATIENT
Start: 2022-01-25 | End: 2022-01-26 | Stop reason: HOSPADM

## 2022-01-25 RX ORDER — LIDOCAINE HYDROCHLORIDE AND EPINEPHRINE 15; 5 MG/ML; UG/ML
INJECTION, SOLUTION EPIDURAL AS NEEDED
Status: DISCONTINUED | OUTPATIENT
Start: 2022-01-25 | End: 2022-01-25 | Stop reason: SURG

## 2022-01-25 RX ADMIN — METHYLERGONOVINE MALEATE 200 MCG: 0.2 INJECTION INTRAVENOUS at 21:50

## 2022-01-25 RX ADMIN — ROPIVACAINE HYDROCHLORIDE 5 ML: 5 INJECTION, SOLUTION EPIDURAL; INFILTRATION; PERINEURAL at 13:31

## 2022-01-25 RX ADMIN — PENICILLIN G 3 MILLION UNITS: 3000000 INJECTION, SOLUTION INTRAVENOUS at 04:00

## 2022-01-25 RX ADMIN — ROPIVACAINE HYDROCHLORIDE 15 ML/HR: 2 INJECTION, SOLUTION EPIDURAL; INFILTRATION at 14:40

## 2022-01-25 RX ADMIN — IBUPROFEN 600 MG: 600 TABLET ORAL at 21:50

## 2022-01-25 RX ADMIN — PENICILLIN G 3 MILLION UNITS: 3000000 INJECTION, SOLUTION INTRAVENOUS at 18:59

## 2022-01-25 RX ADMIN — Medication 8 MILLI-UNITS/MIN: at 06:48

## 2022-01-25 RX ADMIN — SODIUM CHLORIDE, POTASSIUM CHLORIDE, SODIUM LACTATE AND CALCIUM CHLORIDE 125 ML/HR: 600; 310; 30; 20 INJECTION, SOLUTION INTRAVENOUS at 08:05

## 2022-01-25 RX ADMIN — ROPIVACAINE HYDROCHLORIDE 6 ML: 5 INJECTION, SOLUTION EPIDURAL; INFILTRATION; PERINEURAL at 09:19

## 2022-01-25 RX ADMIN — PENICILLIN G 3 MILLION UNITS: 3000000 INJECTION, SOLUTION INTRAVENOUS at 11:39

## 2022-01-25 RX ADMIN — PENICILLIN G 3 MILLION UNITS: 3000000 INJECTION, SOLUTION INTRAVENOUS at 08:06

## 2022-01-25 RX ADMIN — ACETAMINOPHEN 650 MG: 325 TABLET, FILM COATED ORAL at 09:46

## 2022-01-25 RX ADMIN — Medication 85 ML/HR: at 21:50

## 2022-01-25 RX ADMIN — ONDANSETRON 4 MG: 2 INJECTION INTRAMUSCULAR; INTRAVENOUS at 18:00

## 2022-01-25 RX ADMIN — BUTORPHANOL TARTRATE 1 MG: 1 INJECTION, SOLUTION INTRAMUSCULAR; INTRAVENOUS at 16:26

## 2022-01-25 RX ADMIN — ONDANSETRON 4 MG: 2 INJECTION INTRAMUSCULAR; INTRAVENOUS at 11:42

## 2022-01-25 RX ADMIN — METHYLERGONOVINE MALEATE 200 MCG: 0.2 INJECTION, SOLUTION INTRAMUSCULAR; INTRAVENOUS at 21:50

## 2022-01-25 RX ADMIN — FAMOTIDINE 20 MG: 20 TABLET, FILM COATED ORAL at 21:50

## 2022-01-25 RX ADMIN — ONDANSETRON HYDROCHLORIDE 4 MG: 2 SOLUTION INTRAMUSCULAR; INTRAVENOUS at 22:40

## 2022-01-25 RX ADMIN — Medication 2 MILLI-UNITS/MIN: at 04:30

## 2022-01-25 RX ADMIN — Medication 6 MILLI-UNITS/MIN: at 05:55

## 2022-01-25 RX ADMIN — PENICILLIN G 3 MILLION UNITS: 3000000 INJECTION, SOLUTION INTRAVENOUS at 15:11

## 2022-01-25 RX ADMIN — ACETAMINOPHEN 650 MG: 325 TABLET, FILM COATED ORAL at 14:15

## 2022-01-25 RX ADMIN — ROPIVACAINE HYDROCHLORIDE 15 ML/HR: 2 INJECTION, SOLUTION EPIDURAL; INFILTRATION at 09:24

## 2022-01-25 RX ADMIN — Medication 4 MILLI-UNITS/MIN: at 05:15

## 2022-01-25 RX ADMIN — SODIUM CHLORIDE, POTASSIUM CHLORIDE, SODIUM LACTATE AND CALCIUM CHLORIDE 125 ML/HR: 600; 310; 30; 20 INJECTION, SOLUTION INTRAVENOUS at 14:49

## 2022-01-25 RX ADMIN — LIDOCAINE HYDROCHLORIDE AND EPINEPHRINE 3 ML: 15; 5 INJECTION, SOLUTION EPIDURAL at 09:13

## 2022-01-25 RX ADMIN — ACETAMINOPHEN 650 MG: 325 TABLET, FILM COATED ORAL at 19:21

## 2022-01-25 NOTE — ANESTHESIA PREPROCEDURE EVALUATION
Anesthesia Evaluation     Patient summary reviewed and Nursing notes reviewed   history of anesthetic complications: PONV  NPO Solid Status: > 6 hours             Airway   Mallampati: II  TM distance: >3 FB  Neck ROM: full  No difficulty expected  Dental      Pulmonary - negative pulmonary ROS   Cardiovascular - negative cardio ROS        Neuro/Psych- negative ROS  GI/Hepatic/Renal/Endo    (+) obesity, morbid obesity, GERD poorly controlled,  diabetes mellitus gestational well controlled,     Musculoskeletal (-) negative ROS    Abdominal    Substance History - negative use     OB/GYN    (+) Pregnant,         Other - negative ROS                     Anesthesia Plan    ASA 3     epidural       Anesthetic plan, all risks, benefits, and alternatives have been provided, discussed and informed consent has been obtained with: patient.        CODE STATUS:    Level Of Support Discussed With: Patient  Code Status (Patient has no pulse and is not breathing): CPR (Attempt to Resuscitate)  Medical Interventions (Patient has pulse or is breathing): Full Support

## 2022-01-25 NOTE — ANESTHESIA PROCEDURE NOTES
Labor Epidural      Patient reassessed immediately prior to procedure    Patient location during procedure: OB  Performed By  Anesthesiologist: Isha Anderson DO  Preanesthetic Checklist  Completed: patient identified, IV checked, risks and benefits discussed, surgical consent, monitors and equipment checked, pre-op evaluation and timeout performed  Additional Notes  CSE performed using 25g Vernon  Prep:  Pt Position:sitting  Sterile Tech:cap, gloves, mask and sterile barrier  Prep:DuraPrep  Monitoring:blood pressure monitoring  Epidural Block Procedure:  Approach:midline  Guidance:palpation technique  Location:L3-L4  Needle Type:Tuohy  Needle Gauge:17 G  Loss of Resistance Medium: air  Loss of Resistance: 6cm  Cath Depth at skin:13 cm  Paresthesia: none  Aspiration:negative  Test Dose:negative  Number of Attempts: 1  Post Assessment:  Dressing:occlusive dressing applied and secured with tape  Pt Tolerance:patient tolerated the procedure well with no apparent complications  Complications:no

## 2022-01-26 LAB
BASOPHILS # BLD AUTO: 0.02 10*3/MM3 (ref 0–0.2)
BASOPHILS NFR BLD AUTO: 0.2 % (ref 0–1.5)
DEPRECATED RDW RBC AUTO: 45.8 FL (ref 37–54)
EOSINOPHIL # BLD AUTO: 0.12 10*3/MM3 (ref 0–0.4)
EOSINOPHIL NFR BLD AUTO: 1.1 % (ref 0.3–6.2)
ERYTHROCYTE [DISTWIDTH] IN BLOOD BY AUTOMATED COUNT: 14.2 % (ref 12.3–15.4)
GLUCOSE BLDC GLUCOMTR-MCNC: 100 MG/DL (ref 70–130)
GLUCOSE BLDC GLUCOMTR-MCNC: 125 MG/DL (ref 70–130)
GLUCOSE BLDC GLUCOMTR-MCNC: 153 MG/DL (ref 70–130)
GLUCOSE BLDC GLUCOMTR-MCNC: 88 MG/DL (ref 70–130)
HCT VFR BLD AUTO: 30.6 % (ref 34–46.6)
HGB BLD-MCNC: 9.6 G/DL (ref 12–15.9)
IMM GRANULOCYTES # BLD AUTO: 0.07 10*3/MM3 (ref 0–0.05)
IMM GRANULOCYTES NFR BLD AUTO: 0.6 % (ref 0–0.5)
LYMPHOCYTES # BLD AUTO: 1.89 10*3/MM3 (ref 0.7–3.1)
LYMPHOCYTES NFR BLD AUTO: 16.7 % (ref 19.6–45.3)
MCH RBC QN AUTO: 27.7 PG (ref 26.6–33)
MCHC RBC AUTO-ENTMCNC: 31.4 G/DL (ref 31.5–35.7)
MCV RBC AUTO: 88.2 FL (ref 79–97)
MONOCYTES # BLD AUTO: 0.6 10*3/MM3 (ref 0.1–0.9)
MONOCYTES NFR BLD AUTO: 5.3 % (ref 5–12)
NEUTROPHILS NFR BLD AUTO: 76.1 % (ref 42.7–76)
NEUTROPHILS NFR BLD AUTO: 8.6 10*3/MM3 (ref 1.7–7)
NRBC BLD AUTO-RTO: 0 /100 WBC (ref 0–0.2)
PLATELET # BLD AUTO: 208 10*3/MM3 (ref 140–450)
PMV BLD AUTO: 9.7 FL (ref 6–12)
RBC # BLD AUTO: 3.47 10*6/MM3 (ref 3.77–5.28)
WBC NRBC COR # BLD: 11.3 10*3/MM3 (ref 3.4–10.8)

## 2022-01-26 PROCEDURE — 82962 GLUCOSE BLOOD TEST: CPT

## 2022-01-26 PROCEDURE — 85025 COMPLETE CBC W/AUTO DIFF WBC: CPT | Performed by: OBSTETRICS & GYNECOLOGY

## 2022-01-26 RX ORDER — HYDROCORTISONE 25 MG/G
1 CREAM TOPICAL AS NEEDED
Status: DISCONTINUED | OUTPATIENT
Start: 2022-01-26 | End: 2022-01-27 | Stop reason: HOSPADM

## 2022-01-26 RX ORDER — BISACODYL 10 MG
10 SUPPOSITORY, RECTAL RECTAL DAILY PRN
Status: DISCONTINUED | OUTPATIENT
Start: 2022-01-26 | End: 2022-01-27 | Stop reason: HOSPADM

## 2022-01-26 RX ORDER — SODIUM CHLORIDE 0.9 % (FLUSH) 0.9 %
1-10 SYRINGE (ML) INJECTION AS NEEDED
Status: DISCONTINUED | OUTPATIENT
Start: 2022-01-26 | End: 2022-01-27 | Stop reason: HOSPADM

## 2022-01-26 RX ORDER — ACETAMINOPHEN 325 MG/1
650 TABLET ORAL EVERY 4 HOURS PRN
Status: DISCONTINUED | OUTPATIENT
Start: 2022-01-26 | End: 2022-01-27 | Stop reason: HOSPADM

## 2022-01-26 RX ORDER — DOCUSATE SODIUM 100 MG/1
100 CAPSULE, LIQUID FILLED ORAL 2 TIMES DAILY
Status: DISCONTINUED | OUTPATIENT
Start: 2022-01-26 | End: 2022-01-27 | Stop reason: HOSPADM

## 2022-01-26 RX ORDER — IBUPROFEN 600 MG/1
600 TABLET ORAL EVERY 6 HOURS PRN
Status: DISCONTINUED | OUTPATIENT
Start: 2022-01-26 | End: 2022-01-27 | Stop reason: HOSPADM

## 2022-01-26 RX ORDER — HYDROCORTISONE ACETATE PRAMOXINE HCL 2.5; 1 G/100G; G/100G
CREAM TOPICAL 2 TIMES DAILY
Status: DISCONTINUED | OUTPATIENT
Start: 2022-01-26 | End: 2022-01-27 | Stop reason: HOSPADM

## 2022-01-26 RX ADMIN — ACETAMINOPHEN 650 MG: 325 TABLET, FILM COATED ORAL at 16:49

## 2022-01-26 RX ADMIN — ACETAMINOPHEN 650 MG: 325 TABLET, FILM COATED ORAL at 10:59

## 2022-01-26 RX ADMIN — HYDROCORTISONE ACETATE PRAMOXINE HCL: 2.5; 1 CREAM TOPICAL at 08:09

## 2022-01-26 RX ADMIN — HYDROCORTISONE ACETATE PRAMOXINE HCL: 2.5; 1 CREAM TOPICAL at 20:19

## 2022-01-26 RX ADMIN — DOCUSATE SODIUM 100 MG: 100 CAPSULE, LIQUID FILLED ORAL at 20:17

## 2022-01-26 RX ADMIN — ACETAMINOPHEN 650 MG: 325 TABLET, FILM COATED ORAL at 20:16

## 2022-01-26 RX ADMIN — WITCH HAZEL 1 PAD: 500 SOLUTION RECTAL; TOPICAL at 08:08

## 2022-01-26 RX ADMIN — IBUPROFEN 600 MG: 600 TABLET ORAL at 18:42

## 2022-01-26 RX ADMIN — IBUPROFEN 600 MG: 600 TABLET ORAL at 13:08

## 2022-01-26 RX ADMIN — DOCUSATE SODIUM 100 MG: 100 CAPSULE, LIQUID FILLED ORAL at 08:08

## 2022-01-26 RX ADMIN — IBUPROFEN 600 MG: 600 TABLET ORAL at 05:58

## 2022-01-26 RX ADMIN — ACETAMINOPHEN 650 MG: 325 TABLET, FILM COATED ORAL at 02:49

## 2022-01-26 NOTE — ANESTHESIA POSTPROCEDURE EVALUATION
"Patient: Kelly Astorga    Procedure Summary     Date: 01/25/22 Room / Location:     Anesthesia Start: 0902 Anesthesia Stop: 2017    Procedure: LABOR ANALGESIA Diagnosis:     Scheduled Providers:  Provider: Isha Anderson DO    Anesthesia Type: epidural ASA Status: 3          Anesthesia Type: epidural    Vitals  Vitals Value Taken Time   /58 01/26/22 0700   Temp 98.6 °F (37 °C) 01/26/22 0700   Pulse 73 01/26/22 0700   Resp 16 01/26/22 0700   SpO2 97 % 01/25/22 2312           Post Anesthesia Care and Evaluation    Patient location during evaluation: bedside  Patient participation: complete - patient participated  Level of consciousness: awake and alert  Pain score: 4  Pain management: adequate  Airway patency: patent  Anesthetic complications: No anesthetic complications    Cardiovascular status: acceptable  Respiratory status: acceptable  Hydration status: acceptable  Post Neuraxial Block status: Motor and sensory function returned to baseline  Comments: Patient complains of H/A, rated \"4\" on 1-10 scale. It does not increase in inensity with any postural change or movement. No diplopia, no stiff neck or ringing in ears. Patient sitting in bed during evaluation. Suggested a stronger pain med (she is taking ibuprofen and tylenol) but she prefers not to take any stronger analgesics for fear of associated nausea. She has had h/o occasional migraines but they typically last a few hours verses the continuous H/A that she is experiencing now. H/A has improved from yesterday, and in the absence of any other classical signs of a PLPH we discussed conservative therapy vs EBP. She prefers conservative tx (continue her nsaids/tylenol, caffeinated hydration) to EBP. Plan to evaluate tomorrow for signs of improvement.       "

## 2022-01-27 VITALS
HEART RATE: 60 BPM | BODY MASS INDEX: 39.49 KG/M2 | WEIGHT: 237 LBS | OXYGEN SATURATION: 97 % | SYSTOLIC BLOOD PRESSURE: 119 MMHG | HEIGHT: 65 IN | RESPIRATION RATE: 18 BRPM | TEMPERATURE: 98.2 F | DIASTOLIC BLOOD PRESSURE: 57 MMHG

## 2022-01-27 PROBLEM — Z34.90 TERM PREGNANCY: Status: RESOLVED | Noted: 2022-01-24 | Resolved: 2022-01-27

## 2022-01-27 PROBLEM — O46.92 VAGINAL BLEEDING IN PREGNANCY, SECOND TRIMESTER: Status: RESOLVED | Noted: 2021-08-26 | Resolved: 2022-01-27

## 2022-01-27 PROBLEM — Z00.00 WELL ADULT EXAM: Status: RESOLVED | Noted: 2019-05-16 | Resolved: 2022-01-27

## 2022-01-27 LAB — GLUCOSE BLDC GLUCOMTR-MCNC: 79 MG/DL (ref 70–130)

## 2022-01-27 PROCEDURE — 82962 GLUCOSE BLOOD TEST: CPT

## 2022-01-27 RX ORDER — IBUPROFEN 600 MG/1
600 TABLET ORAL EVERY 6 HOURS PRN
Qty: 60 TABLET | Refills: 1 | Status: SHIPPED | OUTPATIENT
Start: 2022-01-27 | End: 2022-03-07

## 2022-01-27 RX ORDER — FERROUS SULFATE 325(65) MG
325 TABLET ORAL 2 TIMES DAILY WITH MEALS
Qty: 60 TABLET | Refills: 1 | Status: SHIPPED | OUTPATIENT
Start: 2022-01-27 | End: 2022-03-07

## 2022-01-27 RX ORDER — PSEUDOEPHEDRINE HCL 30 MG
100 TABLET ORAL 2 TIMES DAILY PRN
Qty: 60 CAPSULE | Refills: 1 | Status: SHIPPED | OUTPATIENT
Start: 2022-01-27 | End: 2022-03-07

## 2022-01-27 RX ADMIN — ACETAMINOPHEN 650 MG: 325 TABLET, FILM COATED ORAL at 07:45

## 2022-01-27 RX ADMIN — DOCUSATE SODIUM 100 MG: 100 CAPSULE, LIQUID FILLED ORAL at 09:07

## 2022-01-27 RX ADMIN — IBUPROFEN 600 MG: 600 TABLET ORAL at 06:03

## 2022-01-27 NOTE — ANESTHESIA POSTPROCEDURE EVALUATION
Patient: Kelly Astorga    Procedure Summary     Date: 01/25/22 Room / Location:     Anesthesia Start: 0902 Anesthesia Stop: 2017    Procedure: LABOR ANALGESIA Diagnosis:     Scheduled Providers:  Provider: Isha Anderson DO    Anesthesia Type: epidural ASA Status: 3          Anesthesia Type: epidural    Vitals  Vitals Value Taken Time   /57 01/27/22 0815   Temp 98.2 °F (36.8 °C) 01/27/22 0730   Pulse 60 01/27/22 0730   Resp 18 01/27/22 0730   SpO2 97 % 01/25/22 2312           Post Anesthesia Care and Evaluation    Patient location during evaluation: bedside  Patient participation: complete - patient participated  Level of consciousness: awake and alert  Pain management: adequate  Airway patency: patent  Anesthetic complications: No anesthetic complications    Cardiovascular status: acceptable  Respiratory status: acceptable  Hydration status: acceptable  Post Neuraxial Block status: Motor and sensory function returned to baseline and No signs or symptoms of PDPH  Comments: Patient denies headache today and has showered, ambulated and sat up for breakfast.  No complaints.

## 2022-01-30 ENCOUNTER — HOSPITAL ENCOUNTER (OUTPATIENT)
Facility: HOSPITAL | Age: 37
Setting detail: OBSERVATION
Discharge: HOME | End: 2022-01-30
Attending: OBSTETRICS & GYNECOLOGY | Admitting: OBSTETRICS & GYNECOLOGY

## 2022-01-30 VITALS
BODY MASS INDEX: 38.32 KG/M2 | HEIGHT: 65 IN | WEIGHT: 230 LBS | SYSTOLIC BLOOD PRESSURE: 140 MMHG | TEMPERATURE: 98.4 F | RESPIRATION RATE: 20 BRPM | HEART RATE: 72 BPM | DIASTOLIC BLOOD PRESSURE: 76 MMHG

## 2022-01-30 LAB
ALP SERPL-CCNC: 118 U/L (ref 39–117)
ALT SERPL W P-5'-P-CCNC: 14 U/L (ref 1–33)
AST SERPL-CCNC: 18 U/L (ref 1–32)
BILIRUB SERPL-MCNC: <0.2 MG/DL (ref 0–1.2)
CREAT SERPL-MCNC: 0.72 MG/DL (ref 0.57–1)
DEPRECATED RDW RBC AUTO: 45 FL (ref 37–54)
ERYTHROCYTE [DISTWIDTH] IN BLOOD BY AUTOMATED COUNT: 14 % (ref 12.3–15.4)
HCT VFR BLD AUTO: 27.8 % (ref 34–46.6)
HGB BLD-MCNC: 8.8 G/DL (ref 12–15.9)
LDH SERPL-CCNC: 234 U/L (ref 135–214)
MCH RBC QN AUTO: 27.8 PG (ref 26.6–33)
MCHC RBC AUTO-ENTMCNC: 31.7 G/DL (ref 31.5–35.7)
MCV RBC AUTO: 88 FL (ref 79–97)
PLATELET # BLD AUTO: 224 10*3/MM3 (ref 140–450)
PMV BLD AUTO: 9.4 FL (ref 6–12)
RBC # BLD AUTO: 3.16 10*6/MM3 (ref 3.77–5.28)
URATE SERPL-MCNC: 5.5 MG/DL (ref 2.4–5.7)
WBC NRBC COR # BLD: 8.13 10*3/MM3 (ref 3.4–10.8)

## 2022-01-30 PROCEDURE — 84075 ASSAY ALKALINE PHOSPHATASE: CPT | Performed by: OBSTETRICS & GYNECOLOGY

## 2022-01-30 PROCEDURE — 82247 BILIRUBIN TOTAL: CPT | Performed by: OBSTETRICS & GYNECOLOGY

## 2022-01-30 PROCEDURE — 84450 TRANSFERASE (AST) (SGOT): CPT | Performed by: OBSTETRICS & GYNECOLOGY

## 2022-01-30 PROCEDURE — 85027 COMPLETE CBC AUTOMATED: CPT | Performed by: OBSTETRICS & GYNECOLOGY

## 2022-01-30 PROCEDURE — 82565 ASSAY OF CREATININE: CPT | Performed by: OBSTETRICS & GYNECOLOGY

## 2022-01-30 PROCEDURE — 99218 PR INITIAL OBSERVATION CARE/DAY 30 MINUTES: CPT | Performed by: OBSTETRICS & GYNECOLOGY

## 2022-01-30 PROCEDURE — 84460 ALANINE AMINO (ALT) (SGPT): CPT | Performed by: OBSTETRICS & GYNECOLOGY

## 2022-01-30 PROCEDURE — 36415 COLL VENOUS BLD VENIPUNCTURE: CPT | Performed by: OBSTETRICS & GYNECOLOGY

## 2022-01-30 PROCEDURE — 84550 ASSAY OF BLOOD/URIC ACID: CPT | Performed by: OBSTETRICS & GYNECOLOGY

## 2022-01-30 PROCEDURE — 83615 LACTATE (LD) (LDH) ENZYME: CPT | Performed by: OBSTETRICS & GYNECOLOGY

## 2022-01-30 PROCEDURE — G0378 HOSPITAL OBSERVATION PER HR: HCPCS

## 2022-01-30 RX ORDER — FAMOTIDINE 20 MG/1
20 TABLET, FILM COATED ORAL 2 TIMES DAILY
COMMUNITY
End: 2022-03-07

## 2022-02-22 ENCOUNTER — TELEMEDICINE (OUTPATIENT)
Dept: FAMILY MEDICINE CLINIC | Facility: TELEHEALTH | Age: 37
End: 2022-02-22

## 2022-02-22 DIAGNOSIS — J02.0 STREP THROAT: Primary | ICD-10-CM

## 2022-02-22 PROCEDURE — BHEMPVIDEOVISIT: Performed by: NURSE PRACTITIONER

## 2022-02-22 RX ORDER — METHYLPREDNISOLONE 4 MG/1
TABLET ORAL
Qty: 21 TABLET | Refills: 0 | Status: SHIPPED | OUTPATIENT
Start: 2022-02-22 | End: 2022-03-07

## 2022-02-22 RX ORDER — AMOXICILLIN 500 MG/1
500 CAPSULE ORAL 2 TIMES DAILY
Qty: 20 CAPSULE | Refills: 0 | Status: SHIPPED | OUTPATIENT
Start: 2022-02-22 | End: 2022-03-04

## 2022-02-22 RX ORDER — FLUCONAZOLE 150 MG/1
TABLET ORAL
Qty: 2 TABLET | Refills: 0 | Status: SHIPPED | OUTPATIENT
Start: 2022-02-22 | End: 2022-03-07

## 2022-02-22 NOTE — PATIENT INSTRUCTIONS
Strep Throat, Adult  Strep throat is an infection in the throat that is caused by bacteria. It is common during the cold months of the year. It mostly affects children who are 5-15 years old. However, people of all ages can get it at any time of the year. This infection spreads from person to person (is contagious) through coughing, sneezing, or having close contact.  Your health care provider may use other names to describe the infection. It can be called tonsillitis (if there is swelling of the tonsils), or pharyngitis (if there is swelling at the back of the throat).  What are the causes?  This condition is caused by the Streptococcus pyogenes bacteria.  What increases the risk?  You are more likely to develop this condition if:  · You care for school-age children, or are around school-age children. Children are more likely to get strep throat and may spread it to others.  · You spend time in crowded places where the infection can spread easily.  · You have close contact with someone who has strep throat.  What are the signs or symptoms?  Symptoms of this condition include:  · Fever or chills.  · Redness, swelling, or pain in the tonsils or throat.  · Pain or difficulty when swallowing.  · White or yellow spots on the tonsils or throat.  · Tender glands in the neck and under the jaw.  · Bad smelling breath.  · Red rash all over the body. This is rare.  How is this diagnosed?  This condition is diagnosed by tests that check for the presence and the amount of bacteria that cause strep throat. They are:  · Rapid strep test. Your throat is swabbed and checked for the presence of bacteria. Results are usually ready in minutes.  · Throat culture test. Your throat is swabbed. The sample is placed in a cup that allows infections to grow. Results are usually ready in 1 or 2 days.  How is this treated?  This condition may be treated with:  · Medicines that kill germs (antibiotics).  · Medicines that relieve pain or fever.  These include:  ? Ibuprofen or acetaminophen.  ? Aspirin, only for patients who are over the age of 18.  ? Throat lozenges.  ? Throat sprays.  Follow these instructions at home:  Medicines    · Take over-the-counter and prescription medicines only as told by your health care provider.  · Take your antibiotic medicine as told by your health care provider. Do not stop taking the antibiotic even if you start to feel better.    Eating and drinking    · If you have trouble swallowing, try eating soft foods until your sore throat feels better.  · Drink enough fluid to keep your urine pale yellow.  · To help relieve pain, you may have:  ? Warm fluids, such as soup and tea.  ? Cold fluids, such as frozen desserts or popsicles.    General instructions  · Gargle with a salt-water mixture 3-4 times a day or as needed. To make a salt-water mixture, completely dissolve ½-1 tsp (3-6 g) of salt in 1 cup (237 mL) of warm water.  · Get plenty of rest.  · Stay home from work or school until you have been taking antibiotics for 24 hours.  · Avoid smoking or being around people who smoke.  · Keep all follow-up visits as told by your health care provider. This is important.  How is this prevented?    · Do not share food, drinking cups, or personal items that could cause the infection to spread to other people.  · Wash your hands well with soap and water, and make sure that all people in your house wash their hands well.  · Have family members tested if they have a sore throat or fever. They may need an antibiotic if they have strep throat.  Contact a health care provider if:  · The glands in your neck continue to get bigger.  · You develop a rash, cough, or earache.  · You cough up a thick mucus that is green, yellow-brown, or bloody.  · You have pain or discomfort that does not get better with medicine.  · Your symptoms seem to be getting worse and not better.  · You have a fever.  Get help right away if:  · You have new symptoms, such  as vomiting, severe headache, stiff or painful neck, chest pain, or shortness of breath.  · You have severe throat pain, drooling, or changes in your voice.  · You have swelling of the neck, or the skin on the neck becomes red and tender.  · You have signs of dehydration, such as tiredness (fatigue), dry mouth, and decreased urination.  · You become increasingly sleepy, or you cannot wake up completely.  · Your joints become red or painful.  Summary  · Strep throat is an infection in the throat that is caused by the Streptococcus pyogenes bacteria. This infection is spread from person to person (is contagious) through coughing, sneezing, or having close contact.  · Take your medicines, including antibiotics, as told by your health care provider. Do not stop taking the antibiotic even if you start to feel better.  · To prevent the spread of germs, wash your hands well with soap and water. Have others do the same. Do not share food, drinking cups, or personal items.  · Get help right away if you have new symptoms, such as vomiting, severe headache, stiff or painful neck, chest pain, or shortness of breath.  This information is not intended to replace advice given to you by your health care provider. Make sure you discuss any questions you have with your health care provider.  Document Revised: 03/06/2020 Document Reviewed: 03/06/2020  [employer] Patient Education © 2021 [employer]

## 2022-02-22 NOTE — PROGRESS NOTES
"You have chosen to receive care through a telehealth visit.  Do you consent to use a video/audio connection for your medical care today? Yes     CHIEF COMPLAINT  Chief Complaint   Patient presents with   • Sore Throat         HPI  Kelly Astorga is a 36 y.o. female  presents with complaint of 3 day history of sore throat with fever of 101.4.  Throat has white patches on both tonsils.  Painful swallowing.  Denies cough    Negative COVID test yesterday    Review of Systems   HENT: Positive for sore throat.    Hematological: Positive for adenopathy.   All other systems reviewed and are negative.      Past Medical History:   Diagnosis Date   • Abnormal Pap smear of cervix 2013    Follow up clear    • GERD (gastroesophageal reflux disease)    • Gestational diabetes    • Hyperlipidemia    • PONV (postoperative nausea and vomiting)    • Pre-eclampsia in third trimester     pt states \"diagnosed after delivery with 1st baby\"   • Vitamin D deficiency        Family History   Problem Relation Age of Onset   • Hyperlipidemia Mother    • Hypertension Mother    • Hypertension Father    • Hyperlipidemia Father    • Diabetes Paternal Grandmother    • No Known Problems Brother    • Hydrocephalus Maternal Grandmother    • Hypertension Maternal Grandmother    • Hyperlipidemia Maternal Grandmother    • Heart disease Maternal Grandfather    • Hyperlipidemia Maternal Grandfather    • Hypertension Maternal Grandfather    • Heart attack Maternal Grandfather        Social History     Socioeconomic History   • Marital status:    Tobacco Use   • Smoking status: Never Smoker   • Smokeless tobacco: Never Used   Vaping Use   • Vaping Use: Never used   Substance and Sexual Activity   • Alcohol use: No   • Drug use: No   • Sexual activity: Yes     Partners: Male     Birth control/protection: None         LMP 04/24/2021     PHYSICAL EXAM  Physical Exam   Constitutional: She is oriented to person, place, and time. She appears " well-developed and well-nourished. She does not have a sickly appearance. She does not appear ill.   HENT:   Head: Normocephalic and atraumatic.   Bilateral 3+ tonsillar enlargement; white patches on bilateral tonsils with severe erythema; see uploaded picture   Pulmonary/Chest: Effort normal.  No respiratory distress.  Neurological: She is alert and oriented to person, place, and time.         Diagnoses and all orders for this visit:    1. Strep throat (Primary)  -     amoxicillin (AMOXIL) 500 MG capsule; Take 1 capsule by mouth 2 (Two) Times a Day for 10 days.  Dispense: 20 capsule; Refill: 0  -     methylPREDNISolone (MEDROL) 4 MG dose pack; Take as directed on package instructions.  Dispense: 21 tablet; Refill: 0  -     fluconazole (DIFLUCAN) 150 MG tablet; Take 1 tablet now, repeat in 72 hours if symptoms continue  Dispense: 2 tablet; Refill: 0    --take medications as prescribed  --f/u in 3-5 days if no improvement      FOLLOW-UP  As discussed during visit with PCP/[facility] if no improvement or Urgent Care/Emergency Department if worsening of symptoms    Patient verbalizes understanding of medication dosage, comfort measures, instructions for treatment and follow-up.    Kayla Flores, COLLIN  02/22/2022  10:33 EST    This visit was performed via Telehealth.  This patient has been instructed to follow-up with their primary care provider if their symptoms worsen or the treatment provided does not resolve their illness.

## 2022-03-07 ENCOUNTER — OFFICE VISIT (OUTPATIENT)
Dept: INTERNAL MEDICINE | Facility: CLINIC | Age: 37
End: 2022-03-07

## 2022-03-07 VITALS
HEART RATE: 73 BPM | HEIGHT: 65 IN | TEMPERATURE: 97.1 F | SYSTOLIC BLOOD PRESSURE: 120 MMHG | BODY MASS INDEX: 38.32 KG/M2 | OXYGEN SATURATION: 98 % | WEIGHT: 230 LBS | DIASTOLIC BLOOD PRESSURE: 80 MMHG

## 2022-03-07 DIAGNOSIS — R19.7 DIARRHEA, UNSPECIFIED TYPE: ICD-10-CM

## 2022-03-07 DIAGNOSIS — Z13.220 LIPID SCREENING: ICD-10-CM

## 2022-03-07 DIAGNOSIS — N30.01 ACUTE CYSTITIS WITH HEMATURIA: Primary | ICD-10-CM

## 2022-03-07 DIAGNOSIS — M25.50 ARTHRALGIA, UNSPECIFIED JOINT: ICD-10-CM

## 2022-03-07 DIAGNOSIS — E79.0 ELEVATED URIC ACID IN BLOOD: ICD-10-CM

## 2022-03-07 DIAGNOSIS — R35.0 FREQUENCY OF URINATION: ICD-10-CM

## 2022-03-07 LAB
BILIRUB BLD-MCNC: NEGATIVE MG/DL
CLARITY, POC: CLEAR
COLOR UR: ABNORMAL
EXPIRATION DATE: ABNORMAL
GLUCOSE UR STRIP-MCNC: NEGATIVE MG/DL
KETONES UR QL: NEGATIVE
LEUKOCYTE EST, POC: ABNORMAL
Lab: ABNORMAL
NITRITE UR-MCNC: NEGATIVE MG/ML
PH UR: 6 [PH] (ref 5–8)
PROT UR STRIP-MCNC: NEGATIVE MG/DL
RBC # UR STRIP: ABNORMAL /UL
SP GR UR: 1.01 (ref 1–1.03)
UROBILINOGEN UR QL: NORMAL

## 2022-03-07 PROCEDURE — 99214 OFFICE O/P EST MOD 30 MIN: CPT | Performed by: FAMILY MEDICINE

## 2022-03-07 PROCEDURE — 81003 URINALYSIS AUTO W/O SCOPE: CPT | Performed by: FAMILY MEDICINE

## 2022-03-07 RX ORDER — CIPROFLOXACIN 500 MG/1
500 TABLET, FILM COATED ORAL 2 TIMES DAILY
Qty: 14 TABLET | Refills: 0 | Status: SHIPPED | OUTPATIENT
Start: 2022-03-07 | End: 2022-03-23

## 2022-03-07 NOTE — PROGRESS NOTES
Kelly Astorga is a 36 y.o. female.    Chief Complaint   Patient presents with   • Joint Pain   • Diarrhea     Since delivery 01/25/2022   • Urinary Tract Infection       HPI   Patient reports urinary problems for 4 week(s).  They admit to frequency.  They deny dysuria, flank pain  , bloody urine and lower abdominal pain.  Recently came off antibiotics for strep.      Patient continues to have joint pain.  She admits to full body pain and stiffness.  She has a h/o elevated uric acid level.  Taking tylenol and ibuprofen for joint pain without significant improvement.  She continues to have fatigue.  She has benefited from steroids in the past.      In addition, she reports diarrhea since the birth of her youngest daughter about 6 week ago.    The following portions of the patient's history were reviewed and updated as appropriate: allergies, current medications, past family history, past medical history, past social history, past surgical history and problem list.     No Known Allergies      Current Outpatient Medications:   •  cetirizine (zyrTEC) 10 MG tablet, Take 10 mg by mouth Daily., Disp: , Rfl:   •  Omeprazole 20 MG Tablet Delayed Release Dispersible, Take 20 mg by mouth Daily., Disp: , Rfl:   •  Prenatal Vit-Fe Fumarate-FA (PNV PRENATAL PLUS MULTIVITAMIN) 27-1 MG tablet, Take 1 tablet by mouth Daily. 200001, Disp: , Rfl: 3  •  allopurinol (Zyloprim) 100 MG tablet, Take 1 tablet by mouth Daily., Disp: 90 tablet, Rfl: 3  •  dicyclomine (BENTYL) 20 MG tablet, Take 1 tablet by mouth Every 6 (Six) Hours As Needed (abdominal cramping)., Disp: 20 tablet, Rfl: 0  •  ondansetron ODT (Zofran ODT) 4 MG disintegrating tablet, Place 1 tablet on the tongue Every 8 (Eight) Hours As Needed for Nausea or Vomiting for up to 7 days., Disp: 30 tablet, Rfl: 0    ROS    Review of Systems   Constitutional: Negative for chills and fever.   Respiratory: Negative for cough and shortness of breath.    Cardiovascular:  "Negative for chest pain.   Gastrointestinal: Positive for diarrhea. Negative for nausea and vomiting.   Genitourinary: Positive for frequency. Negative for dysuria.   Musculoskeletal: Positive for arthralgias.       Vitals:    03/07/22 1533   BP: 120/80   Pulse: 73   Temp: 97.1 °F (36.2 °C)   SpO2: 98%   Weight: 104 kg (230 lb)   Height: 165.1 cm (65\")     Body mass index is 38.27 kg/m².    Physical Exam     Physical Exam  Constitutional:       General: She is not in acute distress.     Appearance: Normal appearance. She is well-developed.   HENT:      Head: Normocephalic and atraumatic.      Right Ear: External ear normal.      Left Ear: External ear normal.   Eyes:      Extraocular Movements: Extraocular movements intact.      Conjunctiva/sclera: Conjunctivae normal.   Cardiovascular:      Rate and Rhythm: Normal rate and regular rhythm.      Heart sounds: No murmur heard.  Pulmonary:      Effort: Pulmonary effort is normal. No respiratory distress.      Breath sounds: Normal breath sounds. No wheezing.   Abdominal:      General: Bowel sounds are normal. There is no distension.      Palpations: Abdomen is soft.      Tenderness: There is no abdominal tenderness.   Skin:     Coloration: Skin is not pale.   Neurological:      Mental Status: She is alert and oriented to person, place, and time.      Cranial Nerves: No cranial nerve deficit.   Psychiatric:         Mood and Affect: Mood normal.         Behavior: Behavior normal.         Assessment/Plan    Problems Addressed this Visit     Arthralgia     Exact etiology unknown.  Suspected gout.  Will refer to rheumatology at [facility].            Relevant Orders    CBC & Differential (Completed)    Comprehensive Metabolic Panel (Completed)    Uric Acid (Completed)    Ambulatory Referral to Rheumatology (Completed)    Elevated uric acid in blood     Will obtain updated uric acid.  May start allopurinol.           Relevant Orders    Uric Acid " (Completed)    Ambulatory Referral to Rheumatology (Completed)    Acute cystitis with hematuria - Primary     Will treat with cipro and send urine for culture.            Diarrhea     Will likely resolve the further out from delivery she gets.  However, will obtain inflammatory bowel disease panel for further evaluation.            Relevant Orders    CBC & Differential (Completed)    Comprehensive Metabolic Panel (Completed)    Inflammatory Bowel Disease Panel (Completed)      Other Visit Diagnoses     Frequency of urination        Relevant Orders    POCT urinalysis dipstick, automated (Completed)    Urine Culture - Urine, Urine, Catheter (Completed)    Lipid screening        Relevant Orders    Lipid Panel (Completed)          No orders of the defined types were placed in this encounter.      No orders of the defined types were placed in this encounter.      Return in about 3 months (around 6/7/2022) for arthralgia.    Alyx Sy, DO

## 2022-03-09 LAB
ALBUMIN SERPL-MCNC: 4.5 G/DL (ref 3.8–4.8)
ALBUMIN/GLOB SERPL: 1.7 {RATIO} (ref 1.2–2.2)
ALP SERPL-CCNC: 120 IU/L (ref 44–121)
ALT SERPL-CCNC: 38 IU/L (ref 0–32)
AST SERPL-CCNC: 30 IU/L (ref 0–40)
BAKER'S YEAST IGA QN: <20 UNITS (ref 0–24.9)
BAKER'S YEAST IGG QN: <20 UNITS (ref 0–24.9)
BASOPHILS # BLD AUTO: 0 X10E3/UL (ref 0–0.2)
BASOPHILS NFR BLD AUTO: 0 %
BILIRUB SERPL-MCNC: 0.2 MG/DL (ref 0–1.2)
BUN SERPL-MCNC: 11 MG/DL (ref 6–20)
BUN/CREAT SERPL: 15 (ref 9–23)
CALCIUM SERPL-MCNC: 9.7 MG/DL (ref 8.7–10.2)
CHLORIDE SERPL-SCNC: 105 MMOL/L (ref 96–106)
CHOLEST SERPL-MCNC: 263 MG/DL (ref 100–199)
CO2 SERPL-SCNC: 23 MMOL/L (ref 20–29)
CREAT SERPL-MCNC: 0.72 MG/DL (ref 0.57–1)
EGFR GENE MUT ANL BLD/T: 112 ML/MIN/1.73
EOSINOPHIL # BLD AUTO: 0.3 X10E3/UL (ref 0–0.4)
EOSINOPHIL NFR BLD AUTO: 3 %
ERYTHROCYTE [DISTWIDTH] IN BLOOD BY AUTOMATED COUNT: 14.3 % (ref 11.7–15.4)
GLOBULIN SER CALC-MCNC: 2.7 G/DL (ref 1.5–4.5)
GLUCOSE SERPL-MCNC: 97 MG/DL (ref 65–99)
HCT VFR BLD AUTO: 36.6 % (ref 34–46.6)
HDLC SERPL-MCNC: 53 MG/DL
HGB BLD-MCNC: 11.7 G/DL (ref 11.1–15.9)
IMM GRANULOCYTES # BLD AUTO: 0 X10E3/UL (ref 0–0.1)
IMM GRANULOCYTES NFR BLD AUTO: 0 %
LDLC SERPL CALC-MCNC: 148 MG/DL (ref 0–99)
LYMPHOCYTES # BLD AUTO: 2.9 X10E3/UL (ref 0.7–3.1)
LYMPHOCYTES NFR BLD AUTO: 29 %
MCH RBC QN AUTO: 26.1 PG (ref 26.6–33)
MCHC RBC AUTO-ENTMCNC: 32 G/DL (ref 31.5–35.7)
MCV RBC AUTO: 82 FL (ref 79–97)
MONOCYTES # BLD AUTO: 0.4 X10E3/UL (ref 0.1–0.9)
MONOCYTES NFR BLD AUTO: 4 %
NEUTROPHILS # BLD AUTO: 6.4 X10E3/UL (ref 1.4–7)
NEUTROPHILS NFR BLD AUTO: 64 %
P-ANCA ATYPICAL TITR SER IF: NORMAL TITER
PLATELET # BLD AUTO: 299 X10E3/UL (ref 150–450)
POTASSIUM SERPL-SCNC: 4.2 MMOL/L (ref 3.5–5.2)
PROT SERPL-MCNC: 7.2 G/DL (ref 6–8.5)
RBC # BLD AUTO: 4.48 X10E6/UL (ref 3.77–5.28)
SODIUM SERPL-SCNC: 143 MMOL/L (ref 134–144)
TRIGL SERPL-MCNC: 335 MG/DL (ref 0–149)
URATE SERPL-MCNC: 9.1 MG/DL (ref 2.6–6.2)
VLDLC SERPL CALC-MCNC: 62 MG/DL (ref 5–40)
WBC # BLD AUTO: 10 X10E3/UL (ref 3.4–10.8)

## 2022-03-10 ENCOUNTER — PATIENT MESSAGE (OUTPATIENT)
Dept: INTERNAL MEDICINE | Facility: CLINIC | Age: 37
End: 2022-03-10

## 2022-03-10 LAB
BACTERIA UR CULT: NO GROWTH
BACTERIA UR CULT: NORMAL

## 2022-03-11 RX ORDER — ALLOPURINOL 100 MG/1
100 TABLET ORAL DAILY
Qty: 90 TABLET | Refills: 3 | Status: SHIPPED | OUTPATIENT
Start: 2022-03-11 | End: 2022-04-23 | Stop reason: SDUPTHER

## 2022-03-11 NOTE — TELEPHONE ENCOUNTER
From: Kelly Astroga  To: Alyx Sy DO  Sent: 3/10/2022 11:00 PM EST  Subject: Allopurinol    I will try the allopurinol and see if it helps the uric acid. You can send it to [facility] in [place]. I think this is the highest it has been! I have an appointment in 3 months, will we just recheck it then or do we recheck sooner?     Thanks,  Kelly

## 2022-03-23 ENCOUNTER — HOSPITAL ENCOUNTER (EMERGENCY)
Facility: HOSPITAL | Age: 37
End: 2022-03-23

## 2022-03-23 ENCOUNTER — TELEMEDICINE (OUTPATIENT)
Dept: FAMILY MEDICINE CLINIC | Facility: TELEHEALTH | Age: 37
End: 2022-03-23

## 2022-03-23 VITALS
HEIGHT: 65 IN | BODY MASS INDEX: 35.65 KG/M2 | HEART RATE: 96 BPM | RESPIRATION RATE: 16 BRPM | TEMPERATURE: 98.5 F | SYSTOLIC BLOOD PRESSURE: 131 MMHG | WEIGHT: 214 LBS | OXYGEN SATURATION: 100 % | DIASTOLIC BLOOD PRESSURE: 83 MMHG

## 2022-03-23 DIAGNOSIS — K52.9 GASTROENTERITIS: Primary | ICD-10-CM

## 2022-03-23 PROCEDURE — 99283 EMERGENCY DEPT VISIT LOW MDM: CPT

## 2022-03-23 PROCEDURE — BHEMPVIDEOVISIT: Performed by: NURSE PRACTITIONER

## 2022-03-23 RX ORDER — ONDANSETRON 4 MG/1
4 TABLET, ORALLY DISINTEGRATING ORAL EVERY 8 HOURS PRN
Qty: 30 TABLET | Refills: 0 | Status: SHIPPED | OUTPATIENT
Start: 2022-03-23 | End: 2022-03-30

## 2022-03-23 NOTE — PROGRESS NOTES
Mode of Visit: Video  Location of patient: home  You have chosen to receive care through a telehealth visit.  The patient has signed the video visit consent form.  The visit included audio and video interaction. No technical issues occurred during this visit.     Chief Complaint  Nausea, Vomiting, and Diarrhea    Thad Astorga [facility] to [facility]  She  has a past medical history of Abnormal Pap smear of cervix (2013), GERD (gastroesophageal reflux disease), Gestational diabetes, Hyperlipidemia, PONV (postoperative nausea and vomiting), Pre-eclampsia in third trimester, and Vitamin D deficiency.  She does not have any pertinent problems on file.  Current Outpatient Medications   Medication Sig Dispense Refill   • allopurinol (Zyloprim) 100 MG tablet Take 1 tablet by mouth Daily. 90 tablet 3   • cetirizine (zyrTEC) 10 MG tablet Take 10 mg by mouth Daily.     • Omeprazole 20 MG Tablet Delayed Release Dispersible Take 20 mg by mouth Daily.     • ondansetron ODT (Zofran ODT) 4 MG disintegrating tablet Place 1 tablet on the tongue Every 8 (Eight) Hours As Needed for Nausea or Vomiting for up to 7 days. 30 tablet 0   • Prenatal Vit-Fe Fumarate-FA (PNV PRENATAL PLUS MULTIVITAMIN) 27-1 MG tablet Take 1 tablet by mouth Daily. [id number]  3     No current facility-administered medications for this visit.     She has No Known Allergies.    Nausea  This is a new problem. Episode onset: 3 days. The problem occurs constantly. The problem has been unchanged. Associated symptoms include a change in bowel habit, chills, fatigue, nausea and vomiting. Pertinent negatives include no fever.   Vomiting   The problem occurs intermittently. Associated symptoms include chills and diarrhea. Pertinent negatives include no fever.   Diarrhea   This is a new problem. The current episode started yesterday. The problem has been gradually worsening. The stool consistency is described as  watery. Associated symptoms include chills and vomiting. Pertinent negatives include no fever.     Objective   Vital Signs:   There were no vitals taken for this visit.    Virtual Visit Physical Exam  Result Review :                 Assessment and Plan    Diagnoses and all orders for this visit:    1. Gastroenteritis (Primary)  -     ondansetron ODT (Zofran ODT) 4 MG disintegrating tablet; Place 1 tablet on the tongue Every 8 (Eight) Hours As Needed for Nausea or Vomiting for up to 7 days.  Dispense: 30 tablet; Refill: 0              I spent 10 minutes caring for Kelly on this date of service. This time includes time spent by me in the following activities:preparing for the visit, obtaining and/or reviewing a separately obtained history, performing a medically appropriate examination and/or evaluation , counseling and educating the patient/family/caregiver, ordering medications, tests, or procedures, and documenting information in the medical record  Follow Up   Return if symptoms worsen or fail to improve.  Patient was given instructions and counseling regarding her condition or for health maintenance advice. Please see specific information pulled into the AVS if appropriate.

## 2022-03-23 NOTE — PATIENT INSTRUCTIONS
Viral Gastroenteritis, Adult    Viral gastroenteritis is also known as the stomach flu. This condition may affect your stomach, small intestine, and large intestine. It can cause sudden watery diarrhea, fever, and vomiting. This condition is caused by many different viruses. These viruses can be passed from person to person very easily (are contagious).  Diarrhea and vomiting can make you feel weak and cause you to become dehydrated. You may not be able to keep fluids down. Dehydration can make you tired and thirsty, cause you to have a dry mouth, and decrease how often you urinate. It is important to replace the fluids that you lose from diarrhea and vomiting.  What are the causes?  Gastroenteritis is caused by many viruses, including rotavirus and norovirus. Norovirus is the most common cause in adults. You can get sick after being exposed to the viruses from other people. You can also get sick by:  Eating food, drinking water, or touching a surface contaminated with one of these viruses.  Sharing utensils or other personal items with an infected person.  What increases the risk?  You are more likely to develop this condition if you:  Have a weak body defense system (immune system).  Live with one or more children who are younger than 2 years old.  Live in a nursing home.  Travel on cruise ships.  What are the signs or symptoms?  Symptoms of this condition start suddenly 1-3 days after exposure to a virus. Symptoms may last for a few days or for as long as a week. Common symptoms include watery diarrhea and vomiting. Other symptoms include:  Fever.  Headache.  Fatigue.  Pain in the abdomen.  Chills.  Weakness.  Nausea.  Muscle aches.  Loss of appetite.  How is this diagnosed?  This condition is diagnosed with a medical history and physical exam. You may also have a stool test to check for viruses or other infections.  How is this treated?  This condition typically goes away on its own. The focus of treatment is to  prevent dehydration and restore lost fluids (rehydration). This condition may be treated with:  An oral rehydration solution (ORS) to replace important salts and minerals (electrolytes) in your body. Take this if told by your health care provider. This is a drink that is sold at pharmacies and retail stores.  Medicines to help with your symptoms.  Probiotic supplements to reduce symptoms of diarrhea.  Fluids given through an IV, if dehydration is severe.  Older adults and people with other diseases or a weak immune system are at higher risk for dehydration.  Follow these instructions at home:    Eating and drinking    Take an ORS as told by your health care provider.  Drink clear fluids in small amounts as you are able. Clear fluids include:  Water.  Ice chips.  Diluted fruit juice.  Low-calorie sports drinks.  Drink enough fluid to keep your urine pale yellow.  Eat small amounts of healthy foods every 3-4 hours as you are able. This may include whole grains, fruits, vegetables, lean meats, and yogurt.  Avoid fluids that contain a lot of sugar or caffeine, such as energy drinks, sports drinks, and soda.  Avoid spicy or fatty foods.  Avoid alcohol.    General instructions  Wash your hands often, especially after having diarrhea or vomiting. If soap and water are not available, use hand .  Make sure that all people in your household wash their hands well and often.  Take over-the-counter and prescription medicines only as told by your health care provider.  Rest at home while you recover.  Watch your condition for any changes.  Take a warm bath to relieve any burning or pain from frequent diarrhea episodes.  Keep all follow-up visits as told by your health care provider. This is important.  Contact a health care provider if you:  Cannot keep fluids down.  Have symptoms that get worse.  Have new symptoms.  Feel light-headed or dizzy.  Have muscle cramps.  Get help right away if you:  Have chest pain.  Feel  extremely weak or you faint.  See blood in your vomit.  Have vomit that looks like coffee grounds.  Have bloody or black stools or stools that look like tar.  Have a severe headache, a stiff neck, or both.  Have a rash.  Have severe pain, cramping, or bloating in your abdomen.  Have trouble breathing or you are breathing very quickly.  Have a fast heartbeat.  Have skin that feels cold and clammy.  Feel confused.  Have pain when you urinate.  Have signs of dehydration, such as:  Dark urine, very little urine, or no urine.  Cracked lips.  Dry mouth.  Sunken eyes.  Sleepiness.  Weakness.  Summary  Viral gastroenteritis is also known as the stomach flu. It can cause sudden watery diarrhea, fever, and vomiting.  This condition can be passed from person to person very easily (is contagious).  Take an ORS if told by your health care provider. This is a drink that is sold at pharmacies and retail stores.  Wash your hands often, especially after having diarrhea or vomiting. If soap and water are not available, use hand .  This information is not intended to replace advice given to you by your health care provider. Make sure you discuss any questions you have with your health care provider.  Document Revised: 06/05/2020 Document Reviewed: 10/23/2019  [employer] Patient Education © 2021 [employer]

## 2022-03-24 ENCOUNTER — HOSPITAL ENCOUNTER (EMERGENCY)
Facility: HOSPITAL | Age: 37
Discharge: HOME | End: 2022-03-24
Attending: FAMILY MEDICINE | Admitting: FAMILY MEDICINE

## 2022-03-24 VITALS
DIASTOLIC BLOOD PRESSURE: 78 MMHG | WEIGHT: 214 LBS | HEIGHT: 65 IN | RESPIRATION RATE: 14 BRPM | HEART RATE: 76 BPM | TEMPERATURE: 98.3 F | SYSTOLIC BLOOD PRESSURE: 136 MMHG | BODY MASS INDEX: 35.65 KG/M2 | OXYGEN SATURATION: 98 %

## 2022-03-24 DIAGNOSIS — A08.0 ROTAVIRUS INFECTION: Primary | ICD-10-CM

## 2022-03-24 LAB
ADV 40+41 DNA STL QL NAA+NON-PROBE: NOT DETECTED
ALBUMIN SERPL-MCNC: 4.4 G/DL (ref 3.5–5.2)
ALBUMIN/GLOB SERPL: 1.2 G/DL
ALP SERPL-CCNC: 93 U/L (ref 39–117)
ALT SERPL W P-5'-P-CCNC: 69 U/L (ref 1–33)
ANION GAP SERPL CALCULATED.3IONS-SCNC: 15.2 MMOL/L (ref 5–15)
AST SERPL-CCNC: 55 U/L (ref 1–32)
ASTRO TYP 1-8 RNA STL QL NAA+NON-PROBE: NOT DETECTED
BACTERIA UR QL AUTO: ABNORMAL /HPF
BASOPHILS # BLD AUTO: 0.01 10*3/MM3 (ref 0–0.2)
BASOPHILS NFR BLD AUTO: 0.2 % (ref 0–1.5)
BILIRUB SERPL-MCNC: 0.2 MG/DL (ref 0–1.2)
BILIRUB UR QL STRIP: NEGATIVE
BUN SERPL-MCNC: 9 MG/DL (ref 6–20)
BUN/CREAT SERPL: 8.1 (ref 7–25)
C CAYETANENSIS DNA STL QL NAA+NON-PROBE: NOT DETECTED
C COLI+JEJ+UPSA DNA STL QL NAA+NON-PROBE: NOT DETECTED
CALCIUM SPEC-SCNC: 8.8 MG/DL (ref 8.6–10.5)
CHLORIDE SERPL-SCNC: 99 MMOL/L (ref 98–107)
CLARITY UR: ABNORMAL
CO2 SERPL-SCNC: 18.8 MMOL/L (ref 22–29)
COLOR UR: YELLOW
CREAT SERPL-MCNC: 1.11 MG/DL (ref 0.57–1)
CRP SERPL-MCNC: 9.22 MG/DL (ref 0–0.5)
CRYPTOSP DNA STL QL NAA+NON-PROBE: NOT DETECTED
DEPRECATED RDW RBC AUTO: 45 FL (ref 37–54)
E HISTOLYT DNA STL QL NAA+NON-PROBE: NOT DETECTED
EAEC PAA PLAS AGGR+AATA ST NAA+NON-PRB: NOT DETECTED
EC STX1+STX2 GENES STL QL NAA+NON-PROBE: NOT DETECTED
EGFRCR SERPLBLD CKD-EPI 2021: 66.6 ML/MIN/1.73
EOSINOPHIL # BLD AUTO: 0.02 10*3/MM3 (ref 0–0.4)
EOSINOPHIL NFR BLD AUTO: 0.3 % (ref 0.3–6.2)
EPEC EAE GENE STL QL NAA+NON-PROBE: NOT DETECTED
ERYTHROCYTE [DISTWIDTH] IN BLOOD BY AUTOMATED COUNT: 15.2 % (ref 12.3–15.4)
ETEC LTA+ST1A+ST1B TOX ST NAA+NON-PROBE: NOT DETECTED
G LAMBLIA DNA STL QL NAA+NON-PROBE: NOT DETECTED
GLOBULIN UR ELPH-MCNC: 3.8 GM/DL
GLUCOSE SERPL-MCNC: 118 MG/DL (ref 65–99)
GLUCOSE UR STRIP-MCNC: NEGATIVE MG/DL
HCG SERPL QL: NEGATIVE
HCT VFR BLD AUTO: 41 % (ref 34–46.6)
HGB BLD-MCNC: 13.2 G/DL (ref 12–15.9)
HGB UR QL STRIP.AUTO: NEGATIVE
HOLD SPECIMEN: NORMAL
HOLD SPECIMEN: NORMAL
HYALINE CASTS UR QL AUTO: ABNORMAL /LPF
IMM GRANULOCYTES # BLD AUTO: 0.02 10*3/MM3 (ref 0–0.05)
IMM GRANULOCYTES NFR BLD AUTO: 0.3 % (ref 0–0.5)
KETONES UR QL STRIP: NEGATIVE
LEUKOCYTE ESTERASE UR QL STRIP.AUTO: ABNORMAL
LIPASE SERPL-CCNC: 28 U/L (ref 13–60)
LYMPHOCYTES # BLD AUTO: 1 10*3/MM3 (ref 0.7–3.1)
LYMPHOCYTES NFR BLD AUTO: 16.3 % (ref 19.6–45.3)
MCH RBC QN AUTO: 26.5 PG (ref 26.6–33)
MCHC RBC AUTO-ENTMCNC: 32.2 G/DL (ref 31.5–35.7)
MCV RBC AUTO: 82.3 FL (ref 79–97)
MONOCYTES # BLD AUTO: 0.38 10*3/MM3 (ref 0.1–0.9)
MONOCYTES NFR BLD AUTO: 6.2 % (ref 5–12)
NEUTROPHILS NFR BLD AUTO: 4.71 10*3/MM3 (ref 1.7–7)
NEUTROPHILS NFR BLD AUTO: 76.7 % (ref 42.7–76)
NITRITE UR QL STRIP: NEGATIVE
NOROVIRUS GI+II RNA STL QL NAA+NON-PROBE: NOT DETECTED
NRBC BLD AUTO-RTO: 0 /100 WBC (ref 0–0.2)
P SHIGELLOIDES DNA STL QL NAA+NON-PROBE: NOT DETECTED
PH UR STRIP.AUTO: 6.5 [PH] (ref 5–8)
PLATELET # BLD AUTO: 284 10*3/MM3 (ref 140–450)
PMV BLD AUTO: 8.9 FL (ref 6–12)
POTASSIUM SERPL-SCNC: 3.5 MMOL/L (ref 3.5–5.2)
PROT SERPL-MCNC: 8.2 G/DL (ref 6–8.5)
PROT UR QL STRIP: ABNORMAL
RBC # BLD AUTO: 4.98 10*6/MM3 (ref 3.77–5.28)
RBC # UR STRIP: ABNORMAL /HPF
REF LAB TEST METHOD: ABNORMAL
RVA RNA STL QL NAA+NON-PROBE: DETECTED
S ENT+BONG DNA STL QL NAA+NON-PROBE: NOT DETECTED
SAPO I+II+IV+V RNA STL QL NAA+NON-PROBE: NOT DETECTED
SHIGELLA SP+EIEC IPAH ST NAA+NON-PROBE: NOT DETECTED
SODIUM SERPL-SCNC: 133 MMOL/L (ref 136–145)
SP GR UR STRIP: 1.02 (ref 1–1.03)
SQUAMOUS #/AREA URNS HPF: ABNORMAL /HPF
URATE SERPL-MCNC: 7.8 MG/DL (ref 2.4–5.7)
UROBILINOGEN UR QL STRIP: ABNORMAL
V CHOL+PARA+VUL DNA STL QL NAA+NON-PROBE: NOT DETECTED
V CHOLERAE DNA STL QL NAA+NON-PROBE: NOT DETECTED
WBC # UR STRIP: ABNORMAL /HPF
WBC NRBC COR # BLD: 6.14 10*3/MM3 (ref 3.4–10.8)
WHOLE BLOOD HOLD SPECIMEN: NORMAL
WHOLE BLOOD HOLD SPECIMEN: NORMAL
Y ENTEROCOL DNA STL QL NAA+NON-PROBE: NOT DETECTED

## 2022-03-24 PROCEDURE — 81001 URINALYSIS AUTO W/SCOPE: CPT | Performed by: FAMILY MEDICINE

## 2022-03-24 PROCEDURE — 85025 COMPLETE CBC W/AUTO DIFF WBC: CPT | Performed by: FAMILY MEDICINE

## 2022-03-24 PROCEDURE — 0097U HC BIOFIRE FILMARRAY GI PANEL: CPT | Performed by: FAMILY MEDICINE

## 2022-03-24 PROCEDURE — 84703 CHORIONIC GONADOTROPIN ASSAY: CPT | Performed by: FAMILY MEDICINE

## 2022-03-24 PROCEDURE — 86140 C-REACTIVE PROTEIN: CPT | Performed by: FAMILY MEDICINE

## 2022-03-24 PROCEDURE — 84550 ASSAY OF BLOOD/URIC ACID: CPT | Performed by: FAMILY MEDICINE

## 2022-03-24 PROCEDURE — 83690 ASSAY OF LIPASE: CPT | Performed by: FAMILY MEDICINE

## 2022-03-24 PROCEDURE — 80053 COMPREHEN METABOLIC PANEL: CPT | Performed by: FAMILY MEDICINE

## 2022-03-24 RX ORDER — ALUMINA, MAGNESIA, AND SIMETHICONE 2400; 2400; 240 MG/30ML; MG/30ML; MG/30ML
15 SUSPENSION ORAL ONCE
Status: COMPLETED | OUTPATIENT
Start: 2022-03-24 | End: 2022-03-24

## 2022-03-24 RX ORDER — LIDOCAINE HYDROCHLORIDE 20 MG/ML
15 SOLUTION OROPHARYNGEAL ONCE
Status: COMPLETED | OUTPATIENT
Start: 2022-03-24 | End: 2022-03-24

## 2022-03-24 RX ORDER — DICYCLOMINE HCL 20 MG
20 TABLET ORAL EVERY 6 HOURS PRN
Qty: 20 TABLET | Refills: 0 | Status: SHIPPED | OUTPATIENT
Start: 2022-03-24 | End: 2022-05-27

## 2022-03-24 RX ORDER — SODIUM CHLORIDE 0.9 % (FLUSH) 0.9 %
10 SYRINGE (ML) INJECTION AS NEEDED
Status: DISCONTINUED | OUTPATIENT
Start: 2022-03-24 | End: 2022-03-24 | Stop reason: HOSPADM

## 2022-03-24 RX ADMIN — SODIUM CHLORIDE 1000 ML: 9 INJECTION, SOLUTION INTRAVENOUS at 01:47

## 2022-03-24 RX ADMIN — LIDOCAINE HYDROCHLORIDE 15 ML: 20 SOLUTION ORAL; TOPICAL at 01:57

## 2022-03-24 RX ADMIN — ALUMINUM HYDROXIDE, MAGNESIUM HYDROXIDE, AND DIMETHICONE 15 ML: 400; 400; 40 SUSPENSION ORAL at 01:57

## 2022-03-24 RX ADMIN — SODIUM CHLORIDE 1000 ML: 9 INJECTION, SOLUTION INTRAVENOUS at 00:36

## 2022-03-24 NOTE — ED PROVIDER NOTES
"Subjective   36-year-old female who is currently on maternity leave she is 2 months postpartum presents the emergency department for of 4-day history of worsening nausea vomiting and diarrhea.  Patient reports that she feels like she is very dehydrated initially started out with fatigue body aches and vomiting that is now progressed to diarrhea patient reports that she has had at least 18 bowel movements in the last 24 hours.  Patient reports that she has begun starting to get lightheaded and have abdominal cramping which she feels like everything is attributed to her dehydration but she wanted to come in and be evaluated.      Vomiting  The primary symptoms include abdominal pain and vomiting. Primary symptoms do not include fever or dysuria. The illness began 3 to 5 days ago. The onset was gradual. The problem has been gradually worsening.   The illness is also significant for chills.   Abdominal Pain  Associated symptoms: chills and vomiting    Associated symptoms: no chest pain, no dysuria and no fever        Review of Systems   Constitutional: Positive for chills. Negative for fever.   HENT: Negative.    Respiratory: Negative.    Cardiovascular: Negative.  Negative for chest pain.   Gastrointestinal: Positive for abdominal pain and vomiting.   Endocrine: Negative.    Genitourinary: Negative.  Negative for dysuria.   Skin: Negative.    Neurological: Negative.    Psychiatric/Behavioral: Negative.    All other systems reviewed and are negative.      Past Medical History:   Diagnosis Date   • Abnormal Pap smear of cervix 2013    Follow up clear    • GERD (gastroesophageal reflux disease)    • Gestational diabetes    • Hyperlipidemia    • PONV (postoperative nausea and vomiting)    • Pre-eclampsia in third trimester     pt states \"diagnosed after delivery with 1st baby\"   • Vitamin D deficiency        No Known Allergies    Past Surgical History:   Procedure Laterality Date   • GASTRIC RESTRICTION SURGERY  9/19/16    " Gastric Sleeve   • GASTRIC SLEEVE LAPAROSCOPIC  09/2016   • LEEP  2013   • OTOPLASTY  2005   • WISDOM TOOTH EXTRACTION         Family History   Problem Relation Age of Onset   • Hyperlipidemia Mother    • Hypertension Mother    • Hypertension Father    • Hyperlipidemia Father    • Diabetes Paternal Grandmother    • No Known Problems Brother    • Hydrocephalus Maternal Grandmother    • Hypertension Maternal Grandmother    • Hyperlipidemia Maternal Grandmother    • Heart disease Maternal Grandfather    • Hyperlipidemia Maternal Grandfather    • Hypertension Maternal Grandfather    • Heart attack Maternal Grandfather        Social History     Socioeconomic History   • Marital status:    Tobacco Use   • Smoking status: Never Smoker   • Smokeless tobacco: Never Used   Vaping Use   • Vaping Use: Never used   Substance and Sexual Activity   • Alcohol use: No   • Drug use: No   • Sexual activity: Yes     Partners: Male     Birth control/protection: None           Objective   Physical Exam  Vitals and nursing note reviewed.   Constitutional:       Appearance: She is well-developed.   HENT:      Head: Normocephalic and atraumatic.      Mouth/Throat:      Mouth: Mucous membranes are moist.   Eyes:      Extraocular Movements: Extraocular movements intact.   Cardiovascular:      Rate and Rhythm: Normal rate and regular rhythm.   Pulmonary:      Effort: Pulmonary effort is normal.      Breath sounds: Normal breath sounds.   Abdominal:      General: Abdomen is flat. Bowel sounds are normal.      Palpations: Abdomen is soft.      Tenderness: There is generalized abdominal tenderness.   Skin:     General: Skin is warm.      Capillary Refill: Capillary refill takes less than 2 seconds.   Neurological:      General: No focal deficit present.      Mental Status: She is alert and oriented to person, place, and time.   Psychiatric:         Mood and Affect: Mood normal.         Behavior: Behavior normal.         Procedures            ED Course                                                 MDM  Number of Diagnoses or Management Options  Rotavirus infection: new and requires workup     Amount and/or Complexity of Data Reviewed  Clinical lab tests: ordered and reviewed  Tests in the radiology section of CPT®: ordered and reviewed  Tests in the medicine section of CPT®: reviewed and ordered  Independent visualization of images, tracings, or specimens: yes    Risk of Complications, Morbidity, and/or Mortality  Presenting problems: high  Diagnostic procedures: high  Management options: high    Patient Progress  Patient progress: stable      Final diagnoses:   Rotavirus infection       ED Disposition  ED Disposition     ED Disposition   Discharge    Condition   Stable    Comment   --             Alyx Sy, DO  [address] [facility] [address]  [facility] [address] [phone]    Schedule an appointment as soon as possible for a visit            Medication List      New Prescriptions    dicyclomine 20 MG tablet  Commonly known as: BENTYL  Take 1 tablet by mouth Every 6 (Six) Hours As Needed (abdominal cramping).           Where to Get Your Medications      These medications were sent to [facility] [address] [place] [address] [address] - [phone] [facility] [phone] [place] [address] [address] [address]    Phone: [phone]   · dicyclomine 20 MG tablet          Paola Sorenson DO  03/24/22 0410

## 2022-03-27 PROBLEM — R19.7 DIARRHEA: Status: ACTIVE | Noted: 2022-03-27

## 2022-03-27 PROBLEM — N30.01 ACUTE CYSTITIS WITH HEMATURIA: Status: ACTIVE | Noted: 2022-03-27

## 2022-03-27 NOTE — ASSESSMENT & PLAN NOTE
Will likely resolve the further out from delivery she gets.  However, will obtain inflammatory bowel disease panel for further evaluation.

## 2022-03-27 NOTE — ASSESSMENT & PLAN NOTE
Exact etiology unknown.  Suspected gout.  Will refer to rheumatology at [facility].

## 2022-04-01 ENCOUNTER — HOSPITAL ENCOUNTER (EMERGENCY)
Facility: HOSPITAL | Age: 37
Discharge: HOME | End: 2022-04-01
Attending: EMERGENCY MEDICINE | Admitting: EMERGENCY MEDICINE

## 2022-04-01 ENCOUNTER — APPOINTMENT (OUTPATIENT)
Dept: CT IMAGING | Facility: HOSPITAL | Age: 37
End: 2022-04-01

## 2022-04-01 VITALS
BODY MASS INDEX: 36.65 KG/M2 | DIASTOLIC BLOOD PRESSURE: 92 MMHG | OXYGEN SATURATION: 95 % | WEIGHT: 220 LBS | HEIGHT: 65 IN | SYSTOLIC BLOOD PRESSURE: 189 MMHG | RESPIRATION RATE: 25 BRPM | TEMPERATURE: 97.6 F | HEART RATE: 76 BPM

## 2022-04-01 DIAGNOSIS — R51.9 GENERALIZED HEADACHE: Primary | ICD-10-CM

## 2022-04-01 LAB
ALBUMIN SERPL-MCNC: 4.1 G/DL (ref 3.5–5.2)
ALBUMIN/GLOB SERPL: 1.4 G/DL
ALP SERPL-CCNC: 92 U/L (ref 39–117)
ALT SERPL W P-5'-P-CCNC: 23 U/L (ref 1–33)
ANION GAP SERPL CALCULATED.3IONS-SCNC: 12 MMOL/L (ref 5–15)
AST SERPL-CCNC: 22 U/L (ref 1–32)
BASOPHILS # BLD AUTO: 0.01 10*3/MM3 (ref 0–0.2)
BASOPHILS NFR BLD AUTO: 0.1 % (ref 0–1.5)
BILIRUB SERPL-MCNC: 0.2 MG/DL (ref 0–1.2)
BUN SERPL-MCNC: 8 MG/DL (ref 6–20)
BUN/CREAT SERPL: 10.5 (ref 7–25)
CALCIUM SPEC-SCNC: 9.1 MG/DL (ref 8.6–10.5)
CHLORIDE SERPL-SCNC: 105 MMOL/L (ref 98–107)
CO2 SERPL-SCNC: 26 MMOL/L (ref 22–29)
CREAT SERPL-MCNC: 0.76 MG/DL (ref 0.57–1)
DEPRECATED RDW RBC AUTO: 43.7 FL (ref 37–54)
EGFRCR SERPLBLD CKD-EPI 2021: 104.9 ML/MIN/1.73
EOSINOPHIL # BLD AUTO: 0.1 10*3/MM3 (ref 0–0.4)
EOSINOPHIL NFR BLD AUTO: 1.3 % (ref 0.3–6.2)
ERYTHROCYTE [DISTWIDTH] IN BLOOD BY AUTOMATED COUNT: 14.4 % (ref 12.3–15.4)
GLOBULIN UR ELPH-MCNC: 2.9 GM/DL
GLUCOSE SERPL-MCNC: 98 MG/DL (ref 65–99)
HCT VFR BLD AUTO: 37.2 % (ref 34–46.6)
HGB BLD-MCNC: 11.9 G/DL (ref 12–15.9)
IMM GRANULOCYTES # BLD AUTO: 0.07 10*3/MM3 (ref 0–0.05)
IMM GRANULOCYTES NFR BLD AUTO: 0.9 % (ref 0–0.5)
LYMPHOCYTES # BLD AUTO: 1.63 10*3/MM3 (ref 0.7–3.1)
LYMPHOCYTES NFR BLD AUTO: 21 % (ref 19.6–45.3)
MCH RBC QN AUTO: 26.8 PG (ref 26.6–33)
MCHC RBC AUTO-ENTMCNC: 32 G/DL (ref 31.5–35.7)
MCV RBC AUTO: 83.8 FL (ref 79–97)
MONOCYTES # BLD AUTO: 0.28 10*3/MM3 (ref 0.1–0.9)
MONOCYTES NFR BLD AUTO: 3.6 % (ref 5–12)
NEUTROPHILS NFR BLD AUTO: 5.69 10*3/MM3 (ref 1.7–7)
NEUTROPHILS NFR BLD AUTO: 73.1 % (ref 42.7–76)
NRBC BLD AUTO-RTO: 0 /100 WBC (ref 0–0.2)
PLATELET # BLD AUTO: 271 10*3/MM3 (ref 140–450)
PMV BLD AUTO: 9 FL (ref 6–12)
POTASSIUM SERPL-SCNC: 3.5 MMOL/L (ref 3.5–5.2)
PROT SERPL-MCNC: 7 G/DL (ref 6–8.5)
RBC # BLD AUTO: 4.44 10*6/MM3 (ref 3.77–5.28)
SODIUM SERPL-SCNC: 143 MMOL/L (ref 136–145)
WBC NRBC COR # BLD: 7.78 10*3/MM3 (ref 3.4–10.8)

## 2022-04-01 PROCEDURE — 99283 EMERGENCY DEPT VISIT LOW MDM: CPT

## 2022-04-01 PROCEDURE — 70450 CT HEAD/BRAIN W/O DYE: CPT

## 2022-04-01 PROCEDURE — 80053 COMPREHEN METABOLIC PANEL: CPT | Performed by: NURSE PRACTITIONER

## 2022-04-01 PROCEDURE — 96375 TX/PRO/DX INJ NEW DRUG ADDON: CPT

## 2022-04-01 PROCEDURE — 85025 COMPLETE CBC W/AUTO DIFF WBC: CPT | Performed by: NURSE PRACTITIONER

## 2022-04-01 PROCEDURE — 25010000002 DIPHENHYDRAMINE PER 50 MG: Performed by: NURSE PRACTITIONER

## 2022-04-01 PROCEDURE — 25010000002 KETOROLAC TROMETHAMINE PER 15 MG: Performed by: NURSE PRACTITIONER

## 2022-04-01 PROCEDURE — 25010000002 METOCLOPRAMIDE PER 10 MG: Performed by: NURSE PRACTITIONER

## 2022-04-01 PROCEDURE — 96374 THER/PROPH/DIAG INJ IV PUSH: CPT

## 2022-04-01 RX ORDER — KETOROLAC TROMETHAMINE 15 MG/ML
15 INJECTION, SOLUTION INTRAMUSCULAR; INTRAVENOUS ONCE
Status: COMPLETED | OUTPATIENT
Start: 2022-04-01 | End: 2022-04-01

## 2022-04-01 RX ORDER — SODIUM CHLORIDE 0.9 % (FLUSH) 0.9 %
10 SYRINGE (ML) INJECTION AS NEEDED
Status: DISCONTINUED | OUTPATIENT
Start: 2022-04-01 | End: 2022-04-01 | Stop reason: HOSPADM

## 2022-04-01 RX ORDER — DIPHENHYDRAMINE HYDROCHLORIDE 50 MG/ML
25 INJECTION INTRAMUSCULAR; INTRAVENOUS ONCE
Status: COMPLETED | OUTPATIENT
Start: 2022-04-01 | End: 2022-04-01

## 2022-04-01 RX ORDER — METOCLOPRAMIDE HYDROCHLORIDE 5 MG/ML
10 INJECTION INTRAMUSCULAR; INTRAVENOUS ONCE
Status: COMPLETED | OUTPATIENT
Start: 2022-04-01 | End: 2022-04-01

## 2022-04-01 RX ADMIN — SODIUM CHLORIDE 1000 ML: 9 INJECTION, SOLUTION INTRAVENOUS at 14:05

## 2022-04-01 RX ADMIN — KETOROLAC TROMETHAMINE 15 MG: 15 INJECTION, SOLUTION INTRAMUSCULAR; INTRAVENOUS at 14:10

## 2022-04-01 RX ADMIN — METOCLOPRAMIDE 10 MG: 5 INJECTION, SOLUTION INTRAMUSCULAR; INTRAVENOUS at 14:11

## 2022-04-01 RX ADMIN — DIPHENHYDRAMINE HYDROCHLORIDE 25 MG: 50 INJECTION, SOLUTION INTRAMUSCULAR; INTRAVENOUS at 14:11

## 2022-04-01 NOTE — DISCHARGE INSTRUCTIONS
Home to rest.  Do not drive today.  Follow-up with your primary care provider to monitor your recovery.  Return to the emergency department as needed for worsening symptoms or concerns thank you

## 2022-04-02 NOTE — ED PROVIDER NOTES
" EMERGENCY DEPARTMENT ENCOUNTER    Pt Name: Kelly Astorga  MRN: [id number]  Pt :   1985  Room Number:  24SF/24  Date of encounter:  2022  PCP: Alyx Sy DO  ED Provider: COLLIN Ocasio    Historian: patient      HPI:  Chief Complaint: headache         Context: Kelly Astorga is a 36 y.o. female who presents to the ED c/o sudden event of worst headache in her life which began approximately 9:40 AM today.  She states she has had Tylenol Motrin without any relief.  Patient states she has only a few severe headaches in her lifetime.  It is located on the top of her head.  She is experiencing no visual changes or neurosensory complaints.  She did feel very nauseated.  She had an event of hyperventilation associated with numbness and tingling to her hands and face resolved spontaneously when she felt more calm.    Review of systems is negative for fever chills or recent illness.  Negative for chest pain or cough or shortness of breath.  Positive for nausea with 1 event of vomiting.  No diarrhea.  Negative for abdominal pain.   systems are negative.  Negative for profound weakness, dizziness or syncope.  No neurosensory complaint or focal weakness.  Headache is located on the top of her head and is not familiar headache.      PAST MEDICAL HISTORY  Past Medical History:   Diagnosis Date   • Abnormal Pap smear of cervix     Follow up clear    • GERD (gastroesophageal reflux disease)    • Gestational diabetes    • Hyperlipidemia    • PONV (postoperative nausea and vomiting)    • Pre-eclampsia in third trimester     pt states \"diagnosed after delivery with 1st baby\"   • Vitamin D deficiency          PAST SURGICAL HISTORY  Past Surgical History:   Procedure Laterality Date   • GASTRIC RESTRICTION SURGERY  17    Gastric Sleeve   • GASTRIC SLEEVE LAPAROSCOPIC  2016   • LEEP  2013   • OTOPLASTY     • WISDOM TOOTH EXTRACTION           FAMILY HISTORY  Family " History   Problem Relation Age of Onset   • Hyperlipidemia Mother    • Hypertension Mother    • Hypertension Father    • Hyperlipidemia Father    • Diabetes Paternal Grandmother    • No Known Problems Brother    • Hydrocephalus Maternal Grandmother    • Hypertension Maternal Grandmother    • Hyperlipidemia Maternal Grandmother    • Heart disease Maternal Grandfather    • Hyperlipidemia Maternal Grandfather    • Hypertension Maternal Grandfather    • Heart attack Maternal Grandfather          SOCIAL HISTORY  Social History     Socioeconomic History   • Marital status:    Tobacco Use   • Smoking status: Never Smoker   • Smokeless tobacco: Never Used   Vaping Use   • Vaping Use: Never used   Substance and Sexual Activity   • Alcohol use: No   • Drug use: No   • Sexual activity: Yes     Partners: Male     Birth control/protection: None         ALLERGIES  Patient has no known allergies.        REVIEW OF SYSTEMS  Review of Systems     All systems reviewed and negative except for those discussed in HPI.       PHYSICAL EXAM    I have reviewed the triage vital signs and nursing notes.    ED Triage Vitals [04/01/22 1224]   Temp Heart Rate Resp BP SpO2   97.6 °F (36.4 °C) 76 25 (!) 189/92 95 %      Temp src Heart Rate Source Patient Position BP Location FiO2 (%)   Oral Monitor Sitting Right arm --       Physical Exam  GENERAL:   Appears uncomfortable.  She is hypertensive.  Patient is very good historian  HENT: Nares patent.  EYES: No scleral icterus.  CV: Regular rhythm, regular rate.  No tachycardia.  No peripheral edema  RESPIRATORY: Normal effort.  No audible wheezes, rales or rhonchi.  ABDOMEN: Soft, nontender  MUSCULOSKELETAL: No deformities.   NEURO: Alert, moves all extremities, follows commands.  No photophobia or meningeal signs.  No neurosensory complaints or focal weakness.  SKIN: Warm, dry, no rash visualized.        LAB RESULTS  Recent Results (from the past 24 hour(s))   Comprehensive Metabolic Panel     Collection Time: 04/01/22  2:11 PM    Specimen: Blood   Result Value Ref Range    Glucose 98 65 - 99 mg/dL    BUN 8 6 - 20 mg/dL    Creatinine 0.76 0.57 - 1.00 mg/dL    Sodium 143 136 - 145 mmol/L    Potassium 3.5 3.5 - 5.2 mmol/L    Chloride 105 98 - 107 mmol/L    CO2 26.0 22.0 - 29.0 mmol/L    Calcium 9.1 8.6 - 10.5 mg/dL    Total Protein 7.0 6.0 - 8.5 g/dL    Albumin 4.10 3.50 - 5.20 g/dL    ALT (SGPT) 23 1 - 33 U/L    AST (SGOT) 22 1 - 32 U/L    Alkaline Phosphatase 92 39 - 117 U/L    Total Bilirubin 0.2 0.0 - 1.2 mg/dL    Globulin 2.9 gm/dL    A/G Ratio 1.4 g/dL    BUN/Creatinine Ratio 10.5 7.0 - 25.0    Anion Gap 12.0 5.0 - 15.0 mmol/L    eGFR 104.9 >60.0 mL/min/1.73   CBC Auto Differential    Collection Time: 04/01/22  2:11 PM    Specimen: Blood   Result Value Ref Range    WBC 7.78 3.40 - 10.80 10*3/mm3    RBC 4.44 3.77 - 5.28 10*6/mm3    Hemoglobin 11.9 (L) 12.0 - 15.9 g/dL    Hematocrit 37.2 34.0 - 46.6 %    MCV 83.8 79.0 - 97.0 fL    MCH 26.8 26.6 - 33.0 pg    MCHC 32.0 31.5 - 35.7 g/dL    RDW 14.4 12.3 - 15.4 %    RDW-SD 43.7 37.0 - 54.0 fl    MPV 9.0 6.0 - 12.0 fL    Platelets 271 140 - 450 10*3/mm3    Neutrophil % 73.1 42.7 - 76.0 %    Lymphocyte % 21.0 19.6 - 45.3 %    Monocyte % 3.6 (L) 5.0 - 12.0 %    Eosinophil % 1.3 0.3 - 6.2 %    Basophil % 0.1 0.0 - 1.5 %    Immature Grans % 0.9 (H) 0.0 - 0.5 %    Neutrophils, Absolute 5.69 1.70 - 7.00 10*3/mm3    Lymphocytes, Absolute 1.63 0.70 - 3.10 10*3/mm3    Monocytes, Absolute 0.28 0.10 - 0.90 10*3/mm3    Eosinophils, Absolute 0.10 0.00 - 0.40 10*3/mm3    Basophils, Absolute 0.01 0.00 - 0.20 10*3/mm3    Immature Grans, Absolute 0.07 (H) 0.00 - 0.05 10*3/mm3    nRBC 0.0 0.0 - 0.2 /100 WBC       If labs were ordered, I independently reviewed the results.        RADIOLOGY  CT Head Without Contrast    Result Date: 4/1/2022  CT HEAD WO CONTRAST-  Date of Exam: 4/1/2022 12:43 PM  Indication: Headache, new or worsening (Age >= 50y).  Comparison: None available.   Technique: CT scan of the head without IV contrast.  Automated exposure control and iterative reconstruction methods were used.  FINDINGS No acute intracranial hemorrhage or extra-axial collection is identified. The ventricles appear normal in caliber, with no evidence of mass effect or midline shift. The basal cisterns appear patent. The gray-white differentiation appears preserved.  The calvarium appears intact. The visualized paranasal sinuses are clear. The mastoid air cells are well-aerated.      No acute intracranial process identified.  This report was finalized on 4/1/2022 1:05 PM by Evans Barrett MD.        I     PROCEDURES    Procedures    No orders to display       MEDICATIONS GIVEN IN ER    Medications   sodium chloride 0.9 % bolus 1,000 mL (0 mL Intravenous Stopped 4/1/22 1533)   ketorolac (TORADOL) injection 15 mg (15 mg Intravenous Given 4/1/22 1410)   metoclopramide (REGLAN) injection 10 mg (10 mg Intravenous Given 4/1/22 1411)   diphenhydrAMINE (BENADRYL) injection 25 mg (25 mg Intravenous Given 4/1/22 1411)           ED Course as of 04/01/22 2037   Fri Apr 01, 2022   1520 Patient feels dramatically better.  Her headache is resolved.  Labs are reassuring.  CT imaging is negative for acute findings.  We discussed parameters for concern that would warrant return to the emergency department.  Ms. Astorga understand and concurs with his outpatient plan of care close follow-up [MS]      ED Course User Index  [MS] Cherelle French Malini, APRN             AS OF 20:37 EDT VITALS:    BP - (!) 189/92  HR - 76  TEMP - 97.6 °F (36.4 °C) (Oral)  O2 SATS - 95%                  DIAGNOSIS  Final diagnoses:   Generalized headache         DISPOSITION     DISCHARGE    Patient discharged in stable condition.    Reviewed implications of results, diagnosis, meds, responsibility to follow up, warning signs and symptoms of possible worsening, potential complications and reasons to return to ER.    Patient/Family voiced  understanding of above instructions.    Discussed plan for discharge, as there is no emergent indication for admission.  Pt/family is agreeable and understands need for follow up and possible repeat testing.  Pt/family is aware that discharge does not mean that nothing is wrong but that it indicates no emergency is currently present that requires admission and they must continue care with follow-up as given below or with a physician of their choice.     FOLLOW-UP  Alyx Sy DO  [address]  [facility] [address] [phone]    Schedule an appointment as soon as possible for a visit in 2 days  If symptoms worsen         Medication List      No changes were made to your prescriptions during this visit.                  Cherelle French, COLLIN  04/01/22 2388

## 2022-04-04 ENCOUNTER — HOSPITAL ENCOUNTER (OUTPATIENT)
Dept: MRI IMAGING | Facility: HOSPITAL | Age: 37
Discharge: HOME | End: 2022-04-04
Admitting: FAMILY MEDICINE

## 2022-04-04 PROCEDURE — A9577 INJ MULTIHANCE: HCPCS | Performed by: FAMILY MEDICINE

## 2022-04-04 PROCEDURE — 0 GADOBENATE DIMEGLUMINE 529 MG/ML SOLUTION: Performed by: FAMILY MEDICINE

## 2022-04-04 PROCEDURE — 70553 MRI BRAIN STEM W/O & W/DYE: CPT

## 2022-04-04 RX ADMIN — GADOBENATE DIMEGLUMINE 20 ML: 529 INJECTION, SOLUTION INTRAVENOUS at 14:35

## 2022-04-22 ENCOUNTER — PATIENT MESSAGE (OUTPATIENT)
Dept: INTERNAL MEDICINE | Facility: CLINIC | Age: 37
End: 2022-04-22

## 2022-04-23 RX ORDER — ALLOPURINOL 100 MG/1
200 TABLET ORAL DAILY
Qty: 180 TABLET | Refills: 3 | Status: SHIPPED | OUTPATIENT
Start: 2022-04-23 | End: 2022-05-27

## 2022-04-24 NOTE — TELEPHONE ENCOUNTER
From: Kelly Astorga  To: Alyx Sy DO  Sent: 4/22/2022 12:34 AM EDT  Subject: Uric Acid    Hi Dr. Sy!  I’ve been taking the allopurinol but feel like my joint pain just continues to worsen. It’s moderate pain 5/10 bilaterally, mostly wrists, elbows, shoulders and upper back. I feel like my right wrist is having a flare, but I’ve worn a brace this week and it seems to be helping. Do you think I should increase to 200mg daily? I alternate Tylenol and Motrin but it’s not helping much. You’ve sent me in diclofenac in the past which I have tried to take as well, but feel like the Motrin helps a little better. I have also been taking a tart cherry supplement but no luck with that either. My rheumatologist appt is May 9 so hopefully they can figure something out.     Thanks!  Kelly

## 2022-05-09 ENCOUNTER — LAB (OUTPATIENT)
Dept: LAB | Facility: HOSPITAL | Age: 37
End: 2022-05-09

## 2022-05-09 ENCOUNTER — TRANSCRIBE ORDERS (OUTPATIENT)
Dept: LAB | Facility: HOSPITAL | Age: 37
End: 2022-05-09

## 2022-05-09 DIAGNOSIS — R53.83 OTHER FATIGUE: ICD-10-CM

## 2022-05-09 DIAGNOSIS — R94.5 ABNORMAL RESULTS OF LIVER FUNCTION STUDIES: ICD-10-CM

## 2022-05-09 DIAGNOSIS — E55.9 VITAMIN D DEFICIENCY: ICD-10-CM

## 2022-05-09 DIAGNOSIS — M25.50 PAIN IN JOINTS: ICD-10-CM

## 2022-05-09 DIAGNOSIS — E55.9 VITAMIN D DEFICIENCY: Primary | ICD-10-CM

## 2022-05-09 DIAGNOSIS — E79.0 HYPERURICEMIA WITHOUT SIGNS INFLAMMATORY ARTHRITIS/TOPHACEOUS DISEASE: ICD-10-CM

## 2022-05-09 LAB
ALBUMIN SERPL-MCNC: 4.1 G/DL (ref 3.5–5.2)
ALBUMIN/GLOB SERPL: 1.3 G/DL
ALP SERPL-CCNC: 92 U/L (ref 39–117)
ALT SERPL W P-5'-P-CCNC: 19 U/L (ref 1–33)
ANION GAP SERPL CALCULATED.3IONS-SCNC: 12.4 MMOL/L (ref 5–15)
AST SERPL-CCNC: 17 U/L (ref 1–32)
BASOPHILS # BLD AUTO: 0.04 10*3/MM3 (ref 0–0.2)
BASOPHILS NFR BLD AUTO: 0.5 % (ref 0–1.5)
BILIRUB SERPL-MCNC: 0.2 MG/DL (ref 0–1.2)
BUN SERPL-MCNC: 7 MG/DL (ref 6–20)
BUN/CREAT SERPL: 8.2 (ref 7–25)
CALCIUM SPEC-SCNC: 9.6 MG/DL (ref 8.6–10.5)
CHLORIDE SERPL-SCNC: 105 MMOL/L (ref 98–107)
CK SERPL-CCNC: 41 U/L (ref 20–180)
CO2 SERPL-SCNC: 20.6 MMOL/L (ref 22–29)
CREAT SERPL-MCNC: 0.85 MG/DL (ref 0.57–1)
CRP SERPL-MCNC: 2.48 MG/DL (ref 0–0.5)
DEPRECATED RDW RBC AUTO: 41.8 FL (ref 37–54)
EGFRCR SERPLBLD CKD-EPI 2021: 91.8 ML/MIN/1.73
EOSINOPHIL # BLD AUTO: 0.27 10*3/MM3 (ref 0–0.4)
EOSINOPHIL NFR BLD AUTO: 3.3 % (ref 0.3–6.2)
ERYTHROCYTE [DISTWIDTH] IN BLOOD BY AUTOMATED COUNT: 14.3 % (ref 12.3–15.4)
ERYTHROCYTE [SEDIMENTATION RATE] IN BLOOD: 21 MM/HR (ref 0–20)
FERRITIN SERPL-MCNC: 12.4 NG/ML (ref 13–150)
GLOBULIN UR ELPH-MCNC: 3.1 GM/DL
GLUCOSE SERPL-MCNC: 124 MG/DL (ref 65–99)
HAV IGM SERPL QL IA: NORMAL
HBV CORE IGM SERPL QL IA: NORMAL
HBV SURFACE AG SERPL QL IA: NORMAL
HCT VFR BLD AUTO: 36.8 % (ref 34–46.6)
HCV AB SER DONR QL: NORMAL
HGB BLD-MCNC: 12.1 G/DL (ref 12–15.9)
IMM GRANULOCYTES # BLD AUTO: 0.03 10*3/MM3 (ref 0–0.05)
IMM GRANULOCYTES NFR BLD AUTO: 0.4 % (ref 0–0.5)
IRON 24H UR-MRATE: 35 MCG/DL (ref 37–145)
IRON SATN MFR SERPL: 5 % (ref 20–50)
LYMPHOCYTES # BLD AUTO: 2.53 10*3/MM3 (ref 0.7–3.1)
LYMPHOCYTES NFR BLD AUTO: 30.6 % (ref 19.6–45.3)
MCH RBC QN AUTO: 26.9 PG (ref 26.6–33)
MCHC RBC AUTO-ENTMCNC: 32.9 G/DL (ref 31.5–35.7)
MCV RBC AUTO: 82 FL (ref 79–97)
MONOCYTES # BLD AUTO: 0.42 10*3/MM3 (ref 0.1–0.9)
MONOCYTES NFR BLD AUTO: 5.1 % (ref 5–12)
NEUTROPHILS NFR BLD AUTO: 4.99 10*3/MM3 (ref 1.7–7)
NEUTROPHILS NFR BLD AUTO: 60.1 % (ref 42.7–76)
NRBC BLD AUTO-RTO: 0 /100 WBC (ref 0–0.2)
PLATELET # BLD AUTO: 315 10*3/MM3 (ref 140–450)
PMV BLD AUTO: 10.2 FL (ref 6–12)
POTASSIUM SERPL-SCNC: 3.3 MMOL/L (ref 3.5–5.2)
PROT SERPL-MCNC: 7.2 G/DL (ref 6–8.5)
RBC # BLD AUTO: 4.49 10*6/MM3 (ref 3.77–5.28)
SODIUM SERPL-SCNC: 138 MMOL/L (ref 136–145)
TIBC SERPL-MCNC: 653 MCG/DL (ref 298–536)
TRANSFERRIN SERPL-MCNC: 438 MG/DL (ref 200–360)
TSH SERPL DL<=0.05 MIU/L-ACNC: 1.37 UIU/ML (ref 0.27–4.2)
URATE SERPL-MCNC: 5.2 MG/DL (ref 2.4–5.7)
WBC NRBC COR # BLD: 8.28 10*3/MM3 (ref 3.4–10.8)

## 2022-05-09 PROCEDURE — 86235 NUCLEAR ANTIGEN ANTIBODY: CPT

## 2022-05-09 PROCEDURE — 86037 ANCA TITER EACH ANTIBODY: CPT

## 2022-05-09 PROCEDURE — 82607 VITAMIN B-12: CPT

## 2022-05-09 PROCEDURE — 83540 ASSAY OF IRON: CPT

## 2022-05-09 PROCEDURE — 85730 THROMBOPLASTIN TIME PARTIAL: CPT

## 2022-05-09 PROCEDURE — 82550 ASSAY OF CK (CPK): CPT

## 2022-05-09 PROCEDURE — 36415 COLL VENOUS BLD VENIPUNCTURE: CPT

## 2022-05-09 PROCEDURE — 82525 ASSAY OF COPPER: CPT

## 2022-05-09 PROCEDURE — 82728 ASSAY OF FERRITIN: CPT

## 2022-05-09 PROCEDURE — 80050 GENERAL HEALTH PANEL: CPT

## 2022-05-09 PROCEDURE — 85670 THROMBIN TIME PLASMA: CPT

## 2022-05-09 PROCEDURE — 84550 ASSAY OF BLOOD/URIC ACID: CPT

## 2022-05-09 PROCEDURE — 86225 DNA ANTIBODY NATIVE: CPT

## 2022-05-09 PROCEDURE — 85610 PROTHROMBIN TIME: CPT

## 2022-05-09 PROCEDURE — 86146 BETA-2 GLYCOPROTEIN ANTIBODY: CPT

## 2022-05-09 PROCEDURE — 83520 IMMUNOASSAY QUANT NOS NONAB: CPT

## 2022-05-09 PROCEDURE — 86038 ANTINUCLEAR ANTIBODIES: CPT

## 2022-05-09 PROCEDURE — 86364 TISS TRNSGLTMNASE EA IG CLAS: CPT

## 2022-05-09 PROCEDURE — 86231 EMA EACH IG CLASS: CPT

## 2022-05-09 PROCEDURE — 85613 RUSSELL VIPER VENOM DILUTED: CPT

## 2022-05-09 PROCEDURE — 85652 RBC SED RATE AUTOMATED: CPT

## 2022-05-09 PROCEDURE — 86160 COMPLEMENT ANTIGEN: CPT

## 2022-05-09 PROCEDURE — 85732 THROMBOPLASTIN TIME PARTIAL: CPT

## 2022-05-09 PROCEDURE — 82784 ASSAY IGA/IGD/IGG/IGM EACH: CPT

## 2022-05-09 PROCEDURE — 86147 CARDIOLIPIN ANTIBODY EA IG: CPT

## 2022-05-09 PROCEDURE — 86140 C-REACTIVE PROTEIN: CPT

## 2022-05-09 PROCEDURE — 81003 URINALYSIS AUTO W/O SCOPE: CPT

## 2022-05-09 PROCEDURE — 86200 CCP ANTIBODY: CPT

## 2022-05-09 PROCEDURE — 82746 ASSAY OF FOLIC ACID SERUM: CPT

## 2022-05-09 PROCEDURE — 82306 VITAMIN D 25 HYDROXY: CPT

## 2022-05-09 PROCEDURE — 80074 ACUTE HEPATITIS PANEL: CPT

## 2022-05-09 PROCEDURE — 86431 RHEUMATOID FACTOR QUANT: CPT

## 2022-05-09 PROCEDURE — 85598 HEXAGNAL PHOSPH PLTLT NEUTRL: CPT

## 2022-05-09 PROCEDURE — 84466 ASSAY OF TRANSFERRIN: CPT

## 2022-05-10 LAB
25(OH)D3 SERPL-MCNC: 28.1 NG/ML (ref 30–100)
ANA SER QL IF: NEGATIVE
BILIRUB UR QL STRIP: NEGATIVE
C3 SERPL-MCNC: 165 MG/DL (ref 82–167)
C4 SERPL-MCNC: 32 MG/DL (ref 14–44)
CENTROMERE B AB SER-ACNC: <0.2 AI (ref 0–0.9)
CHROMATIN AB SERPL-ACNC: <0.2 AI (ref 0–0.9)
CHROMATIN AB SERPL-ACNC: <10 IU/ML (ref 0–14)
CLARITY UR: CLEAR
COLOR UR: YELLOW
DSDNA AB SER-ACNC: <1 IU/ML (ref 0–9)
ENA JO1 AB SER-ACNC: <0.2 AI (ref 0–0.9)
ENA RNP AB SER-ACNC: 0.4 AI (ref 0–0.9)
ENA SCL70 AB SER-ACNC: <0.2 AI (ref 0–0.9)
ENA SM AB SER-ACNC: <0.2 AI (ref 0–0.9)
ENA SS-A AB SER-ACNC: <0.2 AI (ref 0–0.9)
ENA SS-B AB SER-ACNC: <0.2 AI (ref 0–0.9)
ENDOMYSIUM IGA SER QL: NEGATIVE
FOLATE SERPL-MCNC: >20 NG/ML (ref 4.78–24.2)
GLUCOSE UR STRIP-MCNC: NEGATIVE MG/DL
HGB UR QL STRIP.AUTO: NEGATIVE
IGA SERPL-MCNC: 192 MG/DL (ref 87–352)
KETONES UR QL STRIP: NEGATIVE
LEUKOCYTE ESTERASE UR QL STRIP.AUTO: NEGATIVE
Lab: NORMAL
NITRITE UR QL STRIP: NEGATIVE
PH UR STRIP.AUTO: 6 [PH] (ref 5–8)
PROT UR QL STRIP: NEGATIVE
SP GR UR STRIP: 1.01 (ref 1–1.03)
TTG IGA SER-ACNC: <2 U/ML (ref 0–3)
UROBILINOGEN UR QL STRIP: NORMAL
VIT B12 BLD-MCNC: 490 PG/ML (ref 211–946)

## 2022-05-11 LAB
C-ANCA TITR SER IF: NORMAL TITER
CCP IGA+IGG SERPL IA-ACNC: 9 UNITS (ref 0–19)
MYELOPEROXIDASE AB SER IA-ACNC: <9 U/ML (ref 0–9)
P-ANCA ATYPICAL TITR SER IF: NORMAL TITER
P-ANCA TITR SER IF: NORMAL TITER
PROTEINASE3 AB SER IA-ACNC: <3.5 U/ML (ref 0–3.5)

## 2022-05-14 LAB
14-3-3 ETA AG SER IA-MCNC: <0.2 NG/ML
COPPER SERPL-MCNC: 243 UG/DL (ref 80–158)

## 2022-05-23 ENCOUNTER — TRANSCRIBE ORDERS (OUTPATIENT)
Dept: LAB | Facility: HOSPITAL | Age: 37
End: 2022-05-23

## 2022-05-23 ENCOUNTER — LAB (OUTPATIENT)
Dept: LAB | Facility: HOSPITAL | Age: 37
End: 2022-05-23

## 2022-05-23 DIAGNOSIS — R53.83 OTHER FATIGUE: ICD-10-CM

## 2022-05-23 DIAGNOSIS — R94.5 ABNORMAL RESULTS OF LIVER FUNCTION STUDIES: ICD-10-CM

## 2022-05-23 DIAGNOSIS — M25.50 ARTHRALGIA, UNSPECIFIED JOINT: Primary | ICD-10-CM

## 2022-05-23 DIAGNOSIS — M25.50 ARTHRALGIA, UNSPECIFIED JOINT: ICD-10-CM

## 2022-05-23 DIAGNOSIS — R79.0 ABNORMAL LEVEL OF BLOOD MINERAL: ICD-10-CM

## 2022-05-23 LAB — CERULOPLASMIN SERPL-MCNC: 47 MG/DL (ref 19–39)

## 2022-05-23 PROCEDURE — 36415 COLL VENOUS BLD VENIPUNCTURE: CPT

## 2022-05-23 PROCEDURE — 82390 ASSAY OF CERULOPLASMIN: CPT

## 2022-05-25 ENCOUNTER — LAB (OUTPATIENT)
Dept: LAB | Facility: HOSPITAL | Age: 37
End: 2022-05-25

## 2022-05-25 DIAGNOSIS — R79.0 ABNORMAL LEVEL OF BLOOD MINERAL: ICD-10-CM

## 2022-05-25 DIAGNOSIS — M25.50 ARTHRALGIA, UNSPECIFIED JOINT: ICD-10-CM

## 2022-05-25 DIAGNOSIS — R53.83 OTHER FATIGUE: ICD-10-CM

## 2022-05-25 DIAGNOSIS — R94.5 ABNORMAL RESULTS OF LIVER FUNCTION STUDIES: ICD-10-CM

## 2022-05-25 PROCEDURE — 81050 URINALYSIS VOLUME MEASURE: CPT

## 2022-05-25 PROCEDURE — 82570 ASSAY OF URINE CREATININE: CPT

## 2022-05-25 PROCEDURE — 82525 ASSAY OF COPPER: CPT

## 2022-05-27 ENCOUNTER — OFFICE VISIT (OUTPATIENT)
Dept: GASTROENTEROLOGY | Facility: CLINIC | Age: 37
End: 2022-05-27

## 2022-05-27 VITALS
BODY MASS INDEX: 36.25 KG/M2 | WEIGHT: 217.6 LBS | TEMPERATURE: 97.5 F | SYSTOLIC BLOOD PRESSURE: 154 MMHG | HEART RATE: 92 BPM | DIASTOLIC BLOOD PRESSURE: 74 MMHG | HEIGHT: 65 IN

## 2022-05-27 DIAGNOSIS — R79.89 ELEVATED LIVER FUNCTION TESTS: Primary | ICD-10-CM

## 2022-05-27 LAB
APTT HEX PL PPP: 6 SEC
APTT IMM NP PPP: NORMAL S
APTT PPP 1:1 SALINE: NORMAL S
APTT PPP: 23.9 SEC
B2 GLYCOPROT1 IGA SER-ACNC: <10 SAU
B2 GLYCOPROT1 IGG SER-ACNC: <10 SGU
B2 GLYCOPROT1 IGM SER-ACNC: <10 SMU
CARDIOLIPIN IGG SER IA-ACNC: <10 GPL
CARDIOLIPIN IGM SER IA-ACNC: <10 MPL
CONFIRM DRVVT: NORMAL S
COPPER 24H UR-MRATE: 8 UG/24 HR (ref 3–35)
COPPER UR-MCNC: 7 UG/L
COPPER/CREAT UR: 6 UG/G CREAT (ref 0–49)
CREAT UR-MCNC: 1.21 G/L (ref 0.3–3)
DRVVT SCREEN TO CONFIRM RATIO: NORMAL {RATIO}
INR PPP: 0.9 RATIO
LABORATORY COMMENT REPORT: NORMAL
PROTHROMBIN TIME: 9.8 SEC
SCREEN DRVVT: 38.4 SEC
THROMBIN TIME: 16.9 SEC

## 2022-05-27 PROCEDURE — 99214 OFFICE O/P EST MOD 30 MIN: CPT | Performed by: NURSE PRACTITIONER

## 2022-05-27 RX ORDER — NORGESTIMATE AND ETHINYL ESTRADIOL 0.25-0.035
1 KIT ORAL DAILY
COMMUNITY
End: 2022-11-14

## 2022-05-27 NOTE — PROGRESS NOTES
"GASTROENTEROLOGY OFFICE NOTE  Kelly Suzpatrick  [id number]  1985    CARE TEAM  Patient Care Team:  Alyx Sy DO as PCP - General (Family Medicine)  Angelique Danielson DO as Consulting Physician (Endocrinology)    Referring Provider: Alyx Sy DO    Chief Complaint   Patient presents with   • Elevated Hepatic Enzymes        HISTORY OF PRESENT ILLNESS:  Patient is a 36-year-old female who was referred for an elevated ceruloplasmin elevated blood copper.  Liver enzymes are noted to be normal.  She reports that she has been seen by [facility] already, this report is not available for review but the patient was told that no Edwards Fleischer rings were noted.  She has submitted a 24-hour copper urine, results are currently pending.    PAST MEDICAL HISTORY  Past Medical History:   Diagnosis Date   • Abnormal Pap smear of cervix 2013    Follow up clear    • GERD (gastroesophageal reflux disease)    • Hyperlipidemia    • PONV (postoperative nausea and vomiting)    • Pre-eclampsia in third trimester     pt states \"diagnosed after delivery with 1st baby\"   • Vitamin D deficiency         PAST SURGICAL HISTORY  Past Surgical History:   Procedure Laterality Date   • GASTRIC RESTRICTION SURGERY  9/19/16    Gastric Sleeve   • GASTRIC SLEEVE LAPAROSCOPIC  09/2016   • LEEP  2013   • OTOPLASTY  2005   • WISDOM TOOTH EXTRACTION          MEDICATIONS:    Current Outpatient Medications:   •  cetirizine (zyrTEC) 10 MG tablet, Take 10 mg by mouth Daily., Disp: , Rfl:   •  Omeprazole 20 MG Tablet Delayed Release Dispersible, Take 20 mg by mouth Daily., Disp: , Rfl:   •  Prenatal Vit-Fe Fumarate-FA (PNV PRENATAL PLUS MULTIVITAMIN) 27-1 MG tablet, Take 1 tablet by mouth Daily. 200001, Disp: , Rfl: 3  •  norgestimate-ethinyl estradiol (Sprintec 28) 0.25-35 MG-MCG per tablet, Take 1 tablet by mouth Daily., Disp: , Rfl:     ALLERGIES  No Known Allergies    FAMILY HISTORY:  Family History " "  Problem Relation Age of Onset   • Hyperlipidemia Mother    • Hypertension Mother    • Hypertension Father    • Hyperlipidemia Father    • No Known Problems Brother    • Hydrocephalus Maternal Grandmother    • Hypertension Maternal Grandmother    • Hyperlipidemia Maternal Grandmother    • Heart disease Maternal Grandfather    • Hyperlipidemia Maternal Grandfather    • Hypertension Maternal Grandfather    • Heart attack Maternal Grandfather    • Diabetes Paternal Grandmother    • Liver disease Neg Hx    • Liver cancer Neg Hx    • Colon cancer Neg Hx        SOCIAL HISTORY  Social History     Socioeconomic History   • Marital status:    Tobacco Use   • Smoking status: Never Smoker   • Smokeless tobacco: Never Used   Vaping Use   • Vaping Use: Never used   Substance and Sexual Activity   • Alcohol use: No   • Drug use: No   • Sexual activity: Yes     Partners: Male     Birth control/protection: None         PHYSICAL EXAM   /74 (BP Location: Left arm, Patient Position: Sitting, Cuff Size: Adult)   Pulse 92   Temp 97.5 °F (36.4 °C) (Temporal)   Ht 165.1 cm (65\")   Wt 98.7 kg (217 lb 9.6 oz)   BMI 36.21 kg/m²   Physical Exam  Constitutional:       Appearance: Normal appearance.   HENT:      Head: Normocephalic and atraumatic.   Pulmonary:      Effort: Pulmonary effort is normal.   Neurological:      Mental Status: She is alert and oriented to person, place, and time.   Psychiatric:         Mood and Affect: Mood normal.         Thought Content: Thought content normal.           Results Review:   Latest Reference Range & Units 05/09/22 16:49   Glucose 65 - 99 mg/dL 124 (H)   Sodium 136 - 145 mmol/L 138   Potassium 3.5 - 5.2 mmol/L 3.3 (L)   CO2 22.0 - 29.0 mmol/L 20.6 (L)   Chloride 98 - 107 mmol/L 105   Anion Gap 5.0 - 15.0 mmol/L 12.4   Creatinine 0.57 - 1.00 mg/dL 0.85   BUN 6 - 20 mg/dL 7   BUN/Creatinine Ratio 7.0 - 25.0  8.2   Calcium 8.6 - 10.5 mg/dL 9.6   EGFR >60.0 mL/min/1.73 91.8 [1]   Alkaline " Phosphatase 39 - 117 U/L 92   Total Protein 6.0 - 8.5 g/dL 7.2   Uric Acid 2.4 - 5.7 mg/dL 5.2   ALT (SGPT) 1 - 33 U/L 19   AST (SGOT) 1 - 32 U/L 17   Total Bilirubin 0.0 - 1.2 mg/dL 0.2   Albumin 3.50 - 5.20 g/dL 4.10   Globulin gm/dL 3.1   A/G Ratio g/dL 1.3      Latest Reference Range & Units 05/09/22 16:49 05/23/22 16:34   Ceruloplasmin 19 - 39 mg/dL  47 (H)   C-Reactive Protein 0.00 - 0.50 mg/dL 2.48 (H)    Copper 80 - 158 ug/dL 243 (H) [1]        ASSESSMENT / PLAN  1.  Elevated ceruloplasmin  Edwards Fleischer rings absent  24-hour urine copper pending- if 24-hour urine copper less than or equal to 40 mcg Wilsons disease excluded, if 24-hour urine copper greater then 40 we will plan to obtain liver biopsy.    I discussed the patients findings and my recommendations with patient    Gregsheree Hazel, COLLIN      Component   Ref Range & Units 6 d ago 1 yr ago   Copper, urine   Not Estab. ug/L 7     Comment:                                 Detection Limit = 1   Creatinine, Urine   0.30 - 3.00 g/L 1.21  33.5 R    Comment:                                 Detection Limit = 0.10   Copper /G Creat   0 - 49 ug/g creat 6     Copper, 24H Ur   3 - 35 ug/24 hr 8     Resulting Agency [facility]      Alli's disease excluded

## 2022-07-03 ENCOUNTER — TRANSCRIBE ORDERS (OUTPATIENT)
Dept: LAB | Facility: HOSPITAL | Age: 37
End: 2022-07-03

## 2022-07-03 ENCOUNTER — LAB (OUTPATIENT)
Dept: LAB | Facility: HOSPITAL | Age: 37
End: 2022-07-03

## 2022-07-03 DIAGNOSIS — R21 RASH AND OTHER NONSPECIFIC SKIN ERUPTION: ICD-10-CM

## 2022-07-03 DIAGNOSIS — R59.1 LYMPHADENOPATHY: ICD-10-CM

## 2022-07-03 DIAGNOSIS — R53.83 TIREDNESS: ICD-10-CM

## 2022-07-03 DIAGNOSIS — M25.50 PAIN IN JOINT, MULTIPLE SITES: ICD-10-CM

## 2022-07-03 DIAGNOSIS — R94.5 NONSPECIFIC ABNORMAL RESULTS OF LIVER FUNCTION STUDY: ICD-10-CM

## 2022-07-03 DIAGNOSIS — M25.50 PAIN IN JOINT, MULTIPLE SITES: Primary | ICD-10-CM

## 2022-07-03 LAB
ALBUMIN SERPL-MCNC: 4.2 G/DL (ref 3.5–5.2)
ALBUMIN/GLOB SERPL: 1.9 G/DL
ALP SERPL-CCNC: 76 U/L (ref 39–117)
ALT SERPL W P-5'-P-CCNC: 18 U/L (ref 1–33)
ANION GAP SERPL CALCULATED.3IONS-SCNC: 13 MMOL/L (ref 5–15)
AST SERPL-CCNC: 14 U/L (ref 1–32)
BASOPHILS # BLD AUTO: 0.02 10*3/MM3 (ref 0–0.2)
BASOPHILS NFR BLD AUTO: 0.3 % (ref 0–1.5)
BILIRUB SERPL-MCNC: 0.2 MG/DL (ref 0–1.2)
BILIRUB UR QL STRIP: NEGATIVE
BUN SERPL-MCNC: 8 MG/DL (ref 6–20)
BUN/CREAT SERPL: 13.3 (ref 7–25)
CALCIUM SPEC-SCNC: 9.2 MG/DL (ref 8.6–10.5)
CHLORIDE SERPL-SCNC: 103 MMOL/L (ref 98–107)
CLARITY UR: ABNORMAL
CO2 SERPL-SCNC: 24 MMOL/L (ref 22–29)
COLOR UR: YELLOW
CREAT SERPL-MCNC: 0.6 MG/DL (ref 0.57–1)
CRP SERPL-MCNC: 2.35 MG/DL (ref 0–0.5)
DEPRECATED RDW RBC AUTO: 40.7 FL (ref 37–54)
EGFRCR SERPLBLD CKD-EPI 2021: 120.2 ML/MIN/1.73
EOSINOPHIL # BLD AUTO: 0.25 10*3/MM3 (ref 0–0.4)
EOSINOPHIL NFR BLD AUTO: 3.3 % (ref 0.3–6.2)
ERYTHROCYTE [DISTWIDTH] IN BLOOD BY AUTOMATED COUNT: 14.3 % (ref 12.3–15.4)
ERYTHROCYTE [SEDIMENTATION RATE] IN BLOOD: 18 MM/HR (ref 0–20)
GLOBULIN UR ELPH-MCNC: 2.2 GM/DL
GLUCOSE SERPL-MCNC: 77 MG/DL (ref 65–99)
GLUCOSE UR STRIP-MCNC: NEGATIVE MG/DL
HCT VFR BLD AUTO: 36 % (ref 34–46.6)
HGB BLD-MCNC: 12.1 G/DL (ref 12–15.9)
HGB UR QL STRIP.AUTO: NEGATIVE
IMM GRANULOCYTES # BLD AUTO: 0.03 10*3/MM3 (ref 0–0.05)
IMM GRANULOCYTES NFR BLD AUTO: 0.4 % (ref 0–0.5)
KETONES UR QL STRIP: NEGATIVE
LEUKOCYTE ESTERASE UR QL STRIP.AUTO: NEGATIVE
LYMPHOCYTES # BLD AUTO: 2.3 10*3/MM3 (ref 0.7–3.1)
LYMPHOCYTES NFR BLD AUTO: 30.8 % (ref 19.6–45.3)
MCH RBC QN AUTO: 26.8 PG (ref 26.6–33)
MCHC RBC AUTO-ENTMCNC: 33.6 G/DL (ref 31.5–35.7)
MCV RBC AUTO: 79.6 FL (ref 79–97)
MONOCYTES # BLD AUTO: 0.4 10*3/MM3 (ref 0.1–0.9)
MONOCYTES NFR BLD AUTO: 5.4 % (ref 5–12)
NEUTROPHILS NFR BLD AUTO: 4.47 10*3/MM3 (ref 1.7–7)
NEUTROPHILS NFR BLD AUTO: 59.8 % (ref 42.7–76)
NITRITE UR QL STRIP: NEGATIVE
NRBC BLD AUTO-RTO: 0 /100 WBC (ref 0–0.2)
PH UR STRIP.AUTO: 6.5 [PH] (ref 5–8)
PLATELET # BLD AUTO: 272 10*3/MM3 (ref 140–450)
PMV BLD AUTO: 9.3 FL (ref 6–12)
POTASSIUM SERPL-SCNC: 3.9 MMOL/L (ref 3.5–5.2)
PROT SERPL-MCNC: 6.4 G/DL (ref 6–8.5)
PROT UR QL STRIP: NEGATIVE
RBC # BLD AUTO: 4.52 10*6/MM3 (ref 3.77–5.28)
SODIUM SERPL-SCNC: 140 MMOL/L (ref 136–145)
SP GR UR STRIP: 1.01 (ref 1–1.03)
UROBILINOGEN UR QL STRIP: ABNORMAL
WBC NRBC COR # BLD: 7.47 10*3/MM3 (ref 3.4–10.8)

## 2022-07-03 PROCEDURE — 86140 C-REACTIVE PROTEIN: CPT

## 2022-07-03 PROCEDURE — 86235 NUCLEAR ANTIGEN ANTIBODY: CPT

## 2022-07-03 PROCEDURE — 80053 COMPREHEN METABOLIC PANEL: CPT

## 2022-07-03 PROCEDURE — 36415 COLL VENOUS BLD VENIPUNCTURE: CPT

## 2022-07-03 PROCEDURE — 85652 RBC SED RATE AUTOMATED: CPT

## 2022-07-03 PROCEDURE — 83516 IMMUNOASSAY NONANTIBODY: CPT

## 2022-07-03 PROCEDURE — 81003 URINALYSIS AUTO W/O SCOPE: CPT

## 2022-07-03 PROCEDURE — 85025 COMPLETE CBC W/AUTO DIFF WBC: CPT

## 2022-07-03 PROCEDURE — 86225 DNA ANTIBODY NATIVE: CPT

## 2022-07-05 LAB
CENTROMERE B AB SER-ACNC: <0.2 AI (ref 0–0.9)
CHROMATIN AB SERPL-ACNC: <0.2 AI (ref 0–0.9)
DSDNA AB SER-ACNC: 1 IU/ML (ref 0–9)
ENA JO1 AB SER-ACNC: <0.2 AI (ref 0–0.9)
ENA RNP AB SER-ACNC: 0.4 AI (ref 0–0.9)
ENA SCL70 AB SER-ACNC: <0.2 AI (ref 0–0.9)
ENA SM AB SER-ACNC: <0.2 AI (ref 0–0.9)
ENA SM+RNP AB SER-ACNC: <0.2 AI (ref 0–0.9)
ENA SS-A AB SER-ACNC: <0.2 AI (ref 0–0.9)
ENA SS-B AB SER-ACNC: <0.2 AI (ref 0–0.9)
Lab: NORMAL
RIBOSOMAL P AB SER-ACNC: <0.2 AI (ref 0–0.9)

## 2022-07-23 ENCOUNTER — E-VISIT (OUTPATIENT)
Dept: FAMILY MEDICINE CLINIC | Facility: TELEHEALTH | Age: 37
End: 2022-07-23

## 2022-07-23 PROCEDURE — BRIGHTMDVISIT: Performed by: NURSE PRACTITIONER

## 2022-07-23 RX ORDER — IBUPROFEN 600 MG/1
600 TABLET ORAL EVERY 6 HOURS PRN
Qty: 50 TABLET | Refills: 0 | Status: SHIPPED | OUTPATIENT
Start: 2022-07-23 | End: 2022-09-20

## 2022-07-23 RX ORDER — CEFDINIR 300 MG/1
300 CAPSULE ORAL 2 TIMES DAILY
Qty: 14 CAPSULE | Refills: 0 | Status: SHIPPED | OUTPATIENT
Start: 2022-07-23 | End: 2022-07-30

## 2022-07-23 NOTE — EXTERNAL PATIENT INSTRUCTIONS
Note   I have also prescribed you the following: Cefdinir- antibiotic for sinusitis. Motrin- for pain/fever   Diagnosis   Bacterial sinusitis   My name is Frances Chirinos, and I'm a healthcare provider at [facility]. I reviewed your interview, and I see that you have bacterial sinusitis.   To prevent the spread of illness to others, I recommend that you stay home and away from other people as much as possible while you're sick.   Medications   Your pharmacy   [place] [address] [address] [id number] [facility] [id number] [phone]     Prescription   Fluconazole (150mg): Take 1 tablet by mouth once for 1 day as a single dose. Use this medication only if you develop a yeast infection. If yeast infection symptoms are still present 3 days after taking the first tablet, take the second tablet.   Benzonatate (200mg): Take 1 capsule by mouth 3 times a day as needed for cough. Do not chew or cut these capsules.    Some women develop yeast infections after taking antibiotics. If you develop a yeast infection, you can treat it with Diflucan (fluconazole), an oral antifungal prescribed here.   I've decided to avoid treatment with antibiotics at this time. Studies show that antibiotics may help with sinusitis. However, in many cases, patients develop additional symptoms that they didn't have before. Antibiotics can cause side effects such as nausea, diarrhea, and stomach pain. Keep a close eye on your symptoms. Most cases of sinusitis will get better on their own, without antibiotics. However, I can always add antibiotics to your treatment plan if things get worse. If you notice symptoms in the When to seek care section, please contact us to adjust your treatment plan.   About your diagnosis   The sinuses are hollow spaces connected to the nasal passages. Sinusitis occurs when the sinuses swell and block the drainage of fluid and mucus from the nose, causing pain, pressure, and congestion. Fatigue, difficulty sleeping, or  decreased appetite may accompany your symptoms.   More than 90% of sinus infections are caused by viruses. However, in certain cases, a sinus infection may be caused by bacteria. Bacterial sinus infections usually look like one of the following cases:    Severe sinus symptoms with a fever over 102F.    Sinus symptoms that have not improved at all after 10 days.    Cold symptoms that slowly improve but then worsen again after 5 or 6 days, usually with a high fever, headache, or nasal discharge.   What to expect   If you follow this treatment plan, you should start to feel better within a few days.   You can return to your normal activities when ALL of the following are true:    You've been fever-free for more than 24 hours without using fever-reducing medications such as Tylenol    Your other symptoms have improved    It's been at least 5 full days since your symptoms first started   When to seek care   Call us at [phone]   with any sudden or unexpected symptoms.    Symptoms that last longer than 10 to 14 days.    Symptoms that get better for a few days, and then suddenly get worse.    Fever that measures over 103F or continues for more than 3 days.    Any vision changes.    Your headache worsens.    Stiff neck.    Swelling of your forehead or eyes.    Coughing up red or bloody mucus.    Swallowing becomes extremely difficult or impossible.    More than 5 episodes of diarrhea in a day.    More than 5 episodes of vomiting in a day.    Severe shortness of breath.    Severe chest pain    Your treatment plan isn't working. Your immune condition may mean that you need more care.   Other treatment    Rest! Your body needs rest to recover and fight infection.    Drink plenty of water to stay hydrated.    Use steam to soothe your sinuses: Breathe it in from a shower or a bowl of hot water. Placing a warm, moist washcloth over your nose and forehead may help relieve the sinus pain and pressure.    Try  non-prescription saline nasal sprays to help your nasal symptoms. Try using a Neti Pot to flush out your stuffy nose and sinuses. Neti Pots are available at any drugstore without a prescription.    Avoid smoke and air pollution. Smoke can make infections worse.   Prevention    Avoid close contact with other people when you're sick.    Cover your mouth and nose when you cough or sneeze. Use a tissue or cough into your elbow. Make sure that used tissues go directly into the trash.    Avoid touching your eyes, nose, or mouth while you're sick.    Wash your hands often, especially after coughing, sneezing, or blowing your nose. If soap and water are not available, use an alcohol-based hand .    If you or someone in your home or workplace is sick, disinfect commonly used items. This includes door handles, tables, computers, remotes, and pens.    Coronavirus (COVID-19) information   Common symptoms of COVID-19 include fever, cough, shortness of breath, fatigue, muscle or body aches, headaches, new loss of sense of taste or smell, sore throat, stuffy or runny nose, nausea or vomiting, and diarrhea. Most people who get COVID-19 have mild symptoms and can rest at home until they get better. Elderly people and those with chronic medical problems may be at risk for more serious complications.   FAQs about the COVID-19 vaccine   There are three authorized COVID-19 vaccines: Justo & Justo's Ata Vaccine (J&J/Ata), Moderna, and Pfizer-BioNTech (Pfizer). The J&J/Ata vaccine is approved for use in people aged 18 and older. The Moderna and Pfizer vaccines are approved for those aged 6 months and older. All three are available at no cost. Even if you don't have health insurance, you can still get the COVID-19 vaccine for free.   Which vaccine is the best? Which vaccine should I get?   All three vaccines are highly effective. Even if you get COVID-19 after being vaccinated, all of the vaccines help prevent  severe disease, hospitalization, and complications.   Most people should get whichever vaccine is first available to them. However, women younger than 50 years old should consider the rare risk of blood clots with low platelets after vaccination with the J&J/Ata vaccine. This risk hasn't been seen with the other two vaccines.   Are the vaccines safe?   Yes. Hundreds of millions of people in the [place] have already safely received COVID-19 vaccines. As part of Phase 3 clinical trials in the [place] and other countries, researchers collected safety and efficacy data for all three vaccines. These clinical trials follow strict standards. Before a vaccine is approved, the manufacturing company must submit data to the Food and Drug Administration (FDA) for review. Tens of thousands of volunteers participated in the clinical trials for the vaccines. The FDA continues to monitor safety data as the vaccines are given to the general population.   Do I need the vaccine if I've already had COVID?   Yes. Vaccination helps protect you even if you've already had COVID.   If you had COVID-19 and had symptoms, wait to get vaccinated until ALL of the following are true:    5 full days since your symptoms started    24 hours with no fever, without the use of fever-reducing medications    Your other COVID-19 symptoms are improving   If you tested positive for COVID-19 but did not have symptoms, you can get vaccinated after 5 full days have passed since you had a positive test, as long as you don't develop symptoms.   If you had COVID-19 and were treated with monoclonal antibodies, you should wait 90 days before getting a COVID-19 vaccination.   How many doses of the vaccine do I need?   Visit www.cdc.gov/coronavirus/2019-ncov/vaccines/stay-up-to-date.html   to find out how to stay up to date with your COVID-19 vaccines.   I'm immunocompromised. How many doses of the vaccine do I need?   For information on how immunocompromised people can  stay up to date with their COVID-19 vaccines, visit www.cdc.gov/coronavirus/2019-ncov/vaccines/recommendations/immuno.html  .   What are the common side effects of the vaccine?   A sore arm, tiredness, headache, and muscle pain may occur within two days of getting the vaccine and last a day or two. For the Moderna or Pfizer vaccines, side effects are more common after the second dose. People over the age of 55 are less likely to have side effects than younger people.   After I'm up to date on vaccines, can I still get or spread COVID?   Yes, but your disease should be milder. And your risk of serious illness, hospitalization, and complications will be much lower, especially if you're up to date. Being vaccinated reduces the risk of spreading the disease if you get it.   After I'm up to date on vaccines, can I go back to normal?   You should still wear a mask indoors in public if:    It's required by laws, rules, regulations, or local guidance.    You have a weakened immune system.    Your age puts you at increased risk of severe disease.    You have a medical condition that puts you at increased risk of severe disease.    Someone in your household has a weakened immune system, is at increased risk for severe disease, or is unvaccinated.    You're in an area of high transmission.   Where can I get a COVID-19 vaccine?   Visit [facility]'s website for more information. To find a COVID-19 vaccination site near you, visit www.vaccines.gov/  , call [phone]  , or text your zip code to 926840 ([employer]). Message and data rates may apply.   What about travel?   Travel increases your risk of exposure to COVID-19. For more information, see www.cdc.gov/coronavirus/2019-ncov/travelers/index.html  .   I've had close contact with someone who has COVID. Do I need to quarantine, and if so, for how long?   For the most current answer, including a calculator to determine whether you need to stay home and for how long, visit  www.cdc.gov/coronavirus/2019-ncov/your-health/quarantine-isolation.html  .   I've had a positive test result for COVID. How long do I need to isolate?   For the latest recommendations, including a calculator to determine how long you need to stay home, visit www.cdc.gov/coronavirus/2019-ncov/your-health/quarantine-isolation.html  .   What if I develop symptoms that might be from COVID?   For the latest recommendations on what to do if you're sick, including when to seek emergency care, visit www.cdc.gov/coronavirus/2019-ncov/if-you-are-sick/steps-when-sick.html  .    Flu vaccine information   Who should get a flu vaccine?   Everyone 6 months of age and older should get a yearly flu vaccine.   When should I get vaccinated?   You should get a flu vaccine by the end of October. Once you're vaccinated, it takes about two weeks for antibodies to develop and protect you against the flu. That's why it's important to get vaccinated as soon as possible.   After October, is it too late to get vaccinated?   No. You should still get vaccinated. As long as the flu viruses are still in your community, flu vaccines will remain available, even into January of next year or later.   Why do I need a flu vaccine EVERY year?   Flu viruses are constantly changing, so flu vaccines are usually updated from one season to the next. Your protection from the flu vaccine also lessens over time.   Is the flu vaccine safe?   Yes. Over the last 50 years, hundreds of millions of Americans have safely received the flu vaccines.   What are the side effects of flu vaccines?   You CANNOT get the flu from a flu vaccine. Common side effects of the flu shot include soreness, redness and/or swelling where the shot was given, low grade fever, and aches. Common side effects of the nasal spray flu vaccine for adults include runny nose, headaches, sore throat, and cough. For children, side effects include wheezing, vomiting, muscle aches, and fever.   Does the  flu vaccine increase your risk of getting COVID-19?   No. There is no evidence that getting a flu vaccine increases your risk of getting COVID-19.   Is it safe to get the flu vaccine along with a COVID-19 vaccine?   Yes. It's safe to get the flu vaccine with a COVID-19 vaccine or booster.   Contact your healthcare provider TODAY for details on when and where to get your flu vaccine.   Your provider   Your diagnosis was provided by Frances Chirinos, a member of your trusted care team at [facility].   If you have any questions, call us at [phone]  .

## 2022-07-23 NOTE — E-VISIT TREATED
Chief Complaint: Coronavirus (COVID-19), cold, sinus pain, allergy, or flu   Patient introduction   Patient is 36-year-old female with cough, congestion, nasal discharge, and headache that started 10 to 14 days ago. Regarding date of symptom onset, patient writes: 7/11/22.   When asked why they are seeking care online today, patient responds that they just want to feel better.   Patient has not requested COVID testing.   COVID-19 exposure, testing history, and vaccination status:    No known exposure to a person with a confirmed or suspected case of COVID-19.    Recent travel outside of their local community. Regarding dates and locations of recent travel, patient writes: [place] 7/13-7/20.    Patient had a self-test on 7/21/22. Test result was negative.    Has not received a COVID-19 vaccination.   Risk factors for severe disease from COVID-19 infection:    BMI >= 25.    Healthcare worker.    Unspecified autoimmune disorder.   Patient-submitted comments: Can I get diflucan, Motrin, and tessalon with an abx please? Cefdinir causes less GI upset than Augmentin but I can take either.   Patient did not request an excuse note.   General presentation   No improvement of symptoms. Symptoms came on gradually.   Fever:    No fever.   Sinus and nasal symptoms:    Nasal discharge.    Green nasal drainage.    Nasal drainage is thick.    Postnasal drip.    1 to 3 episodes of antibiotic treatment for sinus infection in the last year.    Congestion with sinus pain or pressure on or around the cheeks and upper teeth or jaw.    Patient first noticed sinus pain 10 to 14 days ago.    Sinus pain is worse with Valsalva.    No itchy nose or sneezing.    No history of unhealed nasal septal ulcer/nasal wound.    No history of deviated septum or nasal polyps.   Throat symptoms:    No sore throat.   Head and body aches:    Headache, described as mild (1 to 3 on a scale of 1 to 10).    No sweats.    No chills.    No myalgia.     No fatigue.   Cough:    Cough is worse at night/while sleeping.    Cough is mildly productive of sputum.    Describes color of sputum as green.   Wheezing and shortness of breath:    Has previously used an albuterol inhaler for URI, bronchitis, or pneumonia.    Not using an albuterol inhaler for current symptoms because they do not feel they need it.    No COPD diagnosis.    No asthma diagnosis.    No wheezing.    No shortness of breath.    No previous steroid inhaler use for URIs, bronchitis, or pneumonia.   Chest pain:    No chest pain.   Ear symptoms:    None.   Dizziness:    No dizziness.   Allergies:    No history of allergies.   Flu exposure:    No recent exposure to a person with a confirmed flu diagnosis.    Received a flu vaccine more than 6 months ago.   Review of red flags/alarm symptoms:    No changes in alertness or awareness.    No symptoms suggesting intracranial hemorrhage.    No decreased urination.   Pregnancy/menstrual status/breastfeeding:    Not pregnant.    Not breastfeeding.    Regarding last menstrual period, patient writes: Last month.   Self-exam:    No difficulty moving their chin toward their chest.    Neck lymph nodes feel normal.    Has not taken antibiotics for similar symptoms within the past month.   Current medications   Patient is taking over-the-counter medications for current symptoms, including acetaminophen, cetirizine, dextromethorphan, diphenhydramine, fluticasone, guaifenesin, and ibuprofen.   Taking Hydroxychloroquine and Omeprazole.   Medication allergies   None.   Medication contraindication review   No history of anaphylactic reaction to beta-lactam antibiotics; aspirin triad; blood dyscrasia; bone marrow depression; catecholamine-releasing paraganglioma; coronary artery disease; coagulation disorder; congenital long QT syndrome; depression; electrolyte abnormalities; fungal infection; GI bleeding; GI obstruction; G6PD deficiency; heart arrhythmia; hypertension;  mononucleosis; myasthenia; recent myocardial infarction; NSAID-induced asthma/urticaria; Parkinson's disease; pheochromocytoma; porphyria; Reye syndrome; seizure disorder; ulcerative colitis; and urinary retention.   No history of metoclopramide-associated dystonic reaction and tardive dyskinesia.   No known history of amoxicillin-clavulanate-associated cholestatic jaundice or hepatic impairment.   No known history of azithromycin-associated cholestatic jaundice or hepatic impairment.   Past medical history   Immune conditions: an unspecified autoimmune disorder. Patient takes medications regularly for their condition(s). No history of cancer.   Social history   Patient is a healthcare worker.   High-risk household contacts: Patient's household includes one or more members of a group with risk factors for influenza complications, including a child younger than 5 years.   Never smoked tobacco.   Assessment   Bacterial sinusitis. Ruled out: Traumatic laryngitis.   This is the likely diagnosis based on patient's interview responses and symptoms, including:    Congestion or sinus pressure.    Thick nasal discharge.    Yellow or green mucus.    Duration of symptoms longer than 10 days.    Sinus pressure lasting longer than 10 days.    Symptoms have not improved.   [facility] required information for COVID-19 lab data reporting (if test is ordered):   Symptoms:    Cough.    Headache.    Nasal congestion.    Nasal discharge.   Symptom onset: 10 to 14 days ago ago  Pregnant? No.  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  Resident in a congregate care setting? No.  Previous history of COVID-19 testing: Patient had a self-test on 7/21/22. Test result was negative.     Plan   Medications:    fluconazole 150 mg tablet RX 150mg 1 tab PO once 1d as a single dose for vaginal yeast infection. Repeat in 72 hours if symptoms persist. Amount is 2 tab.    benzonatate 200 mg capsule RX 200mg 1 cap PO tid PRN 7d for cough. Do not chew or cut these capsules.  Amount is 21 cap.   The patient's prescriptions will be sent to:   [facility] [address]   [address] [facility] [address]   Phone: [phone]     Fax: [phone]   Education:    Condition and causes    Prevention    Treatment and self-care    When to call provider   ----------   Electronically signed by COLLIN Gil on 2022-07-23 at 08:14AM   ----------   Patient Interview Transcript:   Why are you getting care through eVisit today? We can't guarantee a specific treatment or test. Your provider will decide what's best for you. Select all that apply.    I just want to feel better!   Not selected:    I want to know if I have a cold or something more serious    I want to know if I need to be seen by a provider    I need a doctor's note    I want to be tested for COVID-19    I want to get the COVID-19 vaccine    I think I'm having side effects from the COVID-19 vaccine    None of the above   COVID-19 symptoms are similar to symptoms of other illnesses. This makes it difficult to diagnose. Please carefully consider each question and answer as best you can. This will help your provider make the right diagnosis. Which of these symptoms are bothering you? Select all that apply.    Cough    Stuffed-up nose or sinuses    Runny nose    Headache   Not selected:    Shortness of breath    Fever    Itchy or watery eyes    Itchy nose or sneezing    Loss of smell or taste    Sore throat    Hoarse voice or loss of voice    Sweats    Chills    Muscle or body aches    Fatigue or tiredness    Nausea or vomiting    Diarrhea    I don't have any of these symptoms   Do you have any of these conditions? These conditions can put you at increased risk for severe disease if you're infected with COVID-19. Select all that apply.    None of the above   Not selected:    Chronic lung disease, such as cystic fibrosis or interstitial fibrosis    Heart disease, such as congenital heart disease, congestive heart failure, or coronary  artery disease    Disorder of the brain, spinal cord, or nerves and muscles, such as dementia, cerebral palsy, epilepsy, muscular dystrophy, or developmental delay    Metabolic disorder or mitochondrial disease    Cerebrovascular disease, such as stroke or another condition affecting the blood vessels or blood supply to the brain    Down syndrome    Mood disorder, including depression or schizophrenia spectrum disorders    Substance use disorder, such as alcohol, opioid, or cocaine use disorder    Tuberculosis   Do you live in a group care setting? Examples include: - Nursing home - Residential care - Psychiatric treatment facility - Group home - [facility] [place] [facility] and care home - Homeless shelter - Foster care setting Select one.    No   Not selected:    Yes   Have you ever been tested for COVID-19? Select one.    Yes   Not selected:    No   When was your most recent COVID-19 test? Select one.    Within the last week (specify date as MM/DD/YY): 7/21/22   Not selected:    7 to 14 days ago    15 to 30 days ago    1 to 3 months ago    More than 3 months ago   What type of COVID-19 test did you most recently have? There are two types of COVID-19 tests: - Viral tests check if you're currently infected with COVID-19. For these tests, a nose swab or saliva sample is taken. Viral tests include self-tests and tests done at a doctor's office, lab, or testing site. - Antibody tests check if you've been infected in the past. For these tests, your blood is drawn. Antibody tests can only be done at a doctor's office, lab, or testing site. Select one.    Viral self-test   Not selected:    Viral test at a doctor's office, lab, or testing site    Antibody test   What was the result of your most recent COVID-19 test? Select one.    Negative   Not selected:    Positive    I'm not sure   Have you gotten the COVID-19 vaccine? Select one.    No   Not selected:    Yes   In the last 14 days, have you traveled outside of your local  "community? This includes travel by car, RV, bus, train, or plane. Travel increases your chances of getting and spreading COVID-19. Select one.    Yes   Not selected:    No   Please tell us when and where you traveled. Include the date you left, the places you traveled to, and the date you returned home.    [place] 7/13-7/20   In the last 14 days, have you had close contact with someone who has coronavirus (COVID-19)? \"Close contact\" means any of these: - Living in the same household as someone with COVID-19. - Caring for someone with COVID-19. - Being within 6 feet of someone with COVID-19 for a total of at least 15 minutes over a 24-hour period. For example, three 5-minute exposures for a total of 15 minutes. - Being in direct contact with respiratory droplets from someone with COVID-19 (being coughed on, kissing, sharing utensils). Select one.    No, not that I know of   Not selected:    Yes, a confirmed case    Yes, a suspected case   When did your current symptoms start? Select one.    10 to 14 days ago   Not selected:    Less than 48 hours ago    3 to 5 days ago    6 to 9 days ago    2 to 4 weeks ago    More than a month ago   Do you know the exact date your symptoms started? If so, enter the date as MM/DD/YY. Select one.    Yes (specify): 7/11/22   Not selected:    No   Did your symptoms come on suddenly or gradually? Select one.    Gradually   Not selected:    Suddenly    I'm not sure   Have your symptoms improved at all since they began? Select one.    No   Not selected:    Yes, but they haven't gone away completely    Yes, but then they came back worse than before    I'm not sure   You mentioned having a headache. On a scale of 1 to 10, how severe is your headache pain? Select one.    Mild (1 to 3)   Not selected:    Moderate (4 to 6)    Severe (7 to 9)    Unbearable (10)    The worst headache of my life (10+)   Do you cough so hard that it's made you gag or vomit? By gag, we mean has your " "coughing made you choke or dry heave? Select all that apply.    No   Not selected:    Yes, my coughing has made me gag    Yes, my coughing has made me vomit   When is your cough the worst? Select all that apply.    At nighttime, or while I'm sleeping   Not selected:    In the morning, or when I wake up    During the day    I haven't noticed a difference depending on time of day   Are you coughing up mucus or phlegm? Select one.    Yes, a little   Not selected:    No, my cough is dry    Yes, a lot   What color is most of the mucus or phlegm that you're coughing up? Select one.    Green   Not selected:    Clear    White/frothy    Yellow    Red or pink    I'm not sure   You mentioned having a stuffy nose or sinus congestion. Do you feel pain or pressure in your sinuses?    Yes   Not selected:    No    I'm not sure   Where do you feel sinus pain or pressure?    In my cheeks    In my upper teeth or jaw   Not selected:    In my forehead    Around my eyes    Behind my nose    I'm not sure   When did you first notice your sinus pain or pressure? Select one.    10 to 14 days ago   Not selected:    Less than 5 days ago    5 to 9 days ago    2 to 4 weeks ago    1 month ago or longer   Does coughing, sneezing, or leaning forward make your sinuses feel worse? Select one.    Yes   Not selected:    No    I'm not sure   What color is your nasal drainage? Select one.    Green   Not selected:    Clear    White    Yellow    My nose is stuffed but not draining or running    I'm not sure   Is your nasal drainage thick or thin? Select one.    Thick   Not selected:    Thin    I'm not sure   Is there any drainage (mucus) going down the back of your throat? This kind of drainage is also called \"postnasal drip.\" Select one.    Yes   Not selected:    No    I'm not sure   Since your symptoms started, have you felt dizzy? Select one.    No   Not selected:    Yes, but I can continue with my regular daily activities    Yes, and it makes it hard " to stand, walk, or do daily activities   Do you have chest pain? You might also feel it as discomfort, aching, tightness, or squeezing in the chest. Select one.    No   Not selected:    Yes   Have you urinated at least 3 times in the last 24 hours? Select one.    Yes   Not selected:    No    I'm not sure   Changes in alertness or awareness may mean you need emergency care. Since your symptoms started, have you had any of these? Select all that apply.    None of the above   Not selected:    Confusion    Slurred speech    Not knowing where you are or what day it is    Difficulty staying conscious    Fainting or passing out   Do your symptoms include a whistling sound, or wheezing, when you breathe? Select one.    No   Not selected:    Yes    I'm not sure   Do you have any of these symptoms in your ear(s)? Select all that apply.    None of the above   Not selected:    Pain    Pressure    Fullness    Crackling or popping    Plugged or blocked sensation   Can you move your chin toward your chest?    Yes   Not selected:    No, my neck is too stiff   Are your glands/lymph nodes swollen, or does it hurt when you touch them?    No   Not selected:    Yes    I'm not sure   People with a very high body mass index (BMI) are at higher risk for developing complications from the flu and severe illness from COVID-19. To determine your BMI, we need to know your weight and height. Please enter your weight (in pounds).    Weight   Please enter your height.    Height   In the past week, has anyone around you (such as at school, work, or home) had a confirmed diagnosis of the flu? A confirmed diagnosis means that a nose swab was done to verify a flu infection. Select all that apply.    No   Not selected:    I live with someone who has the flu    I've been within touching distance of someone who has the flu    I've walked by, or sat about 3 feet away from, someone who has the flu    I've been in the same building as someone who has the  flu    I'm not sure   Have you ever been diagnosed with asthma? Select one.    No   Not selected:    Yes   Have you ever been prescribed albuterol to use for wheezing, cough, or shortness of breath caused by a cold, bronchitis, or pneumonia? Albuterol (ProAir, Proventil, Ventolin) is prescribed as an inhaler or a solution to be used with a nebulizer machine. Select one.    Yes   Not selected:    No    I'm not sure   Have you ever been prescribed a steroid inhaler to use for wheezing, cough, or shortness of breath caused by a cold, bronchitis, or pneumonia? Some examples of steroid inhalers include Pulmicort, Flovent, Qvar, and Alvesco. Select one.    No   Not selected:    Yes    I'm not sure   Have you used albuterol for your current symptoms? This includes both albuterol inhalers and albuterol solution in a nebulizer machine. Select one.    No, I don't feel like I need it   Not selected:    Yes    No, I don't have any, or it's    Would you like a prescription for albuterol? Select one.    No   Not selected:    Yes   Have you ever been diagnosed with chronic obstructive pulmonary disease (COPD)? Select one.    No   Not selected:    Yes    I'm not sure   In the last year, how many times were you treated with antibiotics for a sinus infection? Select one.    1 to 3 times   Not selected:    None    4 or more times   Have you been diagnosed with a deviated septum or nasal polyps? The nose is divided into two nostrils by the septum. A crooked septum is called a deviated septum. Nasal polyps are growths inside the nose or sinuses. Select one.    No   Not selected:    Yes, but I had surgery to treat them    Yes, I have a deviated septum    Yes, I have nasal polyps    Yes, I have a deviated septum and nasal polyps    I'm not sure   Do you have a sore inside your nose that won't heal? Select one.    No   Not selected:    Yes    I'm not sure   Do you have allergies (pollen, dust mites, mold, animal dander)? Select  one.    No   Not selected:    Yes    I'm not sure   Have you had a flu shot this season? Select one.    Yes, more than 6 months ago   Not selected:    Yes, less than 2 weeks ago    Yes, 2 to 4 weeks ago    Yes, 1 to 3 months ago    Yes, 3 to 6 months ago    I'm not sure    No   The flu and COVID-19 can be more serious for people with certain conditions or characteristics. These questions help us figure out if you or anyone you live with is at higher risk for complications from these infections. Do either of these statements apply to you? Select all that apply.    I'm a healthcare worker   Not selected:    I'm  or Native [place]    None of the above   Do you smoke tobacco? Select one.    No   Not selected:    Yes, every day    Yes, some days    No, I quit   Do you have any of these conditions that can affect the immune system? Scroll to see all options. Select all that apply.    An autoimmune disorder not listed here   Not selected:    History of bone marrow transplant    Chronic kidney disease    Chronic liver disease (including cirrhosis)    HIV/AIDS    Inflammatory bowel disease (Crohn's disease or ulcerative colitis)    Lupus    Moderate to severe plaque psoriasis    Multiple sclerosis    Rheumatoid arthritis    Sickle cell anemia    Alpha or beta thalassemia    History of solid organ transplant (kidney, liver, or heart)    History of spleen removal    A condition requiring treatment with long-term use of oral steroids (such as prednisone, prednisolone, or dexamethasone)    None of these   Do you take medications for your condition? This includes oral and injectable medications that are taken daily, weekly, or monthly. Select one.    Yes, regularly   Not selected:    Yes, for flare-ups only    No   Have you ever been diagnosed with cancer? Select one.    No   Not selected:    Yes, I have cancer now    Yes, but I'm in remission   Some conditions can put you at risk for more serious infections. Do  any of these apply to you? Select all that apply.    None of the above   Not selected:    I've been hospitalized within the last 5 days    I have diabetes    I'm in close contact with a child in    Are you currently being treated for any of these conditions? Scroll to see all options. Select all that apply.    None of the above   Not selected:    Aspirin triad (also known as Samter's triad or ASA triad)    Asthma or hives from taking aspirin or other NSAIDs, such as ibuprofen or naproxen    Blockage or narrowing of the blood vessels of the heart    Blood dyscrasia, such anemia, leukemia, lymphoma, or myeloma    Bone marrow depression    Catecholamine-releasing paraganglioma    Blood clotting disorder    Congenital long QT syndrome    Depression    Difficulty urinating or completely emptying your bladder    Uncorrected electrolyte abnormalities    Fungal infection    Gastrointestinal (GI) bleeding    Gastrointestinal (GI) obstruction    G6PD deficiency    Recent heart attack    High blood pressure    Irregular heartbeat or heart rhythm    Mononucleosis (mono)    Myasthenia gravis    Parkinson's disease    Pheochromocytoma    Reye syndrome    Seizure disorder    Ulcerative colitis   Have you ever had either of these conditions? Select all that apply.    No   Not selected:    Metoclopramide-associated dystonic reaction    Tardive dyskinesia   Are you pregnant? Select one.    No   Not selected:    Yes   When was your last menstrual period? If you don't currently have periods or no longer have periods, please briefly explain.    Last month   Within the last 2 weeks, have you: - Given birth - Had a miscarriage - Had a pregnancy loss - Had an  Being postpartum (live birth or loss) within the last 2 weeks increases your risk of flu complications. Select one.    No   Not selected:    Yes   Are you breastfeeding? Select one.    No   Not selected:    Yes   Do any of these apply to the people who live with you?  Select all that apply.    A child under the age of 5   Not selected:    An adult 65 or older    A person who is pregnant    A person who has given birth, had a miscarriage, had a pregnancy loss, or had an  in the last 2 weeks    An  or Native [place]    None of the above   Does any member of your household have any of these medical conditions? Select all that apply.    None of the above   Not selected:    Asthma    Disorders of the brain, spinal cord, or nerves and muscles, such as dementia, cerebral palsy, epilepsy, muscular dystrophy, or developmental delay    Chronic lung disease, such as COPD or cystic fibrosis    Heart disease, such as congenital heart disease, congestive heart failure, or coronary artery disease    Cerebrovascular disease, such as stroke or another condition affecting the blood vessels or blood supply to the brain    Blood disorders, such as sickle cell disease    Diabetes    Metabolic disorders such as inherited metabolic disorders or mitochondrial disease    Kidney disorders    Liver disorders    Weakened immune system due to illness or medications such as chemotherapy or steroids    Children under the age of 19 who are on long-term aspirin therapy    Extreme obesity (BMI > 40)   Just a few more questions about medications, and then you're finished. Have you used any non-prescription medications or nasal sprays for your current symptoms? Examples include saline sprays, decongestants, NyQuil, and Tylenol. Select one.    Yes   Not selected:    No   Which of these non-prescription medications have you tried? Scroll to see all options. Select all that apply.    Acetaminophen (Tylenol)    Cetirizine (Zyrtec)    Dextromethorphan (Delsym, Robitussin, Vicks DayQuil Cough)    Diphenhydramine (Benadryl)    Fluticasone (Flonase)    Guaifenesin (Mucinex)    Ibuprofen (Advil, Motrin, Midol)   Not selected:    Budesonide (Rhinocort)    Chlorpheniramine (Aller-chlor,  Chlor-Trimeton)    Cromolyn (NasalCrom)    Fexofenadine (Allegra)    Guaifenesin/dextromethorphan (Delsym DM, Mucinex DM, Robitussin DM)    Ketotifen (Alaway, Zaditor)    Loratadine (Alavert, Claritin)    Naphazoline-pheniramine (Naphcon-A, Opcon-A, Visine-A)    Omeprazole (Prilosec)    Oxymetazoline (Afrin)    Phenylephrine (Sudafed)    Triamcinolone (Nasacort)    None of the above   In the past month, have you taken antibiotics for similar symptoms? Examples of antibiotics include amoxicillin, amoxicillin-clavulanate (Augmentin), penicillin, cefdinir (Omnicef), doxycycline, and clindamycin (Cleocin). Select one.    No   Not selected:    Yes    I'm not sure   Have you taken any monoamine oxidase inhibitor (MAOI) medications in the last 14 days? Examples include rasagiline (Azilect), selegiline (Eldepryl, Zelapar), isocarboxazid (Marplan), phenelzine (Nardil), and tranylcypromine (Parnate). Select one.    No, not that I know of   Not selected:    Yes   Do you take Kynmobi or Apokyn (apomorphine)? Select one.    No   Not selected:    Yes    I'm not sure   Are you taking any other medications or supplements? On the next screen, you need to list all vitamins, supplements, non-prescription medications (such as aspirin or Aleve), and prescription medications that you're taking. Select one.    Yes   Not selected:    Yes, but I'm not sure what they are    No   Have you ever had an allergic or bad reaction to any medication? Select one.    No   Not selected:    Yes   Are you allergic to milk or to the proteins found in milk (for example, whey or casein)? A milk allergy is different from lactose intolerance. Select one.    No   Not selected:    Yes    I'm not sure   Have you ever had jaundice or liver problems as a result of taking amoxicillin-clavulanate (Augmentin)? Jaundice is a condition in which the skin and the whites of the eyes turn yellow. Select all that apply.    No, not that I know of   Not selected:    Yes,  jaundice    Yes, liver problems   Have you ever had jaundice or liver problems as a result of taking azithromycin (Zithromax, Zmax)? Jaundice is a condition in which the skin and the whites of the eyes turn yellow. Select all that apply.    No, not that I know of   Not selected:    Yes, jaundice    Yes, liver problems   Do you need a doctor's note? A doctor's note confirms that you received care today and states when you can return to school or work. It does not contain information about your diagnosis or treatment plan. Your provider will make the final decision on whether to give you a doctor's note and for how long. Doctor's notes CANNOT be backdated. We can't provide medical leave paperwork through this type of visit. If more paperwork is needed to request time off, contact your primary care provider. Select one.    No   Not selected:    Today only (1 day)    Today and tomorrow (2 days)    3 days    5 days    7 days    10 days    14 days   Is there anything else you'd like to tell us about your symptoms?    Can I get diflucan, Motrin, and tessalon with an abx please? Cefdinir causes less GI upset than Augmentin but I can take either   ----------   Medical history   The following information was received from the EMR on July 23, 2022.   Allergies:    No Known Allergies   - Allergy Type:   - Reaction:   - Severity:   - Clinical Status: Active   - Verification Status: Confirmed   Medications:    NORGESTIMATE-ETH ESTRADIOL 0.25-35 MG-MCG PO TABS   - Route: Oral   - Start Date: May 27, 2022   - End Date: None   - Status: Active    OMEPRAZOLE 20 MG PO TBDD   - Route: Oral   - Start Date: March 07, 2022   - End Date: None   - Status: Active    PNV PRENATAL PLUS MULTIVITAMIN 27-1 MG PO TABS   - Route: Oral   - Start Date: May 30, 2018   - End Date: None   - Status: Active    CETIRIZINE HCL 10 MG PO TABS   - Route: Oral   - Start Date: December 24, 2020   - End Date: None   - Status: Active   Problem list:    S/P  laparoscopic sleeve gastrectomy   - Category: Problem List Item   - Health Status:   - Start Date: 2016   - End Date: None   - Status: Active     premature rupture of membranes (PPROM) with onset of labor within 24 hours of rupture in first trimester, antepartum   - Category: Problem List Item   - Health Status:   - Start Date: 2019   - End Date: 2019   - Status: Resolved    36 weeks gestation of pregnancy   - Category: Problem List Item   - Health Status:   - Start Date: 2019   - End Date: 2019   - Status: Resolved     (normal spontaneous vaginal delivery)   - Category: Problem List Item   - Health Status:   - Start Date: 2019   - End Date: 2020   - Status: Resolved    Pre-eclampsia in third trimester   - Category: Problem List Item   - Health Status:   - Start Date: 2019   - End Date: 2020   - Status: Resolved    Lumbar strain, initial encounter   - Category: Problem List Item   - Health Status:   - Start Date: May 01, 2019   - End Date: May 16, 2019   - Status: Resolved    Near syncope   - Category: Problem List Item   - Health Status:   - Start Date: May 16, 2019   - End Date: 2020   - Status: Resolved    Somatic dysfunction of pelvis region   - Category: Problem List Item   - Health Status:   - Start Date: May 16, 2019   - End Date: None   - Status: Active    Well adult exam   - Category: Problem List Item   - Health Status:   - Start Date: May 16, 2019   - End Date: 2022   - Status: Resolved    39 weeks gestation of pregnancy   - Category: Problem List Item   - Health Status:   - Start Date: 2020   - End Date: 2020   - Status: Resolved    Spontaneous vaginal delivery   - Category: Problem List Item   - Health Status:   - Start Date: 2020   - End Date: None   - Status: Active    Postpartum anemia   - Category: Problem List Item   - Health Status:   - Start Date:  2020   - End Date: 2022   - Status: Resolved    Axillary abscess   - Category: Problem List Item   - Health Status:   - Start Date: 2020   - End Date: None   - Status: Active    Arthralgia   - Category: Problem List Item   - Health Status:   - Start Date: May 03, 2021   - End Date: None   - Status: Active    Elevated uric acid in blood   - Category: Problem List Item   - Health Status:   - Start Date: May 03, 2021   - End Date: None   - Status: Active    Vaginal bleeding in pregnancy, second trimester   - Category: Problem List Item   - Health Status:   - Start Date: 2021   - End Date: 2022   - Status: Resolved    COVID-19 virus infection   - Category: Problem List Item   - Health Status:   - Start Date: 2021   - End Date: None   - Status: Active    Term pregnancy   - Category: Problem List Item   - Health Status:   - Start Date: 2022   - End Date: 2022   - Status: Resolved     (spontaneous vaginal delivery)   - Category: Problem List Item   - Health Status:   - Start Date: 2022   - End Date: None   - Status: Active    Postpartum anemia   - Category: Problem List Item   - Health Status:   - Start Date: 2022   - End Date: None   - Status: Active    Postpartum hypertension   - Category: Problem List Item   - Health Status:   - Start Date: 2022   - End Date: None   - Status: Active    Acute cystitis with hematuria   - Category: Problem List Item   - Health Status:   - Start Date: 2022   - End Date: None   - Status: Active    Diarrhea   - Category: Problem List Item   - Health Status:   - Start Date: 2022   - End Date: None   - Status: Active

## 2022-07-24 LAB — REF LAB TEST RESULTS: NORMAL

## 2022-08-31 DIAGNOSIS — M25.551 ARTHRALGIA OF RIGHT HIP: Primary | ICD-10-CM

## 2022-09-01 ENCOUNTER — OFFICE VISIT (OUTPATIENT)
Dept: ORTHOPEDIC SURGERY | Facility: CLINIC | Age: 37
End: 2022-09-01

## 2022-09-01 VITALS — WEIGHT: 213 LBS | HEIGHT: 65 IN | BODY MASS INDEX: 35.49 KG/M2

## 2022-09-01 DIAGNOSIS — M25.551 ARTHRALGIA OF RIGHT HIP: ICD-10-CM

## 2022-09-01 DIAGNOSIS — M70.61 GREATER TROCHANTERIC BURSITIS OF RIGHT HIP: ICD-10-CM

## 2022-09-01 PROCEDURE — 99203 OFFICE O/P NEW LOW 30 MIN: CPT | Performed by: PHYSICIAN ASSISTANT

## 2022-09-01 PROCEDURE — 20610 DRAIN/INJ JOINT/BURSA W/O US: CPT | Performed by: PHYSICIAN ASSISTANT

## 2022-09-01 RX ORDER — FERROUS SULFATE 325(65) MG
TABLET ORAL
COMMUNITY
Start: 2022-06-07 | End: 2022-11-14

## 2022-09-01 RX ORDER — HYDROXYCHLOROQUINE SULFATE 200 MG/1
TABLET, FILM COATED ORAL
COMMUNITY
Start: 2022-06-29 | End: 2022-11-14

## 2022-09-01 RX ORDER — IBUPROFEN 800 MG/1
1 TABLET ORAL EVERY 8 HOURS
COMMUNITY
End: 2022-11-14

## 2022-09-01 RX ORDER — LIDOCAINE HYDROCHLORIDE 20 MG/ML
2 INJECTION, SOLUTION INFILTRATION; PERINEURAL
Status: COMPLETED | OUTPATIENT
Start: 2022-09-01 | End: 2022-09-01

## 2022-09-01 RX ORDER — METHYLPREDNISOLONE ACETATE 40 MG/ML
40 INJECTION, SUSPENSION INTRA-ARTICULAR; INTRALESIONAL; INTRAMUSCULAR; SOFT TISSUE
Status: COMPLETED | OUTPATIENT
Start: 2022-09-01 | End: 2022-09-01

## 2022-09-01 RX ORDER — ERGOCALCIFEROL 1.25 MG/1
1 CAPSULE ORAL
COMMUNITY
Start: 2022-06-29 | End: 2022-09-20

## 2022-09-01 RX ADMIN — METHYLPREDNISOLONE ACETATE 40 MG: 40 INJECTION, SUSPENSION INTRA-ARTICULAR; INTRALESIONAL; INTRAMUSCULAR; SOFT TISSUE at 09:31

## 2022-09-01 RX ADMIN — LIDOCAINE HYDROCHLORIDE 2 ML: 20 INJECTION, SOLUTION INFILTRATION; PERINEURAL at 09:31

## 2022-09-01 NOTE — PROGRESS NOTES
"Subjective   Patient ID: Kelly Astorga is a 36 y.o. right hand dominant female  Pain of the Right Hip (States she has been having pain for about two years, no trauma. States she does not feel the pain while pregnant but after having her daughter is has flared up.)         History of Present Illness  Patient presents with right hip intermittently x 2 years. NO injury or trauma. Denies numbness or tingling.   Patient informs me that when she is pregnant he pain is not as evident.      She also notes her gait is abnormal ( she walks with her feet pointed outward)  She is seeing a rheumatologist currently working on a psoriatic arthritis dx.    Pain Score: 5  Pain Location: Hip  Pain Orientation: Right     Pain Descriptors: Aching  Pain Frequency: Constant/continuous           Aggravating Factors: Standing, Walking, Stairs, Other (Comment) (sitting, running)        Pain Intervention(s): Rest, Medication (See MAR)  Result of Injury: No  Work-Related Injury: No    Past Medical History:   Diagnosis Date   • Abnormal Pap smear of cervix 2013    Follow up clear    • GERD (gastroesophageal reflux disease)    • Hyperlipidemia    • PONV (postoperative nausea and vomiting)    • Pre-eclampsia in third trimester     pt states \"diagnosed after delivery with 1st baby\"   • Vitamin D deficiency         Past Surgical History:   Procedure Laterality Date   • GASTRIC RESTRICTION SURGERY  9/19/16    Gastric Sleeve   • GASTRIC SLEEVE LAPAROSCOPIC  09/2016   • LEEP  2013   • OTOPLASTY  2005   • WISDOM TOOTH EXTRACTION         Family History   Problem Relation Age of Onset   • Hyperlipidemia Mother    • Hypertension Mother    • Hypertension Father    • Hyperlipidemia Father    • No Known Problems Brother    • Hydrocephalus Maternal Grandmother    • Hypertension Maternal Grandmother    • Hyperlipidemia Maternal Grandmother    • Heart disease Maternal Grandfather    • Hyperlipidemia Maternal Grandfather    • Hypertension Maternal " Grandfather    • Heart attack Maternal Grandfather    • Diabetes Paternal Grandmother    • Liver disease Neg Hx    • Liver cancer Neg Hx    • Colon cancer Neg Hx        Social History     Socioeconomic History   • Marital status:    Tobacco Use   • Smoking status: Never Smoker   • Smokeless tobacco: Never Used   Vaping Use   • Vaping Use: Never used   Substance and Sexual Activity   • Alcohol use: No   • Drug use: No   • Sexual activity: Yes     Partners: Male     Birth control/protection: None         Current Outpatient Medications:   •  ergocalciferol (ERGOCALCIFEROL) 1.25 MG (21391 UT) capsule, Take 1 capsule by mouth., Disp: , Rfl:   •  ferrous sulfate 325 (65 FE) MG tablet, , Disp: , Rfl:   •  hydroxychloroquine (PLAQUENIL) 200 MG tablet, , Disp: , Rfl:   •  cetirizine (zyrTEC) 10 MG tablet, Take 10 mg by mouth Daily., Disp: , Rfl:   •  ibuprofen (ADVIL,MOTRIN) 600 MG tablet, Take 1 tablet by mouth Every 6 (Six) Hours As Needed for Mild Pain ., Disp: 50 tablet, Rfl: 0  •  ibuprofen (ADVIL,MOTRIN) 800 MG tablet, Take 1 tablet by mouth Every 8 (Eight) Hours., Disp: , Rfl:   •  norgestimate-ethinyl estradiol (Sprintec 28) 0.25-35 MG-MCG per tablet, Take 1 tablet by mouth Daily., Disp: , Rfl:   •  Omeprazole 20 MG Tablet Delayed Release Dispersible, Take 20 mg by mouth Daily., Disp: , Rfl:   •  Prenatal Vit-Fe Fumarate-FA (PNV PRENATAL PLUS MULTIVITAMIN) 27-1 MG tablet, Take 1 tablet by mouth Daily. 200001, Disp: , Rfl: 3    No Known Allergies    Review of Systems   Constitutional: Negative for fever.   HENT: Negative for dental problem and voice change.    Eyes: Negative for visual disturbance.   Respiratory: Negative for shortness of breath.    Cardiovascular: Negative for chest pain.   Gastrointestinal: Negative for abdominal pain.   Genitourinary: Negative for dysuria.   Musculoskeletal: Positive for arthralgias (right hip). Negative for gait problem and joint swelling.   Skin: Negative for rash.  "  Neurological: Negative for speech difficulty.   Hematological: Does not bruise/bleed easily.   Psychiatric/Behavioral: Negative for confusion.        I have reviewed the medical and surgical history, family history, social history, medications, and/or allergies, and the review of systems of this report.    Objective   Ht 165.1 cm (65\")   Wt 96.6 kg (213 lb)   BMI 35.45 kg/m²    Physical Exam  Vitals and nursing note reviewed.   Constitutional:       Appearance: Normal appearance.   Pulmonary:      Effort: Pulmonary effort is normal.   Musculoskeletal:      Lumbar back: No spasms, tenderness or bony tenderness. No scoliosis.      Right hip: Tenderness present. No bony tenderness or crepitus. Normal strength.   Neurological:      Mental Status: She is alert and oriented to person, place, and time.       Ortho Exam     Neurologic Exam     Mental Status   Oriented to person, place, and time.      + ttp right greater troch bursa    Negative bilateral straight leg raise    Assessment & Plan   Independent Review of Radiographic Studies:    Xray of lumbar spine 2 view in office for eval of pain. No comparison films available to review. No acute fracture. There is mild lumbar vertebral joint narrowing.     Xray of the Right hip 2 view in office for eval of pain.  No comparison films available to view.  No acute fracture or dislocation.  There does appear to be well calcified ossification adjacent to the right lateral greater trochanteric most likely related to chronic bursitis  Large Joint Arthrocentesis: R greater trochanteric bursa  Date/Time: 9/1/2022 9:31 AM  Consent given by: patient  Site marked: site marked  Timeout: Immediately prior to procedure a time out was called to verify the correct patient, procedure, equipment, support staff and site/side marked as required   Supporting Documentation  Indications: pain   Procedure Details  Location: hip - R greater trochanteric bursa  Preparation: Patient was prepped and " draped in the usual sterile fashion  Needle size: 22 G  Approach: lateral  Medications administered: 2 mL lidocaine 2%; 40 mg methylPREDNISolone acetate 40 MG/ML  Patient tolerance: patient tolerated the procedure well with no immediate complications             Diagnoses and all orders for this visit:    1. Arthralgia of right hip (Primary)  -     XR Spine Lumbar 2 or 3 View    2. Greater trochanteric bursitis of right hip    Other orders  -     Large Joint Arthrocentesis: R greater trochanteric bursa       Orthopedic activities reviewed and patient expressed appreciation  Discussion of orthopedic goals  Risk, benefits, and merits of treatment alternatives reviewed with the patient and questions answered  Call or notify for any adverse effect from injection therapy    Recommendations/Plan:  Exercise, medications, injections, other patient advice, and return appointment as noted.  Patient is encouraged to call or return for any issues or concerns.  Follow-up in 3 months or sooner if needed    Patient politely declined formal physical therapy at this time  Patient agreeable to call or return sooner for any concerns.      EMR Dragon-transcription disclaimer:  This encounter note is an electronic transcription of spoken language to printed text.  Electronic transcription of spoken language may permit erroneous or at times nonsensical words or phrases to be inadvertently transcribed.  Although I have reviewed the note for such errors, some may still exist

## 2022-09-08 ENCOUNTER — PATIENT ROUNDING (BHMG ONLY) (OUTPATIENT)
Dept: ORTHOPEDIC SURGERY | Facility: CLINIC | Age: 37
End: 2022-09-08

## 2022-09-08 NOTE — PROGRESS NOTES
"September 8, 2022    Hello, may I speak with Kelly Astorga?    My name is Ale      I am  with [facility] [address] [address] [phone].    Before we get started may I verify your date of birth? 1985    I am calling to officially welcome you to our practice and ask about your recent visit. Is this a good time to talk? yes    Tell me about your visit with us. What things went well? \"appointment went well\"    We're always looking for ways to make our patients' experiences even better. Do you have recommendations on ways we may improve?  no    Overall were you satisfied with your first visit to our practice? yes       I appreciate you taking the time to speak with me today. Is there anything else I can do for you? \"Yes. Injection didn't really help. Are there any options to help with hip pain?\"    (will send message to G[facility]y and follow up with patient on G[employer] @ her request)    Thank you, and have a great day.      "

## 2022-09-09 DIAGNOSIS — M25.551 ARTHRALGIA OF RIGHT HIP: Primary | ICD-10-CM

## 2022-09-09 DIAGNOSIS — M70.61 GREATER TROCHANTERIC BURSITIS OF RIGHT HIP: ICD-10-CM

## 2022-09-20 ENCOUNTER — OFFICE VISIT (OUTPATIENT)
Dept: INTERNAL MEDICINE | Facility: CLINIC | Age: 37
End: 2022-09-20

## 2022-09-20 VITALS
TEMPERATURE: 97 F | OXYGEN SATURATION: 98 % | BODY MASS INDEX: 35.65 KG/M2 | DIASTOLIC BLOOD PRESSURE: 84 MMHG | WEIGHT: 214 LBS | HEIGHT: 65 IN | HEART RATE: 78 BPM | SYSTOLIC BLOOD PRESSURE: 124 MMHG

## 2022-09-20 DIAGNOSIS — Z00.00 WELL ADULT EXAM: Primary | ICD-10-CM

## 2022-09-20 DIAGNOSIS — Z13.29 SCREENING FOR ENDOCRINE DISORDER: ICD-10-CM

## 2022-09-20 DIAGNOSIS — E55.9 VITAMIN D DEFICIENCY: ICD-10-CM

## 2022-09-20 DIAGNOSIS — Z13.0 SCREENING FOR BLOOD DISEASE: ICD-10-CM

## 2022-09-20 DIAGNOSIS — E78.5 HYPERLIPIDEMIA, UNSPECIFIED HYPERLIPIDEMIA TYPE: ICD-10-CM

## 2022-09-20 PROBLEM — L02.419 AXILLARY ABSCESS: Status: RESOLVED | Noted: 2020-08-06 | Resolved: 2022-09-20

## 2022-09-20 PROBLEM — N30.01 ACUTE CYSTITIS WITH HEMATURIA: Status: RESOLVED | Noted: 2022-03-27 | Resolved: 2022-09-20

## 2022-09-20 PROCEDURE — 99395 PREV VISIT EST AGE 18-39: CPT | Performed by: FAMILY MEDICINE

## 2022-09-20 NOTE — PROGRESS NOTES
"Kelly Astorga is a 36 y.o. female.    Chief Complaint   Patient presents with   • Annual Exam       HPI   Patient presents today for annual physical exam.  She does not always follow a healthy diet.  She has started exercising and walking on the treadmill most days of the week.  She does have regular dental exams.  She is also had a retinal exam in the last year.  She is planning on getting a flu shot at a later date with her .  She declines COVID-vaccine.  She is up-to-date on Tdap, which was administered in 2019.  She has regular Pap smears with her gynecologist.  Pap was completed earlier this year.  She denies any concern for anxiety or depression.    Patient started hydroxychloroquine for suspected psoriatic arthritis.  She has been seeing rheumatology for elevated uric acid.  She did fill the hydroxychloroquine was beneficial, but is hoping to become pregnant in the next few months.  Therefore, she stopped the medication.  She has chronic bursitis of the right hip orthopedics for this issue..  Steroid injection didn't help.    Still taking birth control right now.  Taking fish oil for triglycerides.  She does have a history of vitamin D deficiency.  She is not currently taking an oral supplement for this.    The following portions of the patient's history were reviewed and updated as appropriate: allergies, current medications, past family history, past medical history, past social history, past surgical history and problem list.     Past Medical History:   Diagnosis Date   • Abnormal Pap smear of cervix 2013    Follow up clear    • GERD (gastroesophageal reflux disease)    • Headache    • Hyperlipidemia    • PONV (postoperative nausea and vomiting)    • Pre-eclampsia in third trimester     pt states \"diagnosed after delivery with 1st baby\"   • Vitamin D deficiency        Past Surgical History:   Procedure Laterality Date   • GASTRIC RESTRICTION SURGERY  9/19/16    Gastric Sleeve   • " GASTRIC SLEEVE LAPAROSCOPIC  09/2016   • LEEP  2013   • OTOPLASTY  2005   • WISDOM TOOTH EXTRACTION         Family History   Problem Relation Age of Onset   • Hyperlipidemia Mother    • Hypertension Mother    • Hypertension Father    • Hyperlipidemia Father    • No Known Problems Brother    • Hydrocephalus Maternal Grandmother    • Hypertension Maternal Grandmother    • Hyperlipidemia Maternal Grandmother    • Other Maternal Grandmother         Normal Pressure Hydrocephalus   • Heart disease Maternal Grandfather    • Hyperlipidemia Maternal Grandfather    • Hypertension Maternal Grandfather    • Heart attack Maternal Grandfather    • Diabetes Paternal Grandmother    • Liver disease Neg Hx    • Liver cancer Neg Hx    • Colon cancer Neg Hx        Social History     Socioeconomic History   • Marital status:    Tobacco Use   • Smoking status: Never Smoker   • Smokeless tobacco: Never Used   Vaping Use   • Vaping Use: Never used   Substance and Sexual Activity   • Alcohol use: No   • Drug use: No   • Sexual activity: Yes     Partners: Male     Birth control/protection: Birth control pill       No Known Allergies      Current Outpatient Medications:   •  cetirizine (zyrTEC) 10 MG tablet, Take 10 mg by mouth Daily., Disp: , Rfl:   •  ferrous sulfate 325 (65 FE) MG tablet, , Disp: , Rfl:   •  ibuprofen (ADVIL,MOTRIN) 800 MG tablet, Take 1 tablet by mouth Every 8 (Eight) Hours., Disp: , Rfl:   •  norgestimate-ethinyl estradiol (ORTHO-CYCLEN) 0.25-35 MG-MCG per tablet, Take 1 tablet by mouth Daily., Disp: , Rfl:   •  Omeprazole 20 MG Tablet Delayed Release Dispersible, Take 20 mg by mouth Daily., Disp: , Rfl:   •  Prenatal Vit-Fe Fumarate-FA (PNV PRENATAL PLUS MULTIVITAMIN) 27-1 MG tablet, Take 1 tablet by mouth Daily. 200001, Disp: , Rfl: 3  •  hydroxychloroquine (PLAQUENIL) 200 MG tablet, , Disp: , Rfl:     ROS    Review of Systems   Constitutional: Negative for chills, fatigue and fever.   HENT: Negative for  "congestion and rhinorrhea.    Eyes: Negative for blurred vision and visual disturbance.   Respiratory: Negative for cough and shortness of breath.    Cardiovascular: Negative for chest pain and leg swelling.   Gastrointestinal: Negative for abdominal pain, constipation, diarrhea, nausea and vomiting.   Endocrine: Negative for cold intolerance and heat intolerance.   Genitourinary: Negative for difficulty urinating.   Musculoskeletal: Negative for arthralgias.   Skin: Negative for rash.   Allergic/Immunologic: Negative for environmental allergies.   Neurological: Negative for weakness and headache.   Hematological: Does not bruise/bleed easily.   Psychiatric/Behavioral: Negative for depressed mood. The patient is not nervous/anxious.        Vitals:    09/20/22 0808   BP: 124/84   Pulse: 78   Temp: 97 °F (36.1 °C)   SpO2: 98%   Weight: 97.1 kg (214 lb)   Height: 165.1 cm (65\")   PainSc: 0-No pain     Body mass index is 35.61 kg/m².    Physical Exam     Physical Exam  Constitutional:       General: She is not in acute distress.     Appearance: Normal appearance. She is well-developed.   HENT:      Head: Normocephalic and atraumatic.      Right Ear: Tympanic membrane and external ear normal.      Left Ear: Tympanic membrane and external ear normal.   Eyes:      Extraocular Movements: Extraocular movements intact.      Conjunctiva/sclera: Conjunctivae normal.      Pupils: Pupils are equal, round, and reactive to light.   Cardiovascular:      Rate and Rhythm: Normal rate and regular rhythm.      Heart sounds: No murmur heard.  Pulmonary:      Effort: Pulmonary effort is normal. No respiratory distress.      Breath sounds: Normal breath sounds. No wheezing.   Abdominal:      General: Bowel sounds are normal. There is no distension.      Palpations: Abdomen is soft.      Tenderness: There is no abdominal tenderness.   Musculoskeletal:      Cervical back: Normal range of motion and neck supple.      Right lower leg: No " edema.      Left lower leg: No edema.   Lymphadenopathy:      Cervical: No cervical adenopathy.   Skin:     General: Skin is warm and dry.   Neurological:      Mental Status: She is alert and oriented to person, place, and time.      Cranial Nerves: No cranial nerve deficit.      Deep Tendon Reflexes: Reflexes normal.   Psychiatric:         Mood and Affect: Mood normal.         Behavior: Behavior normal.         Assessment/Plan    Problems Addressed this Visit     Well adult exam - Primary     Physical exam findings.  Discussed with patient routine health maintenance including: Vaccines, dental/eye health, healthy diet and exercise, Pap smears.  Mental health has been addressed today as well.  Routine screening labs have been ordered.         Vitamin D deficiency     Low on previous vitamin D labs.  Will obtain updated vitamin D level today.  We will treat as appropriate.         Relevant Orders    Vitamin D 25 Hydroxy (Completed)    Hyperlipidemia    Relevant Orders    Lipid Panel (Completed)      Other Visit Diagnoses     Screening for blood disease        Relevant Orders    CBC & Differential (Completed)    Screening for endocrine disorder        Relevant Orders    Comprehensive Metabolic Panel (Completed)     No orders of the defined types were placed in this encounter.      No orders of the defined types were placed in this encounter.      Return in about 1 year (around 9/20/2023) for Annual.      Alyx Sy DO

## 2022-09-21 LAB
25(OH)D3+25(OH)D2 SERPL-MCNC: 42 NG/ML (ref 30–100)
ALBUMIN SERPL-MCNC: 4.1 G/DL (ref 3.5–5.2)
ALBUMIN/GLOB SERPL: 1.8 G/DL
ALP SERPL-CCNC: 65 U/L (ref 39–117)
ALT SERPL-CCNC: 16 U/L (ref 1–33)
AST SERPL-CCNC: 14 U/L (ref 1–32)
BASOPHILS # BLD AUTO: 0.04 10*3/MM3 (ref 0–0.2)
BASOPHILS NFR BLD AUTO: 0.5 % (ref 0–1.5)
BILIRUB SERPL-MCNC: 0.3 MG/DL (ref 0–1.2)
BUN SERPL-MCNC: 9 MG/DL (ref 6–20)
BUN/CREAT SERPL: 12.5 (ref 7–25)
CALCIUM SERPL-MCNC: 9.4 MG/DL (ref 8.6–10.5)
CHLORIDE SERPL-SCNC: 101 MMOL/L (ref 98–107)
CHOLEST SERPL-MCNC: 198 MG/DL (ref 0–200)
CO2 SERPL-SCNC: 25.6 MMOL/L (ref 22–29)
CREAT SERPL-MCNC: 0.72 MG/DL (ref 0.57–1)
EGFRCR SERPLBLD CKD-EPI 2021: 112 ML/MIN/1.73
EOSINOPHIL # BLD AUTO: 0.31 10*3/MM3 (ref 0–0.4)
EOSINOPHIL NFR BLD AUTO: 3.8 % (ref 0.3–6.2)
ERYTHROCYTE [DISTWIDTH] IN BLOOD BY AUTOMATED COUNT: 12.8 % (ref 12.3–15.4)
GLOBULIN SER CALC-MCNC: 2.3 GM/DL
GLUCOSE SERPL-MCNC: 82 MG/DL (ref 65–99)
HCT VFR BLD AUTO: 37.9 % (ref 34–46.6)
HDLC SERPL-MCNC: 66 MG/DL (ref 40–60)
HGB BLD-MCNC: 12.2 G/DL (ref 12–15.9)
IMM GRANULOCYTES # BLD AUTO: 0.02 10*3/MM3 (ref 0–0.05)
IMM GRANULOCYTES NFR BLD AUTO: 0.2 % (ref 0–0.5)
LDLC SERPL CALC-MCNC: 92 MG/DL (ref 0–100)
LYMPHOCYTES # BLD AUTO: 2.42 10*3/MM3 (ref 0.7–3.1)
LYMPHOCYTES NFR BLD AUTO: 29.4 % (ref 19.6–45.3)
MCH RBC QN AUTO: 27.6 PG (ref 26.6–33)
MCHC RBC AUTO-ENTMCNC: 32.2 G/DL (ref 31.5–35.7)
MCV RBC AUTO: 85.7 FL (ref 79–97)
MONOCYTES # BLD AUTO: 0.42 10*3/MM3 (ref 0.1–0.9)
MONOCYTES NFR BLD AUTO: 5.1 % (ref 5–12)
NEUTROPHILS # BLD AUTO: 5.02 10*3/MM3 (ref 1.7–7)
NEUTROPHILS NFR BLD AUTO: 61 % (ref 42.7–76)
NRBC BLD AUTO-RTO: 0 /100 WBC (ref 0–0.2)
PLATELET # BLD AUTO: 270 10*3/MM3 (ref 140–450)
POTASSIUM SERPL-SCNC: 4.2 MMOL/L (ref 3.5–5.2)
PROT SERPL-MCNC: 6.4 G/DL (ref 6–8.5)
RBC # BLD AUTO: 4.42 10*6/MM3 (ref 3.77–5.28)
SODIUM SERPL-SCNC: 137 MMOL/L (ref 136–145)
TRIGL SERPL-MCNC: 242 MG/DL (ref 0–150)
VLDLC SERPL CALC-MCNC: 40 MG/DL (ref 5–40)
WBC # BLD AUTO: 8.23 10*3/MM3 (ref 3.4–10.8)

## 2022-09-21 NOTE — ASSESSMENT & PLAN NOTE
Physical exam findings.  Discussed with patient routine health maintenance including: Vaccines, dental/eye health, healthy diet and exercise, Pap smears.  Mental health has been addressed today as well.  Routine screening labs have been ordered.

## 2022-09-21 NOTE — ASSESSMENT & PLAN NOTE
Low on previous vitamin D labs.  Will obtain updated vitamin D level today.  We will treat as appropriate.

## 2022-11-14 ENCOUNTER — OFFICE VISIT (OUTPATIENT)
Dept: ORTHOPEDIC SURGERY | Facility: CLINIC | Age: 37
End: 2022-11-14

## 2022-11-14 VITALS — BODY MASS INDEX: 35.65 KG/M2 | HEIGHT: 65 IN | WEIGHT: 214 LBS

## 2022-11-14 DIAGNOSIS — M70.61 GREATER TROCHANTERIC BURSITIS OF RIGHT HIP: Primary | ICD-10-CM

## 2022-11-14 DIAGNOSIS — M76.31 IT BAND SYNDROME, RIGHT: ICD-10-CM

## 2022-11-14 DIAGNOSIS — M25.551 ARTHRALGIA OF RIGHT HIP: ICD-10-CM

## 2022-11-14 PROCEDURE — 99213 OFFICE O/P EST LOW 20 MIN: CPT | Performed by: PHYSICIAN ASSISTANT

## 2022-11-14 RX ORDER — METHYLPREDNISOLONE 4 MG/1
TABLET ORAL
Qty: 21 TABLET | Refills: 0 | Status: SHIPPED | OUTPATIENT
Start: 2022-11-14

## 2022-11-14 NOTE — PROGRESS NOTES
"Subjective   Patient ID: Kelly Astorga is a 36 y.o. right hand dominant female  Follow-up of the Right Hip (States she is still having pain, the cortisone injection she had on 9/1/22 did not help. She has been doing PT for 8 weeks, states her range of motion has improved but the adrian has not.)         History of Present Illness  Patient is following up for continued right lateral hip pain.   NO numbness or tinging. NO lower back pain.  She had a right hip GT bursa injection without improvement.   Patient has been attending PT which helps with the ROM but she still has pain and limping.                                                   Past Medical History:   Diagnosis Date   • Abnormal Pap smear of cervix 2013    Follow up clear    • GERD (gastroesophageal reflux disease)    • Headache    • Hyperlipidemia    • PONV (postoperative nausea and vomiting)    • Pre-eclampsia in third trimester     pt states \"diagnosed after delivery with 1st baby\"   • Vitamin D deficiency         Past Surgical History:   Procedure Laterality Date   • GASTRIC RESTRICTION SURGERY  9/19/16    Gastric Sleeve   • GASTRIC SLEEVE LAPAROSCOPIC  09/2016   • LEEP  2013   • OTOPLASTY  2005   • WISDOM TOOTH EXTRACTION         Family History   Problem Relation Age of Onset   • Hyperlipidemia Mother    • Hypertension Mother    • Hypertension Father    • Hyperlipidemia Father    • No Known Problems Brother    • Hydrocephalus Maternal Grandmother    • Hypertension Maternal Grandmother    • Hyperlipidemia Maternal Grandmother    • Other Maternal Grandmother         Normal Pressure Hydrocephalus   • Heart disease Maternal Grandfather    • Hyperlipidemia Maternal Grandfather    • Hypertension Maternal Grandfather    • Heart attack Maternal Grandfather    • Diabetes Paternal Grandmother    • Liver disease Neg Hx    • Liver cancer Neg Hx    • Colon cancer Neg Hx        Social History     Socioeconomic History   • Marital status:    Tobacco " "Use   • Smoking status: Never   • Smokeless tobacco: Never   Vaping Use   • Vaping Use: Never used   Substance and Sexual Activity   • Alcohol use: No   • Drug use: No   • Sexual activity: Yes     Partners: Male     Birth control/protection: Birth control pill         Current Outpatient Medications:   •  cetirizine (zyrTEC) 10 MG tablet, Take 10 mg by mouth Daily., Disp: , Rfl:   •  methylPREDNISolone (MEDROL) 4 MG dose pack, Take as directed on package instructions., Disp: 21 tablet, Rfl: 0  •  Omeprazole 20 MG Tablet Delayed Release Dispersible, Take 20 mg by mouth Daily., Disp: , Rfl:   •  Prenatal Vit-Fe Fumarate-FA (PNV PRENATAL PLUS MULTIVITAMIN) 27-1 MG tablet, Take 1 tablet by mouth Daily. 200001, Disp: , Rfl: 3    No Known Allergies    Review of Systems   Constitutional: Negative for fever.   HENT: Negative for dental problem and voice change.    Eyes: Negative for visual disturbance.   Respiratory: Negative for shortness of breath.    Cardiovascular: Negative for chest pain.   Gastrointestinal: Negative for abdominal pain.   Genitourinary: Negative for dysuria.   Musculoskeletal: Positive for arthralgias (right hip). Negative for gait problem and joint swelling.   Skin: Negative for rash.   Neurological: Negative for speech difficulty.   Hematological: Does not bruise/bleed easily.   Psychiatric/Behavioral: Negative for confusion.       I have reviewed the medical and surgical history, family history, social history, medications, and/or allergies, and the review of systems of this report.    Objective   Ht 165.1 cm (65\")   Wt 97.1 kg (214 lb)   BMI 35.61 kg/m²    Physical Exam  Vitals and nursing note reviewed.   Constitutional:       Appearance: Normal appearance.   Musculoskeletal:      Lumbar back: No tenderness or bony tenderness. Negative right straight leg raise test and negative left straight leg raise test.      Right hip: Tenderness and bony tenderness present. Normal strength.   Neurological: "      Mental Status: She is alert and oriented to person, place, and time.       Right Hip Exam     Tenderness   The patient is experiencing tenderness in the greater trochanter and lateral.    Range of Motion   Abduction: 40   Adduction: 20   Flexion: 90   External rotation: 50   Internal rotation: 20     Tests   KARIME: negative  Jorge: positive  Fadir:  Positive FADIR test    Other   Sensation: normal      Back Exam     Tests   Straight leg raise right: negative  Straight leg raise left: negative           Extremity DVT signs are negative on physical exam with negative Antonino sign, no calf pain, no palpable cords and no skin tone change   Neurologic Exam     Mental Status   Oriented to person, place, and time.              Assessment & Plan   Independent Review of Radiographic Studies:    No new imaging done today.      Procedures       Diagnoses and all orders for this visit:    1. Greater trochanteric bursitis of right hip (Primary)  -     MRI hip right wo contrast; Future  -     methylPREDNISolone (MEDROL) 4 MG dose pack; Take as directed on package instructions.  Dispense: 21 tablet; Refill: 0    2. Arthralgia of right hip  -     MRI hip right wo contrast; Future  -     methylPREDNISolone (MEDROL) 4 MG dose pack; Take as directed on package instructions.  Dispense: 21 tablet; Refill: 0    3. It band syndrome, right  -     MRI hip right wo contrast; Future  -     methylPREDNISolone (MEDROL) 4 MG dose pack; Take as directed on package instructions.  Dispense: 21 tablet; Refill: 0       Discussion of orthopedic goals  Risk, benefits, and merits of treatment alternatives reviewed with the patient and questions answered  Ice, heat, and/or modalities as beneficial    Recommendations/Plan:  Exercise, medications, injections, other patient advice, and return appointment as noted.  Patient is encouraged to call or return for any issues or concerns.      Patient agreeable to call or return sooner for any  concerns.        EMR Dragon-transcription disclaimer:  This encounter note is an electronic transcription of spoken language to printed text.  Electronic transcription of spoken language may permit erroneous or at times nonsensical words or phrases to be inadvertently transcribed.  Although I have reviewed the note for such errors, some may still exist

## 2022-11-16 ENCOUNTER — TELEPHONE (OUTPATIENT)
Dept: ORTHOPEDIC SURGERY | Facility: CLINIC | Age: 37
End: 2022-11-16

## 2022-11-16 NOTE — TELEPHONE ENCOUNTER
Spoke with patient, advised her I have re-faxed it several times. She states she talked to [facility] and they finally received it.

## 2022-11-16 NOTE — TELEPHONE ENCOUNTER
.Provider:JOSH  Caller: AVI RONQUILLO  Relationship to Patient:SELF  Phone Number: [phone]  Reason for Call:   RE: MRI ORDER,  11/16/2022 PATIENT CALLED TO ADVISED [facility] COULDN'T DO MRI (SCHEDULED FOR) 12/28/22 REQUESTS THE ORDER BE FAXED TO [facility] IN [place]) TO GET IN SOONER - FAX# [phone] - PATIENT ADVISED IS PAYING OUT OF POCKET & INSURANCE INS AUTH ISN'T NEEDED & REQUESTED TO HAVE SENT OVER ASAP  - CAN HAVE MRI @ 830AM TOMORROW 11/ 17/ 22 - ATT: KEY // HUB ADVISED WOULD SEND TO CLINICAL AS HIGH PRIORITY.

## 2022-11-16 NOTE — TELEPHONE ENCOUNTER
Provider:JOSH  Caller: AVI RONQUILLO  Phone Number: [phone]  Reason for Call:   PT IS REQUESTING TO HAVE THE MRI ORDERS RESENT TO  FAX:[phone] AS THEY HAVE NOT GOTTEN THE ORDERS YET

## 2022-12-28 ENCOUNTER — APPOINTMENT (OUTPATIENT)
Dept: MRI IMAGING | Facility: HOSPITAL | Age: 37
End: 2022-12-28

## 2023-02-14 ENCOUNTER — TRANSCRIBE ORDERS (OUTPATIENT)
Dept: LAB | Facility: HOSPITAL | Age: 38
End: 2023-02-14
Payer: COMMERCIAL

## 2023-02-14 ENCOUNTER — LAB (OUTPATIENT)
Dept: LAB | Facility: HOSPITAL | Age: 38
End: 2023-02-14
Payer: COMMERCIAL

## 2023-02-14 DIAGNOSIS — N97.9 PRIMARY FEMALE INFERTILITY: ICD-10-CM

## 2023-02-14 DIAGNOSIS — N97.9 PRIMARY FEMALE INFERTILITY: Primary | ICD-10-CM

## 2023-02-14 PROCEDURE — 83498 ASY HYDROXYPROGESTERONE 17-D: CPT

## 2023-02-14 PROCEDURE — 36415 COLL VENOUS BLD VENIPUNCTURE: CPT

## 2023-02-14 PROCEDURE — 84146 ASSAY OF PROLACTIN: CPT

## 2023-02-14 PROCEDURE — 83001 ASSAY OF GONADOTROPIN (FSH): CPT

## 2023-02-14 PROCEDURE — 82670 ASSAY OF TOTAL ESTRADIOL: CPT

## 2023-02-14 PROCEDURE — 84443 ASSAY THYROID STIM HORMONE: CPT

## 2023-02-14 PROCEDURE — 84144 ASSAY OF PROGESTERONE: CPT

## 2023-02-15 LAB
ESTRADIOL SERPL HS-MCNC: 48.1 PG/ML
FSH SERPL-ACNC: 9.2 MIU/ML
PROGEST SERPL-MCNC: 0.07 NG/ML
PROLACTIN SERPL-MCNC: 6.4 NG/ML (ref 4.79–23.3)
TSH SERPL DL<=0.05 MIU/L-ACNC: 1.03 UIU/ML (ref 0.27–4.2)

## 2023-02-20 LAB — 17OHP SERPL-MCNC: 15 NG/DL

## 2023-03-13 ENCOUNTER — TRANSCRIBE ORDERS (OUTPATIENT)
Dept: LAB | Facility: HOSPITAL | Age: 38
End: 2023-03-13
Payer: COMMERCIAL

## 2023-03-13 ENCOUNTER — LAB (OUTPATIENT)
Dept: LAB | Facility: HOSPITAL | Age: 38
End: 2023-03-13
Payer: COMMERCIAL

## 2023-03-13 DIAGNOSIS — N92.6 IRREGULAR MENSTRUAL CYCLE: ICD-10-CM

## 2023-03-13 DIAGNOSIS — N92.6 IRREGULAR MENSTRUAL CYCLE: Primary | ICD-10-CM

## 2023-03-13 PROCEDURE — 36415 COLL VENOUS BLD VENIPUNCTURE: CPT

## 2023-03-13 PROCEDURE — 84144 ASSAY OF PROGESTERONE: CPT

## 2023-03-13 PROCEDURE — 84702 CHORIONIC GONADOTROPIN TEST: CPT

## 2023-03-14 LAB
HCG INTACT+B SERPL-ACNC: <1 MIU/ML
PROGEST SERPL-MCNC: 0.51 NG/ML

## 2023-04-10 ENCOUNTER — TRANSCRIBE ORDERS (OUTPATIENT)
Dept: LAB | Facility: HOSPITAL | Age: 38
End: 2023-04-10
Payer: COMMERCIAL

## 2023-04-10 ENCOUNTER — LAB (OUTPATIENT)
Dept: LAB | Facility: HOSPITAL | Age: 38
End: 2023-04-10
Payer: COMMERCIAL

## 2023-04-10 DIAGNOSIS — N88.3 FEMALE INFERTILITY OF CERVICAL ORIGIN: ICD-10-CM

## 2023-04-10 DIAGNOSIS — N97.8 FEMALE INFERTILITY OF CERVICAL ORIGIN: Primary | ICD-10-CM

## 2023-04-10 DIAGNOSIS — N88.3 FEMALE INFERTILITY OF CERVICAL ORIGIN: Primary | ICD-10-CM

## 2023-04-10 DIAGNOSIS — N97.8 FEMALE INFERTILITY OF CERVICAL ORIGIN: ICD-10-CM

## 2023-04-10 LAB — HCG INTACT+B SERPL-ACNC: 161.8 MIU/ML

## 2023-04-10 PROCEDURE — 84702 CHORIONIC GONADOTROPIN TEST: CPT

## 2023-04-10 PROCEDURE — 36415 COLL VENOUS BLD VENIPUNCTURE: CPT

## 2023-04-10 PROCEDURE — 84144 ASSAY OF PROGESTERONE: CPT

## 2023-04-11 LAB — PROGEST SERPL-MCNC: 27.9 NG/ML

## 2023-04-12 ENCOUNTER — LAB (OUTPATIENT)
Dept: LAB | Facility: HOSPITAL | Age: 38
End: 2023-04-12
Payer: COMMERCIAL

## 2023-04-12 ENCOUNTER — TRANSCRIBE ORDERS (OUTPATIENT)
Dept: LAB | Facility: HOSPITAL | Age: 38
End: 2023-04-12
Payer: COMMERCIAL

## 2023-04-12 DIAGNOSIS — N92.6 MISSED PERIODS: Primary | ICD-10-CM

## 2023-04-12 DIAGNOSIS — N92.6 MISSED PERIODS: ICD-10-CM

## 2023-04-12 LAB — HCG INTACT+B SERPL-ACNC: 311.8 MIU/ML

## 2023-04-12 PROCEDURE — 84702 CHORIONIC GONADOTROPIN TEST: CPT

## 2023-04-12 PROCEDURE — 36415 COLL VENOUS BLD VENIPUNCTURE: CPT

## 2023-05-18 ENCOUNTER — TRANSCRIBE ORDERS (OUTPATIENT)
Dept: LAB | Facility: HOSPITAL | Age: 38
End: 2023-05-18
Payer: COMMERCIAL

## 2023-05-18 ENCOUNTER — LAB (OUTPATIENT)
Dept: LAB | Facility: HOSPITAL | Age: 38
End: 2023-05-18
Payer: COMMERCIAL

## 2023-05-18 DIAGNOSIS — Z32.01 PREGNANCY EXAMINATION OR TEST, POSITIVE RESULT: ICD-10-CM

## 2023-05-18 DIAGNOSIS — Z32.01 PREGNANCY EXAMINATION OR TEST, POSITIVE RESULT: Primary | ICD-10-CM

## 2023-05-18 LAB
ABO GROUP BLD: NORMAL
ALBUMIN SERPL-MCNC: 4 G/DL (ref 3.5–5.2)
ALBUMIN/GLOB SERPL: 1.5 G/DL
ALP SERPL-CCNC: 84 U/L (ref 39–117)
ALT SERPL W P-5'-P-CCNC: 12 U/L (ref 1–33)
AMPHET+METHAMPHET UR QL: NEGATIVE
AMPHETAMINES UR QL: NEGATIVE
ANION GAP SERPL CALCULATED.3IONS-SCNC: 10 MMOL/L (ref 5–15)
AST SERPL-CCNC: 15 U/L (ref 1–32)
BARBITURATES UR QL SCN: NEGATIVE
BENZODIAZ UR QL SCN: NEGATIVE
BILIRUB SERPL-MCNC: 0.3 MG/DL (ref 0–1.2)
BLD GP AB SCN SERPL QL: NEGATIVE
BUN SERPL-MCNC: 7 MG/DL (ref 6–20)
BUN/CREAT SERPL: 10.9 (ref 7–25)
BUPRENORPHINE SERPL-MCNC: NEGATIVE NG/ML
CALCIUM SPEC-SCNC: 9.3 MG/DL (ref 8.6–10.5)
CANNABINOIDS SERPL QL: NEGATIVE
CHLORIDE SERPL-SCNC: 104 MMOL/L (ref 98–107)
CO2 SERPL-SCNC: 23 MMOL/L (ref 22–29)
COCAINE UR QL: NEGATIVE
CREAT SERPL-MCNC: 0.64 MG/DL (ref 0.57–1)
CREAT UR-MCNC: 81.3 MG/DL
DEPRECATED RDW RBC AUTO: 38.9 FL (ref 37–54)
EGFRCR SERPLBLD CKD-EPI 2021: 117.6 ML/MIN/1.73
ERYTHROCYTE [DISTWIDTH] IN BLOOD BY AUTOMATED COUNT: 13.1 % (ref 12.3–15.4)
GLOBULIN UR ELPH-MCNC: 2.7 GM/DL
GLUCOSE SERPL-MCNC: 88 MG/DL (ref 65–99)
HBV SURFACE AG SERPL QL IA: NORMAL
HCT VFR BLD AUTO: 36.1 % (ref 34–46.6)
HCV AB SER DONR QL: NORMAL
HGB BLD-MCNC: 12.4 G/DL (ref 12–15.9)
HIV1+2 AB SER QL: NORMAL
LDH SERPL-CCNC: 153 U/L (ref 135–214)
MCH RBC QN AUTO: 28.2 PG (ref 26.6–33)
MCHC RBC AUTO-ENTMCNC: 34.3 G/DL (ref 31.5–35.7)
MCV RBC AUTO: 82.2 FL (ref 79–97)
METHADONE UR QL SCN: NEGATIVE
OPIATES UR QL: NEGATIVE
OXYCODONE UR QL SCN: NEGATIVE
PCP UR QL SCN: NEGATIVE
PLATELET # BLD AUTO: 283 10*3/MM3 (ref 140–450)
PMV BLD AUTO: 9.6 FL (ref 6–12)
POTASSIUM SERPL-SCNC: 3.8 MMOL/L (ref 3.5–5.2)
PROPOXYPH UR QL: NEGATIVE
PROT ?TM UR-MCNC: 6.6 MG/DL
PROT SERPL-MCNC: 6.7 G/DL (ref 6–8.5)
PROT/CREAT UR: 81.2 MG/G CREA (ref 0–200)
RBC # BLD AUTO: 4.39 10*6/MM3 (ref 3.77–5.28)
RH BLD: POSITIVE
SODIUM SERPL-SCNC: 137 MMOL/L (ref 136–145)
TRICYCLICS UR QL SCN: NEGATIVE
URATE SERPL-MCNC: 5.4 MG/DL (ref 2.4–5.7)
WBC NRBC COR # BLD: 6.6 10*3/MM3 (ref 3.4–10.8)

## 2023-05-18 PROCEDURE — 83615 LACTATE (LD) (LDH) ENZYME: CPT

## 2023-05-18 PROCEDURE — 86901 BLOOD TYPING SEROLOGIC RH(D): CPT

## 2023-05-18 PROCEDURE — 86780 TREPONEMA PALLIDUM: CPT

## 2023-05-18 PROCEDURE — 87340 HEPATITIS B SURFACE AG IA: CPT

## 2023-05-18 PROCEDURE — 86803 HEPATITIS C AB TEST: CPT | Performed by: OBSTETRICS & GYNECOLOGY

## 2023-05-18 PROCEDURE — 84550 ASSAY OF BLOOD/URIC ACID: CPT

## 2023-05-18 PROCEDURE — 80053 COMPREHEN METABOLIC PANEL: CPT

## 2023-05-18 PROCEDURE — 87086 URINE CULTURE/COLONY COUNT: CPT | Performed by: OBSTETRICS & GYNECOLOGY

## 2023-05-18 PROCEDURE — 86900 BLOOD TYPING SEROLOGIC ABO: CPT

## 2023-05-18 PROCEDURE — 84156 ASSAY OF PROTEIN URINE: CPT

## 2023-05-18 PROCEDURE — 82570 ASSAY OF URINE CREATININE: CPT

## 2023-05-18 PROCEDURE — 85027 COMPLETE CBC AUTOMATED: CPT | Performed by: OBSTETRICS & GYNECOLOGY

## 2023-05-18 PROCEDURE — 80306 DRUG TEST PRSMV INSTRMNT: CPT | Performed by: OBSTETRICS & GYNECOLOGY

## 2023-05-18 PROCEDURE — 36415 COLL VENOUS BLD VENIPUNCTURE: CPT

## 2023-05-18 PROCEDURE — 86762 RUBELLA ANTIBODY: CPT

## 2023-05-18 PROCEDURE — 86850 RBC ANTIBODY SCREEN: CPT | Performed by: OBSTETRICS & GYNECOLOGY

## 2023-05-18 PROCEDURE — G0432 EIA HIV-1/HIV-2 SCREEN: HCPCS | Performed by: OBSTETRICS & GYNECOLOGY

## 2023-05-19 LAB — RUBV IGG SERPL IA-ACNC: 4.79 INDEX

## 2023-05-20 LAB — BACTERIA SPEC AEROBE CULT: NORMAL

## 2023-05-22 LAB — TREPONEMA PALLIDUM IGG+IGM AB [PRESENCE] IN SERUM OR PLASMA BY IMMUNOASSAY: NON REACTIVE

## 2023-07-06 ENCOUNTER — TELEPHONE (OUTPATIENT)
Dept: ENDOCRINOLOGY | Facility: CLINIC | Age: 38
End: 2023-07-06

## 2023-08-10 DIAGNOSIS — O24.414 INSULIN CONTROLLED GESTATIONAL DIABETES MELLITUS (GDM) IN SECOND TRIMESTER: ICD-10-CM

## 2023-08-10 RX ORDER — INSULIN GLARGINE 100 [IU]/ML
38 INJECTION, SOLUTION SUBCUTANEOUS NIGHTLY
Qty: 30 ML | Refills: 1 | Status: SHIPPED | OUTPATIENT
Start: 2023-08-10

## 2023-08-13 ENCOUNTER — E-VISIT (OUTPATIENT)
Dept: FAMILY MEDICINE CLINIC | Facility: TELEHEALTH | Age: 38
End: 2023-08-13
Payer: COMMERCIAL

## 2023-08-13 NOTE — E-VISIT TREATED
Chief Complaint: Cold, flu, COVID, sinus, hay fever, or seasonal allergies   Patient introduction   Patient is 37-year-old female with sore throat and headache that started 3 to 5 days ago.   COVID-19 testing history, vaccination status, and exposure:    Patient did a self-test within the last week. Patient specifies date of test as 8-10-23. Test result was negative.    Patient took a self-test during the interview. Test result was negative.    Has not received a COVID-19 vaccination.    No known exposure to a person with a confirmed or suspected case of COVID-19.    No travel outside local community within the last 14 days.   Risk factors for severe disease from COVID-19 infection    BMI >= 25.    Healthcare worker.    Pregnancy.    An unspecified autoimmune disorder.   Warning. The following may warrant further investigation:    Pregnancy.   General presentation   Fever:    No fever.   Sinus and nasal symptoms:    No sinus pain or pressure.    No nasal or sinus congestion.    No nasal discharge.    No itchy nose or sneezing.   Throat symptoms:    Sore throat.    Has had recent strep exposure.    Patient thinks they have strep.    Has discomfort when swallowing but can swallow liquids and solid foods.   Head and body aches:    Headache described as mild (1 to 3 on a scale of 1 to 10).    No sweats.    No chills.    No myalgia.    No fatigue.   Cough:    No cough.   Wheezing and shortness of breath:    No COPD diagnosis.    No asthma diagnosis.    No wheezing.    No shortness of breath.   Chest pain:    No chest pain.   Ear symptoms:    None.   Dizziness:    No dizziness.   Allergies:    No history of allergies.   Flu exposure:    No recent known exposure to a person with a confirmed flu diagnosis.    Has not had a flu vaccine this season.   Patient is taking over-the-counter medications for current symptoms, including acetaminophen, cetirizine, fluticasone, and omeprazole.   Review of red flags/alarm symptoms:     No changes in alertness or awareness.    No nuchal rigidity.    No symptoms suggesting airway obstruction.    No symptoms suggesting intracranial hemorrhage.    No decreased urination.   Pregnancy/menstrual status/breastfeeding:    Patient is pregnant.    Not breastfeeding.   Self-exam:    Height: 165 centimeters    Weight: 97.5 kilograms    No difficulty moving their chin toward their chest.    Tonsillar edema.    Purulent tonsils.    Palatal petechiae.    Swollen/painful neck lymph nodes.   Recent antibiotic use:    Has not taken antibiotics for similar symptoms within the past month.   Current medications   Currently taking Omeprazole 20 MG Tablet Delayed Release Dispersible, Insulin Pen Needle 32G X 4 MM misc, Semglee (yfgn) 100 UNIT/ML solution pen-injector, aspirin 81 MG EC tablet, PNV Prenatal Plus Multivitamin 27-1 MG tablet, and cetirizine 10 MG tablet.   Medication allergies   None.   Medication contraindication review   Because patient is pregnant, the following medications will not be prescribed: baloxavir, doxycycline hyclate, doxycycline monohydrate, fluconazole, ibuprofen, acetaminophen-diphenhydramine-phenylephrine, aspirin-chlorpheniramine-phenylephrine, and pseudoephedrine.   No history of anaphylactic reaction to beta-lactam antibiotics; aspirin triad; blood dyscrasia; bone marrow depression; catecholamine-releasing paraganglioma; coronary artery disease; coagulation disorder; congenital long QT syndrome; depression; electrolyte abnormalities; fungal infection; GI bleeding; GI obstruction; G6PD deficiency; heart arrhythmia; hypertension; mononucleosis; myasthenia; recent myocardial infarction; NSAID-induced asthma/urticaria; Parkinson's disease; pheochromocytoma; porphyria; Reye syndrome; seizure disorder; ulcerative colitis; and urinary retention.   No history of metoclopramide-associated dystonic reaction and tardive dyskinesia.   No known history of amoxicillin-clavulanate-associated  cholestatic jaundice or hepatic impairment.   No known history of azithromycin-associated cholestatic jaundice or hepatic impairment.   Past medical history   Immune conditions: Patient is not currently taking medications for their condition(s).   No history of cancer.   Social history   Never smoked tobacco.   High-risk household contacts:    Household contact under the age of 5.    Household contact who is pregnant.   Patient-submitted comments   Patient was asked if they had anything to add about their symptoms. Patient writes: Feels like strep! Plain amoxil is fine.   Patient did not request an excuse note.   Assessment   Strep pharyngitis.   This is the likely diagnosis based on patient's interview responses, including:    Symptom profile    Recent strep exposure   Plan   Medications:    amoxicillin 500 mg capsule RX 500mg 1 cap PO bid 10d for infection. This medication is an antibiotic. Take it exactly as directed. You must finish the entire course of medication, even if you feel better after the first few days of treatment. Amount is 20 cap.   The patient's prescription will be sent to:   [facility] [id number]   [address] [id number] [place] [id number]   Phone: [phone]     Fax: [phone]   Education:    Condition and causes    Prevention    Treatment and self-care    When to call provider   ----------   Electronically signed by COLLIN Lopez on 2023-08-13 at 07:04AM   ----------   Patient Interview Transcript:   Which of these symptoms are bothering you? Select all that apply.    Sore throat    Headache   Not selected:    Cough    Shortness of breath    Stuffed-up nose or sinuses    Runny nose    Itchy or watery eyes    Itchy nose or sneezing    Loss of smell or taste    Hoarse voice or loss of voice    Fever    Sweats    Chills    Muscle or body aches    Fatigue or tiredness    Nausea or vomiting    Diarrhea    I don't have any of these symptoms   When did your current symptoms start? Select  one.    3 to 5 days ago   Not selected:    Less than 48 hours ago    6 to 9 days ago    10 to 14 days ago    2 to 3 weeks ago    3 to 4 weeks ago    More than a month ago   Do you know the exact date your symptoms started? If so, enter the date as MM/DD/YY. Select one.    No   Not selected:    Yes (specify)   Did your symptoms come on suddenly or gradually? Select one.    I'm not sure   Not selected:    Suddenly    Gradually   Since your current symptoms started, have you had a viral test for COVID-19? - This includes home self-tests as well as nose swab or saliva tests done at a doctor's office, lab, or testing site. - It does NOT include antibody tests, which use a blood sample to test for past infection. Select one.    Yes   Not selected:    No   When was your most recent COVID-19 test? Select one.    Within the last week (specify date as MM/DD/YY): 8-10-23   Not selected:    Within the last 24 hours    7 to 14 days ago    15 to 30 days ago    More than 1 month ago   What type of COVID-19 test did you most recently have? Select one.    Viral self-test or home test   Not selected:    Viral test at a doctor's office, lab, or testing site   What was the result of your most recent COVID-19 test? Select one.    Negative   Not selected:    Positive   Even though your last test was negative, you could still have COVID. You may have tested before the virus was detectable. In some cases, repeat testing over several days is needed. If you have a COVID test kit, take the test now before continuing with this interview. Do you have a COVID test kit? Select one.    Yes, and I'll take another test now   Not selected:    Yes, but I prefer not to take a test now    No, I don't have a test kit   What is the result of the COVID-19 home test you just took? Select one.    Negative   Not selected:    Positive   Have you gotten the COVID-19 vaccine? Select one.    No   Not selected:    Yes   In the last 14 days, have you traveled  "outside of your local community? This includes travel by car, RV, bus, train, or plane. Travel increases your chances of getting and spreading COVID-19. Select one.    No   Not selected:    Yes   In the last 14 days, have you had close contact with someone who has coronavirus (COVID-19)? \"Close contact\" means any of these: - Living in the same household as someone with COVID-19. - Caring for someone with COVID-19. - Being within 6 feet of someone with COVID-19 for a total of at least 15 minutes over a 24-hour period. For example, three 5-minute exposures for a total of 15 minutes. - Being in direct contact with respiratory droplets from someone with COVID-19 (being coughed on, kissing, sharing utensils). Select one.    No, not that I know of   Not selected:    Yes, a confirmed case    Yes, a suspected case   You mentioned having a headache. On a scale of 1 to 10, how severe is your headache pain? Select one.    Mild (1 to 3)   Not selected:    Moderate (4 to 6)    Severe (7 to 9)    Unbearable (10)    The worst headache of my life (10+)   Do you feel sinus pain or pressure in any of these areas?    No   Not selected:    In my forehead    Around my eyes    Behind my nose    In my cheeks    In my upper teeth or jaw   Can you swallow liquids and solid foods? A sore throat may be painful when swallowing, but it shouldn't prevent you from swallowing. Select one.    Yes, but it's uncomfortable   Not selected:    Yes, with ease    Yes, but it's painful    It's hard to swallow anything because it feels like liquids and food get stuck in my throat    No, I can't swallow anything, liquid or solid foods   Is your throat pain worse on one side than the other? Select one.    No, it's the same on both sides   Not selected:    Yes, it's worse on the right side    Yes, it's worse on the left side   Since your symptoms started, have you felt dizzy? Select one.    No   Not selected:    Yes, but I can continue with my regular daily " activities    Yes, and it makes it hard to stand, walk, or do daily activities   Do you have chest pain? You might also feel it as discomfort, aching, tightness, or squeezing in the chest. Select one.    No   Not selected:    Yes   Have you urinated at least 3 times in the last 24 hours? Select one.    Yes   Not selected:    No   Changes in alertness or awareness may mean you need emergency care. Since your symptoms started, have you had any of these? Select all that apply.    None of the above   Not selected:    Confusion    Slurred speech    Not knowing where you are or what day it is    Difficulty staying conscious    Fainting or passing out   Do your symptoms include a whistling sound, or wheezing, when you breathe? Select one.    No   Not selected:    Yes    I'm not sure   Do you have any of these symptoms in your ear(s)? Select all that apply.    None of the above   Not selected:    Pain    Pressure    Fullness    Crackling or popping    Plugged or blocked sensation   Can you move your chin toward your chest?    Yes   Not selected:    No, my neck is too stiff   Are your tonsils larger than usual?    Yes   Not selected:    No, not that I can tell    I've had my tonsils removed   Is there any white or yellow pus on your tonsils?    Yes   Not selected:    No, not that I can see   Are there red spots on the roof of your mouth or the back of your throat?    Yes   Not selected:    No, not that I can see   Are your glands/lymph nodes swollen, or does it hurt when you touch them?    Yes   Not selected:    No, not that I can tell   In the past 2 weeks, has anyone around you (such as at school, work, or home) had a confirmed diagnosis of strep throat? A confirmed diagnosis means that a throat swab and lab test were done to verify a strep throat infection. Select one.    Yes   Not selected:    No, not that I know of   Do you think you might have strep throat? Select one.    Yes   Not selected:    No   In the past week,  has anyone around you (such as at school, work, or home) had a confirmed diagnosis of the flu? A confirmed diagnosis means that a nose swab was done to verify a flu infection. Select all that apply.    No, not that I know of   Not selected:    I live with someone who has the flu    I've been within touching distance of someone who has the flu    I've walked by, or sat about 3 feet away from, someone who has the flu    I've been in the same building as someone who has the flu   Have you ever been diagnosed with asthma? Select one.    No   Not selected:    Yes   Have you ever been diagnosed with chronic obstructive pulmonary disease (COPD)? Select one.    No, not that I know of   Not selected:    Yes   In the past month, have you taken antibiotics for similar symptoms? Examples of antibiotics include amoxicillin, amoxicillin-clavulanate (Augmentin), penicillin, cefdinir (Omnicef), doxycycline, and clindamycin (Cleocin). Select one.    No   Not selected:    Yes (specify)   Do you have allergies (pollen, dust mites, mold, animal dander)? Select one.    No, not that I know of   Not selected:    Yes   Have you had a flu shot this season? Select one.    No   Not selected:    Yes, less than 2 weeks ago    Yes, 2 to 4 weeks ago    Yes, 1 to 3 months ago    Yes, 3 to 6 months ago    Yes, more than 6 months ago   Are you pregnant? Select one.    Yes   Not selected:    No   Are you breastfeeding? Select one.    No   Not selected:    Yes   The flu and COVID-19 can be more serious for people in certain groups. The next few questions help us figure out if you or anyone you live with is at higher risk for complications from these infections. Do any of these statements apply to you? Select all that apply.    None of the above   Not selected:    I'm     I'm     I'm Black    I'm  or    Do you smoke tobacco? Select one.    No   Not selected:    Yes, every day    Yes, some days    No, I quit    Do you have any of these conditions? Select all that apply.    None of the above   Not selected:    Chronic lung disease, such as cystic fibrosis or interstitial fibrosis    Heart disease, such as congenital heart disease, congestive heart failure, or coronary artery disease    Disorder of the brain, spinal cord, or nerves and muscles, such as dementia, cerebral palsy, epilepsy, muscular dystrophy, or developmental delay    Metabolic disorder or mitochondrial disease    Cerebrovascular disease, such as stroke or another condition affecting the blood vessels or blood supply to the brain    Down syndrome    Mood disorder, including depression or schizophrenia spectrum disorders    Substance use disorder, such as alcohol, opioid, or cocaine use disorder    Tuberculosis   Do you live in a group care setting? Examples include: - Nursing home - Residential care - Psychiatric treatment facility - [facility] - Board and care home - Homeless shelter - Foster care setting Select one.    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 To determine your BMI, we need to know your weight and height. Please enter your weight (in pounds).    Weight   Please enter your height.    Height   Do you have any of these conditions that can affect the immune system? Scroll to see all options. Select all that apply.    An autoimmune disorder not listed here (specify): patient did not specify   Not selected:    History of bone marrow transplant    Chronic kidney disease    Chronic liver disease (including cirrhosis)    HIV/AIDS    Inflammatory bowel disease (Crohn's disease or ulcerative colitis)    Lupus    Moderate to severe plaque psoriasis    Multiple sclerosis    Rheumatoid arthritis    Sickle cell anemia    Alpha or beta thalassemia     History of solid organ transplant (kidney, liver, or heart)    History of spleen removal    A condition requiring treatment with long-term use of oral steroids (such as prednisone, prednisolone, or dexamethasone) (specify)    None of these   Do you take medications for your condition? This includes oral and injectable medications that are taken daily, weekly, or monthly. Select one.    No   Not selected:    Yes, regularly    Yes, for flare-ups only   Have you ever been diagnosed with cancer? Select one.    No   Not selected:    Yes, I have cancer now    Yes, but I'm in remission   Do any of these apply to you? Select all that apply.    None of the above   Not selected:    I've been hospitalized within the last 5 days    I have diabetes    I'm in close contact with a child in    The flu and COVID-19 can be more serious for people in certain groups. Do any of these apply to the people who live with you? Select all that apply.    Under age 5    Pregnant   Not selected:    Over age 65            Black     or     Has given birth, had a miscarriage, had a pregnancy loss, or had an  in the last 2 weeks    None of the above   Does any member of your household have any of these medical conditions? Select all that apply.    None of the above   Not selected:    Asthma    Disorders of the brain, spinal cord, or nerves and muscles, such as dementia, cerebral palsy, epilepsy, muscular dystrophy, or developmental delay    Chronic lung disease, such as COPD or cystic fibrosis    Heart disease, such as congenital heart disease, congestive heart failure, or coronary artery disease    Cerebrovascular disease, such as stroke or another condition affecting the blood vessels or blood supply to the brain    Blood disorders, such as sickle cell disease    Diabetes    Metabolic disorders such as inherited metabolic disorders or mitochondrial disease    Kidney disorders    Liver  disorders    Weakened immune system due to illness or medications such as chemotherapy or steroids    Children under the age of 19 who are on long-term aspirin therapy    Extreme obesity (BMI > 40)   Do you have any of these conditions? Scroll to see all options. Select all that apply.    None of the above   Not selected:    Aspirin triad (also known as Samter's triad or ASA triad)    Asthma or hives from taking aspirin or other NSAIDs, such as ibuprofen or naproxen    Blockage or narrowing of the blood vessels of the heart    Blood clotting disorder    Blood dyscrasia, such anemia, leukemia, lymphoma, or myeloma    Bone marrow depression    Catecholamine-releasing paraganglioma    Congenital long QT syndrome    Depression    Difficulty urinating or completely emptying your bladder    Uncorrected electrolyte abnormalities    Fungal infection    Gastrointestinal (GI) bleeding    Gastrointestinal (GI) obstruction    G6PD deficiency    Recent heart attack    High blood pressure    Irregular heartbeat or heart rhythm    Mononucleosis (mono)    Myasthenia gravis    Parkinson's disease    Pheochromocytoma    Reye syndrome    Seizure disorder    Thyroid disease    Ulcerative colitis   Have you ever had either of these conditions? Select all that apply.    No   Not selected:    Metoclopramide-associated dystonic reaction    Tardive dyskinesia   Just a few more questions about medications, and then you're finished. Have you used any non-prescription medications or nasal sprays for your current symptoms? Examples include saline sprays, decongestants, NyQuil, and Tylenol. Select one.    Yes   Not selected:    No   Which of these non-prescription medications have you tried? Scroll to see all options. Select all that apply.    Acetaminophen (Tylenol)    Cetirizine (Zyrtec)    Fluticasone (Flonase)    Omeprazole (Prilosec)   Not selected:    Budesonide (Rhinocort)    Chlorpheniramine (Aller-chlor, Chlor-Trimeton)    Cromolyn  (NasalCrom)    Dextromethorphan (Delsym, Robitussin, Vicks DayQuil Cough)    Diphenhydramine (Benadryl)    Fexofenadine (Allegra)    Guaifenesin (Mucinex)    Guaifenesin/dextromethorphan (Delsym DM, Mucinex DM, Robitussin DM)    Ibuprofen (Advil, Motrin, Midol)    Ketotifen (Alaway, Zaditor)    Loratadine (Alavert, Claritin)    Naphazoline-pheniramine (Naphcon-A, Opcon-A, Visine-A)    Oxymetazoline (Afrin)    Phenylephrine (Sudafed PE)    Triamcinolone (Nasacort)    None of the above   Have you taken any monoamine oxidase inhibitor (MAOI) medications in the last 14 days? Examples include rasagiline (Azilect), selegiline (Eldepryl, Zelapar), isocarboxazid (Marplan), phenelzine (Nardil), and tranylcypromine (Parnate). Select one.    No, not that I know of   Not selected:    Yes   Do you take Kynmobi or Apokyn (apomorphine)? Select one.    No   Not selected:    Yes   Are you still taking these medications listed in your medical record? If you're not taking any of these, click Next. Select all that apply.    Omeprazole 20 MG Tablet Delayed Release Dispersible    Insulin Pen Needle 32G X 4 MM misc    Semglee (yfgn) 100 UNIT/ML solution pen-injector    aspirin 81 MG EC tablet    PNV Prenatal Plus Multivitamin 27-1 MG tablet    cetirizine 10 MG tablet   Are you taking any other medications, vitamins, or supplements? Select one.    No   Not selected:    Yes   Have you ever had an allergic or bad reaction to any medication? Select one.    No   Not selected:    Yes   Are you allergic to milk or to the proteins found in milk (for example, whey or casein)? A milk allergy is different from lactose intolerance. Select one.    No, not that I know of   Not selected:    Yes   Have you ever had jaundice or liver problems as a result of taking amoxicillin-clavulanate (Augmentin)? Jaundice is a condition in which the skin and the whites of the eyes turn yellow. Select all that apply.    No, not that I know of   Not selected:    Yes,  jaundice    Yes, liver problems   Have you ever had jaundice or liver problems as a result of taking azithromycin (Zithromax, Zmax)? Jaundice is a condition in which the skin and the whites of the eyes turn yellow. Select all that apply.    No, not that I know of   Not selected:    Yes, jaundice    Yes, liver problems   Do you need a doctor's note? A doctor's note confirms that you received care today and states when you can return to school or work. It does not contain information about your diagnosis or treatment plan. Your provider will make the final decision on whether to give you a doctor's note and for how long. Doctor's notes CANNOT be backdated. We can't provide medical leave paperwork through this type of visit. If more paperwork is needed to request time off, contact your primary care provider. Select one.    No   Not selected:    Today only (1 day)    Today and tomorrow (2 days)    3 days    5 days    7 days    10 days    14 days   Is there anything you'd like to add about your symptoms? Please limit your comments to the symptoms asked about in this interview. If you include comments about other concerns, your provider may recommend that you be seen in person.    Feels like strep! Plain amoxil is fine   ----------   Medical history   Medical history data does not currently exist for this patient.

## 2023-08-13 NOTE — EXTERNAL PATIENT INSTRUCTIONS
Diagnosis   Strep pharyngitis (strep throat)   My name is COLLIN Lopez, and I'm a healthcare provider at [facility]. I reviewed your interview, and I see that you have strep throat. This can be a painful condition, but it responds well to treatment.   To prevent the spread of illness to others, I recommend that you stay home and away from other people as much as possible while you're sick. When you need to be around other people, consider wearing a face mask.   Medications   Your pharmacy   [facility] [id number] [facility] [address] [phone]     Prescription   Amoxicillin (500mg): Take 1 capsule by mouth twice a day for 10 days for infection. This medication is an antibiotic. Take it exactly as directed. You must finish the entire course of medication, even if you feel better after the first few days of treatment.    Start taking the antibiotics I've prescribed right away. You need to finish the entire course of antibiotics, even if you start to feel better before the pills run out.    Some people develop a yeast infection after taking antibiotics. If you get a yeast infection, you can treat it with antifungal creams or suppositories. These are available without a prescription at [employer] and many supermarkets.   Pregnancy and breastfeeding   The medications in this treatment plan are safe to take while pregnant.   About your diagnosis   Strep throat is an infection in the throat and tonsils caused by group A streptococcus bacteria. Strep throat is most often spread by droplets that are released into the air after an infected person coughs or sneezes. You can also get strep throat by sharing cups or utensils with an infected person.   Symptoms usually begin within 2 to 5 days after a person has been exposed. The pain from strep throat usually starts quickly and can be severe, especially when swallowing. Body aches and pains are common.   What to expect   If you follow this  "treatment plan, you should start to feel better within a few days.   When to seek care   Call us at [phone]   with any sudden or unexpected symptoms.    Symptoms that don't improve within 48 hours of starting antibiotics.    Symptoms that get better for a few days and then suddenly get worse.    Worsening fever.    Your throat pain becomes worse and makes swallowing extremely difficult or impossible.    Muffled voice (it may sound like you are talking with a mouthful of food).    Difficulty opening your mouth or jaw.    Stiff neck.    Joint pain, or swelling that moves from joint to joint.    Severe stomach pain.    Shortness of breath.    Nosebleed.    Severe fatigue.    A new rash.    Odd emotional or behavioral changes.    Uncontrollable body movements or \"jerkiness.\"    Severe chest pain.   Other treatment    Rest and drink plenty of water.    Choose throat-friendly liquids and foods, such as lemon tea, broth, applesauce, frozen yogurt, or popsicles.    Gargle with salt water several times a day to help with your throat pain.    Try cough drops and throat lozenges for extra relief.    Stir a teaspoon of honey into warm water or weak tea to help soothe a sore throat.    Avoid smoke and air pollution. Smoke can make infections worse.   Prevention    Avoid close contact with other people when you're sick.    Cover your mouth and nose when you cough or sneeze. Use a tissue or cough into your elbow. Make sure that used tissues go directly into the trash.    Avoid touching your eyes, nose, or mouth while you're sick.    Wash your hands often, especially after coughing, sneezing, or blowing your nose. If soap and water are not available, use an alcohol-based hand .    If you or someone in your home or workplace is sick, disinfect commonly used items. This includes door handles, tables, computers, remotes, and pens.    Coronavirus (COVID-19) information   Common symptoms of COVID-19 include fever, " cough, shortness of breath, fatigue, muscle or body aches, headaches, new loss of sense of taste or smell, sore throat, stuffy or runny nose, nausea or vomiting, and diarrhea. Most people who get COVID-19 have mild symptoms and can rest at home until they get better. Elderly people and those with chronic medical problems may be at risk for more serious complications.   FAQs about the COVID-19 vaccine   There are four authorized COVID-19 vaccines: [employer] Ata Vaccine (J&J/Ata), Moderna, Novavax, and Pfizer-Packetzoom (Pfizer). The J&J/Ata and Novavax vaccines are approved for use in people aged 18 and older. The Moderna and Pfizer vaccines are approved for those aged 6 months and older. All four are available at no cost. Even if you don't have health insurance, you can still get the COVID-19 vaccine for free.   Which vaccine is the best? Which vaccine should I get?   All four vaccines are highly effective. Even if you get COVID-19 after being vaccinated, all of the vaccines help prevent severe disease, hospitalization, and complications.   Most people should get whichever vaccine is first available to them. However, women younger than 50 years old should consider the rare risk of blood clots with low platelets after vaccination with the J&J/Ata vaccine. This risk hasn't been seen with the other three vaccines.   Are the vaccines safe?   Yes. Hundreds of millions of people in the [place] have already safely received COVID-19 vaccines under the most intense safety monitoring in the history of the [place].   Do I need the vaccine if I've already had COVID?   Yes. Vaccination helps protect you even if you've already had COVID.   If you had COVID-19 and had symptoms, wait to get vaccinated until you've recovered and completed your isolation period.   If you tested positive for COVID-19 but did not have symptoms, you can get vaccinated after 5 full days have passed since you had a positive test, as long as  you don't develop symptoms.   How many doses of the vaccine do I need?   Visit www.cdc.gov/coronavirus/2019-ncov/vaccines/stay-up-to-date.html   to find out how to stay up to date with your COVID-19 vaccines.   I'm immunocompromised. How many doses of the vaccine do I need?   For information on how immunocompromised people can stay up to date with their COVID-19 vaccines, visit www.cdc.gov/coronavirus/2019-ncov/vaccines/recommendations/immuno.html  .   What are the common side effects of the vaccine?   A sore arm, tiredness, headache, and muscle pain may occur within two days of getting the vaccine and last a day or two. For the Moderna or Pfizer vaccines, side effects are more common after the second dose. People over the age of 55 are less likely to have side effects than younger people.   After I'm up to date on vaccines, can I still get or spread COVID?   Yes, you can still get COVID, but your disease should be milder. And your risk of serious illness, hospitalization, and complications will be much lower, especially if you're up to date. Unfortunately, you can still spread COVID if you've been vaccinated. That's why it's important to follow isolation guidelines if you get sick or test positive.   After I'm up to date on vaccines, can I go back to normal?   You should still wear a mask indoors in public if:    It's required by laws, rules, regulations, or local guidance.    You have a weakened immune system.    Your age puts you at increased risk of severe disease.    You have a medical condition that puts you at increased risk of severe disease.    Someone in your household has a weakened immune system, is at increased risk for severe disease, or is unvaccinated.    You're in an area of high transmission.   Where can I get a COVID-19 vaccine?   Visit [facility]'s website for more information. To find a COVID-19 vaccination site near you, visit www.vaccines.gov/  , call [phone]  , or text your zip  code to [phone] ([place]). Message and data rates may apply.   What about travel?   Travel increases your risk of exposure to COVID-19. For more information, see www.cdc.gov/coronavirus/2019-ncov/travelers/index.html  .   I've had close contact with someone who has COVID. Do I need to quarantine, and if so, for how long?   For the most current answer, including a calculator to determine whether you need to stay home and for how long, visit www.cdc.gov/coronavirus/2019-ncov/your-health/quarantine-isolation.html  .   I've tested positive for COVID. How long do I need to isolate?   For the latest recommendations, including a calculator to determine how long you need to stay home, visit www.cdc.gov/coronavirus/2019-ncov/your-health/quarantine-isolation.html  .   What if I develop symptoms that might be from COVID?   For the latest recommendations on what to do if you're sick, including when to seek emergency care, visit www.cdc.gov/coronavirus/2019-ncov/if-you-are-sick/steps-when-sick.html  .    Flu vaccine information   Who should get a flu vaccine?   Everyone 6 months of age and older should get a yearly flu vaccine.   When should I get vaccinated?   You should get a flu vaccine by the end of October. Once you're vaccinated, it takes about two weeks for antibodies to develop and protect you against the flu. That's why it's important to get vaccinated as soon as possible.   After October, is it too late to get vaccinated?   No. You should still get vaccinated. As long as the flu viruses are still in your community, flu vaccines will remain available, even into January of next year or later.   Why do I need a flu vaccine EVERY year?   Flu viruses are constantly changing, so flu vaccines are usually updated from one season to the next. Your protection from the flu vaccine also lessens over time.   Is the flu vaccine safe?   Yes. Over the last 50 years, hundreds of millions of Americans have safely received the flu  vaccines.   What are the side effects of flu vaccines?   You CANNOT get the flu from a flu vaccine. Common side effects of the flu shot include soreness, redness and/or swelling where the shot was given, low grade fever, and aches. Common side effects of the nasal spray flu vaccine for adults include runny nose, headaches, sore throat, and cough. For children, side effects include wheezing, vomiting, muscle aches, and fever.   Does the flu vaccine increase your risk of getting COVID-19?   No. There is no evidence that getting a flu vaccine increases your risk of getting COVID-19.   Is it safe to get the flu vaccine along with a COVID-19 vaccine?   Yes. It's safe to get the flu vaccine with a COVID-19 vaccine or booster.   Contact your healthcare provider TODAY for details on when and where to get your flu vaccine.   Your provider   Your diagnosis was provided by COLLIN Lopez, a member of your trusted care team at [facility].   If you have any questions, call us at [phone]  .

## 2023-08-28 ENCOUNTER — APPOINTMENT (OUTPATIENT)
Dept: GENERAL RADIOLOGY | Facility: HOSPITAL | Age: 38
End: 2023-08-28
Payer: COMMERCIAL

## 2023-08-28 ENCOUNTER — HOSPITAL ENCOUNTER (EMERGENCY)
Facility: HOSPITAL | Age: 38
Discharge: HOME | End: 2023-08-28
Attending: EMERGENCY MEDICINE | Admitting: EMERGENCY MEDICINE
Payer: COMMERCIAL

## 2023-08-28 ENCOUNTER — APPOINTMENT (OUTPATIENT)
Dept: CT IMAGING | Facility: HOSPITAL | Age: 38
End: 2023-08-28
Payer: COMMERCIAL

## 2023-08-28 VITALS
BODY MASS INDEX: 37.99 KG/M2 | SYSTOLIC BLOOD PRESSURE: 133 MMHG | HEIGHT: 65 IN | RESPIRATION RATE: 18 BRPM | DIASTOLIC BLOOD PRESSURE: 77 MMHG | HEART RATE: 95 BPM | WEIGHT: 228 LBS | TEMPERATURE: 98.1 F | OXYGEN SATURATION: 97 %

## 2023-08-28 DIAGNOSIS — J06.9 VIRAL UPPER RESPIRATORY INFECTION: ICD-10-CM

## 2023-08-28 DIAGNOSIS — R00.2 PALPITATIONS: Primary | ICD-10-CM

## 2023-08-28 DIAGNOSIS — R05.9 COUGH, UNSPECIFIED TYPE: ICD-10-CM

## 2023-08-28 LAB
ALBUMIN SERPL-MCNC: 3.8 G/DL (ref 3.5–5.2)
ALBUMIN/GLOB SERPL: 1.2 G/DL
ALP SERPL-CCNC: 90 U/L (ref 39–117)
ALT SERPL W P-5'-P-CCNC: 10 U/L (ref 1–33)
ANION GAP SERPL CALCULATED.3IONS-SCNC: 12.8 MMOL/L (ref 5–15)
AST SERPL-CCNC: 12 U/L (ref 1–32)
B PARAPERT DNA SPEC QL NAA+PROBE: NOT DETECTED
B PERT DNA SPEC QL NAA+PROBE: NOT DETECTED
BASOPHILS # BLD AUTO: 0.02 10*3/MM3 (ref 0–0.2)
BASOPHILS NFR BLD AUTO: 0.2 % (ref 0–1.5)
BILIRUB SERPL-MCNC: <0.2 MG/DL (ref 0–1.2)
BILIRUB UR QL STRIP: NEGATIVE
BUN SERPL-MCNC: 13 MG/DL (ref 6–20)
BUN/CREAT SERPL: 22.4 (ref 7–25)
C PNEUM DNA NPH QL NAA+NON-PROBE: NOT DETECTED
CALCIUM SPEC-SCNC: 10 MG/DL (ref 8.6–10.5)
CHLORIDE SERPL-SCNC: 102 MMOL/L (ref 98–107)
CLARITY UR: CLEAR
CO2 SERPL-SCNC: 22.2 MMOL/L (ref 22–29)
COLOR UR: YELLOW
CREAT SERPL-MCNC: 0.58 MG/DL (ref 0.57–1)
D DIMER PPP FEU-MCNC: 0.58 MCGFEU/ML (ref 0–0.5)
DEPRECATED RDW RBC AUTO: 41.9 FL (ref 37–54)
EGFRCR SERPLBLD CKD-EPI 2021: 120.5 ML/MIN/1.73
EOSINOPHIL # BLD AUTO: 0.23 10*3/MM3 (ref 0–0.4)
EOSINOPHIL NFR BLD AUTO: 2.3 % (ref 0.3–6.2)
ERYTHROCYTE [DISTWIDTH] IN BLOOD BY AUTOMATED COUNT: 13.7 % (ref 12.3–15.4)
FLUAV SUBTYP SPEC NAA+PROBE: NOT DETECTED
FLUBV RNA ISLT QL NAA+PROBE: NOT DETECTED
GLOBULIN UR ELPH-MCNC: 3.1 GM/DL
GLUCOSE SERPL-MCNC: 90 MG/DL (ref 65–99)
GLUCOSE UR STRIP-MCNC: NEGATIVE MG/DL
HADV DNA SPEC NAA+PROBE: NOT DETECTED
HCOV 229E RNA SPEC QL NAA+PROBE: NOT DETECTED
HCOV HKU1 RNA SPEC QL NAA+PROBE: NOT DETECTED
HCOV NL63 RNA SPEC QL NAA+PROBE: NOT DETECTED
HCOV OC43 RNA SPEC QL NAA+PROBE: NOT DETECTED
HCT VFR BLD AUTO: 36.2 % (ref 34–46.6)
HGB BLD-MCNC: 11.5 G/DL (ref 12–15.9)
HGB UR QL STRIP.AUTO: NEGATIVE
HMPV RNA NPH QL NAA+NON-PROBE: NOT DETECTED
HOLD SPECIMEN: NORMAL
HOLD SPECIMEN: NORMAL
HPIV1 RNA ISLT QL NAA+PROBE: NOT DETECTED
HPIV2 RNA SPEC QL NAA+PROBE: NOT DETECTED
HPIV3 RNA NPH QL NAA+PROBE: NOT DETECTED
HPIV4 P GENE NPH QL NAA+PROBE: NOT DETECTED
IMM GRANULOCYTES # BLD AUTO: 0.1 10*3/MM3 (ref 0–0.05)
IMM GRANULOCYTES NFR BLD AUTO: 1 % (ref 0–0.5)
KETONES UR QL STRIP: NEGATIVE
LEUKOCYTE ESTERASE UR QL STRIP.AUTO: NEGATIVE
LYMPHOCYTES # BLD AUTO: 1.82 10*3/MM3 (ref 0.7–3.1)
LYMPHOCYTES NFR BLD AUTO: 18.4 % (ref 19.6–45.3)
M PNEUMO IGG SER IA-ACNC: NOT DETECTED
MAGNESIUM SERPL-MCNC: 1.8 MG/DL (ref 1.6–2.6)
MCH RBC QN AUTO: 26.7 PG (ref 26.6–33)
MCHC RBC AUTO-ENTMCNC: 31.8 G/DL (ref 31.5–35.7)
MCV RBC AUTO: 84 FL (ref 79–97)
MONOCYTES # BLD AUTO: 0.55 10*3/MM3 (ref 0.1–0.9)
MONOCYTES NFR BLD AUTO: 5.6 % (ref 5–12)
NEUTROPHILS NFR BLD AUTO: 7.17 10*3/MM3 (ref 1.7–7)
NEUTROPHILS NFR BLD AUTO: 72.5 % (ref 42.7–76)
NITRITE UR QL STRIP: NEGATIVE
NRBC BLD AUTO-RTO: 0 /100 WBC (ref 0–0.2)
PH UR STRIP.AUTO: 6.5 [PH] (ref 5–8)
PLATELET # BLD AUTO: 208 10*3/MM3 (ref 140–450)
PMV BLD AUTO: 8.8 FL (ref 6–12)
POTASSIUM SERPL-SCNC: 3.8 MMOL/L (ref 3.5–5.2)
PROT SERPL-MCNC: 6.9 G/DL (ref 6–8.5)
PROT UR QL STRIP: NEGATIVE
RBC # BLD AUTO: 4.31 10*6/MM3 (ref 3.77–5.28)
RHINOVIRUS RNA SPEC NAA+PROBE: NOT DETECTED
RSV RNA NPH QL NAA+NON-PROBE: NOT DETECTED
SARS-COV-2 RNA NPH QL NAA+NON-PROBE: NOT DETECTED
SODIUM SERPL-SCNC: 137 MMOL/L (ref 136–145)
SP GR UR STRIP: 1.01 (ref 1–1.03)
TROPONIN T SERPL HS-MCNC: <6 NG/L
TSH SERPL DL<=0.05 MIU/L-ACNC: 1.14 UIU/ML (ref 0.27–4.2)
UROBILINOGEN UR QL STRIP: NORMAL
WBC NRBC COR # BLD: 9.89 10*3/MM3 (ref 3.4–10.8)
WHOLE BLOOD HOLD COAG: NORMAL
WHOLE BLOOD HOLD SPECIMEN: NORMAL

## 2023-08-28 PROCEDURE — 25010000002 METHYLPREDNISOLONE PER 125 MG

## 2023-08-28 PROCEDURE — 99285 EMERGENCY DEPT VISIT HI MDM: CPT

## 2023-08-28 PROCEDURE — 25510000001 IOPAMIDOL 61 % SOLUTION: Performed by: EMERGENCY MEDICINE

## 2023-08-28 PROCEDURE — 71275 CT ANGIOGRAPHY CHEST: CPT

## 2023-08-28 PROCEDURE — 96361 HYDRATE IV INFUSION ADD-ON: CPT

## 2023-08-28 PROCEDURE — 93005 ELECTROCARDIOGRAM TRACING: CPT

## 2023-08-28 PROCEDURE — 84484 ASSAY OF TROPONIN QUANT: CPT

## 2023-08-28 PROCEDURE — 83735 ASSAY OF MAGNESIUM: CPT

## 2023-08-28 PROCEDURE — 85379 FIBRIN DEGRADATION QUANT: CPT

## 2023-08-28 PROCEDURE — 96374 THER/PROPH/DIAG INJ IV PUSH: CPT

## 2023-08-28 PROCEDURE — 0202U NFCT DS 22 TRGT SARS-COV-2: CPT

## 2023-08-28 PROCEDURE — 81003 URINALYSIS AUTO W/O SCOPE: CPT | Performed by: EMERGENCY MEDICINE

## 2023-08-28 PROCEDURE — 80050 GENERAL HEALTH PANEL: CPT

## 2023-08-28 RX ORDER — BENZONATATE 100 MG/1
100 CAPSULE ORAL 3 TIMES DAILY PRN
Qty: 30 CAPSULE | Refills: 0 | Status: SHIPPED | OUTPATIENT
Start: 2023-08-28 | End: 2023-09-05

## 2023-08-28 RX ORDER — METHYLPREDNISOLONE SODIUM SUCCINATE 125 MG/2ML
125 INJECTION, POWDER, LYOPHILIZED, FOR SOLUTION INTRAMUSCULAR; INTRAVENOUS ONCE
Status: COMPLETED | OUTPATIENT
Start: 2023-08-28 | End: 2023-08-28

## 2023-08-28 RX ORDER — PREDNISONE 20 MG/1
20 TABLET ORAL 2 TIMES DAILY
Qty: 6 TABLET | Refills: 0 | Status: SHIPPED | OUTPATIENT
Start: 2023-08-28 | End: 2023-08-31

## 2023-08-28 RX ORDER — SODIUM CHLORIDE 0.9 % (FLUSH) 0.9 %
10 SYRINGE (ML) INJECTION AS NEEDED
Status: DISCONTINUED | OUTPATIENT
Start: 2023-08-28 | End: 2023-08-28 | Stop reason: HOSPADM

## 2023-08-28 RX ADMIN — METHYLPREDNISOLONE SODIUM SUCCINATE 125 MG: 125 INJECTION, POWDER, FOR SOLUTION INTRAMUSCULAR; INTRAVENOUS at 16:52

## 2023-08-28 RX ADMIN — SODIUM CHLORIDE 1000 ML: 9 INJECTION, SOLUTION INTRAVENOUS at 16:52

## 2023-08-28 RX ADMIN — IOPAMIDOL 100 ML: 612 INJECTION, SOLUTION INTRAVENOUS at 17:50

## 2023-08-28 NOTE — Clinical Note
[facility] EMERGENCY DEPARTMENT  [address] [address]  Phone: [phone]    Kelly Astorga was seen and treated in our emergency department on 8/28/2023.  She may return to work on 08/31/2023.         Thank you for choosing [facility].    Magdy Lerma PA-C

## 2023-08-28 NOTE — ED PROVIDER NOTES
Subjective  History of Present Illness:    This is a 37-year-old female, currently 22 and half weeks pregnant, history of postoperative nausea vomiting, preeclampsia, vitamin D deficiency, headache, hyperlipidemia presents emergency room today for evaluation of palpitations and cough.  Cough has been ongoing for the last week and a half.  No known fevers.  No nausea vomiting diarrhea.  No runny nose or congestion.  She does report some dyspnea that has been associated with her cough.  She reports palpitations have been worsening for approximately the last month.  She contacted her OB who sent her to the emergency department for further evaluation.  Patient is concerned for possible pulmonary embolism given that they just got back from a 12-hour car ride in the last several days from [place].  She reports her heart rate has been somewhat elevated at home, took some steroids last week for her cough.  No chest pain.  No unilateral leg pain or swelling.  She denies any vaginal bleeding.  Has felt baby moving without difficulty.  No dysuria hematuria frequency or urgency.      Nurses Notes reviewed and agree, including vitals, allergies, social history and prior medical history.     REVIEW OF SYSTEMS: All systems reviewed and not pertinent unless noted.  Review of Systems   Constitutional:  Negative for fever.   HENT:  Negative for congestion and rhinorrhea.    Respiratory:  Positive for cough and shortness of breath.    Cardiovascular:  Positive for palpitations. Negative for chest pain and leg swelling.   Gastrointestinal:  Negative for abdominal pain, diarrhea, nausea and vomiting.   Genitourinary:  Negative for dysuria, frequency, urgency, vaginal bleeding, vaginal discharge and vaginal pain.   Musculoskeletal:  Negative for myalgias.   All other systems reviewed and are negative.    Past Medical History:   Diagnosis Date    Abnormal Pap smear of cervix 2013    Follow up clear     GERD (gastroesophageal reflux  "disease)     Headache     Hyperlipidemia     PONV (postoperative nausea and vomiting)     Pre-eclampsia in third trimester     pt states \"diagnosed after delivery with 1st baby\"    Vitamin D deficiency        Allergies:    Patient has no known allergies.      Past Surgical History:   Procedure Laterality Date    GASTRIC RESTRICTION SURGERY  9/19/16    Gastric Sleeve    GASTRIC SLEEVE LAPAROSCOPIC  09/2016    LEE  2013    OTOPLASTY  2005    WISDOM TOOTH EXTRACTION           Social History     Socioeconomic History    Marital status:    Tobacco Use    Smoking status: Never    Smokeless tobacco: Never   Vaping Use    Vaping Use: Never used   Substance and Sexual Activity    Alcohol use: No    Drug use: No    Sexual activity: Yes     Partners: Male     Birth control/protection: None         Family History   Problem Relation Age of Onset    Hyperlipidemia Mother     Hypertension Mother     Hypertension Father     Hyperlipidemia Father     No Known Problems Brother     Hydrocephalus Maternal Grandmother     Hypertension Maternal Grandmother     Hyperlipidemia Maternal Grandmother     Other Maternal Grandmother         Normal Pressure Hydrocephalus    Heart disease Maternal Grandfather     Hyperlipidemia Maternal Grandfather     Hypertension Maternal Grandfather     Heart attack Maternal Grandfather     Diabetes Paternal Grandmother     Liver disease Neg Hx     Liver cancer Neg Hx     Colon cancer Neg Hx        Objective  Physical Exam:  /76 (BP Location: Left arm, Patient Position: Sitting)   Pulse 101   Temp 98 °F (36.7 °C) (Oral)   Resp 20   Ht 165.1 cm (65\")   Wt 103 kg (228 lb)   SpO2 98%   BMI 37.94 kg/m²      Physical Exam  Vitals and nursing note reviewed.   Constitutional:       General: She is not in acute distress.     Appearance: Normal appearance. She is not ill-appearing, toxic-appearing or diaphoretic.   HENT:      Head: Normocephalic and atraumatic.      Nose: Nose normal. No congestion " or rhinorrhea.      Mouth/Throat:      Mouth: Mucous membranes are moist.      Pharynx: Oropharynx is clear. No oropharyngeal exudate or posterior oropharyngeal erythema.   Eyes:      Extraocular Movements: Extraocular movements intact.   Cardiovascular:      Rate and Rhythm: Normal rate and regular rhythm.      Pulses: Normal pulses.   Pulmonary:      Effort: Pulmonary effort is normal.      Breath sounds: No stridor. Rhonchi present. No wheezing or rales.      Comments: Bilateral rhonchi with good air movement.  Chest:      Chest wall: No tenderness.   Abdominal:      General: There is no distension.      Palpations: Abdomen is soft.      Tenderness: There is no abdominal tenderness. There is no guarding.   Musculoskeletal:         General: Normal range of motion.      Cervical back: Normal range of motion and neck supple.   Skin:     General: Skin is warm and dry.      Capillary Refill: Capillary refill takes less than 2 seconds.   Neurological:      General: No focal deficit present.      Mental Status: She is alert and oriented to person, place, and time.   Psychiatric:         Mood and Affect: Mood normal.         Behavior: Behavior normal.         Thought Content: Thought content normal.         Judgment: Judgment normal.         Procedures    ED Course:    ED Course as of 08/28/23 1906   Mon Aug 28, 2023   1601 EKG interpreted by me: Sinus rhythm, normal rate, PVC, no acute ST/T changes, this is an abnormal EKG [MP]      ED Course User Index  [MP] Carl Mac MD       Lab Results (last 24 hours)       Procedure Component Value Units Date/Time    CBC & Differential [[id number]]  (Abnormal) Collected: 08/28/23 1603    Specimen: Blood Updated: 08/28/23 1613    Narrative:      The following orders were created for panel order CBC & Differential.  Procedure                               Abnormality         Status                     ---------                               -----------         ------                      CBC Auto Differential[[id number]]        Abnormal            Final result                 Please view results for these tests on the individual orders.    Comprehensive Metabolic Panel [[id number]] Collected: 08/28/23 1603    Specimen: Blood Updated: 08/28/23 1631     Glucose 90 mg/dL      BUN 13 mg/dL      Creatinine 0.58 mg/dL      Sodium 137 mmol/L      Potassium 3.8 mmol/L      Chloride 102 mmol/L      CO2 22.2 mmol/L      Calcium 10.0 mg/dL      Total Protein 6.9 g/dL      Albumin 3.8 g/dL      ALT (SGPT) 10 U/L      AST (SGOT) 12 U/L      Alkaline Phosphatase 90 U/L      Total Bilirubin <0.2 mg/dL      Globulin 3.1 gm/dL      A/G Ratio 1.2 g/dL      BUN/Creatinine Ratio 22.4     Anion Gap 12.8 mmol/L      eGFR 120.5 mL/min/1.73     Narrative:      GFR Normal >60  Chronic Kidney Disease <60  Kidney Failure <15      Magnesium [[id number]]  (Normal) Collected: 08/28/23 1603    Specimen: Blood Updated: 08/28/23 1631     Magnesium 1.8 mg/dL     Single High Sensitivity Troponin T [[id number]]  (Normal) Collected: 08/28/23 1603    Specimen: Blood Updated: 08/28/23 1642     HS Troponin T <6 ng/L     Narrative:      High Sensitive Troponin T Reference Range:  <10.0 ng/L- Negative Female for AMI  <15.0 ng/L- Negative Male for AMI  >=10 - Abnormal Female indicating possible myocardial injury.  >=15 - Abnormal Male indicating possible myocardial injury.   Clinicians would have to utilize clinical acumen, EKG, Troponin, and serial changes to determine if it is an Acute Myocardial Infarction or myocardial injury due to an underlying chronic condition.         TSH [[id number]]  (Normal) Collected: 08/28/23 1603    Specimen: Blood Updated: 08/28/23 1642     TSH 1.140 uIU/mL     CBC Auto Differential [[id number]]  (Abnormal) Collected: 08/28/23 1603    Specimen: Blood Updated: 08/28/23 1613     WBC 9.89 10*3/mm3      RBC 4.31 10*6/mm3      Hemoglobin 11.5 g/dL      Hematocrit 36.2 %      MCV 84.0 fL      MCH 26.7 pg   "    MCHC 31.8 g/dL      RDW 13.7 %      RDW-SD 41.9 fl      MPV 8.8 fL      Platelets 208 10*3/mm3      Neutrophil % 72.5 %      Lymphocyte % 18.4 %      Monocyte % 5.6 %      Eosinophil % 2.3 %      Basophil % 0.2 %      Immature Grans % 1.0 %      Neutrophils, Absolute 7.17 10*3/mm3      Lymphocytes, Absolute 1.82 10*3/mm3      Monocytes, Absolute 0.55 10*3/mm3      Eosinophils, Absolute 0.23 10*3/mm3      Basophils, Absolute 0.02 10*3/mm3      Immature Grans, Absolute 0.10 10*3/mm3      nRBC 0.0 /100 WBC     D-dimer, Quantitative [[id number]]  (Abnormal) Collected: 08/28/23 1603    Specimen: Blood Updated: 08/28/23 1654     D-Dimer, Quantitative 0.58 MCGFEU/mL     Narrative:      According to the assay 's published package insert, a normal (<0.50 MCGFEU/mL) D-dimer result in conjunction with a non-high clinical probability assessment, excludes deep vein thrombosis (DVT) and pulmonary embolism (PE) with high sensitivity.    D-dimer values increase with age and this can make VTE exclusion of an older population difficult. To address this, the [employer], based on best available evidence and recent guidelines, recommends that clinicians use age-adjusted D-dimer thresholds in patients greater than 50 years of age with: a) a low probability of PE who do not meet all Pulmonary Embolism Rule Out Criteria, or b) in those with intermediate probability of PE.   The formula for an age-adjusted D-dimer cut-off is \"age/100\".  For example, a 60 year old patient would have an age-adjusted cut-off of 0.60 MCGFEU/mL and an 80 year old 0.80 MCGFEU/mL.    Urinalysis With Microscopic If Indicated (No Culture) - Urine, Clean Catch [[id number]]  (Normal) Collected: 08/28/23 1710    Specimen: Urine, Clean Catch Updated: 08/28/23 1726     Color, UA Yellow     Appearance, UA Clear     pH, UA 6.5     Specific Gravity, UA 1.012     Glucose, UA Negative     Ketones, UA Negative     Bilirubin, UA Negative     " Blood, UA Negative     Protein, UA Negative     Leuk Esterase, UA Negative     Nitrite, UA Negative     Urobilinogen, UA 0.2 E.U./dL    Narrative:      Urine microscopic not indicated.    Respiratory Panel PCR w/COVID-19(SARS-CoV-2) RICH/MIKAEL/REJI/PAD/COR/MAD/JOAQUIN In-House, NP Swab in UTM/VTM, 3-4 HR TAT - Swab, Nasopharynx [[id number]]  (Normal) Collected: 08/28/23 1710    Specimen: Swab from Nasopharynx Updated: 08/28/23 1806     ADENOVIRUS, PCR Not Detected     Coronavirus 229E Not Detected     Coronavirus HKU1 Not Detected     Coronavirus NL63 Not Detected     Coronavirus OC43 Not Detected     COVID19 Not Detected     Human Metapneumovirus Not Detected     Human Rhinovirus/Enterovirus Not Detected     Influenza A PCR Not Detected     Influenza B PCR Not Detected     Parainfluenza Virus 1 Not Detected     Parainfluenza Virus 2 Not Detected     Parainfluenza Virus 3 Not Detected     Parainfluenza Virus 4 Not Detected     RSV, PCR Not Detected     Bordetella pertussis pcr Not Detected     Bordetella parapertussis PCR Not Detected     Chlamydophila pneumoniae PCR Not Detected     Mycoplasma pneumo by PCR Not Detected    Narrative:      In the setting of a positive respiratory panel with a viral infection PLUS a negative procalcitonin without other underlying concern for bacterial infection, consider observing off antibiotics or discontinuation of antibiotics and continue supportive care. If the respiratory panel is positive for atypical bacterial infection (Bordetella pertussis, Chlamydophila pneumoniae, or Mycoplasma pneumoniae), consider antibiotic de-escalation to target atypical bacterial infection.             No radiology results from the last 24 hrs       MDM      Initial impression of presenting illness: This is a 37-year-old female presents emergency room today currently 22-1/2 weeks pregnant for evaluation of palpitations and cough    DDX: includes but is not limited to: Pneumothorax, pneumonia, viral upper  respiratory tract infection, pulmonary embolism, viral illness, pregnancy physiologic changes, UTI other    Patient arrives hemodynamically stable, mildly tachycardic at a rate of 101, afebrile, nonhypoxic with vitals interpreted by myself.     Pertinent features from physical exam: Well-appearing 37-year-old female in no acute distress.  Lungs revealed rhonchorous breath sounds bilaterally with good air movement.  Cardiac auscultation revealed regular rate and rhythm, moist mucous membranes.  Moves all extremities without difficulty, 2+ radial pulses bilaterally.  Abdomen soft and nontender.  No unilateral leg pain or swelling.    Initial diagnostic plan: CBC, CMP, TSH, magnesium, D-dimer, EKG, troponin, respiratory panel, urinalysis, chest x-ray, urine culture    Results from initial plan were reviewed and interpreted by me revealing EKG revealed sinus rhythm normal rate PVCs with no acute ST or T wave changes.  CBC essentially unremarkable.  CMP unremarkable.  Troponin magnesium and TSH all normal.  CT pulmonary embolism normal per radiology.  Lungs were clear.  Suspect bronchitis or viral upper respiratory tract infection.  Urinalysis negative for infection.  Troponin normal.  All labs appear reassuring.  Respiratory panel was negative.  Fetal heart tones in the 150s and appropriate.    Diagnostic information from other sources: Chart reviewed.    Interventions / Re-evaluation: 1 L of fluids and 125 mg IV of Solu-Medrol.  Better after interventions.  Stable for discharge.     Results/clinical rationale were discussed with patient at bedside.  Suspect viral bronchitis.    Consultations/Discussion of results with other physicians: Discussed with attending physician.    Disposition plan: Discharge.  OB/GYN and primary care follow-up and cardiology follow-up.  She tells me that she has OB/GYN and cardiology follow-up this week.  Return precautions given.  She is agreeable to this plan at bedside.  We will send 3  more days of prednisone and Tessalon Perles for symptomatic treatment.  -----    Final diagnoses:   Palpitations   Cough, unspecified type   Viral upper respiratory infection          Magdy Lerma PA-C  08/28/23 1924

## 2023-08-28 NOTE — Clinical Note
[facility] EMERGENCY DEPARTMENT  [address] [address] [facility] [address]  Phone: [phone]    Kelly Astorga was seen and treated in our emergency department on 8/28/2023.  She may return to work on 08/31/2023.         Thank you for choosing [facility].    Magdy Lerma PA-C

## 2023-08-30 DIAGNOSIS — O24.414 INSULIN CONTROLLED GESTATIONAL DIABETES MELLITUS (GDM) IN SECOND TRIMESTER: Primary | ICD-10-CM

## 2023-08-30 RX ORDER — INSULIN ASPART 100 [IU]/ML
6 INJECTION, SOLUTION INTRAVENOUS; SUBCUTANEOUS
Qty: 15 ML | Refills: 1 | Status: SHIPPED | OUTPATIENT
Start: 2023-08-30

## 2023-08-30 RX ORDER — INSULIN GLARGINE-YFGN 100 [IU]/ML
44 INJECTION, SOLUTION SUBCUTANEOUS NIGHTLY
Qty: 30 ML | Refills: 1 | Status: SHIPPED | OUTPATIENT
Start: 2023-08-30

## 2023-09-01 ENCOUNTER — HOSPITAL ENCOUNTER (EMERGENCY)
Facility: HOSPITAL | Age: 38
Discharge: HOME | End: 2023-09-01
Attending: EMERGENCY MEDICINE
Payer: COMMERCIAL

## 2023-09-01 ENCOUNTER — LAB (OUTPATIENT)
Dept: LAB | Facility: HOSPITAL | Age: 38
End: 2023-09-01
Payer: COMMERCIAL

## 2023-09-01 ENCOUNTER — APPOINTMENT (OUTPATIENT)
Dept: GENERAL RADIOLOGY | Facility: HOSPITAL | Age: 38
End: 2023-09-01
Payer: COMMERCIAL

## 2023-09-01 ENCOUNTER — PATIENT MESSAGE (OUTPATIENT)
Dept: INTERNAL MEDICINE | Facility: CLINIC | Age: 38
End: 2023-09-01
Payer: COMMERCIAL

## 2023-09-01 VITALS
DIASTOLIC BLOOD PRESSURE: 63 MMHG | SYSTOLIC BLOOD PRESSURE: 117 MMHG | HEART RATE: 111 BPM | HEIGHT: 65 IN | RESPIRATION RATE: 18 BRPM | WEIGHT: 229 LBS | BODY MASS INDEX: 38.15 KG/M2 | OXYGEN SATURATION: 98 % | TEMPERATURE: 97.3 F

## 2023-09-01 DIAGNOSIS — R06.02 SHORTNESS OF BREATH: ICD-10-CM

## 2023-09-01 DIAGNOSIS — J40 BRONCHITIS: Primary | ICD-10-CM

## 2023-09-01 DIAGNOSIS — R00.2 PALPITATIONS: Primary | ICD-10-CM

## 2023-09-01 DIAGNOSIS — E87.6 HYPOKALEMIA: ICD-10-CM

## 2023-09-01 LAB
ALBUMIN SERPL-MCNC: 4 G/DL (ref 3.5–5.2)
ALBUMIN/GLOB SERPL: 1.4 G/DL
ALP SERPL-CCNC: 86 U/L (ref 39–117)
ALT SERPL W P-5'-P-CCNC: 11 U/L (ref 1–33)
ANION GAP SERPL CALCULATED.3IONS-SCNC: 15.3 MMOL/L (ref 5–15)
AST SERPL-CCNC: 11 U/L (ref 1–32)
B PARAPERT DNA SPEC QL NAA+PROBE: NOT DETECTED
B PERT DNA SPEC QL NAA+PROBE: NOT DETECTED
BASOPHILS # BLD AUTO: 0.03 10*3/MM3 (ref 0–0.2)
BASOPHILS NFR BLD AUTO: 0.2 % (ref 0–1.5)
BILIRUB SERPL-MCNC: 0.2 MG/DL (ref 0–1.2)
BUN SERPL-MCNC: 8 MG/DL (ref 6–20)
BUN/CREAT SERPL: 12.7 (ref 7–25)
C PNEUM DNA NPH QL NAA+NON-PROBE: NOT DETECTED
CALCIUM SPEC-SCNC: 9.2 MG/DL (ref 8.6–10.5)
CHLORIDE SERPL-SCNC: 101 MMOL/L (ref 98–107)
CO2 SERPL-SCNC: 20.7 MMOL/L (ref 22–29)
CREAT SERPL-MCNC: 0.63 MG/DL (ref 0.57–1)
DEPRECATED RDW RBC AUTO: 40.8 FL (ref 37–54)
EGFRCR SERPLBLD CKD-EPI 2021: 118.1 ML/MIN/1.73
EOSINOPHIL # BLD AUTO: 0.26 10*3/MM3 (ref 0–0.4)
EOSINOPHIL NFR BLD AUTO: 1.9 % (ref 0.3–6.2)
ERYTHROCYTE [DISTWIDTH] IN BLOOD BY AUTOMATED COUNT: 13.8 % (ref 12.3–15.4)
FLUAV SUBTYP SPEC NAA+PROBE: NOT DETECTED
FLUBV RNA ISLT QL NAA+PROBE: NOT DETECTED
GLOBULIN UR ELPH-MCNC: 2.8 GM/DL
GLUCOSE SERPL-MCNC: 131 MG/DL (ref 65–99)
HADV DNA SPEC NAA+PROBE: NOT DETECTED
HCOV 229E RNA SPEC QL NAA+PROBE: NOT DETECTED
HCOV HKU1 RNA SPEC QL NAA+PROBE: NOT DETECTED
HCOV NL63 RNA SPEC QL NAA+PROBE: NOT DETECTED
HCOV OC43 RNA SPEC QL NAA+PROBE: NOT DETECTED
HCT VFR BLD AUTO: 35 % (ref 34–46.6)
HGB BLD-MCNC: 11.4 G/DL (ref 12–15.9)
HMPV RNA NPH QL NAA+NON-PROBE: NOT DETECTED
HOLD SPECIMEN: NORMAL
HOLD SPECIMEN: NORMAL
HPIV1 RNA ISLT QL NAA+PROBE: NOT DETECTED
HPIV2 RNA SPEC QL NAA+PROBE: NOT DETECTED
HPIV3 RNA NPH QL NAA+PROBE: NOT DETECTED
HPIV4 P GENE NPH QL NAA+PROBE: NOT DETECTED
IMM GRANULOCYTES # BLD AUTO: 0.21 10*3/MM3 (ref 0–0.05)
IMM GRANULOCYTES NFR BLD AUTO: 1.5 % (ref 0–0.5)
LYMPHOCYTES # BLD AUTO: 2.63 10*3/MM3 (ref 0.7–3.1)
LYMPHOCYTES NFR BLD AUTO: 19 % (ref 19.6–45.3)
M PNEUMO IGG SER IA-ACNC: NOT DETECTED
MCH RBC QN AUTO: 26.9 PG (ref 26.6–33)
MCHC RBC AUTO-ENTMCNC: 32.6 G/DL (ref 31.5–35.7)
MCV RBC AUTO: 82.5 FL (ref 79–97)
MONOCYTES # BLD AUTO: 0.62 10*3/MM3 (ref 0.1–0.9)
MONOCYTES NFR BLD AUTO: 4.5 % (ref 5–12)
NEUTROPHILS NFR BLD AUTO: 10.1 10*3/MM3 (ref 1.7–7)
NEUTROPHILS NFR BLD AUTO: 72.9 % (ref 42.7–76)
NRBC BLD AUTO-RTO: 0.1 /100 WBC (ref 0–0.2)
NT-PROBNP SERPL-MCNC: 51.3 PG/ML (ref 0–450)
PLATELET # BLD AUTO: 176 10*3/MM3 (ref 140–450)
PMV BLD AUTO: 9.3 FL (ref 6–12)
POTASSIUM SERPL-SCNC: 3.2 MMOL/L (ref 3.5–5.2)
PROT SERPL-MCNC: 6.8 G/DL (ref 6–8.5)
RBC # BLD AUTO: 4.24 10*6/MM3 (ref 3.77–5.28)
RHINOVIRUS RNA SPEC NAA+PROBE: NOT DETECTED
RSV RNA NPH QL NAA+NON-PROBE: NOT DETECTED
SARS-COV-2 RNA NPH QL NAA+NON-PROBE: NOT DETECTED
SODIUM SERPL-SCNC: 137 MMOL/L (ref 136–145)
TROPONIN T SERPL HS-MCNC: 6 NG/L
WBC NRBC COR # BLD: 13.85 10*3/MM3 (ref 3.4–10.8)
WHOLE BLOOD HOLD COAG: NORMAL
WHOLE BLOOD HOLD SPECIMEN: NORMAL

## 2023-09-01 PROCEDURE — 86658 ENTEROVIRUS ANTIBODY: CPT | Performed by: FAMILY MEDICINE

## 2023-09-01 PROCEDURE — 94761 N-INVAS EAR/PLS OXIMETRY MLT: CPT

## 2023-09-01 PROCEDURE — 80053 COMPREHEN METABOLIC PANEL: CPT

## 2023-09-01 PROCEDURE — 84484 ASSAY OF TROPONIN QUANT: CPT

## 2023-09-01 PROCEDURE — 83880 ASSAY OF NATRIURETIC PEPTIDE: CPT

## 2023-09-01 PROCEDURE — 99284 EMERGENCY DEPT VISIT MOD MDM: CPT

## 2023-09-01 PROCEDURE — 87799 DETECT AGENT NOS DNA QUANT: CPT | Performed by: FAMILY MEDICINE

## 2023-09-01 PROCEDURE — 94799 UNLISTED PULMONARY SVC/PX: CPT

## 2023-09-01 PROCEDURE — 85025 COMPLETE CBC W/AUTO DIFF WBC: CPT

## 2023-09-01 PROCEDURE — 94640 AIRWAY INHALATION TREATMENT: CPT

## 2023-09-01 PROCEDURE — 0202U NFCT DS 22 TRGT SARS-COV-2: CPT

## 2023-09-01 PROCEDURE — 96360 HYDRATION IV INFUSION INIT: CPT

## 2023-09-01 RX ORDER — IPRATROPIUM BROMIDE AND ALBUTEROL SULFATE 2.5; .5 MG/3ML; MG/3ML
3 SOLUTION RESPIRATORY (INHALATION) ONCE
Status: COMPLETED | OUTPATIENT
Start: 2023-09-01 | End: 2023-09-01

## 2023-09-01 RX ORDER — SODIUM CHLORIDE 0.9 % (FLUSH) 0.9 %
10 SYRINGE (ML) INJECTION AS NEEDED
Status: DISCONTINUED | OUTPATIENT
Start: 2023-09-01 | End: 2023-09-01 | Stop reason: HOSPADM

## 2023-09-01 RX ORDER — POTASSIUM CHLORIDE 750 MG/1
40 CAPSULE, EXTENDED RELEASE ORAL ONCE
Status: COMPLETED | OUTPATIENT
Start: 2023-09-01 | End: 2023-09-01

## 2023-09-01 RX ORDER — ALBUTEROL SULFATE 90 UG/1
2 AEROSOL, METERED RESPIRATORY (INHALATION) EVERY 4 HOURS PRN
Qty: 8 G | Refills: 0 | Status: SHIPPED | OUTPATIENT
Start: 2023-09-01

## 2023-09-01 RX ORDER — AZITHROMYCIN 250 MG/1
TABLET, FILM COATED ORAL
Qty: 6 TABLET | Refills: 0 | Status: SHIPPED | OUTPATIENT
Start: 2023-09-01

## 2023-09-01 RX ADMIN — SODIUM CHLORIDE 1000 ML: 9 INJECTION, SOLUTION INTRAVENOUS at 14:35

## 2023-09-01 RX ADMIN — POTASSIUM CHLORIDE 40 MEQ: 10 CAPSULE, COATED, EXTENDED RELEASE ORAL at 14:35

## 2023-09-01 RX ADMIN — IPRATROPIUM BROMIDE AND ALBUTEROL SULFATE 3 ML: .5; 3 SOLUTION RESPIRATORY (INHALATION) at 15:10

## 2023-09-01 NOTE — ED PROVIDER NOTES
"Subjective  History of Present Illness:    This is a 37-year-old female, presents emergency room today for evaluation of shortness of breath and cough.  She received IV steroids and was discharged home on 4 days of oral prednisone after a visit this past Monday for the same symptoms.  She had a negative CT PE at that time on Monday.  Reports that after the IV steroids and being discharged, her cough and shortness of air improved for around 1 day, however returned shortly after.  Finished her last day of prednisone yesterday.  Returns today for same symptoms.  She has outpatient labs ordered by her primary care including BNP, coxsackievirus and parvovirus.  She is concerned that heart failure given orthopnea.  No fevers.  No unilateral leg pain or swelling.  No hemoptysis, no prior DVT or PE.  Cough is productive at times.  She is currently around 24 weeks pregnant and follows with OB/GYN out of [place].  Denies any chest pain.  Called her primary care who again told her to come back to the emergency department.      Nurses Notes reviewed and agree, including vitals, allergies, social history and prior medical history.     REVIEW OF SYSTEMS: All systems reviewed and not pertinent unless noted.  Review of Systems   Constitutional:  Negative for chills and fever.   Respiratory:  Positive for cough, shortness of breath and wheezing.      Past Medical History:   Diagnosis Date    Abnormal Pap smear of cervix 2013    Follow up clear     GERD (gastroesophageal reflux disease)     Headache     Hyperlipidemia     PONV (postoperative nausea and vomiting)     Pre-eclampsia in third trimester     pt states \"diagnosed after delivery with 1st baby\"    Vitamin D deficiency        Allergies:    Patient has no known allergies.      Past Surgical History:   Procedure Laterality Date    GASTRIC RESTRICTION SURGERY  9/19/16    Gastric Sleeve    GASTRIC SLEEVE LAPAROSCOPIC  09/2016    LEEP  2013    OTOPLASTY  2005    WISDOM TOOTH " "EXTRACTION           Social History     Socioeconomic History    Marital status:    Tobacco Use    Smoking status: Never    Smokeless tobacco: Never   Vaping Use    Vaping Use: Never used   Substance and Sexual Activity    Alcohol use: No    Drug use: No    Sexual activity: Yes     Partners: Male     Birth control/protection: None         Family History   Problem Relation Age of Onset    Hyperlipidemia Mother     Hypertension Mother     Hypertension Father     Hyperlipidemia Father     No Known Problems Brother     Hydrocephalus Maternal Grandmother     Hypertension Maternal Grandmother     Hyperlipidemia Maternal Grandmother     Other Maternal Grandmother         Normal Pressure Hydrocephalus    Heart disease Maternal Grandfather     Hyperlipidemia Maternal Grandfather     Hypertension Maternal Grandfather     Heart attack Maternal Grandfather     Diabetes Paternal Grandmother     Liver disease Neg Hx     Liver cancer Neg Hx     Colon cancer Neg Hx        Objective  Physical Exam:  /63   Pulse 111   Temp 97.3 °F (36.3 °C) (Oral)   Resp 18   Ht 165.1 cm (65\")   Wt 104 kg (229 lb)   SpO2 98%   BMI 38.11 kg/m²      Physical Exam  Vitals and nursing note reviewed.   Constitutional:       General: She is not in acute distress.     Appearance: She is well-developed and normal weight. She is not ill-appearing, toxic-appearing or diaphoretic.      Interventions: She is not intubated.  HENT:      Head: Normocephalic and atraumatic.      Mouth/Throat:      Mouth: Mucous membranes are moist.      Pharynx: Oropharynx is clear.   Cardiovascular:      Rate and Rhythm: Regular rhythm. Tachycardia present.   Pulmonary:      Effort: Pulmonary effort is normal. No tachypnea, bradypnea, accessory muscle usage or respiratory distress. She is not intubated.      Breath sounds: Wheezing and rhonchi present. No decreased breath sounds or rales.   Chest:      Chest wall: No tenderness.   Abdominal:      Palpations: " Abdomen is soft.   Musculoskeletal:         General: Normal range of motion.      Cervical back: Normal range of motion and neck supple.      Right lower leg: No tenderness. No edema.      Left lower leg: No tenderness. No edema.   Skin:     General: Skin is warm and dry.      Capillary Refill: Capillary refill takes less than 2 seconds.   Neurological:      General: No focal deficit present.      Mental Status: She is alert and oriented to person, place, and time.   Psychiatric:         Mood and Affect: Mood is anxious.         Behavior: Behavior normal.             Procedures    ED Course:    ED Course as of 09/01/23 1621   Fri Sep 01, 2023   1325 EKG interpreted by me reveals sinus tachycardia 114 occasional PVCs.  No ischemic changes [PF]      ED Course User Index  [PF] Domenic Eden,        Lab Results (last 24 hours)       Procedure Component Value Units Date/Time    CBC & Differential [[id number]]  (Abnormal) Collected: 09/01/23 1320    Specimen: Blood Updated: 09/01/23 1328    Narrative:      The following orders were created for panel order CBC & Differential.  Procedure                               Abnormality         Status                     ---------                               -----------         ------                     CBC Auto Differential[[id number]]        Abnormal            Final result                 Please view results for these tests on the individual orders.    Comprehensive Metabolic Panel [[id number]]  (Abnormal) Collected: 09/01/23 1320    Specimen: Blood Updated: 09/01/23 1346     Glucose 131 mg/dL      BUN 8 mg/dL      Creatinine 0.63 mg/dL      Sodium 137 mmol/L      Potassium 3.2 mmol/L      Chloride 101 mmol/L      CO2 20.7 mmol/L      Calcium 9.2 mg/dL      Total Protein 6.8 g/dL      Albumin 4.0 g/dL      ALT (SGPT) 11 U/L      AST (SGOT) 11 U/L      Alkaline Phosphatase 86 U/L      Total Bilirubin 0.2 mg/dL      Globulin 2.8 gm/dL      A/G Ratio 1.4 g/dL      BUN/Creatinine  Ratio 12.7     Anion Gap 15.3 mmol/L      eGFR 118.1 mL/min/1.73     Narrative:      GFR Normal >60  Chronic Kidney Disease <60  Kidney Failure <15      BNP [[id number]]  (Normal) Collected: 09/01/23 1320    Specimen: Blood Updated: 09/01/23 1349     proBNP 51.3 pg/mL     Narrative:      Among patients with dyspnea, NT-proBNP is highly sensitive for the detection of acute congestive heart failure. In addition NT-proBNP of <300 pg/ml effectively rules out acute congestive heart failure with 99% negative predictive value.      Single High Sensitivity Troponin T [[id number]]  (Normal) Collected: 09/01/23 1320    Specimen: Blood Updated: 09/01/23 1349     HS Troponin T 6 ng/L     Narrative:      High Sensitive Troponin T Reference Range:  <10.0 ng/L- Negative Female for AMI  <15.0 ng/L- Negative Male for AMI  >=10 - Abnormal Female indicating possible myocardial injury.  >=15 - Abnormal Male indicating possible myocardial injury.   Clinicians would have to utilize clinical acumen, EKG, Troponin, and serial changes to determine if it is an Acute Myocardial Infarction or myocardial injury due to an underlying chronic condition.         CBC Auto Differential [[id number]]  (Abnormal) Collected: 09/01/23 1320    Specimen: Blood Updated: 09/01/23 1328     WBC 13.85 10*3/mm3      RBC 4.24 10*6/mm3      Hemoglobin 11.4 g/dL      Hematocrit 35.0 %      MCV 82.5 fL      MCH 26.9 pg      MCHC 32.6 g/dL      RDW 13.8 %      RDW-SD 40.8 fl      MPV 9.3 fL      Platelets 176 10*3/mm3      Neutrophil % 72.9 %      Lymphocyte % 19.0 %      Monocyte % 4.5 %      Eosinophil % 1.9 %      Basophil % 0.2 %      Immature Grans % 1.5 %      Neutrophils, Absolute 10.10 10*3/mm3      Lymphocytes, Absolute 2.63 10*3/mm3      Monocytes, Absolute 0.62 10*3/mm3      Eosinophils, Absolute 0.26 10*3/mm3      Basophils, Absolute 0.03 10*3/mm3      Immature Grans, Absolute 0.21 10*3/mm3      nRBC 0.1 /100 WBC     Respiratory Panel PCR  w/COVID-19(SARS-CoV-2) RICH/MIKAEL/REJI/PAD/COR/MAD/JOAQUIN In-House, NP Swab in UTM/VTM, 3-4 HR TAT - Swab, Nasopharynx [[id number]]  (Normal) Collected: 09/01/23 1414    Specimen: Swab from Nasopharynx Updated: 09/01/23 1505     ADENOVIRUS, PCR Not Detected     Coronavirus 229E Not Detected     Coronavirus HKU1 Not Detected     Coronavirus NL63 Not Detected     Coronavirus OC43 Not Detected     COVID19 Not Detected     Human Metapneumovirus Not Detected     Human Rhinovirus/Enterovirus Not Detected     Influenza A PCR Not Detected     Influenza B PCR Not Detected     Parainfluenza Virus 1 Not Detected     Parainfluenza Virus 2 Not Detected     Parainfluenza Virus 3 Not Detected     Parainfluenza Virus 4 Not Detected     RSV, PCR Not Detected     Bordetella pertussis pcr Not Detected     Bordetella parapertussis PCR Not Detected     Chlamydophila pneumoniae PCR Not Detected     Mycoplasma pneumo by PCR Not Detected    Narrative:      In the setting of a positive respiratory panel with a viral infection PLUS a negative procalcitonin without other underlying concern for bacterial infection, consider observing off antibiotics or discontinuation of antibiotics and continue supportive care. If the respiratory panel is positive for atypical bacterial infection (Bordetella pertussis, Chlamydophila pneumoniae, or Mycoplasma pneumoniae), consider antibiotic de-escalation to target atypical bacterial infection.    Parvovirus B19 Quant PCR [[id number]] Collected: 09/01/23 1558    Specimen: Blood Updated: 09/01/23 1617    Coxsackie Virus Group B Ab [[id number]] Collected: 09/01/23 1558    Specimen: Blood Updated: 09/01/23 1617    Coxsackie A IgG / IgM Ab [[id number]] Collected: 09/01/23 1558    Specimen: Blood Updated: 09/01/23 1618             No radiology results from the last 24 hrs       MDM     Amount and/or Complexity of Data Reviewed  Clinical lab tests: reviewed  Decide to obtain previous medical records or to obtain history from  someone other than the patient: yes        Initial impression of presenting illness: This is a 37-year-old female presents emergency room today for evaluation of shortness of breath and cough.    DDX: includes but is not limited to: Bronchitis, pneumonia, upper respiratory tract infection, physiologic changes of pregnancy, bronchiolitis, pneumothorax.    Less suspicious for PE.  Patient has wheezing and rhonchi present throughout her lungs, more suspicious for a viral process or bronchitis.  No unilateral leg pain or swelling, no hemoptysis, no pleuritic chest pain.  No prior history of DVT or PE.    Patient arrives hemodynamically stable, tachycardic at a rate of 111, afebrile, nonhypoxic and nontachypneic with vitals interpreted by myself.     Pertinent features from physical exam: Lungs had coarse breath sounds bilaterally and wheezing.  Cardiac auscultation revealed tachycardic rate regular rhythm, abdomen soft nontender.  No unilateral leg pain or swelling..    Initial diagnostic plan: CBC, CMP, BNP, troponin, respiratory panel    Results from initial plan were reviewed and interpreted by me revealing CBC with a white blood cell count of 13.85 which is likely secondary to steroid use.  Hemoglobin of 11.4.  CMP with a glucose of 131, potassium of 3.2 and a CO2 of 20.7 with a mild anion gap of 15.3.  EKG revealed sinus tachycardia rate of 114 with occasional PVCs.  proBNP was within normal limits. Troponin within normal limits.  Respiratory panel is negative.  All labs appear reassuring.  EKG revealed sinus tachycardia rate of 114.    Diagnostic information from other sources: Chart was reviewed including prior visit to the emergency department.    Interventions / Re-evaluation: DuoNeb and potassium for mild hypokalemia.  1 L normal saline.    Results/clinical rationale were discussed with patient at bedside.  Discussed that given the patient's lung sound findings, suspect more of a viral bronchitis.  She  declined chest x-ray imaging given that she just had a CT PE on Monday.  Do not suspect much is changed from Monday especially in absence of any fevers, less likely pneumonia.    Consultations/Discussion of results with other physicians: Discussed with Dr. Sanderson of OB/GYN, did not feel that it was necessary to repeat CT PE given patient had almost exact same presentation several days ago with no new risk factors and a negative CT PE and lung findings consistent with a probable bronchitis and I agree with her recommendation.  Recommended Zithromax, inhaler, and close follow-up with OB/GYN.    Disposition plan: Discharge.  OB/GYN follow-up.  She did follow-up with cardiology this past week and has a Holter monitor in place.  Return precautions discussed.  We will send Zithromax.  She is agreeable and happy with this plan at bedside.  -----    Final diagnoses:   Bronchitis   Hypokalemia          Magdy Lerma PA-C  09/01/23 [id number]

## 2023-09-01 NOTE — DISCHARGE INSTRUCTIONS
Return to ER for any worsening symptoms.  Recommend close follow-up with your primary care physician and your OB/GYN physician.  Have sent Zithromax to your pharmacy.

## 2023-09-01 NOTE — Clinical Note
[facility] EMERGENCY DEPARTMENT  [address] [address]  Phone: [phone]    Kelly Astorga was seen and treated in our emergency department on 9/1/2023.  She may return to work on 09/13/2023.         Thank you for choosing [facility].    Magdy Lerma PA-C

## 2023-09-05 ENCOUNTER — OFFICE VISIT (OUTPATIENT)
Dept: ENDOCRINOLOGY | Facility: CLINIC | Age: 38
End: 2023-09-05
Payer: COMMERCIAL

## 2023-09-05 VITALS
SYSTOLIC BLOOD PRESSURE: 112 MMHG | OXYGEN SATURATION: 97 % | DIASTOLIC BLOOD PRESSURE: 68 MMHG | WEIGHT: 227 LBS | HEIGHT: 65 IN | HEART RATE: 107 BPM | BODY MASS INDEX: 37.82 KG/M2

## 2023-09-05 DIAGNOSIS — O24.414 INSULIN CONTROLLED GESTATIONAL DIABETES MELLITUS (GDM) IN SECOND TRIMESTER: Primary | ICD-10-CM

## 2023-09-05 LAB
EXPIRATION DATE: NORMAL
EXPIRATION DATE: NORMAL
GLUCOSE BLDC GLUCOMTR-MCNC: 98 MG/DL (ref 70–130)
HBA1C MFR BLD: 5.3 %
Lab: NORMAL
Lab: NORMAL

## 2023-09-05 RX ORDER — INSULIN GLARGINE-YFGN 100 [IU]/ML
50 INJECTION, SOLUTION SUBCUTANEOUS NIGHTLY
Qty: 30 ML | Refills: 1 | Status: SHIPPED | OUTPATIENT
Start: 2023-09-05

## 2023-09-05 RX ORDER — INSULIN ASPART 100 [IU]/ML
6-10 INJECTION, SOLUTION INTRAVENOUS; SUBCUTANEOUS
Qty: 15 ML | Refills: 1 | Status: SHIPPED | OUTPATIENT
Start: 2023-09-05

## 2023-09-05 RX ORDER — ACETAMINOPHEN 325 MG/1
TABLET ORAL EVERY 8 HOURS SCHEDULED
COMMUNITY

## 2023-09-05 NOTE — PROGRESS NOTES
"Chief Complaint   Patient presents with    Gestational Diabetes        HPI   Kelly Astorga is a 37 y.o. female had concerns including Gestational Diabetes.    She is checking blood sugars 4 times per day.    She is 24.6 weeks and baby is measuring on schedule.    Fasting Bgs are too high. Postprandial Bgs are too high. Is on levemir 44 units at night and 6 units novolog with some meals (higher carbs).     The following portions of the patient's history were reviewed and updated as appropriate: allergies, current medications, past family history, past medical history, past social history, past surgical history, and problem list.    Review of Systems   Respiratory:  Positive for shortness of breath.    Endocrine:        See HPI      /68   Pulse 107   Ht 165.1 cm (65\")   Wt 103 kg (227 lb)   SpO2 97%   BMI 37.77 kg/m²      Physical Exam  Vitals reviewed.   Constitutional:       Appearance: Normal appearance.   Cardiovascular:      Rate and Rhythm: Normal rate.   Pulmonary:      Effort: Pulmonary effort is normal.   Abdominal:      Comments: gravid   Neurological:      General: No focal deficit present.      Mental Status: She is alert. Mental status is at baseline.   Psychiatric:         Mood and Affect: Mood normal.         Behavior: Behavior normal.          HbA1c:  Lab Results   Component Value Date    HGBA1C 5.3 09/05/2023    HGBA1C 5.3 07/06/2023     Glucose:    Lab Results   Component Value Date    POCGLU 98 09/05/2023         Assessment and Plan    Diagnoses and all orders for this visit:    1. Insulin controlled gestational diabetes mellitus (GDM) in second trimester (Primary)  Uncontrolled with fasting and postprandial hyperglycemia.   Increase to 50 units basal insulin.   Increase prandial insulin to 10 units, more for higher carb meals.   Send logs weekly.   -     POC Glucose, Blood  -     POC Glycosylated Hemoglobin (Hb A1C)  -     Insulin Glargine-yfgn (Semglee, yfgn,) 100 UNIT/ML " solution pen-injector; Inject 50 Units under the skin into the appropriate area as directed Every Night.  Dispense: 30 mL; Refill: 1  -     insulin aspart (NovoLOG FlexPen) 100 UNIT/ML solution pen-injector sc pen; Inject 6-10 Units under the skin into the appropriate area as directed 3 (Three) Times a Day With Meals.  Dispense: 15 mL; Refill: 1         Return in about 1 month (around 10/5/2023) for next scheduled follow up. The patient was instructed to contact the clinic with any interval questions or concerns.    Angelique Danielson, DO   Endocrinologist    Please note that portions of this note were completed with a voice recognition program.

## 2023-09-06 LAB
B19V DNA # SERPL NAA+PROBE: NEGATIVE COPIES/ML
B19V DNA SERPL NAA+PROBE-LOG#: NORMAL LOG10COPY/ML

## 2023-09-07 LAB
CV A16 IGG TITR SER IF: ABNORMAL TITER
CV A16 IGM TITR SER IF: ABNORMAL TITER
CV A24 IGG TITR SER IF: ABNORMAL TITER
CV A24 IGM TITR SER IF: ABNORMAL TITER
CV A7 IGG TITR SER IF: ABNORMAL TITER
CV A7 IGM TITR SER IF: ABNORMAL TITER
CV A9 IGG TITR SER IF: ABNORMAL TITER
CV A9 IGM TITR SER IF: ABNORMAL TITER
CV B1 AB TITR SER CF: NEGATIVE {TITER}
CV B2 AB TITR SER CF: ABNORMAL {TITER}
CV B3 AB TITR SER CF: ABNORMAL {TITER}
CV B4 AB TITR SER CF: NEGATIVE {TITER}
CV B5 AB TITR SER CF: ABNORMAL {TITER}
CV B6 AB TITR SER CF: ABNORMAL {TITER}

## 2023-09-26 ENCOUNTER — LAB (OUTPATIENT)
Dept: LAB | Facility: HOSPITAL | Age: 38
End: 2023-09-26
Payer: COMMERCIAL

## 2023-09-26 ENCOUNTER — TRANSCRIBE ORDERS (OUTPATIENT)
Dept: LAB | Facility: HOSPITAL | Age: 38
End: 2023-09-26
Payer: COMMERCIAL

## 2023-09-26 DIAGNOSIS — Z34.83 PRENATAL CARE, SUBSEQUENT PREGNANCY, THIRD TRIMESTER: ICD-10-CM

## 2023-09-26 DIAGNOSIS — Z3A.28 28 WEEKS GESTATION OF PREGNANCY: ICD-10-CM

## 2023-09-26 DIAGNOSIS — Z3A.28 28 WEEKS GESTATION OF PREGNANCY: Primary | ICD-10-CM

## 2023-09-26 LAB
BASOPHILS # BLD AUTO: 0.01 10*3/MM3 (ref 0–0.2)
BASOPHILS NFR BLD AUTO: 0.1 % (ref 0–1.5)
BLD GP AB SCN SERPL QL: NEGATIVE
DEPRECATED RDW RBC AUTO: 38.8 FL (ref 37–54)
EOSINOPHIL # BLD AUTO: 0.16 10*3/MM3 (ref 0–0.4)
EOSINOPHIL NFR BLD AUTO: 2.2 % (ref 0.3–6.2)
ERYTHROCYTE [DISTWIDTH] IN BLOOD BY AUTOMATED COUNT: 13.8 % (ref 12.3–15.4)
HCT VFR BLD AUTO: 29.1 % (ref 34–46.6)
HGB BLD-MCNC: 9.5 G/DL (ref 12–15.9)
IMM GRANULOCYTES # BLD AUTO: 0.05 10*3/MM3 (ref 0–0.05)
IMM GRANULOCYTES NFR BLD AUTO: 0.7 % (ref 0–0.5)
LYMPHOCYTES # BLD AUTO: 1.7 10*3/MM3 (ref 0.7–3.1)
LYMPHOCYTES NFR BLD AUTO: 23.2 % (ref 19.6–45.3)
MCH RBC QN AUTO: 25.7 PG (ref 26.6–33)
MCHC RBC AUTO-ENTMCNC: 32.6 G/DL (ref 31.5–35.7)
MCV RBC AUTO: 78.6 FL (ref 79–97)
MONOCYTES # BLD AUTO: 0.41 10*3/MM3 (ref 0.1–0.9)
MONOCYTES NFR BLD AUTO: 5.6 % (ref 5–12)
NEUTROPHILS NFR BLD AUTO: 5 10*3/MM3 (ref 1.7–7)
NEUTROPHILS NFR BLD AUTO: 68.2 % (ref 42.7–76)
NRBC BLD AUTO-RTO: 0.1 /100 WBC (ref 0–0.2)
NT-PROBNP SERPL-MCNC: 39.4 PG/ML (ref 0–450)
PLATELET # BLD AUTO: 214 10*3/MM3 (ref 140–450)
PMV BLD AUTO: 9.1 FL (ref 6–12)
RBC # BLD AUTO: 3.7 10*6/MM3 (ref 3.77–5.28)
WBC NRBC COR # BLD: 7.33 10*3/MM3 (ref 3.4–10.8)

## 2023-09-26 PROCEDURE — 86850 RBC ANTIBODY SCREEN: CPT

## 2023-09-26 PROCEDURE — 83880 ASSAY OF NATRIURETIC PEPTIDE: CPT | Performed by: FAMILY MEDICINE

## 2023-09-26 PROCEDURE — 85025 COMPLETE CBC W/AUTO DIFF WBC: CPT

## 2023-10-02 ENCOUNTER — OFFICE VISIT (OUTPATIENT)
Dept: ENDOCRINOLOGY | Facility: CLINIC | Age: 38
End: 2023-10-02
Payer: COMMERCIAL

## 2023-10-02 VITALS
OXYGEN SATURATION: 97 % | DIASTOLIC BLOOD PRESSURE: 62 MMHG | SYSTOLIC BLOOD PRESSURE: 114 MMHG | BODY MASS INDEX: 38.95 KG/M2 | HEART RATE: 88 BPM | WEIGHT: 233.8 LBS | HEIGHT: 65 IN

## 2023-10-02 DIAGNOSIS — O24.414 INSULIN CONTROLLED GESTATIONAL DIABETES MELLITUS (GDM) IN SECOND TRIMESTER: Primary | ICD-10-CM

## 2023-10-02 LAB
EXPIRATION DATE: NORMAL
EXPIRATION DATE: NORMAL
GLUCOSE BLDC GLUCOMTR-MCNC: 78 MG/DL (ref 70–130)
HBA1C MFR BLD: 5.3 %
Lab: NORMAL
Lab: NORMAL

## 2023-10-02 PROCEDURE — 99214 OFFICE O/P EST MOD 30 MIN: CPT | Performed by: INTERNAL MEDICINE

## 2023-10-02 PROCEDURE — 82947 ASSAY GLUCOSE BLOOD QUANT: CPT | Performed by: INTERNAL MEDICINE

## 2023-10-02 PROCEDURE — 83036 HEMOGLOBIN GLYCOSYLATED A1C: CPT | Performed by: INTERNAL MEDICINE

## 2023-10-02 RX ORDER — INSULIN ASPART 100 [IU]/ML
10-15 INJECTION, SOLUTION INTRAVENOUS; SUBCUTANEOUS
Qty: 15 ML | Refills: 1
Start: 2023-10-02

## 2023-10-02 RX ORDER — INSULIN GLARGINE-YFGN 100 [IU]/ML
60 INJECTION, SOLUTION SUBCUTANEOUS NIGHTLY
Qty: 30 ML | Refills: 1
Start: 2023-10-02

## 2023-10-02 NOTE — PROGRESS NOTES
"Chief Complaint   Patient presents with    Diabetes     Insulin controlled gestational diabetes mellitus (GDM) in third trimester          HPI   Kelly Astorga is a 37 y.o. female had concerns including Diabetes (Insulin controlled gestational diabetes mellitus (GDM) in third trimester/).    She is checking blood sugars 4 times per day. Fasting Bgs at goal. Yesterday was 92. Was 80s two days before that.   Is on lantus 55 units at night and novolog 10-15 units with lunch/supper. Isn't taking insulin with breakfast and Bgs are good.     She is 28.5 weeks with unknown gender and baby is measuring on or slightly ahead of schedule.    The following portions of the patient's history were reviewed and updated as appropriate: allergies, current medications, past family history, past medical history, past social history, past surgical history, and problem list.    Review of Systems   Constitutional: Negative.    Endocrine: Negative.       /62 (BP Location: Right arm, Patient Position: Sitting, Cuff Size: Adult)   Pulse 88   Ht 165.1 cm (65\")   Wt 106 kg (233 lb 12.8 oz)   SpO2 97%   BMI 38.91 kg/m²      Physical Exam  Vitals reviewed.   Constitutional:       Appearance: Normal appearance.   Cardiovascular:      Rate and Rhythm: Normal rate.   Pulmonary:      Effort: Pulmonary effort is normal.   Neurological:      General: No focal deficit present.      Mental Status: She is alert. Mental status is at baseline.   Psychiatric:         Mood and Affect: Mood normal.         Behavior: Behavior normal.          HbA1c:  Lab Results   Component Value Date    HGBA1C 5.3 10/02/2023    HGBA1C 5.3 09/05/2023     Glucose:    Lab Results   Component Value Date    POCGLU 78 10/02/2023         Assessment and Plan    Diagnoses and all orders for this visit:    1. Insulin controlled gestational diabetes mellitus (GDM) in second trimester (Primary)  Controlled at 28.5 weeks, Unknown gender.   Increase lantus to 60 " units QHS. Continue to titrate insulin as needed.   Continue novolog 10-15 units with lunch/supper.   Send Bg logs weekly.   -     POC Glycosylated Hemoglobin (Hb A1C)  -     POC Glucose, Blood         Return in about 1 month (around 11/2/2023) for next scheduled follow up. The patient was instructed to contact the clinic with any interval questions or concerns.    Angelique Danielson, DO   Endocrinologist    Please note that portions of this note were completed with a voice recognition program.

## 2023-10-12 ENCOUNTER — LAB (OUTPATIENT)
Dept: LAB | Facility: HOSPITAL | Age: 38
End: 2023-10-12
Payer: COMMERCIAL

## 2023-10-12 ENCOUNTER — TRANSCRIBE ORDERS (OUTPATIENT)
Dept: LAB | Facility: HOSPITAL | Age: 38
End: 2023-10-12
Payer: COMMERCIAL

## 2023-10-12 DIAGNOSIS — Z34.83 PRENATAL CARE, SUBSEQUENT PREGNANCY, THIRD TRIMESTER: ICD-10-CM

## 2023-10-12 DIAGNOSIS — Z3A.30 30 WEEKS GESTATION OF PREGNANCY: Primary | ICD-10-CM

## 2023-10-12 DIAGNOSIS — Z3A.30 30 WEEKS GESTATION OF PREGNANCY: ICD-10-CM

## 2023-10-12 PROCEDURE — 87086 URINE CULTURE/COLONY COUNT: CPT

## 2023-10-12 RX ORDER — INSULIN GLARGINE-YFGN 100 [IU]/ML
38 INJECTION, SOLUTION SUBCUTANEOUS NIGHTLY
Qty: 30 ML | Refills: 1 | Status: CANCELLED | OUTPATIENT
Start: 2023-10-12

## 2023-10-14 LAB — BACTERIA SPEC AEROBE CULT: NORMAL

## 2023-10-16 ENCOUNTER — TELEPHONE (OUTPATIENT)
Dept: ENDOCRINOLOGY | Facility: CLINIC | Age: 38
End: 2023-10-16
Payer: COMMERCIAL

## 2023-10-16 DIAGNOSIS — O24.414 INSULIN CONTROLLED GESTATIONAL DIABETES MELLITUS (GDM) IN SECOND TRIMESTER: ICD-10-CM

## 2023-10-16 RX ORDER — INSULIN GLARGINE-YFGN 100 [IU]/ML
60 INJECTION, SOLUTION SUBCUTANEOUS NIGHTLY
Qty: 30 ML | Refills: 1 | Status: SHIPPED | OUTPATIENT
Start: 2023-10-16

## 2023-10-16 NOTE — TELEPHONE ENCOUNTER
----- Message from Kelly Astorga sent at 10/16/2023 11:54 AM EDT -----  Regarding: Semglee Insulin  Contact: [phone]  Hi Dr. Danielson!  I tried to refill my semglee but the pharmacy says it was discontinued. Can you send a new Rx for 65 units QHS to [facility] pharmacy? Thanks!

## 2023-10-16 NOTE — TELEPHONE ENCOUNTER
Rx Refill Note  Requested Prescriptions      No prescriptions requested or ordered in this encounter        Last office visit with prescribing clinician: 10/2/2023      Next office visit with prescribing clinician: 11/13/2023       Ritu Angela (Jodi)  10/16/23, 13:02 EDT     Refill was set to no print

## 2023-10-19 ENCOUNTER — E-VISIT (OUTPATIENT)
Dept: FAMILY MEDICINE CLINIC | Facility: TELEHEALTH | Age: 38
End: 2023-10-19
Payer: COMMERCIAL

## 2023-10-19 RX ORDER — OSELTAMIVIR PHOSPHATE 75 MG/1
75 CAPSULE ORAL DAILY
Qty: 10 CAPSULE | Refills: 0 | Status: SHIPPED | OUTPATIENT
Start: 2023-10-19 | End: 2023-10-29

## 2023-10-19 NOTE — EXTERNAL PATIENT INSTRUCTIONS
Note   I have sent tamiflu 75 mg daily x 10 days to [facility] in [place].   Diagnosis   Influenza (the flu)   My name is COLLIN Yeboah, and I'm a healthcare provider at [facility]. After reviewing your interview, I see that you may have influenza, or the flu.   With the ongoing COVID-19 pandemic, it can be hard to tell the difference between the flu and a COVID-19 infection. The two illnesses share many of the same symptoms, including fever, fatigue, muscle and/or body aches, and a cough. Most people with either illness have mild to moderate symptoms and can rest at home until they get better.   To prevent the spread of illness to others, I recommend that you stay home and away from other people as much as possible while you're sick. When you need to be around other people, consider wearing a face mask.   Here are the main things to know about your current symptoms:    Flu symptoms can be miserable, but they usually get better on their own.    You can use the medications recommended here to help ease your symptoms.   I haven't prescribed any antibiotics. Antibiotics fight bacteria, not the influenza virus. The following factors suggest that influenza, not a bacterial infection, is causing your symptoms:    You don't have sinus pain.   I haven't prescribed any antiviral medications, such as Tamiflu. Most people who get the flu don't need antiviral medications and recover in 1 to 2 weeks without treatment. Only people who are at high risk for complications from the flu need antiviral medications. This includes people over the age of 65 and those with chronic health conditions, such as asthma, COPD, diabetes, and heart disease. When used, antiviral medications need to be started within 48 hours of getting sick.   Pregnancy and breastfeeding   The medications in this treatment plan are safe to take while pregnant.   About your diagnosis   The flu is a contagious respiratory illness caused by influenza  viruses that infect the nose, throat, and lungs. Although more than 200 different viruses can cause the common cold, there aren't as many that cause the flu. That's why there is a vaccine against the flu but not against the common cold. These are the key things to know about the flu:    Flu viruses spread mainly by droplets released into the air. When people with the flu cough, sneeze, or talk, these droplets can land in the mouths or noses of people nearby.    People can also get the flu by touching an object that has flu virus on it and then touching their own mouth, eyes, or nose.   What to expect   Follow the advice in the treatment section below and you should feel better within 3 to 7 days. You may continue to feel tired and have a cough for several weeks.   You can spread the flu before you know you're sick, even a full day before symptoms develop. Most adults can infect others up to a week after first becoming sick.   You can return to your normal activities when ALL of the following are true:    You've been fever-free for more than 24 hours without using fever-reducing medications such as Tylenol    Your other symptoms have improved    It's been at least 5 full days since your symptoms first started   When to seek care   Call us at [phone]   with any sudden or unexpected symptoms.    Fever that measures over 103F or continues for more than 3 days.    Bluish color to your lips.    Coughing up red or bloody mucus.    Severe chest pain.    Severe shortness of breath.    Sudden dizziness.    Confusion.    More than 5 episodes of vomiting in a day.    Convulsions or seizures.    Swallowing becomes extremely difficult or impossible.    Sinus pain that lasts for more than 10 days, without improvement.    Symptoms that get better for a few days and then return with fever and worse cough.   Other treatment    Rest and drink plenty of sugar-free fluids.    If your nose or sinuses become very stuffy, try  using a Neti Pot to flush them out. Neti Pots are available at any drugstore without a prescription.    Avoid smoke and air pollution. Smoke can make infections worse.   Prevention   The flu is very contagious and can be more dangerous for people with high-risk conditions. Because you've been diagnosed with the flu, people who live with you or are in close contact with you may need medication to protect them from the flu.   The following people are at high risk for complications from the flu:    Children under the age of 5    Adults over the age of 65    Women who are pregnant or have given birth within the last 2 weeks    Residents of nursing homes or long-term care facilities     or  persons    People with the following medical conditions:    Asthma    Disorders of the brain, spinal cord, or nerves and muscles such as cerebral palsy, stroke, epilepsy, muscular dystrophy or developmental delay    Chronic lung disease such as COPD or cystic fibrosis    Heart disease such as congenital heart disease, congestive heart failure or coronary artery disease    Blood disorders such as sickle cell disease    Diabetes    Metabolic disorders such as inherited metabolic disorders or mitochondrial disease    Kidney disorders    Liver disorders    A weakened immune system due to illness or medications such as chemotherapy or steroids    People under the age of 19 who are on long-term aspirin therapy    Extreme obesity (BMI > 40)   If any of your close contacts fall into one of the categories above, they should speak with their healthcare provider as soon as possible. They may need to take antiviral medications to prevent the flu.   Avoid spreading the flu to others while you're sick:    Stay home and avoid contact with others.    Cover your mouth and nose with a tissue when you cough or sneeze. Put your used tissues in a waste basket immediately. If you don't have a tissue, cough or sneeze into your  upper sleeve or elbow, not into your hands.    Wash your hands often with soap and water for at least 20 seconds. If soap and water aren't available, use an alcohol-based hand .    Regularly clean and disinfect surfaces and objects.   To prevent severe illness and serious complications from the flu:    Everyone 6 months of age and older should get a yearly flu vaccine.    Good hand hygiene is important! Wash your hands regularly, especially after using the bathroom, changing a diaper, blowing your nose, coughing or sneezing, and before eating.    Avoid close contact with people who are sick.    Coronavirus (COVID-19) information   Common symptoms of COVID-19 include fever, cough, shortness of breath, fatigue, muscle or body aches, headaches, new loss of sense of taste or smell, sore throat, stuffy or runny nose, nausea or vomiting, and diarrhea. Most people who get COVID-19 have mild symptoms and can rest at home until they get better. Elderly people and those with chronic medical problems may be at risk for more serious complications.   FAQs about the COVID-19 vaccine   There are four authorized COVID-19 vaccines: [employer]'s Ata Vaccine (J&J/Ata), Moderna, Novavax, and Pfizer-BioNTech (Pfizer). The J&J/Ata and Novavax vaccines are approved for use in people aged 18 and older. The Moderna and Pfizer vaccines are approved for those aged 6 months and older. All four are available at no cost. Even if you don't have health insurance, you can still get the COVID-19 vaccine for free.   Which vaccine is the best? Which vaccine should I get?   All four vaccines are highly effective. Even if you get COVID-19 after being vaccinated, all of the vaccines help prevent severe disease, hospitalization, and complications.   Most people should get whichever vaccine is first available to them. However, women younger than 50 years old should consider the rare risk of blood clots with low platelets after  vaccination with the J&J/[employer] vaccine. This risk hasn't been seen with the other three vaccines.   Are the vaccines safe?   Yes. Hundreds of millions of people in the [place] have already safely received COVID-19 vaccines under the most intense safety monitoring in the history of the [place].   Do I need the vaccine if I've already had COVID?   Yes. Vaccination helps protect you even if you've already had COVID.   If you had COVID-19 and had symptoms, wait to get vaccinated until you've recovered and completed your isolation period.   If you tested positive for COVID-19 but did not have symptoms, you can get vaccinated after 5 full days have passed since you had a positive test, as long as you don't develop symptoms.   How many doses of the vaccine do I need?   Visit www.cdc.gov/coronavirus/2019-ncov/vaccines/stay-up-to-date.html   to find out how to stay up to date with your COVID-19 vaccines.   I'm immunocompromised. How many doses of the vaccine do I need?   For information on how immunocompromised people can stay up to date with their COVID-19 vaccines, visit www.cdc.gov/coronavirus/2019-ncov/vaccines/recommendations/immuno.html  .   What are the common side effects of the vaccine?   A sore arm, tiredness, headache, and muscle pain may occur within two days of getting the vaccine and last a day or two. For the Moderna or Pfizer vaccines, side effects are more common after the second dose. People over the age of 55 are less likely to have side effects than younger people.   After I'm up to date on vaccines, can I still get or spread COVID?   Yes, you can still get COVID, but your disease should be milder. And your risk of serious illness, hospitalization, and complications will be much lower, especially if you're up to date. Unfortunately, you can still spread COVID if you've been vaccinated. That's why it's important to follow isolation guidelines if you get sick or test positive.   After I'm up to date on vaccines,  can I go back to normal?   You should still wear a mask indoors in public if:    It's required by laws, rules, regulations, or local guidance.    You have a weakened immune system.    Your age puts you at increased risk of severe disease.    You have a medical condition that puts you at increased risk of severe disease.    Someone in your household has a weakened immune system, is at increased risk for severe disease, or is unvaccinated.    You're in an area of high transmission.   Where can I get a COVID-19 vaccine?   Visit [facility]'s website for more information. To find a COVID-19 vaccination site near you, visit www.vaccines.gov/  , call [phone]  , or text your zip code to 192859 ([employer]). Message and data rates may apply.   What about travel?   Travel increases your risk of exposure to COVID-19. For more information, see www.cdc.gov/coronavirus/2019-ncov/travelers/index.html  .   I've had close contact with someone who has COVID. Do I need to quarantine, and if so, for how long?   For the most current answer, including a calculator to determine whether you need to stay home and for how long, visit www.cdc.gov/coronavirus/2019-ncov/your-health/quarantine-isolation.html  .   I've tested positive for COVID. How long do I need to isolate?   For the latest recommendations, including a calculator to determine how long you need to stay home, visit www.cdc.gov/coronavirus/2019-ncov/your-health/quarantine-isolation.html  .   What if I develop symptoms that might be from COVID?   For the latest recommendations on what to do if you're sick, including when to seek emergency care, visit www.cdc.gov/coronavirus/2019-ncov/if-you-are-sick/steps-when-sick.html  .   Your provider   Your diagnosis was provided by COLLIN Yeboah, a member of your trusted care team at [facility].   If you have any questions, call us at [phone]  .

## 2023-10-19 NOTE — E-VISIT TREATED
Chief Complaint: Cold, flu, COVID, sinus, hay fever, or seasonal allergies   Patient introduction   Patient is 37-year-old female with nasal discharge that started more than 1 month ago.   COVID-19 testing history, vaccination status, and exposure:    Patient did a self-test within the last 24 hours. Test result was negative.    Has not received a COVID-19 vaccination.    No high-risk (household) exposure to COVID-19 within the last 14 days.    No travel outside local community within the last 14 days.   Risk factors for severe disease from COVID-19 infection    BMI >= 25.    Healthcare worker.    Pregnancy.    An unspecified autoimmune disorder.   Warning. The following may warrant further investigation:    Patient is pregnant.   General presentation   No improvement of symptoms.   Fever:    No fever.   Sinus and nasal symptoms:    Nasal discharge.    Clear nasal drainage.    Nasal drainage is thin.    No sinus pain or pressure.    No nasal or sinus congestion.    No itchy nose or sneezing.    No postnasal drip.   Throat symptoms:    No sore throat.   Head and body aches:    No headache.    No sweats.    No chills.    No myalgia.    No fatigue.   Cough:    No cough.   Wheezing and shortness of breath:    No COPD diagnosis.    No asthma diagnosis.    No wheezing.    No shortness of breath.   Chest pain:    No chest pain.   Ear symptoms:    None.   Dizziness:    No dizziness.   Allergies:    No history of allergies.   Flu exposure:    Recent household exposure to a person with a confirmed flu diagnosis.    Has not had a flu vaccine this season.   Patient is taking over-the-counter medications for current symptoms, including acetaminophen, cetirizine, diphenhydramine, and fluticasone.   Review of red flags/alarm symptoms:    No changes in alertness or awareness.    No nuchal rigidity.    No decreased urination.   Pregnancy/menstrual status/breastfeeding:    Patient is pregnant.    Not breastfeeding.   Self-exam:     Height: 165 centimeters    Weight: 97.5 kilograms    No difficulty moving their chin toward their chest.    Neck lymph nodes feel normal.   Recent antibiotic use:    Has not taken antibiotics for similar symptoms within the past month.   Current medications   Currently taking albuterol sulfate  (90 Base) MCG/ACT inhaler, Insulin Pen Needle 32G X 4 MM misc, NovoLOG FlexPen 100 UNIT/ML solution pen-injector sc pen, Omeprazole 20 MG Tablet Delayed Release Dispersible, Insulin Glargine-yfgn 100 UNIT/ML solution pen-injector, aspirin 81 MG EC tablet, PNV Prenatal Plus Multivitamin 27-1 MG tablet, cetirizine 10 MG tablet, and acetaminophen 325 MG tablet.   Medication allergies   None.   Medication contraindication review   Because patient is pregnant, the following medications will not be prescribed: baloxavir, doxycycline hyclate, doxycycline monohydrate, fluconazole, ibuprofen, acetaminophen-diphenhydramine-phenylephrine, aspirin-chlorpheniramine-phenylephrine, and pseudoephedrine.   No history of anaphylactic reaction to beta-lactam antibiotics; aspirin triad; blood dyscrasia; bone marrow depression; catecholamine-releasing paraganglioma; coronary artery disease; coagulation disorder; congenital long QT syndrome; depression; electrolyte abnormalities; fungal infection; GI bleeding; GI obstruction; G6PD deficiency; heart arrhythmia; hypertension; mononucleosis; myasthenia; recent myocardial infarction; NSAID-induced asthma/urticaria; Parkinson's disease; pheochromocytoma; porphyria; Reye syndrome; seizure disorder; ulcerative colitis; and urinary retention.   No history of metoclopramide-associated dystonic reaction and tardive dyskinesia.   No known history of amoxicillin-clavulanate-associated cholestatic jaundice or hepatic impairment.   No known history of azithromycin-associated cholestatic jaundice or hepatic impairment.   Past medical history   Immune conditions: Patient is not currently taking  medications for their condition(s).   No history of cancer.   Social history   Never smoked tobacco.   High-risk household contacts:    Household contact under the age of 5.    Household contact who is pregnant.   Patient-submitted comments   Patient was asked if they had anything to add about their symptoms. Patient writes: Daughter tested positive for Flu A this morning. Pediatrician thinks I should take 10 day preventative Tamiflu due to pregnancy. .   Patient did not request an excuse note.   Assessment   Influenza.   This is the likely diagnosis based on patient's interview responses, including:    Symptom profile    Household or close-proximity exposure to a person with a confirmed influenza diagnosis   Patient is at higher risk for influenza complications.   Other indications for antiviral treatment:    Patient is a healthcare worker    Household contact under the age of 5    Household contact who is pregnant   Plan   Medications:   No medications prescribed.   Education:    Condition and causes    Prevention    Treatment and self-care    When to call provider   ----------   Electronically signed by COLLIN Yeboah on 2023-10-19 at 13:45PM   ----------   Patient Interview Transcript:   Which of these symptoms are bothering you? Select all that apply.    Runny nose   Not selected:    Cough    Shortness of breath    Stuffed-up nose or sinuses    Itchy or watery eyes    Itchy nose or sneezing    Loss of smell or taste    Sore throat    Hoarse voice or loss of voice    Headache    Fever    Sweats    Chills    Muscle or body aches    Fatigue or tiredness    Nausea or vomiting    Diarrhea    I don't have any of these symptoms   When did your current symptoms start? Select one.    More than a month ago   Not selected:    Less than 48 hours ago    3 to 5 days ago    6 to 9 days ago    10 to 14 days ago    2 to 3 weeks ago    3 to 4 weeks ago   Did your symptoms come on suddenly or gradually? Select one.    I'm  not sure   Not selected:    Suddenly    Gradually   Have your symptoms improved at all since they began? Select one.    No   Not selected:    Yes, but they haven't gone away completely    Yes, but then they came back worse than before   Since your current symptoms started, have you had a viral test for COVID-19? - This includes home self-tests as well as nose swab or saliva tests done at a doctor's office, lab, or testing site. - It does NOT include antibody tests, which use a blood sample to test for past infection. Select one.    Yes   Not selected:    No   When was your most recent COVID-19 test? Select one.    Within the last 24 hours   Not selected:    Within the last week (specify date as MM/DD/YY)    7 to 14 days ago    15 to 30 days ago    More than 1 month ago   What type of COVID-19 test did you most recently have? Select one.    Viral self-test or home test   Not selected:    Viral test at a doctor's office, lab, or testing site   What was the result of your most recent COVID-19 test? Select one.    Negative   Not selected:    Positive   Has anyone in your household tested positive for COVID-19 in the past 14 days? Select one.    No   Not selected:    Yes   Have you gotten the COVID-19 vaccine? Select one.    No   Not selected:    Yes   In the last 14 days, have you traveled outside of your local community? This includes travel by car, RV, bus, train, or plane. Travel increases your chances of getting and spreading COVID-19. Select one.    No   Not selected:    Yes   Do you feel sinus pain or pressure in any of these areas?    No   Not selected:    In my forehead    Around my eyes    Behind my nose    In my cheeks    In my upper teeth or jaw   What color is your nasal drainage? Select one.    Clear   Not selected:    White    Yellow or yellowish    Green or greenish    My nose is stuffed but not draining or running   Is your nasal drainage thick or thin? Select one.    Thin   Not selected:    Thick   Is  "there any drainage (mucus) going down the back of your throat? This kind of drainage is also called \"postnasal drip.\" Select one.    No, not that I know of   Not selected:    Yes   Since your symptoms started, have you felt dizzy? Select one.    No   Not selected:    Yes, but I can continue with my regular daily activities    Yes, and it makes it hard to stand, walk, or do daily activities   Do you have chest pain? You might also feel it as discomfort, aching, tightness, or squeezing in the chest. Select one.    No   Not selected:    Yes   Have you urinated at least 3 times in the last 24 hours? Select one.    Yes   Not selected:    No   Changes in alertness or awareness may mean you need emergency care. Since your symptoms started, have you had any of these? Select all that apply.    None of the above   Not selected:    Confusion    Slurred speech    Not knowing where you are or what day it is    Difficulty staying conscious    Fainting or passing out   Do your symptoms include a whistling sound, or wheezing, when you breathe? Select one.    No   Not selected:    Yes    I'm not sure   Do you have any of these symptoms in your ear(s)? Select all that apply.    None of the above   Not selected:    Pain    Pressure    Fullness    Crackling or popping    Plugged or blocked sensation   Can you move your chin toward your chest?    Yes   Not selected:    No, my neck is too stiff   Are your glands/lymph nodes swollen, or does it hurt when you touch them?    No, not that I can tell   Not selected:    Yes   In the past week, has anyone around you (such as at school, work, or home) had a confirmed diagnosis of the flu? A confirmed diagnosis means that a nose swab was done to verify a flu infection. Select all that apply.    I live with someone who has the flu   Not selected:    I've been within touching distance of someone who has the flu    I've walked by, or sat about 3 feet away from, someone who has the flu    I've been " in the same building as someone who has the flu    No, not that I know of   Have you ever been diagnosed with asthma? Select one.    No   Not selected:    Yes   Have you ever been diagnosed with chronic obstructive pulmonary disease (COPD)? Select one.    No, not that I know of   Not selected:    Yes   In the past month, have you taken antibiotics for similar symptoms? Examples of antibiotics include amoxicillin, amoxicillin-clavulanate (Augmentin), penicillin, cefdinir (Omnicef), doxycycline, and clindamycin (Cleocin). Select one.    No   Not selected:    Yes (specify)   Do you have allergies (pollen, dust mites, mold, animal dander)? Select one.    No, not that I know of   Not selected:    Yes   Have you had a flu shot this season? Select one.    No   Not selected:    Yes, less than 2 weeks ago    Yes, 2 to 4 weeks ago    Yes, 1 to 3 months ago    Yes, 3 to 6 months ago    Yes, more than 6 months ago   Are you pregnant? Select one.    Yes   Not selected:    No   Are you breastfeeding? Select one.    No   Not selected:    Yes   The flu and COVID-19 can be more serious for people in certain groups. The next few questions help us figure out if you or anyone you live with is at higher risk for complications from these infections. Do any of these statements apply to you? Select all that apply.    None of the above   Not selected:    I'm     I'm     I'm Black    I'm  or    Do you smoke tobacco? Select one.    No   Not selected:    Yes, every day    Yes, some days    No, I quit   Do you have any of these conditions? Select all that apply.    None of the above   Not selected:    Chronic lung disease, such as cystic fibrosis or interstitial fibrosis    Heart disease, such as congenital heart disease, congestive heart failure, or coronary artery disease    Disorder of the brain, spinal cord, or nerves and muscles, such as dementia, cerebral palsy, epilepsy, muscular dystrophy, or  developmental delay    Metabolic disorder or mitochondrial disease    Cerebrovascular disease, such as stroke or another condition affecting the blood vessels or blood supply to the brain    Down syndrome    Mood disorder, including depression or schizophrenia spectrum disorders    Substance use disorder, such as alcohol, opioid, or cocaine use disorder    Tuberculosis   Do you live in a group care setting? Examples include: - Nursing home - Residential care - Psychiatric treatment facility - Group home - [facility] - Board and care home - Homeless shelter - Foster care setting Select one.    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 To determine your BMI, we need to know your weight and height. Please enter your weight (in pounds).    Weight   Please enter your height.    Height   Do you have any of these conditions that can affect the immune system? Scroll to see all options. Select all that apply.    An autoimmune disorder not listed here (specify): patient did not specify   Not selected:    History of bone marrow transplant    Chronic kidney disease    Chronic liver disease (including cirrhosis)    HIV/AIDS    Inflammatory bowel disease (Crohn's disease or ulcerative colitis)    Lupus    Moderate to severe plaque psoriasis    Multiple sclerosis    Rheumatoid arthritis    Sickle cell anemia    Alpha or beta thalassemia    History of solid organ transplant (kidney, liver, or heart)    History of spleen removal    A condition requiring treatment with long-term use of oral steroids (such as prednisone, prednisolone, or dexamethasone) (specify)    None of these   Do you take medications for your condition? This includes oral and injectable medications that are taken daily, weekly, or monthly. Select one.    No   Not  selected:    Yes, regularly    Yes, for flare-ups only   Have you ever been diagnosed with cancer? Select one.    No   Not selected:    Yes, I have cancer now    Yes, but I'm in remission   Do any of these apply to you? Select all that apply.    None of the above   Not selected:    I've been hospitalized within the last 5 days    I have diabetes    I'm in close contact with a child in    The flu and COVID-19 can be more serious for people in certain groups. Do any of these apply to the people who live with you? Select all that apply.    Under age 5    Pregnant   Not selected:    Over age 65            Black     or     Has given birth, had a miscarriage, had a pregnancy loss, or had an  in the last 2 weeks    None of the above   Does any member of your household have any of these medical conditions? Select all that apply.    None of the above   Not selected:    Asthma    Disorders of the brain, spinal cord, or nerves and muscles, such as dementia, cerebral palsy, epilepsy, muscular dystrophy, or developmental delay    Chronic lung disease, such as COPD or cystic fibrosis    Heart disease, such as congenital heart disease, congestive heart failure, or coronary artery disease    Cerebrovascular disease, such as stroke or another condition affecting the blood vessels or blood supply to the brain    Blood disorders, such as sickle cell disease    Diabetes    Metabolic disorders such as inherited metabolic disorders or mitochondrial disease    Kidney disorders    Liver disorders    Weakened immune system due to illness or medications such as chemotherapy or steroids    Children under the age of 19 who are on long-term aspirin therapy    Extreme obesity (BMI > 40)   Do you have any of these conditions? Scroll to see all options. Select all that apply.    None of the above   Not selected:    Aspirin triad (also known as Samter's triad or ASA triad)    Asthma or hives  from taking aspirin or other NSAIDs, such as ibuprofen or naproxen    Blockage or narrowing of the blood vessels of the heart    Blood clotting disorder    Blood dyscrasia, such anemia, leukemia, lymphoma, or myeloma    Bone marrow depression    Catecholamine-releasing paraganglioma    Congenital long QT syndrome    Depression    Difficulty urinating or completely emptying your bladder    Uncorrected electrolyte abnormalities    Fungal infection    Gastrointestinal (GI) bleeding    Gastrointestinal (GI) obstruction    G6PD deficiency    Recent heart attack    High blood pressure    Irregular heartbeat or heart rhythm    Mononucleosis (mono)    Myasthenia gravis    Parkinson's disease    Pheochromocytoma    Reye syndrome    Seizure disorder    Thyroid disease    Ulcerative colitis   Have you ever had either of these conditions? Select all that apply.    No   Not selected:    Metoclopramide-associated dystonic reaction    Tardive dyskinesia   Just a few more questions about medications, and then you're finished. Have you used any non-prescription medications or nasal sprays for your current symptoms? Examples include saline sprays, decongestants, NyQuil, and Tylenol. Select one.    Yes   Not selected:    No   Which of these non-prescription medications have you tried? Scroll to see all options. Select all that apply.    Acetaminophen (Tylenol)    Cetirizine (Zyrtec)    Diphenhydramine (Benadryl)    Fluticasone (Flonase)   Not selected:    Budesonide (Rhinocort)    Chlorpheniramine (Aller-chlor, Chlor-Trimeton)    Cromolyn (NasalCrom)    Dextromethorphan (Delsym, Robitussin, Vicks DayQuil Cough)    Fexofenadine (Allegra)    Guaifenesin (Mucinex)    Guaifenesin/dextromethorphan (Delsym DM, Mucinex DM, Robitussin DM)    Ibuprofen (Advil, Motrin, Midol)    Ketotifen (Alaway, Zaditor)    Loratadine (Alavert, Claritin)    Naphazoline-pheniramine (Naphcon-A, Opcon-A, Visine-A)    Omeprazole (Prilosec)    Oxymetazoline  (Afrin)    Phenylephrine (Sudafed PE)    Triamcinolone (Nasacort)    None of the above   Have you taken any monoamine oxidase inhibitor (MAOI) medications in the last 14 days? Examples include rasagiline (Azilect), selegiline (Eldepryl, Zelapar), isocarboxazid (Marplan), phenelzine (Nardil), and tranylcypromine (Parnate). Select one.    No, not that I know of   Not selected:    Yes   Do you take Kynmobi or Apokyn (apomorphine)? Select one.    No   Not selected:    Yes   Are you still taking these medications listed in your medical record? If you're not taking any of these, click Next. Select all that apply.    albuterol sulfate  (90 Base) MCG/ACT inhaler    Insulin Pen Needle 32G X 4 MM misc    NovoLOG FlexPen 100 UNIT/ML solution pen-injector sc pen    Omeprazole 20 MG Tablet Delayed Release Dispersible    Insulin Glargine-yfgn 100 UNIT/ML solution pen-injector    aspirin 81 MG EC tablet    PNV Prenatal Plus Multivitamin 27-1 MG tablet    cetirizine 10 MG tablet    acetaminophen 325 MG tablet   Are you taking any other medications, vitamins, or supplements? Select one.    No   Not selected:    Yes   Have you ever had an allergic or bad reaction to any medication? Select one.    No   Not selected:    Yes   Are you allergic to milk or to the proteins found in milk (for example, whey or casein)? A milk allergy is different from lactose intolerance. Select one.    No, not that I know of   Not selected:    Yes   Have you ever had jaundice or liver problems as a result of taking amoxicillin-clavulanate (Augmentin)? Jaundice is a condition in which the skin and the whites of the eyes turn yellow. Select all that apply.    No, not that I know of   Not selected:    Yes, jaundice    Yes, liver problems   Have you ever had jaundice or liver problems as a result of taking azithromycin (Zithromax, Zmax)? Jaundice is a condition in which the skin and the whites of the eyes turn yellow. Select all that apply.    No, not  that I know of   Not selected:    Yes, jaundice    Yes, liver problems   Do you need a doctor's note? A doctor's note confirms that you received care today and states when you can return to school or work. It does not contain information about your diagnosis or treatment plan. Your provider will make the final decision on whether to give you a doctor's note and for how long. Doctor's notes CANNOT be backdated. We can't provide medical leave paperwork through this type of visit. If more paperwork is needed to request time off, contact your primary care provider. Select one.    No   Not selected:    Today only (1 day)    Today and tomorrow (2 days)    3 days    5 days    7 days    10 days    14 days   Is there anything you'd like to add about your symptoms? Please limit your comments to the symptoms asked about in this interview. If you include comments about other concerns, your provider may recommend that you be seen in person.    Daughter tested positive for Flu A this morning. Pediatrician thinks I should take 10 day preventative Tamiflu due to pregnancy.   ----------   Medical history   Medical history data does not currently exist for this patient.

## 2023-10-26 DIAGNOSIS — O24.414 INSULIN CONTROLLED GESTATIONAL DIABETES MELLITUS (GDM) IN SECOND TRIMESTER: ICD-10-CM

## 2023-10-26 RX ORDER — INSULIN ASPART 100 [IU]/ML
10-20 INJECTION, SOLUTION INTRAVENOUS; SUBCUTANEOUS
Qty: 15 ML | Refills: 1 | Status: SHIPPED | OUTPATIENT
Start: 2023-10-26

## 2023-10-26 RX ORDER — INSULIN ASPART 100 [IU]/ML
10-20 INJECTION, SOLUTION INTRAVENOUS; SUBCUTANEOUS
Qty: 15 ML | Refills: 1
Start: 2023-10-26 | End: 2023-10-26 | Stop reason: SDUPTHER

## 2023-10-26 NOTE — TELEPHONE ENCOUNTER
Rx Refill Note  Requested Prescriptions     Pending Prescriptions Disp Refills    insulin aspart (NovoLOG FlexPen) 100 UNIT/ML solution pen-injector sc pen 15 mL 1     Sig: Inject 10-15 Units under the skin into the appropriate area as directed 3 (Three) Times a Day With Meals.      Last office visit with prescribing clinician: 10/2/2023     Next office visit with prescribing clinician: 11/13/2023       Hari Epps MA  10/26/23, 15:51 EDT

## 2023-11-13 ENCOUNTER — OFFICE VISIT (OUTPATIENT)
Dept: ENDOCRINOLOGY | Facility: CLINIC | Age: 38
End: 2023-11-13
Payer: COMMERCIAL

## 2023-11-13 VITALS
HEART RATE: 78 BPM | DIASTOLIC BLOOD PRESSURE: 68 MMHG | HEIGHT: 65 IN | WEIGHT: 242 LBS | BODY MASS INDEX: 40.32 KG/M2 | OXYGEN SATURATION: 97 % | SYSTOLIC BLOOD PRESSURE: 118 MMHG

## 2023-11-13 DIAGNOSIS — O24.414 INSULIN CONTROLLED GESTATIONAL DIABETES MELLITUS (GDM) IN THIRD TRIMESTER: Primary | ICD-10-CM

## 2023-11-13 PROCEDURE — 99214 OFFICE O/P EST MOD 30 MIN: CPT | Performed by: INTERNAL MEDICINE

## 2023-11-13 RX ORDER — INSULIN ASPART 100 [IU]/ML
25-30 INJECTION, SOLUTION INTRAVENOUS; SUBCUTANEOUS
Qty: 15 ML | Refills: 1 | Status: SHIPPED | OUTPATIENT
Start: 2023-11-13

## 2023-11-13 RX ORDER — INSULIN GLARGINE-YFGN 100 [IU]/ML
65 INJECTION, SOLUTION SUBCUTANEOUS NIGHTLY
Start: 2023-11-13

## 2023-11-13 NOTE — PROGRESS NOTES
"Chief Complaint   Patient presents with    Gestational Diabetes        HPI   Kelly Astorga is a 37 y.o. female had concerns including Gestational Diabetes.    She is checking blood sugars 4 times per day.    She is 34.5 weeks with unknown gender and baby is measuring ahead of schedule. > 90th percentile.    Had two high BGS over the weekend due to baby shower.     Tentative induction vs  23. Baby is transverse currently.     The following portions of the patient's history were reviewed and updated as appropriate: allergies, current medications, past family history, past medical history, past social history, past surgical history, and problem list.    Review of Systems   Constitutional: Negative.  Positive for fatigue.   Endocrine:        See HPI   Musculoskeletal:  Positive for back pain.        /68   Pulse 78   Ht 165.1 cm (65\")   Wt 110 kg (242 lb)   SpO2 97%   BMI 40.27 kg/m²      Physical Exam  Vitals reviewed.   Constitutional:       Appearance: Normal appearance.   Cardiovascular:      Rate and Rhythm: Normal rate.   Pulmonary:      Effort: Pulmonary effort is normal.   Abdominal:      Comments: gravid   Neurological:      General: No focal deficit present.      Mental Status: She is alert. Mental status is at baseline.   Psychiatric:         Mood and Affect: Mood normal.         Behavior: Behavior normal.            HbA1c:  Lab Results   Component Value Date    HGBA1C 5.3 10/02/2023    HGBA1C 5.3 2023     Glucose:    Lab Results   Component Value Date    POCGLU 78 10/02/2023         Assessment and Plan    Diagnoses and all orders for this visit:    1. Insulin controlled gestational diabetes mellitus (GDM) in third trimester (Primary)  Uncontrolled with too frequent postprandial hyperglycemia over the weekend was due to change in diet (baby shower).  Increase prandial insulin by 3 to 5 units for a total of up to 30 units before meals.  Continue Semglee 65 units " nightly.  Baby measuring large, greater than 90th percentile.  Titrate up on doses as needed.  I expect insulin resistance to increase the next few weeks then should plateau or slightly reduce at the end of pregnancy.  Tentative  versus induction 2023.  Send BG logs weekly  -     insulin aspart (NovoLOG FlexPen) 100 UNIT/ML solution pen-injector sc pen; Inject 25-30 Units under the skin into the appropriate area as directed 3 (Three) Times a Day With Meals.  Dispense: 15 mL; Refill: 1  -     Insulin Glargine-yfgn (Semglee, yfgn,) 100 UNIT/ML solution pen-injector; Inject 65 Units under the skin into the appropriate area as directed Every Night.         Return in about 3 weeks (around 2023) for next scheduled follow up. The patient was instructed to contact the clinic with any interval questions or concerns.    Angelique Danielson, DO   Endocrinologist    Please note that portions of this note were completed with a voice recognition program.

## 2023-11-18 LAB — EXTERNAL GROUP B STREP ANTIGEN: NEGATIVE

## 2023-11-20 ENCOUNTER — TRANSCRIBE ORDERS (OUTPATIENT)
Dept: LAB | Facility: HOSPITAL | Age: 38
End: 2023-11-20
Payer: COMMERCIAL

## 2023-11-20 ENCOUNTER — LAB (OUTPATIENT)
Dept: LAB | Facility: HOSPITAL | Age: 38
End: 2023-11-20
Payer: COMMERCIAL

## 2023-11-20 DIAGNOSIS — Z3A.35 35 WEEKS GESTATION OF PREGNANCY: Primary | ICD-10-CM

## 2023-11-20 DIAGNOSIS — Z3A.35 35 WEEKS GESTATION OF PREGNANCY: ICD-10-CM

## 2023-11-20 PROCEDURE — 84550 ASSAY OF BLOOD/URIC ACID: CPT

## 2023-11-20 PROCEDURE — 83615 LACTATE (LD) (LDH) ENZYME: CPT

## 2023-11-20 PROCEDURE — 80053 COMPREHEN METABOLIC PANEL: CPT

## 2023-11-20 PROCEDURE — 85027 COMPLETE CBC AUTOMATED: CPT

## 2023-11-20 PROCEDURE — 82570 ASSAY OF URINE CREATININE: CPT

## 2023-11-20 PROCEDURE — 84156 ASSAY OF PROTEIN URINE: CPT

## 2023-11-20 PROCEDURE — 36415 COLL VENOUS BLD VENIPUNCTURE: CPT

## 2023-11-21 LAB
ALBUMIN SERPL-MCNC: 3.5 G/DL (ref 3.5–5.2)
ALBUMIN/GLOB SERPL: 1.4 G/DL
ALP SERPL-CCNC: 105 U/L (ref 39–117)
ALT SERPL W P-5'-P-CCNC: 10 U/L (ref 1–33)
ANION GAP SERPL CALCULATED.3IONS-SCNC: 11.4 MMOL/L (ref 5–15)
AST SERPL-CCNC: 14 U/L (ref 1–32)
BILIRUB SERPL-MCNC: <0.2 MG/DL (ref 0–1.2)
BUN SERPL-MCNC: 7 MG/DL (ref 6–20)
BUN/CREAT SERPL: 10.8 (ref 7–25)
CALCIUM SPEC-SCNC: 9.1 MG/DL (ref 8.6–10.5)
CHLORIDE SERPL-SCNC: 104 MMOL/L (ref 98–107)
CO2 SERPL-SCNC: 21.6 MMOL/L (ref 22–29)
CREAT SERPL-MCNC: 0.65 MG/DL (ref 0.57–1)
CREAT UR-MCNC: 77.2 MG/DL
DEPRECATED RDW RBC AUTO: 52.1 FL (ref 37–54)
EGFRCR SERPLBLD CKD-EPI 2021: 117.2 ML/MIN/1.73
ERYTHROCYTE [DISTWIDTH] IN BLOOD BY AUTOMATED COUNT: 17.3 % (ref 12.3–15.4)
GLOBULIN UR ELPH-MCNC: 2.5 GM/DL
GLUCOSE SERPL-MCNC: 89 MG/DL (ref 65–99)
HCT VFR BLD AUTO: 34.6 % (ref 34–46.6)
HGB BLD-MCNC: 11.5 G/DL (ref 12–15.9)
LDH SERPL-CCNC: 196 U/L (ref 135–214)
MCH RBC QN AUTO: 27.8 PG (ref 26.6–33)
MCHC RBC AUTO-ENTMCNC: 33.2 G/DL (ref 31.5–35.7)
MCV RBC AUTO: 83.6 FL (ref 79–97)
PLATELET # BLD AUTO: 212 10*3/MM3 (ref 140–450)
PMV BLD AUTO: 9 FL (ref 6–12)
POTASSIUM SERPL-SCNC: 4 MMOL/L (ref 3.5–5.2)
PROT ?TM UR-MCNC: 8.7 MG/DL
PROT SERPL-MCNC: 6 G/DL (ref 6–8.5)
PROT/CREAT UR: 112.7 MG/G CREA (ref 0–200)
RBC # BLD AUTO: 4.14 10*6/MM3 (ref 3.77–5.28)
SODIUM SERPL-SCNC: 137 MMOL/L (ref 136–145)
URATE SERPL-MCNC: 5.2 MG/DL (ref 2.4–5.7)
WBC NRBC COR # BLD AUTO: 8.91 10*3/MM3 (ref 3.4–10.8)

## 2023-11-27 ENCOUNTER — PATIENT MESSAGE (OUTPATIENT)
Dept: ENDOCRINOLOGY | Facility: CLINIC | Age: 38
End: 2023-11-27
Payer: COMMERCIAL

## 2023-11-27 NOTE — TELEPHONE ENCOUNTER
From: Kelly Astorga  To: Angelique Fishersridhar  Sent: 11/27/2023 8:20 AM EST  Subject: Updated BG Log    Hope you had a great Thanksgiving! I’ve had to go down on my insulin as we discussed. I dropped down to 20 units with lunch and dinner (none with breakfast as I’ve been doing), and will decrease semglee tonight.    I took 30 units novolog on 11/25 with dinner, then 65 units semglee at bedtime, and woke up sweaty with glucose of 60 around midnight. I ate a snack and then went back to sleep and glucose was 71 that morning.     I didn't take semglee last night due to glucose being 89 at bedtime. It was 92 when I woke up this morning. I was scared after my low of 60! How much semglee do you think I should do now until delivery? I’m sure it was the 30 units of novolog I had, but I took that around 6pm and the semglee around 830pm.     I’m scheduled for induction Dec 7th! Is there any need to keep my Dec 4th appt with you? Or can I just cancel and go ahead and schedule a follow up for post partum? Thanks for everything!

## 2023-11-29 ENCOUNTER — PREP FOR SURGERY (OUTPATIENT)
Dept: OTHER | Facility: HOSPITAL | Age: 38
End: 2023-11-29
Payer: COMMERCIAL

## 2023-11-29 RX ORDER — SODIUM CHLORIDE 0.9 % (FLUSH) 0.9 %
10 SYRINGE (ML) INJECTION EVERY 12 HOURS SCHEDULED
Status: CANCELLED | OUTPATIENT
Start: 2023-11-29

## 2023-11-29 RX ORDER — ONDANSETRON 4 MG/1
4 TABLET, FILM COATED ORAL EVERY 6 HOURS PRN
Status: CANCELLED | OUTPATIENT
Start: 2023-11-29

## 2023-11-29 RX ORDER — OXYCODONE HYDROCHLORIDE AND ACETAMINOPHEN 5; 325 MG/1; MG/1
1 TABLET ORAL EVERY 4 HOURS PRN
Status: CANCELLED | OUTPATIENT
Start: 2023-11-29 | End: 2023-12-09

## 2023-11-29 RX ORDER — MORPHINE SULFATE 1 MG/ML
1 INJECTION, SOLUTION EPIDURAL; INTRATHECAL; INTRAVENOUS EVERY 4 HOURS PRN
Status: CANCELLED | OUTPATIENT
Start: 2023-11-29 | End: 2023-12-06

## 2023-11-29 RX ORDER — LIDOCAINE HYDROCHLORIDE 10 MG/ML
0.5 INJECTION, SOLUTION EPIDURAL; INFILTRATION; INTRACAUDAL; PERINEURAL ONCE AS NEEDED
Status: CANCELLED | OUTPATIENT
Start: 2023-11-29

## 2023-11-29 RX ORDER — MAGNESIUM CARB/ALUMINUM HYDROX 105-160MG
30 TABLET,CHEWABLE ORAL ONCE
Status: CANCELLED | OUTPATIENT
Start: 2023-11-29 | End: 2023-11-29

## 2023-11-29 RX ORDER — SODIUM CHLORIDE 9 MG/ML
40 INJECTION, SOLUTION INTRAVENOUS AS NEEDED
Status: CANCELLED | OUTPATIENT
Start: 2023-11-29

## 2023-11-29 RX ORDER — OXYCODONE AND ACETAMINOPHEN 10; 325 MG/1; MG/1
2 TABLET ORAL EVERY 4 HOURS PRN
Status: CANCELLED | OUTPATIENT
Start: 2023-11-29 | End: 2023-12-09

## 2023-11-29 RX ORDER — OXYTOCIN/0.9 % SODIUM CHLORIDE 30/500 ML
2-20 PLASTIC BAG, INJECTION (ML) INTRAVENOUS
Status: CANCELLED | OUTPATIENT
Start: 2023-11-29

## 2023-11-29 RX ORDER — IBUPROFEN 600 MG/1
600 TABLET ORAL EVERY 6 HOURS PRN
Status: CANCELLED | OUTPATIENT
Start: 2023-11-29

## 2023-11-29 RX ORDER — OXYTOCIN/0.9 % SODIUM CHLORIDE 30/500 ML
250 PLASTIC BAG, INJECTION (ML) INTRAVENOUS CONTINUOUS
Status: CANCELLED | OUTPATIENT
Start: 2023-11-29 | End: 2023-11-29

## 2023-11-29 RX ORDER — ONDANSETRON 2 MG/ML
4 INJECTION INTRAMUSCULAR; INTRAVENOUS EVERY 6 HOURS PRN
Status: CANCELLED | OUTPATIENT
Start: 2023-11-29

## 2023-11-29 RX ORDER — CARBOPROST TROMETHAMINE 250 UG/ML
250 INJECTION, SOLUTION INTRAMUSCULAR AS NEEDED
Status: CANCELLED | OUTPATIENT
Start: 2023-11-29

## 2023-11-29 RX ORDER — ACETAMINOPHEN 325 MG/1
650 TABLET ORAL EVERY 4 HOURS PRN
Status: CANCELLED | OUTPATIENT
Start: 2023-11-29

## 2023-11-29 RX ORDER — SODIUM CHLORIDE 0.9 % (FLUSH) 0.9 %
10 SYRINGE (ML) INJECTION AS NEEDED
Status: CANCELLED | OUTPATIENT
Start: 2023-11-29

## 2023-11-29 RX ORDER — METHYLERGONOVINE MALEATE 0.2 MG/ML
200 INJECTION INTRAVENOUS ONCE AS NEEDED
Status: CANCELLED | OUTPATIENT
Start: 2023-11-29

## 2023-11-29 RX ORDER — NALOXONE HCL 0.4 MG/ML
0.4 VIAL (ML) INJECTION
Status: CANCELLED | OUTPATIENT
Start: 2023-11-29

## 2023-11-29 RX ORDER — MISOPROSTOL 200 UG/1
800 TABLET ORAL AS NEEDED
Status: CANCELLED | OUTPATIENT
Start: 2023-11-29

## 2023-11-29 RX ORDER — OXYTOCIN/0.9 % SODIUM CHLORIDE 30/500 ML
999 PLASTIC BAG, INJECTION (ML) INTRAVENOUS ONCE
Status: CANCELLED | OUTPATIENT
Start: 2023-11-29 | End: 2023-11-29

## 2023-11-29 RX ORDER — MORPHINE SULFATE 2 MG/ML
2 INJECTION, SOLUTION INTRAMUSCULAR; INTRAVENOUS EVERY 4 HOURS PRN
Status: CANCELLED | OUTPATIENT
Start: 2023-11-29 | End: 2023-12-06

## 2023-11-29 RX ORDER — SODIUM CHLORIDE, SODIUM LACTATE, POTASSIUM CHLORIDE, CALCIUM CHLORIDE 600; 310; 30; 20 MG/100ML; MG/100ML; MG/100ML; MG/100ML
125 INJECTION, SOLUTION INTRAVENOUS CONTINUOUS
Status: CANCELLED | OUTPATIENT
Start: 2023-11-29

## 2023-11-29 RX ORDER — TERBUTALINE SULFATE 1 MG/ML
0.25 INJECTION, SOLUTION SUBCUTANEOUS AS NEEDED
Status: CANCELLED | OUTPATIENT
Start: 2023-11-29

## 2023-11-30 ENCOUNTER — HOSPITAL ENCOUNTER (INPATIENT)
Facility: HOSPITAL | Age: 38
LOS: 3 days | Discharge: HOME | End: 2023-12-03
Attending: OBSTETRICS & GYNECOLOGY | Admitting: OBSTETRICS & GYNECOLOGY
Payer: COMMERCIAL

## 2023-11-30 ENCOUNTER — HOSPITAL ENCOUNTER (OUTPATIENT)
Dept: LABOR AND DELIVERY | Facility: HOSPITAL | Age: 38
Discharge: HOME | End: 2023-11-30
Payer: COMMERCIAL

## 2023-11-30 LAB
ABO GROUP BLD: NORMAL
BLD GP AB SCN SERPL QL: NEGATIVE
DEPRECATED RDW RBC AUTO: 53.6 FL (ref 37–54)
ERYTHROCYTE [DISTWIDTH] IN BLOOD BY AUTOMATED COUNT: 16.9 % (ref 12.3–15.4)
HCT VFR BLD AUTO: 34.6 % (ref 34–46.6)
HGB BLD-MCNC: 11.3 G/DL (ref 12–15.9)
MCH RBC QN AUTO: 28.6 PG (ref 26.6–33)
MCHC RBC AUTO-ENTMCNC: 32.7 G/DL (ref 31.5–35.7)
MCV RBC AUTO: 87.6 FL (ref 79–97)
PLATELET # BLD AUTO: 213 10*3/MM3 (ref 140–450)
PMV BLD AUTO: 9.3 FL (ref 6–12)
RBC # BLD AUTO: 3.95 10*6/MM3 (ref 3.77–5.28)
RH BLD: POSITIVE
T&S EXPIRATION DATE: NORMAL
WBC NRBC COR # BLD AUTO: 8.29 10*3/MM3 (ref 3.4–10.8)

## 2023-11-30 PROCEDURE — 86901 BLOOD TYPING SEROLOGIC RH(D): CPT | Performed by: OBSTETRICS & GYNECOLOGY

## 2023-11-30 PROCEDURE — 85027 COMPLETE CBC AUTOMATED: CPT | Performed by: OBSTETRICS & GYNECOLOGY

## 2023-11-30 PROCEDURE — 86850 RBC ANTIBODY SCREEN: CPT | Performed by: OBSTETRICS & GYNECOLOGY

## 2023-11-30 PROCEDURE — 86900 BLOOD TYPING SEROLOGIC ABO: CPT | Performed by: OBSTETRICS & GYNECOLOGY

## 2023-11-30 RX ORDER — SODIUM CHLORIDE, SODIUM LACTATE, POTASSIUM CHLORIDE, CALCIUM CHLORIDE 600; 310; 30; 20 MG/100ML; MG/100ML; MG/100ML; MG/100ML
125 INJECTION, SOLUTION INTRAVENOUS CONTINUOUS
Status: DISCONTINUED | OUTPATIENT
Start: 2023-11-30 | End: 2023-12-01

## 2023-11-30 RX ORDER — TERBUTALINE SULFATE 1 MG/ML
0.25 INJECTION, SOLUTION SUBCUTANEOUS AS NEEDED
Status: DISCONTINUED | OUTPATIENT
Start: 2023-11-30 | End: 2023-12-01 | Stop reason: HOSPADM

## 2023-11-30 RX ORDER — MORPHINE SULFATE 2 MG/ML
1 INJECTION, SOLUTION INTRAMUSCULAR; INTRAVENOUS EVERY 4 HOURS PRN
Status: DISCONTINUED | OUTPATIENT
Start: 2023-11-30 | End: 2023-12-01 | Stop reason: HOSPADM

## 2023-11-30 RX ORDER — ACETAMINOPHEN 325 MG/1
650 TABLET ORAL EVERY 4 HOURS PRN
Status: DISCONTINUED | OUTPATIENT
Start: 2023-11-30 | End: 2023-12-01 | Stop reason: HOSPADM

## 2023-11-30 RX ORDER — NALOXONE HCL 0.4 MG/ML
0.4 VIAL (ML) INJECTION
Status: DISCONTINUED | OUTPATIENT
Start: 2023-11-30 | End: 2023-12-01 | Stop reason: HOSPADM

## 2023-11-30 RX ORDER — LIDOCAINE HYDROCHLORIDE 10 MG/ML
0.5 INJECTION, SOLUTION EPIDURAL; INFILTRATION; INTRACAUDAL; PERINEURAL ONCE AS NEEDED
Status: DISCONTINUED | OUTPATIENT
Start: 2023-11-30 | End: 2023-12-01 | Stop reason: HOSPADM

## 2023-11-30 RX ORDER — MORPHINE SULFATE 2 MG/ML
2 INJECTION, SOLUTION INTRAMUSCULAR; INTRAVENOUS EVERY 4 HOURS PRN
Status: DISCONTINUED | OUTPATIENT
Start: 2023-11-30 | End: 2023-12-01 | Stop reason: HOSPADM

## 2023-11-30 RX ORDER — SODIUM CHLORIDE 0.9 % (FLUSH) 0.9 %
10 SYRINGE (ML) INJECTION EVERY 12 HOURS SCHEDULED
Status: DISCONTINUED | OUTPATIENT
Start: 2023-11-30 | End: 2023-12-01 | Stop reason: HOSPADM

## 2023-11-30 RX ORDER — ONDANSETRON 4 MG/1
4 TABLET, FILM COATED ORAL EVERY 6 HOURS PRN
Status: DISCONTINUED | OUTPATIENT
Start: 2023-11-30 | End: 2023-12-01 | Stop reason: HOSPADM

## 2023-11-30 RX ORDER — SODIUM CHLORIDE 9 MG/ML
40 INJECTION, SOLUTION INTRAVENOUS AS NEEDED
Status: DISCONTINUED | OUTPATIENT
Start: 2023-11-30 | End: 2023-12-01 | Stop reason: HOSPADM

## 2023-11-30 RX ORDER — ONDANSETRON 2 MG/ML
4 INJECTION INTRAMUSCULAR; INTRAVENOUS EVERY 6 HOURS PRN
Status: DISCONTINUED | OUTPATIENT
Start: 2023-11-30 | End: 2023-12-01 | Stop reason: HOSPADM

## 2023-11-30 RX ORDER — SODIUM CHLORIDE 0.9 % (FLUSH) 0.9 %
10 SYRINGE (ML) INJECTION AS NEEDED
Status: DISCONTINUED | OUTPATIENT
Start: 2023-11-30 | End: 2023-12-01 | Stop reason: HOSPADM

## 2023-11-30 RX ORDER — MAGNESIUM CARB/ALUMINUM HYDROX 105-160MG
30 TABLET,CHEWABLE ORAL ONCE
Status: DISCONTINUED | OUTPATIENT
Start: 2023-11-30 | End: 2023-12-01 | Stop reason: HOSPADM

## 2023-11-30 RX ORDER — OXYTOCIN/0.9 % SODIUM CHLORIDE 30/500 ML
2-20 PLASTIC BAG, INJECTION (ML) INTRAVENOUS
Status: DISCONTINUED | OUTPATIENT
Start: 2023-11-30 | End: 2023-12-01 | Stop reason: HOSPADM

## 2023-11-30 NOTE — LETTER
December 3, 2023      [facility]  [address] [facility] [address]  [phone]          Patient: Kelly Astorga   YOB: 1985   Date of Visit: 11/30/2023       To Whom It May Concern:    Kelly Astorga and Jitendra Astorga were seen at [facility] on 12/01/2023 for the birth of their child, Liliana Astorga.    Please excuse father, Jitendra Astorga, from work as his presence and support were required during and after delivery for the post partum recovery period.        Sincerely,       Stephani Nicolas RN

## 2023-12-01 ENCOUNTER — ANESTHESIA EVENT (OUTPATIENT)
Dept: LABOR AND DELIVERY | Facility: HOSPITAL | Age: 38
End: 2023-12-01
Payer: COMMERCIAL

## 2023-12-01 ENCOUNTER — ANESTHESIA (OUTPATIENT)
Dept: LABOR AND DELIVERY | Facility: HOSPITAL | Age: 38
End: 2023-12-01
Payer: COMMERCIAL

## 2023-12-01 LAB
GLUCOSE BLDC GLUCOMTR-MCNC: 109 MG/DL (ref 70–130)
GLUCOSE BLDC GLUCOMTR-MCNC: 110 MG/DL (ref 70–130)
GLUCOSE BLDC GLUCOMTR-MCNC: 68 MG/DL (ref 70–130)
GLUCOSE BLDC GLUCOMTR-MCNC: 72 MG/DL (ref 70–130)
GLUCOSE BLDC GLUCOMTR-MCNC: 93 MG/DL (ref 70–130)
GLUCOSE BLDC GLUCOMTR-MCNC: 96 MG/DL (ref 70–130)

## 2023-12-01 PROCEDURE — 3E033VJ INTRODUCTION OF OTHER HORMONE INTO PERIPHERAL VEIN, PERCUTANEOUS APPROACH: ICD-10-PCS | Performed by: OBSTETRICS & GYNECOLOGY

## 2023-12-01 PROCEDURE — 51703 INSERT BLADDER CATH COMPLEX: CPT

## 2023-12-01 PROCEDURE — C1755 CATHETER, INTRASPINAL: HCPCS

## 2023-12-01 PROCEDURE — C1755 CATHETER, INTRASPINAL: HCPCS | Performed by: ANESTHESIOLOGY

## 2023-12-01 PROCEDURE — 25810000003 LACTATED RINGERS PER 1000 ML: Performed by: OBSTETRICS & GYNECOLOGY

## 2023-12-01 PROCEDURE — 25010000002 FENTANYL CITRATE (PF) 50 MCG/ML SOLUTION: Performed by: NURSE ANESTHETIST, CERTIFIED REGISTERED

## 2023-12-01 PROCEDURE — 25010000002 ROPIVACAINE PER 1 MG: Performed by: NURSE ANESTHETIST, CERTIFIED REGISTERED

## 2023-12-01 PROCEDURE — 25010000002 ONDANSETRON PER 1 MG: Performed by: OBSTETRICS & GYNECOLOGY

## 2023-12-01 PROCEDURE — 82948 REAGENT STRIP/BLOOD GLUCOSE: CPT

## 2023-12-01 PROCEDURE — 59025 FETAL NON-STRESS TEST: CPT

## 2023-12-01 RX ORDER — HYDROCORTISONE 25 MG/G
1 CREAM TOPICAL AS NEEDED
Status: DISCONTINUED | OUTPATIENT
Start: 2023-12-01 | End: 2023-12-03 | Stop reason: HOSPADM

## 2023-12-01 RX ORDER — OXYCODONE HYDROCHLORIDE AND ACETAMINOPHEN 5; 325 MG/1; MG/1
1 TABLET ORAL EVERY 4 HOURS PRN
Status: DISCONTINUED | OUTPATIENT
Start: 2023-12-01 | End: 2023-12-01 | Stop reason: HOSPADM

## 2023-12-01 RX ORDER — ONDANSETRON 2 MG/ML
4 INJECTION INTRAMUSCULAR; INTRAVENOUS EVERY 6 HOURS PRN
Status: DISCONTINUED | OUTPATIENT
Start: 2023-12-01 | End: 2023-12-03 | Stop reason: HOSPADM

## 2023-12-01 RX ORDER — DIPHENHYDRAMINE HYDROCHLORIDE 50 MG/ML
12.5 INJECTION INTRAMUSCULAR; INTRAVENOUS EVERY 8 HOURS PRN
Status: DISCONTINUED | OUTPATIENT
Start: 2023-12-01 | End: 2023-12-01 | Stop reason: HOSPADM

## 2023-12-01 RX ORDER — HYDROCODONE BITARTRATE AND ACETAMINOPHEN 5; 325 MG/1; MG/1
1 TABLET ORAL EVERY 4 HOURS PRN
Status: DISCONTINUED | OUTPATIENT
Start: 2023-12-01 | End: 2023-12-03 | Stop reason: HOSPADM

## 2023-12-01 RX ORDER — LIDOCAINE HYDROCHLORIDE AND EPINEPHRINE 15; 5 MG/ML; UG/ML
INJECTION, SOLUTION EPIDURAL AS NEEDED
Status: DISCONTINUED | OUTPATIENT
Start: 2023-12-01 | End: 2023-12-01 | Stop reason: SURG

## 2023-12-01 RX ORDER — ONDANSETRON 4 MG/1
4 TABLET, FILM COATED ORAL EVERY 6 HOURS PRN
Status: DISCONTINUED | OUTPATIENT
Start: 2023-12-01 | End: 2023-12-01 | Stop reason: SDUPTHER

## 2023-12-01 RX ORDER — OXYTOCIN/0.9 % SODIUM CHLORIDE 30/500 ML
999 PLASTIC BAG, INJECTION (ML) INTRAVENOUS ONCE
Status: COMPLETED | OUTPATIENT
Start: 2023-12-01 | End: 2023-12-01

## 2023-12-01 RX ORDER — FAMOTIDINE 10 MG/ML
20 INJECTION, SOLUTION INTRAVENOUS ONCE AS NEEDED
Status: DISCONTINUED | OUTPATIENT
Start: 2023-12-01 | End: 2023-12-01 | Stop reason: HOSPADM

## 2023-12-01 RX ORDER — BISACODYL 10 MG
10 SUPPOSITORY, RECTAL RECTAL DAILY PRN
Status: DISCONTINUED | OUTPATIENT
Start: 2023-12-02 | End: 2023-12-03 | Stop reason: HOSPADM

## 2023-12-01 RX ORDER — METOCLOPRAMIDE HYDROCHLORIDE 5 MG/ML
10 INJECTION INTRAMUSCULAR; INTRAVENOUS ONCE AS NEEDED
Status: DISCONTINUED | OUTPATIENT
Start: 2023-12-01 | End: 2023-12-01 | Stop reason: HOSPADM

## 2023-12-01 RX ORDER — ACETAMINOPHEN 325 MG/1
650 TABLET ORAL EVERY 6 HOURS PRN
Status: DISCONTINUED | OUTPATIENT
Start: 2023-12-01 | End: 2023-12-03 | Stop reason: HOSPADM

## 2023-12-01 RX ORDER — POLYETHYLENE GLYCOL 3350 17 G/17G
17 POWDER, FOR SOLUTION ORAL DAILY PRN
Status: DISCONTINUED | OUTPATIENT
Start: 2023-12-01 | End: 2023-12-03 | Stop reason: HOSPADM

## 2023-12-01 RX ORDER — MISOPROSTOL 100 MCG
25 TABLET ORAL ONCE
Status: COMPLETED | OUTPATIENT
Start: 2023-12-01 | End: 2023-12-01

## 2023-12-01 RX ORDER — METHYLERGONOVINE MALEATE 0.2 MG/ML
200 INJECTION INTRAVENOUS ONCE AS NEEDED
Status: DISCONTINUED | OUTPATIENT
Start: 2023-12-01 | End: 2023-12-01 | Stop reason: HOSPADM

## 2023-12-01 RX ORDER — ACETAMINOPHEN 325 MG/1
650 TABLET ORAL EVERY 4 HOURS PRN
Status: DISCONTINUED | OUTPATIENT
Start: 2023-12-01 | End: 2023-12-01 | Stop reason: HOSPADM

## 2023-12-01 RX ORDER — FENTANYL CITRATE 50 UG/ML
INJECTION, SOLUTION INTRAMUSCULAR; INTRAVENOUS AS NEEDED
Status: DISCONTINUED | OUTPATIENT
Start: 2023-12-01 | End: 2023-12-01 | Stop reason: SURG

## 2023-12-01 RX ORDER — ROPIVACAINE HYDROCHLORIDE 2 MG/ML
15 INJECTION, SOLUTION EPIDURAL; INFILTRATION; PERINEURAL CONTINUOUS
Status: DISCONTINUED | OUTPATIENT
Start: 2023-12-01 | End: 2023-12-01

## 2023-12-01 RX ORDER — SODIUM CHLORIDE 0.9 % (FLUSH) 0.9 %
1-10 SYRINGE (ML) INJECTION AS NEEDED
Status: DISCONTINUED | OUTPATIENT
Start: 2023-12-01 | End: 2023-12-03 | Stop reason: HOSPADM

## 2023-12-01 RX ORDER — OXYTOCIN/0.9 % SODIUM CHLORIDE 30/500 ML
250 PLASTIC BAG, INJECTION (ML) INTRAVENOUS CONTINUOUS
Status: DISCONTINUED | OUTPATIENT
Start: 2023-12-01 | End: 2023-12-01

## 2023-12-01 RX ORDER — IBUPROFEN 600 MG/1
600 TABLET ORAL EVERY 6 HOURS PRN
Status: DISCONTINUED | OUTPATIENT
Start: 2023-12-01 | End: 2023-12-03 | Stop reason: HOSPADM

## 2023-12-01 RX ORDER — PRENATAL VIT/IRON FUM/FOLIC AC 27MG-0.8MG
1 TABLET ORAL DAILY
Status: DISCONTINUED | OUTPATIENT
Start: 2023-12-02 | End: 2023-12-03 | Stop reason: HOSPADM

## 2023-12-01 RX ORDER — MISOPROSTOL 200 UG/1
800 TABLET ORAL AS NEEDED
Status: DISCONTINUED | OUTPATIENT
Start: 2023-12-01 | End: 2023-12-01 | Stop reason: HOSPADM

## 2023-12-01 RX ORDER — EPHEDRINE SULFATE 5 MG/ML
10 INJECTION INTRAVENOUS
Status: DISCONTINUED | OUTPATIENT
Start: 2023-12-01 | End: 2023-12-01 | Stop reason: HOSPADM

## 2023-12-01 RX ORDER — OXYCODONE AND ACETAMINOPHEN 10; 325 MG/1; MG/1
2 TABLET ORAL EVERY 4 HOURS PRN
Status: DISCONTINUED | OUTPATIENT
Start: 2023-12-01 | End: 2023-12-01 | Stop reason: HOSPADM

## 2023-12-01 RX ORDER — HYDROCODONE BITARTRATE AND ACETAMINOPHEN 5; 325 MG/1; MG/1
2 TABLET ORAL EVERY 6 HOURS PRN
Status: DISCONTINUED | OUTPATIENT
Start: 2023-12-01 | End: 2023-12-03 | Stop reason: HOSPADM

## 2023-12-01 RX ORDER — CARBOPROST TROMETHAMINE 250 UG/ML
250 INJECTION, SOLUTION INTRAMUSCULAR AS NEEDED
Status: DISCONTINUED | OUTPATIENT
Start: 2023-12-01 | End: 2023-12-01 | Stop reason: HOSPADM

## 2023-12-01 RX ORDER — ONDANSETRON 2 MG/ML
4 INJECTION INTRAMUSCULAR; INTRAVENOUS ONCE AS NEEDED
Status: DISCONTINUED | OUTPATIENT
Start: 2023-12-01 | End: 2023-12-01 | Stop reason: SDUPTHER

## 2023-12-01 RX ORDER — CITRIC ACID/SODIUM CITRATE 334-500MG
30 SOLUTION, ORAL ORAL ONCE
Status: DISCONTINUED | OUTPATIENT
Start: 2023-12-01 | End: 2023-12-01 | Stop reason: HOSPADM

## 2023-12-01 RX ORDER — OXYTOCIN/0.9 % SODIUM CHLORIDE 30/500 ML
125 PLASTIC BAG, INJECTION (ML) INTRAVENOUS CONTINUOUS PRN
Status: DISCONTINUED | OUTPATIENT
Start: 2023-12-01 | End: 2023-12-03 | Stop reason: HOSPADM

## 2023-12-01 RX ORDER — SIMETHICONE 80 MG
80 TABLET,CHEWABLE ORAL 4 TIMES DAILY PRN
Status: DISCONTINUED | OUTPATIENT
Start: 2023-12-01 | End: 2023-12-03 | Stop reason: HOSPADM

## 2023-12-01 RX ORDER — ONDANSETRON 4 MG/1
4 TABLET, FILM COATED ORAL EVERY 6 HOURS PRN
Status: DISCONTINUED | OUTPATIENT
Start: 2023-12-01 | End: 2023-12-03 | Stop reason: HOSPADM

## 2023-12-01 RX ORDER — IBUPROFEN 600 MG/1
600 TABLET ORAL EVERY 6 HOURS PRN
Status: DISCONTINUED | OUTPATIENT
Start: 2023-12-01 | End: 2023-12-01 | Stop reason: HOSPADM

## 2023-12-01 RX ORDER — DOCUSATE SODIUM 100 MG/1
100 CAPSULE, LIQUID FILLED ORAL 2 TIMES DAILY
Status: DISCONTINUED | OUTPATIENT
Start: 2023-12-01 | End: 2023-12-03 | Stop reason: HOSPADM

## 2023-12-01 RX ORDER — ONDANSETRON 2 MG/ML
4 INJECTION INTRAMUSCULAR; INTRAVENOUS EVERY 6 HOURS PRN
Status: DISCONTINUED | OUTPATIENT
Start: 2023-12-01 | End: 2023-12-01 | Stop reason: SDUPTHER

## 2023-12-01 RX ADMIN — Medication 999 ML/HR: at 18:39

## 2023-12-01 RX ADMIN — EPHEDRINE SULFATE 10 MG: 5 INJECTION INTRAVENOUS at 16:14

## 2023-12-01 RX ADMIN — IBUPROFEN 600 MG: 600 TABLET, FILM COATED ORAL at 22:26

## 2023-12-01 RX ADMIN — Medication 2 MILLI-UNITS/MIN: at 08:34

## 2023-12-01 RX ADMIN — SODIUM CHLORIDE, POTASSIUM CHLORIDE, SODIUM LACTATE AND CALCIUM CHLORIDE 125 ML/HR: 600; 310; 30; 20 INJECTION, SOLUTION INTRAVENOUS at 04:26

## 2023-12-01 RX ADMIN — SODIUM CHLORIDE, POTASSIUM CHLORIDE, SODIUM LACTATE AND CALCIUM CHLORIDE 125 ML/HR: 600; 310; 30; 20 INJECTION, SOLUTION INTRAVENOUS at 01:52

## 2023-12-01 RX ADMIN — LIDOCAINE HYDROCHLORIDE AND EPINEPHRINE 3 ML: 15; 5 INJECTION, SOLUTION EPIDURAL at 14:21

## 2023-12-01 RX ADMIN — FENTANYL CITRATE 100 MCG: 50 INJECTION, SOLUTION INTRAMUSCULAR; INTRAVENOUS at 14:24

## 2023-12-01 RX ADMIN — ROPIVACAINE HYDROCHLORIDE 15 ML/HR: 2 INJECTION, SOLUTION EPIDURAL; INFILTRATION at 14:30

## 2023-12-01 RX ADMIN — WITCH HAZEL: 500 SOLUTION RECTAL; TOPICAL at 22:35

## 2023-12-01 RX ADMIN — SODIUM CHLORIDE, POTASSIUM CHLORIDE, SODIUM LACTATE AND CALCIUM CHLORIDE 125 ML/HR: 600; 310; 30; 20 INJECTION, SOLUTION INTRAVENOUS at 12:06

## 2023-12-01 RX ADMIN — ACETAMINOPHEN 650 MG: 325 TABLET ORAL at 02:54

## 2023-12-01 RX ADMIN — Medication 25 MCG: at 04:26

## 2023-12-01 RX ADMIN — ACETAMINOPHEN 650 MG: 325 TABLET ORAL at 12:11

## 2023-12-01 RX ADMIN — ACETAMINOPHEN 650 MG: 325 TABLET ORAL at 07:45

## 2023-12-01 RX ADMIN — ROPIVACAINE HYDROCHLORIDE 10 ML: 5 INJECTION, SOLUTION EPIDURAL; INFILTRATION; PERINEURAL at 14:28

## 2023-12-01 RX ADMIN — Medication 250 ML/HR: at 19:01

## 2023-12-01 RX ADMIN — Medication 2 MILLI-UNITS/MIN: at 01:52

## 2023-12-01 RX ADMIN — ONDANSETRON 4 MG: 2 INJECTION INTRAMUSCULAR; INTRAVENOUS at 15:42

## 2023-12-01 RX ADMIN — HYDROCORTISONE 1 APPLICATION: 25 CREAM TOPICAL at 22:34

## 2023-12-01 RX ADMIN — Medication: at 22:35

## 2023-12-01 NOTE — PROGRESS NOTES
"12/01/23  13:07 EST  Kelly Astorga    SUBJECTIVE: Kelly is resting, no complaints.     ASSESSMENTS:     /67 (BP Location: Right arm, Patient Position: Lying)   Pulse 72   Temp 98 °F (36.7 °C) (Oral)   Resp 16   Ht 165.1 cm (65\")   Wt 109 kg (241 lb)   BMI 40.10 kg/m²     Fetal Heart Rate Assessment   Method: Fetal HR Assessment Method: external   Beats/min: Fetal HR (beats/min): 130   Baseline: Fetal HR Baseline: normal range   Variability: Fetal HR Variability: moderate (amplitude range 6 to 25 bpm)   Accels: Fetal HR Accelerations: prolonged, greater than/equal to 15 bpm, lasting at least 15 seconds   Decels: Fetal HR Decelerations: absent   Tracing Category:  I     Uterine Assessment   Method: Method: external tocotransducer   Frequency (min): Contraction Frequency (Minutes): x4   Ctx Count in 10 min:     Duration:     Intensity: Contraction Intensity: no contractions   Intensity by IUPC:     Resting Tone:     Resting Tone by IUPC:       Presentation: vtx   Cervix: Exam by: Method: sterile exam per physician   Dilation:  4cm   Effacement: Cervical Effacement: 70%   Station:  -2            PLAN: FHR reassuring, category I. SVE as noted. AROM performed, clear fluid noted. Continue pitocin per protocol. Epidural as desires. BS wnl. Repeat SVE q2-4h or PRN.    Aurora Ibarra MD  13:07 EST  12/01/23    "

## 2023-12-01 NOTE — H&P
"EDUARDA Ventura  Obstetric History and Physical    CC: MIOL - A2GDM    Subjective     Patient is a 37 y.o. female  currently at 37w2d, who presented overnight for medical induction of labor. FB unable to be placed per laborist after multiple attempts. She received one dose of cytotec. She denies current complaints.    Her prenatal care is complicated by A2GDM with recent dramatic decrease in insulin requirements. She also has hx of LEEP and bariatric surgery. Her previous obstetric/gynecological history is noted and is remarkable for FTSVD x 2, PTSVD x 1, hx GDM x 3, and hx of PP pre-eclampsia.    The following portions of the patients history were reviewed and updated as appropriate: current medications, allergies, past medical history, past surgical history, past family history, past social history, and problem list .       Prenatal Information:   Maternal Prenatal Labs  Blood Type ABO Type   Date Value Ref Range Status   2023 O  Final      Rh Status RH type   Date Value Ref Range Status   2023 Positive  Final      Antibody Screen Antibody Screen   Date Value Ref Range Status   2023 Negative  Final      Gonnorhea No results found for: \"GCCX\"   Chlamydia No results found for: \"CLAMYDCU\"   RPR No results found for: \"RPR\"   Syphilis Antibody No results found for: \"SYPHILIS\"   Rubella No results found for: \"RUBELLAIGGIN\"   Hepatitis B Surface Antigen No results found for: \"HEPBSAG\"   HIV-1 Antibody No results found for: \"LABHIV1\"   Hepatitis C Antibody No results found for: \"HEPCAB\"   Rapid Urin Drug Screen No results found for: \"AMPMETHU\", \"BARBITSCNUR\", \"LABBENZSCN\", \"LABMETHSCN\", \"LABOPIASCN\", \"THCURSCR\", \"COCAINEUR\", \"AMPHETSCREEN\", \"PROPOXSCN\", \"BUPRENORSCNU\", \"METAMPSCNUR\", \"OXYCODONESCN\", \"TRICYCLICSCN\"   Group B Strep Culture No results found for: \"GBSANTIGEN\"           External Prenatal Results       Pregnancy Outside Results - Transcribed From Office Records - See Scanned Records For " Details       Test Value Date Time    ABO  O  11/30/23 2253    Rh  Positive  11/30/23 2253    Antibody Screen  Negative  11/30/23 2253       Negative  09/26/23 0814       Negative  05/18/23 0845    Varicella IgG       Rubella  4.79 index 05/18/23 0845    Hgb  11.3 g/dL 11/30/23 2253       11.5 g/dL 11/20/23 1240       9.5 g/dL 09/26/23 0814       11.4 g/dL 09/01/23 1320       11.5 g/dL 08/28/23 1603       12.4 g/dL 05/18/23 0845    Hct  34.6 % 11/30/23 2253       34.6 % 11/20/23 1240       29.1 % 09/26/23 0814       35.0 % 09/01/23 1320       36.2 % 08/28/23 1603       36.1 % 05/18/23 0845    Glucose Fasting GTT       Glucose Tolerance Test 1 hour       Glucose Tolerance Test 3 hour  62 mg/dL 02/15/19 0944    Gonorrhea (discrete)  Negative  06/14/21 1452    Chlamydia (discrete)  Negative  06/14/21 1452    RPR  Non-Reactive  06/14/21 1452    VDRL       Syphilis Antibody       HBsAg  Non-Reactive  05/18/23 0845    Herpes Simplex Virus PCR       Herpes Simplex VIrus Culture       HIV  Non-Reactive  05/18/23 0845    Hep C RNA Quant PCR       Hep C Antibody  Non-Reactive  05/18/23 0845    AFP       Group B Strep ^ Negative  11/18/23     GBS Susceptibility to Clindamycin       GBS Susceptibility to Erythromycin       Fetal Fibronectin       Genetic Testing, Maternal Blood                 Drug Screening       Test Value Date Time    Urine Drug Screen       Amphetamine Screen  Negative  05/18/23 0845    Barbiturate Screen  Negative  05/18/23 0845    Benzodiazepine Screen  Negative  05/18/23 0845    Methadone Screen  Negative  05/18/23 0845    Phencyclidine Screen  Negative  05/18/23 0845    Opiates Screen  Negative  05/18/23 0845    THC Screen  Negative  05/18/23 0845    Cocaine Screen       Propoxyphene Screen  Negative  05/18/23 0845    Buprenorphine Screen  Negative  05/18/23 0845    Methamphetamine Screen       Oxycodone Screen  Negative  05/18/23 0845    Tricyclic Antidepressants Screen  Negative  05/18/23 0845          "     Legend    ^: Historical                              Past OB History:       OB History    Para Term  AB Living   7 3 2 1 3 3   SAB IAB Ectopic Molar Multiple Live Births   3 0 0 0 0 3      # Outcome Date GA Lbr David/2nd Weight Sex Delivery Anes PTL Lv   7 Current            6 Term 22 39w3d 10:25 / 02:22 3895 g (8 lb 9.4 oz) F Vag-Spont EPI N NIKHIL      Name: CARMEN CERRATONEYSGIRROSANGELA      Apgar1: 8  Apgar5: 9   5 Term / 39w1d 15:45 / 01:48 3760 g (8 lb 4.6 oz) F Vag-Spont EPI N NIKHIL      Name: ROSEANNA CERRATOAROLDOGIRROSANGELA      Apgar1: 5  Apgar5: 7   4  19 36w5d 12:15 / 04:58 2930 g (6 lb 7.4 oz) F Vag-Spont EPI  NIKHIL      Name: CARMEN CERRATORUSSGIRROSANGELA      Apgar1: 8  Apgar5: 9   3 SAB            2 SAB            1 SAB                Past Medical History: Past Medical History:   Diagnosis Date    Abnormal Pap smear of cervix 2013    Follow up clear     GERD (gastroesophageal reflux disease)     Headache     Hyperlipidemia     PONV (postoperative nausea and vomiting)     Pre-eclampsia in third trimester     pt states \"diagnosed after delivery with 1st baby\"    Vitamin D deficiency       Past Surgical History Past Surgical History:   Procedure Laterality Date    GASTRIC RESTRICTION SURGERY  16    Gastric Sleeve    GASTRIC SLEEVE LAPAROSCOPIC  2016    SAWYER  2013    OTOPLASTY  2005    WISDOM TOOTH EXTRACTION        Family History: Family History   Problem Relation Age of Onset    Hyperlipidemia Mother     Hypertension Mother     Hypertension Father     Hyperlipidemia Father     No Known Problems Brother     Hydrocephalus Maternal Grandmother     Hypertension Maternal Grandmother     Hyperlipidemia Maternal Grandmother     Other Maternal Grandmother         Normal Pressure Hydrocephalus    Heart disease Maternal Grandfather     Hyperlipidemia Maternal Grandfather     Hypertension Maternal Grandfather     Heart attack Maternal Grandfather     Diabetes Paternal Grandmother     " Liver disease Neg Hx     Liver cancer Neg Hx     Colon cancer Neg Hx       Social History:  reports that she has never smoked. She has never used smokeless tobacco.   reports no history of alcohol use.   reports no history of drug use.                   General ROS Negative Findings:Headaches, Visual Changes, Epigastric pain, Anorexia, Nausia/Vomiting, ROM, and Vaginal Bleeding    ROS      Objective       Vital Signs Range for the last 24 hours  Temperature: Temp:  [97.9 °F (36.6 °C)-98.1 °F (36.7 °C)] 97.9 °F (36.6 °C)   Temp Source: Temp src: Oral   BP: BP: (121-133)/(58-68) 128/68   Pulse: Heart Rate:  [68-76] 68   Respirations: Resp:  [16] 16   SPO2:     O2 Amount (l/min):     O2 Devices     Weight: Weight:  [109 kg (241 lb)] 109 kg (241 lb)     Physical Examination:   General:   alert, appears stated age, and cooperative   Skin:   normal   HEENT:  normal   Lungs:   Normal respiratory effort, no respiratory distress   Heart:   Regular rate   Abdomen:  Soft, gravid, nontender   Lower Extremities  no edema   Pelvis:  Exam deferred         Presentation: vtx   Cervix: Exam by: Method: sterile exam per physician (Fred)   Dilation:  3cm   Effacement: Cervical Effacement: 60%   Station:  -2       Fetal Heart Rate Assessment   Method: Fetal HR Assessment Method: external   Beats/min: Fetal HR (beats/min): 130   Baseline: Fetal HR Baseline: normal range   Varibility: Fetal HR Variability: moderate (amplitude range 6 to 25 bpm)   Accels: Fetal HR Accelerations: greater than/equal to 15 bpm, lasting at least 15 seconds   Decels: Fetal HR Decelerations: absent   Tracing Category:  I     Uterine Assessment   Method: Method: external tocotransducer   Frequency (min): Contraction Frequency (Minutes): x3   Ctx Count in 10 min:     Duration:     Intensity: Contraction Intensity: mild by palpation   Intensity by IUPC:     Resting Tone:     Resting Tone by IUPC:     Lavina Units:       Laboratory Results:   Lab Results (last  24 hours)       Procedure Component Value Units Date/Time    POC Glucose Once [[id number]]  (Normal) Collected: 12/01/23 0531    Specimen: Blood Updated: 12/01/23 0532     Glucose 93 mg/dL     POC Glucose Once [[id number]]  (Normal) Collected: 12/01/23 0118    Specimen: Blood Updated: 12/01/23 0120     Glucose 96 mg/dL     CBC (No Diff) [[id number]]  (Abnormal) Collected: 11/30/23 2253    Specimen: Blood Updated: 11/30/23 2307     WBC 8.29 10*3/mm3      RBC 3.95 10*6/mm3      Hemoglobin 11.3 g/dL      Hematocrit 34.6 %      MCV 87.6 fL      MCH 28.6 pg      MCHC 32.7 g/dL      RDW 16.9 %      RDW-SD 53.6 fl      MPV 9.3 fL      Platelets 213 10*3/mm3     Group B Streptococcus Culture - Swab, Vaginal/Rectum [[id number]] Resulted: 11/18/23    Specimen: Swab from Vaginal/Rectum Updated: 11/30/23 2222     External Strep Group B Ag Negative          Radiology Review:  Imaging Results (Last 24 Hours)       ** No results found for the last 24 hours. **          Other Studies:    Assessment & Plan       * No active hospital problems. *        Assessment:  1.  Intrauterine pregnancy at 37w2d weeks gestation with reassuring fetal status.    2.  Medical induction of labor - A2GDM  3.  GBS negative    Plan:  1. Admit to LD for IOL; s/p failed FB attempts, s/p cytotec; poorly tolerant of SVE, will attempt AROM at next check; continue pitocin per protocol; FSBG q4h, have been wnl; analgesics and epidural PRN  2. Plan of care has been reviewed with patient.  3.  Risks, benefits of treatment plan have been discussed.  4.  All questions have been answered.        Aurora Ibarra MD  12/1/2023  09:10 EST

## 2023-12-01 NOTE — ANESTHESIA PROCEDURE NOTES
Labor Epidural      Patient reassessed immediately prior to procedure    Patient location during procedure: floor  Performed By  CRNA/SAI: Pallavi Abel CRNA  Preanesthetic Checklist  Completed: patient identified, IV checked, site marked, risks and benefits discussed, surgical consent, monitors and equipment checked, pre-op evaluation and timeout performed  Prep:  Pt Position:sitting  Sterile Tech:cap, gloves, mask and sterile barrier  Prep:DuraPrep  Monitoring:blood pressure monitoring  Epidural Block Procedure:  Approach:midline  Guidance:landmark technique and palpation technique  Location:L4-L5  Needle Type:Tuohy  Needle Gauge:17 G  Loss of Resistance Medium: air  Loss of Resistance: 8cm  Cath Depth at skin:13 cm  Paresthesia: none  Aspiration:negative  Test Dose:negative  Number of Attempts: 1  Post Assessment:  Dressing:occlusive dressing applied and secured with tape  Pt Tolerance:patient tolerated the procedure well with no apparent complications  Complications:no

## 2023-12-01 NOTE — ANESTHESIA PROCEDURE NOTES
Spinal Block      Patient reassessed immediately prior to procedure    Patient location during procedure: OR  Indication:at surgeon's request  Performed By  CRNA/SAI: Pallavi Abel CRNA  Preanesthetic Checklist  Completed: patient identified, IV checked, site marked, risks and benefits discussed, surgical consent, monitors and equipment checked, pre-op evaluation and timeout performed  Spinal Block Prep:  Patient Position:sitting  Sterile Tech:cap, gloves, mask and sterile barriers  Prep:Betadine  Patient Monitoring:blood pressure monitoring, continuous pulse oximetry and EKG    Spinal Block Procedure  Approach:midline  Guidance:palpation technique  Location:L3-L4  Needle Type:Vernon  Needle Gauge:25 G  Placement of Spinal needle event:cerebrospinal fluid aspirated  Paresthesia: no  Fluid Appearance:clear     Post Assessment  Patient Tolerance:patient tolerated the procedure well with no apparent complications  Complications no

## 2023-12-01 NOTE — ANESTHESIA PREPROCEDURE EVALUATION
Anesthesia Evaluation     Patient summary reviewed and Nursing notes reviewed   history of anesthetic complications:  PONV  NPO Solid Status: > 6 hours  NPO Liquid Status: < 2 hours           Airway   Mallampati: II  TM distance: >3 FB  Neck ROM: full  Dental      Pulmonary - negative pulmonary ROS   Cardiovascular     (+) hyperlipidemia      Neuro/Psych  (+) headaches  GI/Hepatic/Renal/Endo    (+) GERD    Musculoskeletal (-) negative ROS    Abdominal    Substance History - negative use     OB/GYN    (+) Pregnant        Other - negative ROS                   Anesthesia Plan    ASA 2     epidural       Anesthetic plan, risks, benefits, and alternatives have been provided, discussed and informed consent has been obtained with: patient.    Plan discussed with attending.    CODE STATUS:    Level Of Support Discussed With: Patient  Code Status (Patient has no pulse and is not breathing): CPR (Attempt to Resuscitate)  Medical Interventions (Patient has pulse or is breathing): Full Support

## 2023-12-02 LAB
HCT VFR BLD AUTO: 29.7 % (ref 34–46.6)
HGB BLD-MCNC: 9.6 G/DL (ref 12–15.9)

## 2023-12-02 PROCEDURE — 85014 HEMATOCRIT: CPT | Performed by: OBSTETRICS & GYNECOLOGY

## 2023-12-02 PROCEDURE — 85018 HEMOGLOBIN: CPT | Performed by: OBSTETRICS & GYNECOLOGY

## 2023-12-02 RX ORDER — FERROUS SULFATE 325(65) MG
325 TABLET ORAL
Status: DISCONTINUED | OUTPATIENT
Start: 2023-12-02 | End: 2023-12-03 | Stop reason: HOSPADM

## 2023-12-02 RX ADMIN — IBUPROFEN 600 MG: 600 TABLET, FILM COATED ORAL at 06:41

## 2023-12-02 RX ADMIN — IBUPROFEN 600 MG: 600 TABLET, FILM COATED ORAL at 20:56

## 2023-12-02 RX ADMIN — HYDROCORTISONE 1 APPLICATION: 25 CREAM TOPICAL at 09:21

## 2023-12-02 RX ADMIN — FERROUS SULFATE TAB 325 MG (65 MG ELEMENTAL FE) 325 MG: 325 (65 FE) TAB at 09:21

## 2023-12-02 RX ADMIN — WITCH HAZEL: 500 SOLUTION RECTAL; TOPICAL at 09:21

## 2023-12-02 RX ADMIN — PRENATAL VITAMINS-IRON FUMARATE 27 MG IRON-FOLIC ACID 0.8 MG TABLET 1 TABLET: at 08:46

## 2023-12-02 RX ADMIN — IBUPROFEN 600 MG: 600 TABLET, FILM COATED ORAL at 13:09

## 2023-12-02 NOTE — PLAN OF CARE
Problem: Adult Inpatient Plan of Care  Goal: Plan of Care Review  Outcome: Ongoing, Progressing  Goal: Patient-Specific Goal (Individualized)  Outcome: Ongoing, Progressing  Goal: Absence of Hospital-Acquired Illness or Injury  Outcome: Ongoing, Progressing  Intervention: Identify and Manage Fall Risk  Recent Flowsheet Documentation  Taken 12/1/2023 2100 by Lubna Womack RN  Safety Promotion/Fall Prevention:   assistive device/personal items within reach   clutter free environment maintained   room organization consistent   safety round/check completed  Taken 12/1/2023 1945 by Lubna Womack RN  Safety Promotion/Fall Prevention:   assistive device/personal items within reach   clutter free environment maintained   room organization consistent   safety round/check completed  Taken 12/1/2023 1930 by Lubna Womack RN  Safety Promotion/Fall Prevention:   assistive device/personal items within reach   clutter free environment maintained   room organization consistent   safety round/check completed  Intervention: Prevent Skin Injury  Recent Flowsheet Documentation  Taken 12/1/2023 2100 by Lubna Womack RN  Body Position: sitting up in bed  Taken 12/1/2023 1930 by Lubna Womack RN  Body Position: sitting up in bed  Intervention: Prevent and Manage VTE (Venous Thromboembolism) Risk  Recent Flowsheet Documentation  Taken 12/1/2023 2100 by Lubna Womack RN  VTE Prevention/Management:   bilateral   sequential compression devices off  Taken 12/1/2023 1930 by Lubna Womack RN  Activity Management: (d/t epidural) bedrest  VTE Prevention/Management:   bilateral   sequential compression devices off  Intervention: Prevent Infection  Recent Flowsheet Documentation  Taken 12/1/2023 2100 by Lubna Womack RN  Infection Prevention:   hand hygiene promoted   rest/sleep promoted  Taken 12/1/2023 1945 by Lubna Womack RN  Infection Prevention:   hand hygiene promoted   rest/sleep promoted  Taken 12/1/2023 1930 by Vu  Lubna RN  Infection Prevention:   hand hygiene promoted   rest/sleep promoted  Goal: Optimal Comfort and Wellbeing  Outcome: Ongoing, Progressing  Intervention: Provide Person-Centered Care  Recent Flowsheet Documentation  Taken 12/1/2023 2100 by Lubna Womack RN  Trust Relationship/Rapport:   care explained   choices provided   emotional support provided   empathic listening provided   questions answered   questions encouraged   reassurance provided   thoughts/feelings acknowledged  Taken 12/1/2023 1945 by Lubna Womack RN  Trust Relationship/Rapport:   care explained   choices provided   emotional support provided   empathic listening provided   questions answered   questions encouraged   reassurance provided   thoughts/feelings acknowledged  Taken 12/1/2023 1930 by Lubna Womack RN  Trust Relationship/Rapport:   care explained   choices provided   emotional support provided   empathic listening provided   questions answered   questions encouraged   reassurance provided   thoughts/feelings acknowledged  Goal: Readiness for Transition of Care  Outcome: Ongoing, Progressing     Problem: Adjustment to Role Transition (Postpartum Vaginal Delivery)  Goal: Successful Maternal Role Transition  Outcome: Ongoing, Progressing     Problem: Bleeding (Postpartum Vaginal Delivery)  Goal: Hemostasis  Outcome: Ongoing, Progressing     Problem: Infection (Postpartum Vaginal Delivery)  Goal: Absence of Infection Signs/Symptoms  Outcome: Ongoing, Progressing  Intervention: Prevent or Manage Infection  Recent Flowsheet Documentation  Taken 12/1/2023 2052 by Lubna Womack, RN  Perineal Care:   absorbent brief/pad changed   perineum cleansed  Taken 12/1/2023 2015 by Lubna Womack RN  Perineal Care: absorbent brief/pad changed     Problem: Pain (Postpartum Vaginal Delivery)  Goal: Acceptable Pain Control  Outcome: Ongoing, Progressing     Problem: Urinary Retention (Postpartum Vaginal Delivery)  Goal: Effective Urinary  Elimination  Outcome: Ongoing, Progressing   Goal Outcome Evaluation:

## 2023-12-02 NOTE — PROGRESS NOTES
12/2/2023  PPD #1    Subjective   Kelly feels well.  Patient describes her lochia as less than menses.  Pain is well controlled       Objective   Temp: Temp:  [97.5 °F (36.4 °C)-98.7 °F (37.1 °C)] 97.9 °F (36.6 °C) Temp src: Oral   BP: BP: (101-148)/(56-84) 115/56        Pulse: Heart Rate:  [] 88  RR: Resp:  [16-18] 18    General:  No acute distress   Abdomen: Fundus firm and beneath umbilicus   Pelvis: deferred     Lab Results   Component Value Date    WBC 8.29 11/30/2023    HGB 9.6 (L) 12/02/2023    HCT 29.7 (L) 12/02/2023    MCV 87.6 11/30/2023     11/30/2023    HEPBSAG Non-Reactive 05/18/2023    AST 14 11/20/2023    ALT 10 11/20/2023    URICACID 5.2 11/20/2023       Assessment  PPD# 1 after vaginal delivery  Mild PP anemia - asymptomatic    Plan  Continue routine postpartum care, anticipate discharge in a.m.  Start FeSO4 PO daily      This note has been electronically signed.    Aurora Ibarra MD  08:53 EST  December 2, 2023

## 2023-12-02 NOTE — L&D DELIVERY NOTE
" [facility]   Vaginal Delivery Note    Patient Name: Kelly Astorga  : 1985  MRN: [id number]    Date of Delivery: 2023     Diagnosis     Pre & Post-Delivery:  Intrauterine pregnancy at 37w2d  Labor status: Induced Onset of Labor     * No active hospital problems. *             Problem List    Transfer to Postpartum     Review the Delivery Report for details.     Delivery     Delivery: Vaginal, Spontaneous     YOB: 2022    Time of Birth:  Gestational Age 6:33 PM   37w2d     Anesthesia: Epidural     Delivering clinician: Aurora Ibarra    Forceps?   No   Vacuum? No    Shoulder dystocia present: No        Delivery narrative:  Uncomplicated  at 37w2d over an intact perineum. Anterior shoulder delivered with ease. Infant vigorous at delivery so placed on maternal abdomen. Delayed cord clamping x 1 min. Cord doubly clamped and cut by father. Cord blood and cord segment obtained. Placenta delivered spontaneously and appeared intact. Careful inspection of the vagina and perineum revealed no lacerations.        Infant     Findings: female  infant     Infant observations: Weight: 3370 g (7 lb 6.9 oz)   Length: 19.5  in  Observations/Comments:        Apgars: 9  @ 1 minute /    9  @ 5 minutes   Infant Name: TBD...     Placenta & Cord         Placenta delivered  Spontaneous  at   2023  6:39 PM     Cord: 3 vessels  present.   Nuchal Cord?  no   Cord blood obtained: Yes    Cord gases obtained:  No    Cord gas results: Venous:  No results found for: \"PHCVEN\", \"BECVEN\"    Arterial:  No results found for: \"PHCART\", \"BECART\"     Repair     Episiotomy: None     No    Lacerations: No   Estimated Blood Loss: Est. Blood Loss (mL): 200 mL (Filed from Delivery Summary) (23 3100)     Complications     none    Disposition     Mother to Mother Baby/Postpartum  in stable condition currently.  Baby remains with mom  in stable condition currently.    Aurora Ibarra, " MD  12/01/23  19:29 EST

## 2023-12-03 VITALS
HEART RATE: 78 BPM | DIASTOLIC BLOOD PRESSURE: 59 MMHG | TEMPERATURE: 97.6 F | RESPIRATION RATE: 16 BRPM | SYSTOLIC BLOOD PRESSURE: 121 MMHG | BODY MASS INDEX: 40.15 KG/M2 | WEIGHT: 241 LBS | HEIGHT: 65 IN

## 2023-12-03 RX ORDER — IBUPROFEN 600 MG/1
600 TABLET ORAL EVERY 6 HOURS PRN
Qty: 60 TABLET | Refills: 1 | Status: SHIPPED | OUTPATIENT
Start: 2023-12-03

## 2023-12-03 RX ORDER — FERROUS SULFATE 325(65) MG
325 TABLET ORAL
Qty: 60 TABLET | Refills: 1 | Status: SHIPPED | OUTPATIENT
Start: 2023-12-03

## 2023-12-03 RX ORDER — HYDROCORTISONE 25 MG/G
1 CREAM TOPICAL AS NEEDED
Qty: 28.35 G | Refills: 1 | Status: SHIPPED | OUTPATIENT
Start: 2023-12-03

## 2023-12-03 RX ORDER — PSEUDOEPHEDRINE HCL 30 MG
100 TABLET ORAL 2 TIMES DAILY PRN
Qty: 60 CAPSULE | Refills: 1 | Status: SHIPPED | OUTPATIENT
Start: 2023-12-03

## 2023-12-03 RX ADMIN — IBUPROFEN 600 MG: 600 TABLET, FILM COATED ORAL at 04:02

## 2023-12-03 RX ADMIN — PRENATAL VITAMINS-IRON FUMARATE 27 MG IRON-FOLIC ACID 0.8 MG TABLET 1 TABLET: at 10:06

## 2023-12-03 RX ADMIN — FERROUS SULFATE TAB 325 MG (65 MG ELEMENTAL FE) 325 MG: 325 (65 FE) TAB at 10:07

## 2023-12-03 RX ADMIN — IBUPROFEN 600 MG: 600 TABLET, FILM COATED ORAL at 10:06

## 2023-12-03 NOTE — PROGRESS NOTES
12/3/2023  PPD #2    Subjective   Kelly feels well.  Patient describes her lochia as less than menses.  Pain is well controlled       Objective   Temp: Temp:  [97.5 °F (36.4 °C)-98.7 °F (37.1 °C)] 97.6 °F (36.4 °C) Temp src: Oral   BP: BP: (120-129)/(59-67) 121/59        Pulse: Heart Rate:  [64-82] 78  RR: Resp:  [16-18] 16    General:  No acute distress   Abdomen: Fundus firm and beneath umbilicus   Pelvis: deferred     Lab Results   Component Value Date    WBC 8.29 11/30/2023    HGB 9.6 (L) 12/02/2023    HCT 29.7 (L) 12/02/2023    MCV 87.6 11/30/2023     11/30/2023    HEPBSAG Non-Reactive 05/18/2023    AST 14 11/20/2023    ALT 10 11/20/2023    URICACID 5.2 11/20/2023       Assessment  PPD# 2 after vaginal delivery  PP anemia 2/2 acute blood loss.     Plan  Discharge to home  Follow up with LW  in 6 weeks  Prescriptions for Motrin, ferrous sulfate, Colace and hemorrhoid cream (requested by patient) prescribed and advised on weaning.  Advised no tampons, intercourse, or tub baths for 6 weeks.       This note has been electronically signed.    Dory Parada CNM  09:14 EST  December 3, 2023

## 2023-12-03 NOTE — DISCHARGE SUMMARY
[facility]  Vaginal delivery discharge summary      Patient: Kelly Astorga      MR#:[id number]  Admission  Diagnosis:    (spontaneous vaginal delivery)    Postpartum anemia     Discharge Diagnosis:   1. Gestational diabetes mellitus (GDM) in childbirth, insulin controlled          Date of Admission: 2023  Date of Discharge:  12/3/2023    Procedures:  Vaginal, Spontaneous     2023    6:33 PM      Service:  Obstetrics    Hospital Course:  Patient underwent vaginal delivery and remained in the hospital for 3 days.  During that time she remained afebrile and hemodynamically stable.  On the day of discharge, she was eating, ambulating and voiding without difficulty.  She is breastfeeding.     Labs:    Lab Results   Component Value Date    WBC 8.29 2023    HGB 9.6 (L) 2023    HCT 29.7 (L) 2023    MCV 87.6 2023     2023    POCGLU 110 2023    CREATININE 0.65 2023    URICACID 5.2 2023    AST 14 2023    ALT 10 2023     2023     Results from last 7 days   Lab Units 23  2253   ABO TYPING  O   RH TYPING  Positive   ANTIBODY SCREEN  Negative       Discharge Medications     Discharge Medications        New Medications        Instructions Start Date   docusate sodium 100 MG capsule   100 mg, Oral, 2 Times Daily PRN      ferrous sulfate 325 (65 FE) MG tablet   325 mg, Oral, Daily With Breakfast      Hydrocortisone (Perianal) 2.5 % rectal cream  Commonly known as: ANUSOL-HC   1 application , Rectal, As Needed      ibuprofen 600 MG tablet  Commonly known as: ADVIL,MOTRIN   600 mg, Oral, Every 6 Hours PRN             Continue These Medications        Instructions Start Date   BD Pen Needle Roxanne 2nd Gen 32G X 4 MM misc  Generic drug: Insulin Pen Needle   1 each, Does not apply, 4 Times Daily      Dexcom G7  device   Use to check blood sugar      Dexcom G7 Sensor misc   Change every 10 days      PNV Prenatal Plus  Multivitamin 27-1 MG tablet   1 tablet, Oral, Daily, 200001             Stop These Medications      acetaminophen 325 MG tablet  Commonly known as: TYLENOL     acetone (urine) test strip     aspirin 81 MG EC tablet     cetirizine 10 MG tablet  Commonly known as: zyrTEC     Insulin Glargine-yfgn 100 UNIT/ML solution pen-injector  Commonly known as: Semglee (yfgn)     NovoLOG FlexPen 100 UNIT/ML solution pen-injector sc pen  Generic drug: insulin aspart     Omeprazole 20 MG Tablet Delayed Release Dispersible              Discharge Disposition:  To Home    Discharge Condition:  Stable. PP anemia taking ferrous sulfate.     Discharge Diet: Regular    Activity at Discharge: Pelvic rest    Follow-up Appointments  Follow up with [facility] in 6 weeks.     Dory Parada CNM  12/03/23  09:13 EST

## 2023-12-25 ENCOUNTER — E-VISIT (OUTPATIENT)
Dept: FAMILY MEDICINE CLINIC | Facility: TELEHEALTH | Age: 38
End: 2023-12-25
Payer: COMMERCIAL

## 2023-12-25 RX ORDER — METHYLPREDNISOLONE 4 MG/1
TABLET ORAL
Qty: 21 TABLET | Refills: 0 | Status: SHIPPED | OUTPATIENT
Start: 2023-12-25

## 2023-12-26 DIAGNOSIS — B37.9 ANTIBIOTIC-INDUCED YEAST INFECTION: Primary | ICD-10-CM

## 2023-12-26 DIAGNOSIS — T36.95XA ANTIBIOTIC-INDUCED YEAST INFECTION: Primary | ICD-10-CM

## 2023-12-26 RX ORDER — FLUCONAZOLE 150 MG/1
TABLET ORAL
Qty: 2 TABLET | Refills: 0 | Status: SHIPPED | OUTPATIENT
Start: 2023-12-26

## 2023-12-26 NOTE — E-VISIT TREATED
Chief Complaint: Cold, flu, COVID, sinus, hay fever, or seasonal allergies   Patient introduction   Patient is 38-year-old female with congestion, nasal discharge, and voice hoarseness that started 2 to 3 weeks ago.   COVID-19 testing history, vaccination status, and exposure:    Patient was tested for COVID-19 within the last 24 hours. Test result was negative.    Has not received an updated 1340-1260 COVID-19 vaccination (Pfizer-BioNTech or Moderna vaccine after September 12, 2023; or Novavax vaccine after October 3, 2023).    No known exposure to a person with a confirmed or suspected case of COVID-19.    No high-risk (household) exposure to COVID-19 within the last 14 days.   Risk factors for severe disease from COVID-19 infection    Higher risk for severe disease from COVID-19 because of BMI >= 25.    An unspecified autoimmune disorder.   General presentation   Patient saw improvement in symptoms for 1 to 2 days, followed by worsening of symptoms. Symptoms came on gradually.   Fever:    No fever.   Sinus and nasal symptoms:    Nasal discharge.    Green nasal drainage.    Nasal drainage is thick.    Postnasal drip.    1 to 3 episodes of antibiotic treatment for sinus infection in the last year.    Sinus pain or pressure on or around the cheeks and upper teeth or jaw.    Patient first noticed sinus pain 10 to 14 days ago.    Sinus pain is worse with Valsalva.    No itchy nose or sneezing.    No history of unhealed nasal septal ulcer/nasal wound.    No history of deviated septum or nasal polyps.   Throat symptoms:    Voice is mildly hoarse. Patient does not believe hoarseness is due to voice strain.    No sore throat.   Head and body aches:    No headache.    No sweats.    No chills.    No myalgia.    No fatigue.   Cough:    No cough.   Wheezing and shortness of breath:    No COPD diagnosis.    No asthma diagnosis.    No wheezing.    No shortness of breath.   Chest pain:    No chest pain.   Ear symptoms:    " Current symptoms include fullness and crackling or popping in the ear(s).   Dizziness:    No dizziness.   Allergies:    No history of allergies.   Flu exposure:    No recent known exposure to a person with a confirmed flu diagnosis.    Received a flu vaccine in the last 1 to 3 months.   Patient is taking over-the-counter medications for current symptoms, including acetaminophen, cetirizine, diphenhydramine, fluticasone, guaifenesin, and ibuprofen.   Review of red flags/alarm symptoms:    No changes in alertness or awareness.    No decreased urination.    No blue or gray coloring present in face, lips, or nail beds.    No swelling, pain, redness, or increased warmth in the calf or lower part of ONE leg only.   Pregnancy/menstrual status/breastfeeding:    Not pregnant.    Not breastfeeding.    Regarding date of last menstrual period, patient writes: Had a baby girl on 12/1/2023. Periods have not returned yet..   Self-exam:    Height: 5' 5\"    Weight: 215 lbs    Neck lymph nodes feel normal.   Recent antibiotic use:    Has not taken antibiotics for similar symptoms within the past month.   Current medications   Currently taking ibuprofen 600 MG tablet, ferrous sulfate 325 (65 FE) MG tablet, and PNV Prenatal Plus Multivitamin 27-1 MG tablet.   Medication allergies   None.   Medication contraindication review   No history of anaphylactic reaction to beta-lactam antibiotics; aspirin triad; blood dyscrasia; bone marrow depression; catecholamine-releasing paraganglioma; coronary artery disease; coagulation disorder; congenital long QT syndrome; depression; electrolyte abnormalities; fungal infection; GI bleeding; GI obstruction; G6PD deficiency; heart arrhythmia; hypertension; mononucleosis; myasthenia; recent myocardial infarction; NSAID-induced asthma/urticaria; Parkinson's disease; pheochromocytoma; porphyria; Reye syndrome; seizure disorder; ulcerative colitis; and urinary retention.   No history of " metoclopramide-associated dystonic reaction and tardive dyskinesia.   No known history of amoxicillin-clavulanate-associated cholestatic jaundice or hepatic impairment.   No known history of azithromycin-associated cholestatic jaundice or hepatic impairment.   Past medical history   Immune conditions:   Regarding an autoimmune disorder they have, patient writes: Autoimmune arthritis undifferentiated. Patient is not currently taking medications for their condition(s).   No history of cancer.   Social history   Never smoked tobacco.   High-risk household contacts:    Household contact under the age of 5.    Household contact with asthma.   Patient-submitted comments   Patient was asked if they had anything to add about their symptoms. Patient writes: Antibiotic and steroid taper pack work best for past sinus infections. Can I also get diflucan for yeast? Thanks!.   Patient did not request an excuse note.   Assessment   Bacterial sinusitis.   This is the likely diagnosis based on patient's interview responses, including:    Symptom profile    Duration of symptoms longer than 10 days    Sinus pressure lasting longer than 10 days    Symptoms improved then came back worse   Plan   Medications:    amoxicillin 875 mg-potassium clavulanate 125 mg tablet RX 875mg/125mg 1 tab PO bid 7d for infection. This medication is an antibiotic. Take it exactly as directed. You must finish the entire course of medication, even if you feel better after the first few days of treatment. Amount is 14 tab.    fluconazole 150 mg tablet RX 150mg 1 tab PO once 1d as a single dose for vaginal yeast infection. Repeat in 72 hours if symptoms persist. Amount is 2 tab.   The patient's prescriptions will be sent to:   [facility] [id number]   [address] [facility] [address]   Phone: [phone]     Fax: [phone]   Education:    Condition and causes    Prevention    Treatment and self-care    When to call provider   ----------    Electronically signed by COLLIN Elias FNP-BC on 2023-12-25 at 23:51PM   ----------   Patient Interview Transcript:   Which of these symptoms are bothering you? Select all that apply.    Stuffed-up nose or sinuses    Runny nose    Hoarse voice or loss of voice   Not selected:    Cough    Shortness of breath    Itchy or watery eyes    Itchy nose or sneezing    Loss of smell or taste    Sore throat    Headache    Fever    Sweats    Chills    Muscle or body aches    Fatigue or tiredness    Nausea or vomiting    Diarrhea    I don't have any of these symptoms   When did your current symptoms start? Select one.    2 to 3 weeks ago   Not selected:    Less than 48 hours ago    3 to 5 days ago    6 to 9 days ago    10 to 14 days ago    3 to 4 weeks ago    More than a month ago   Did your symptoms come on suddenly or gradually? Select one.    Gradually   Not selected:    Suddenly    I'm not sure   Have your symptoms improved at all since they began? Select one.    Yes, but then they came back worse than before   Not selected:    Yes, but they haven't gone away completely    No   It sounds like you felt better, but now you feel sick again. How long did you feel better? Select one.    1 to 2 days   Not selected:    Less than 1 day    More than 2 days   Since your current symptoms started, have you been tested for COVID-19? This includes home self-tests as well as nose swab or saliva tests done at a doctor's office, lab, or testing site. Select one.    Yes   Not selected:    No   When was your most recent COVID-19 test? Select one.    Within the last 24 hours   Not selected:    Within the last week (specify date as MM/DD/YY)    7 to 14 days ago    15 to 30 days ago    More than 1 month ago   What was the result of your most recent COVID-19 test? Select one.    Negative   Not selected:    Positive   Has anyone in your household tested positive for COVID-19 in the past 14 days? Select one.    No   Not selected:    Yes   In  "the last 14 days, have you had close contact with someone who has COVID-19? \"Close contact\" means any of these: - Caring for someone with COVID-19. - Being within 6 feet of someone with COVID-19 for a total of at least 15 minutes over a 24-hour period. For example, three 5-minute exposures for a total of 15 minutes. - Being in direct contact with respiratory droplets from someone with COVID-19 (being coughed on, kissing, sharing utensils). Select one.    No, not that I know of   Not selected:    Yes, a confirmed case    Yes, a suspected case   Have you gotten the 7840-3718 updated COVID-19 vaccine? This means either the updated Pfizer-BioNTech or Moderna vaccine after September 12, 2023; or the updated Novavax vaccine after October 3, 2023. Select one.    No   Not selected:    Yes   Since your symptoms started, have you felt dizzy? Select one.    No   Not selected:    Yes, but I can still do my regular daily activities    Yes, and it makes it hard to stand, walk, or do daily activities   Do you feel sinus pain or pressure in any of these areas?    In my cheeks    In my upper teeth or jaw   Not selected:    In my forehead    Around my eyes    Behind my nose    No   When did you first notice your sinus pain or pressure? Select one.    10 to 14 days ago   Not selected:    Less than 5 days ago    5 to 9 days ago    2 to 4 weeks ago    1 month ago or longer   Does coughing, sneezing, or leaning forward make your sinuses feel worse? Select one.    Yes   Not selected:    No   What color is your nasal drainage? Select one.    Green or greenish   Not selected:    Clear    White    Yellow or yellowish    My nose is stuffed but not draining or running   Is your nasal drainage thick or thin? Select one.    Thick   Not selected:    Thin   Is there any drainage (mucus) going down the back of your throat? This kind of drainage is also called \"postnasal drip.\" It can cause frequent throat clearing. Select one.    Yes   Not " selected:    No, not that I know of   Do you have chest pain? You might also feel it as discomfort, aching, tightness, or squeezing in the chest. Select one.    No   Not selected:    Yes   Have you urinated at least 3 times in the last 24 hours? Select one.    Yes   Not selected:    No   Do your face, lips, or nail beds appear blue or gray? Select one.    No   Not selected:    Yes   Do you have any swelling, pain, redness, or increased warmth in the calf or lower part of ONE leg only? Select one.    No   Not selected:    Yes   Changes in alertness or awareness may mean you need emergency care. Since your symptoms started, have you had any of these? Select all that apply.    None of the above   Not selected:    Confusion    Slurred speech    Not knowing where you are or what day it is    Difficulty staying conscious    Fainting or passing out   Do your symptoms include a whistling sound, or wheezing, when you breathe? Select one.    No   Not selected:    Yes   Early in this interview, you told us you were hoarse or you'd lost your voice. How would you describe the changes to your voice? Select one.    It just sounds a little raspy   Not selected:    It's harder than usual to talk    I can barely talk at all   Is it possible that you strained your voice? Singing, yelling, or talking more or louder than usual can cause voice strain. Select one.    No, not that I know of   Not selected:    Yes   Do you have any of these symptoms in your ear(s)? Select all that apply.    Fullness    Crackling or popping   Not selected:    Pain    Pressure    Plugged or blocked sensation    None of the above   Are your glands/lymph nodes swollen, or does it hurt when you touch them?    No, not that I can tell   Not selected:    Yes   In the past week, has anyone around you (such as at school, work, or home) had a confirmed diagnosis of the flu? A confirmed diagnosis means that a nose swab was done to verify a flu infection. Select all  that apply.    No, not that I know of   Not selected:    I live with someone who has the flu    I've been within touching distance of someone who has the flu    I've walked by, or sat about 3 feet away from, someone who has the flu    I've been in the same building as someone who has the flu   Have you ever been diagnosed with asthma? Select one.    No   Not selected:    Yes   Have you ever been diagnosed with chronic obstructive pulmonary disease (COPD)? Select one.    No, not that I know of   Not selected:    Yes   In the past month, have you taken antibiotics for similar symptoms? Examples of antibiotics include amoxicillin, amoxicillin-clavulanate (Augmentin), penicillin, cefdinir (Omnicef), doxycycline, and clindamycin (Cleocin). Select one.    No   Not selected:    Yes (specify)   In the last year, how many times were you treated with antibiotics for a sinus infection? Select one.    1 to 3 times   Not selected:    None    4 or more times   Have you been diagnosed with a deviated septum or nasal polyps? The nose is divided into two nostrils by the septum. A crooked septum is called a deviated septum. Nasal polyps are growths inside the nose or sinuses. Select one.    No, not that I know of   Not selected:    Yes, but I had surgery to treat them    Yes, I have a deviated septum    Yes, I have nasal polyps    Yes, I have a deviated septum and nasal polyps   Do you have a sore inside your nose that won't heal? Select one.    No, not that I know of   Not selected:    Yes   Do you have allergies (pollen, dust mites, mold, animal dander)? Select one.    No, not that I know of   Not selected:    Yes   Have you had a flu shot this season? Select one.    Yes, 1 to 3 months ago   Not selected:    Yes, less than 2 weeks ago    Yes, 2 to 4 weeks ago    Yes, 3 to 6 months ago    Yes, more than 6 months ago    No   Are you pregnant? Select one.    No   Not selected:    Yes   When was your last menstrual period? If you don't  currently have periods or no longer have periods, please briefly explain.    Had a baby girl on 2023. Periods have not returned yet.   Within the last 2 weeks, have you: - Given birth - Had a miscarriage - Had a pregnancy loss - Had an  Being postpartum (live birth or loss) within the last 2 weeks increases your risk of flu complications. Select one.    No   Not selected:    Yes   Are you breastfeeding? Select one.    No   Not selected:    Yes   The flu and COVID-19 can be more serious for people in certain groups. The next few questions help us figure out if you or anyone you live with is at higher risk for complications from these infections. Do any of these statements apply to you? Select all that apply.    None of the above   Not selected:    I'm     I'm     I'm Black    I'm  or    Do you smoke tobacco? Select one.    No   Not selected:    Yes, every day    Yes, some days    No, I quit   Do you have a mostly inactive lifestyle? Answer yes if all of these are true: - You spend at least 6 hours a day sitting or lying down - You get less than 2 and a half hours per week of moderate exercise such as walking fast - You get less than 1 hour and 15 minutes per week of intense exercise such as jogging or running Select one.    No   Not selected:    Yes   Do you have any of these conditions? Select all that apply.    None of the above   Not selected:    Chronic lung disease, such as cystic fibrosis or interstitial fibrosis    Heart disease, such as congenital heart disease, congestive heart failure, or coronary artery disease    Disorder of the brain, spinal cord, or nerves and muscles, such as dementia, cerebral palsy, epilepsy, muscular dystrophy, or developmental delay    Metabolic disorder or mitochondrial disease    Cerebrovascular disease, such as stroke or another condition affecting the blood vessels or blood supply to the brain    Down syndrome    Mood  disorder, including depression or schizophrenia spectrum disorders    Substance use disorder, such as alcohol, opioid, or cocaine use disorder    Tuberculosis    Primary immunodeficiency   Do you live in a group care setting? Examples include: - Nursing home - Residential care - Psychiatric treatment facility - Group home - [facility] - Board and care home - Homeless shelter - Foster care setting Select one.    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 To determine your BMI, we need to know your weight and height. Please enter your weight (in pounds).    Weight   Please enter your height.    Height   Do you have any of these conditions that can affect the immune system? Scroll to see all options. Select all that apply.    An autoimmune disorder not listed here (specify): Autoimmune arthritis undifferentiated   Not selected:    History of bone marrow transplant    Chronic kidney disease    Chronic liver disease (including cirrhosis)    HIV/AIDS    Inflammatory bowel disease (Crohn's disease or ulcerative colitis)    Lupus    Moderate to severe plaque psoriasis    Multiple sclerosis    Rheumatoid arthritis    Sickle cell anemia    Alpha or beta thalassemia    History of solid organ transplant (kidney, liver, or heart)    History of spleen removal    A condition requiring treatment with long-term use of oral steroids (such as prednisone, prednisolone, or dexamethasone) (specify)    None of these   Do you take medications for your condition? This includes oral and injectable medications that are taken daily, weekly, or monthly. Select one.    No   Not selected:    Yes, regularly    Yes, for flare-ups only   Have you ever been diagnosed with cancer? Select one.    No   Not selected:    Yes, I have cancer now    Yes, but I'm in  remission   Do any of these apply to you? Select all that apply.    None of the above   Not selected:    I've been hospitalized within the last 5 days    I have diabetes    I'm in close contact with a child in    The flu and COVID-19 can be more serious for people in certain groups. Do any of these apply to the people who live with you? Select all that apply.    Under age 5   Not selected:    Over age 65            Black     or     Pregnant    Has given birth, had a miscarriage, had a pregnancy loss, or had an  in the last 2 weeks    None of the above   Does any member of your household have any of these medical conditions? Select all that apply.    Asthma   Not selected:    Disorders of the brain, spinal cord, or nerves and muscles, such as dementia, cerebral palsy, epilepsy, muscular dystrophy, or developmental delay    Chronic lung disease, such as COPD or cystic fibrosis    Heart disease, such as congenital heart disease, congestive heart failure, or coronary artery disease    Cerebrovascular disease, such as stroke or another condition affecting the blood vessels or blood supply to the brain    Blood disorders, such as sickle cell disease    Diabetes    Metabolic disorders such as inherited metabolic disorders or mitochondrial disease    Kidney disorders    Liver disorders    Weakened immune system due to illness or medications such as chemotherapy or steroids    Children under the age of 19 who are on long-term aspirin therapy    Extreme obesity (BMI > 40)    None of the above   Do you have any of these conditions? Scroll to see all options. Select all that apply.    None of the above   Not selected:    Aspirin triad (also known as Samter's triad or ASA triad)    Asthma or hives from taking aspirin or other NSAIDs, such as ibuprofen or naproxen    Blockage or narrowing of the blood vessels of the heart    Blood clotting disorder    Blood dyscrasia, such  anemia, leukemia, lymphoma, or myeloma    Bone marrow depression    Catecholamine-releasing paraganglioma    Congenital long QT syndrome    Depression    Difficulty urinating or completely emptying your bladder    Uncorrected electrolyte abnormalities    Fungal infection    Gastrointestinal (GI) bleeding    Gastrointestinal (GI) obstruction    G6PD deficiency    Recent heart attack    High blood pressure    Irregular heartbeat or heart rhythm    Mononucleosis (mono)    Myasthenia gravis    Parkinson's disease    Pheochromocytoma    Reye syndrome    Seizure disorder    Thyroid disease    Ulcerative colitis   Have you ever had either of these conditions? Select all that apply.    No   Not selected:    Metoclopramide-associated dystonic reaction    Tardive dyskinesia   Just a few more questions about medications, and then you're finished. Have you used any non-prescription medications or nasal sprays for your current symptoms? Examples include saline sprays, decongestants, NyQuil, and Tylenol. Select one.    Yes   Not selected:    No   Which of these non-prescription medications have you tried? Scroll to see all options. Select all that apply.    Acetaminophen (Tylenol)    Cetirizine (Zyrtec)    Diphenhydramine (Benadryl)    Fluticasone (Flonase)    Guaifenesin (Mucinex)    Ibuprofen (Advil, Motrin, Midol)   Not selected:    Budesonide (Rhinocort)    Chlorpheniramine (Aller-chlor, Chlor-Trimeton)    Cromolyn (NasalCrom)    Dextromethorphan (Delsym, Robitussin, Vicks DayQuil Cough)    Fexofenadine (Allegra)    Guaifenesin/dextromethorphan (Delsym DM, Mucinex DM, Robitussin DM)    Ketotifen (Alaway, Zaditor)    Loratadine (Alavert, Claritin)    Naphazoline-pheniramine (Naphcon-A, Opcon-A, Visine-A)    Omeprazole (Prilosec)    Oxymetazoline (Afrin)    Phenylephrine (Sudafed PE)    Triamcinolone (Nasacort)    None of the above   Have you taken any monoamine oxidase inhibitor (MAOI) medications in the last 14 days? Examples  include rasagiline (Azilect), selegiline (Eldepryl, Zelapar), isocarboxazid (Marplan), phenelzine (Nardil), and tranylcypromine (Parnate). Select one.    No, not that I know of   Not selected:    Yes   Do you take Kynmobi or Apokyn (apomorphine)? Select one.    No   Not selected:    Yes   Are you still taking these medications listed in your medical record? If you're not taking any of these, click Next. Select all that apply.    ibuprofen 600 MG tablet    ferrous sulfate 325 (65 FE) MG tablet    PNV Prenatal Plus Multivitamin 27-1 MG tablet   Are you taking any other medications, vitamins, or supplements? Select one.    No   Not selected:    Yes   Have you ever had an allergic or bad reaction to any medication? Select one.    No   Not selected:    Yes   Are you allergic to milk or to the proteins found in milk (for example, whey or casein)? A milk allergy is different from lactose intolerance. Select one.    No, not that I know of   Not selected:    Yes   Have you ever had jaundice or liver problems as a result of taking amoxicillin-clavulanate (Augmentin)? Jaundice is a condition in which the skin and the whites of the eyes turn yellow. Select all that apply.    No, not that I know of   Not selected:    Yes, jaundice    Yes, liver problems   Have you ever had jaundice or liver problems as a result of taking azithromycin (Zithromax, Zmax)? Jaundice is a condition in which the skin and the whites of the eyes turn yellow. Select all that apply.    No, not that I know of   Not selected:    Yes, jaundice    Yes, liver problems   Do you need a doctor's note? A doctor's note confirms that you received care today and states when you can return to school or work. It does not contain information about your diagnosis or treatment plan. Your provider will make the final decision on whether to give you a doctor's note and for how long. Doctor's notes CANNOT be backdated. We can't provide medical leave paperwork through this  type of visit. If more paperwork is needed to request time off, contact your primary care provider. Select one.    No   Not selected:    Today only (1 day)    Today and tomorrow (2 days)    3 days    5 days    7 days   Is there anything you'd like to add about your symptoms? Please limit your comments to the symptoms asked about in this interview. If you include comments about other concerns, your provider may recommend that you be seen in person.    Antibiotic and steroid taper pack work best for past sinus infections. Can I also get diflucan for yeast? Thanks!   ----------   Medical history   Medical history data does not currently exist for this patient.

## 2023-12-26 NOTE — EXTERNAL PATIENT INSTRUCTIONS
Note   I have prescribed Augmentin, Medrol dose pack and Diflucan. If your symptoms do not improve orbecome worse, follow up with your doctor.   Diagnosis   Bacterial sinusitis   My name is COLLIN Elias FNP-BC, and I'm a healthcare provider at [facility]. I reviewed your interview, and I see that you have bacterial sinusitis, also known as a bacterial sinus infection.   Medications   Your pharmacy   [facility] [id number] [address] [facility] [address] [phone]     Prescription   Amoxicillin-clavulanate (875mg/125mg): Take 1 tablet by mouth twice a day for 7 days for infection. This medication is an antibiotic. Take it exactly as directed. You must finish the entire course of medication, even if you feel better after the first few days of treatment.   Fluconazole (150mg): Take 1 tablet by mouth once for 1 day as a single dose. Use this medication only if you develop a yeast infection. If yeast infection symptoms are still present 3 days after taking the first tablet, take the second tablet.    Start taking the antibiotics I've prescribed right away. You need to finish the entire course of antibiotics, even if you start to feel better before the pills run out.    Some people develop a yeast infection after taking antibiotics. If you get a yeast infection, you can treat it with Diflucan (fluconazole), an oral antifungal prescribed here.   About your diagnosis   The sinuses are hollow spaces connected to the nasal passages. Sinusitis occurs when the sinuses swell and block the drainage of fluid and mucus from the nose, causing pain, pressure, and congestion. Fatigue, difficulty sleeping, or decreased appetite may accompany your symptoms.   More than 90% of sinus infections are caused by viruses. However, in certain cases, a sinus infection may be caused by bacteria. Bacterial sinus infections usually look like one of the following cases:    Severe sinus symptoms with a fever over 102F.     Sinus symptoms that have not improved at all after 10 days.    Cold symptoms that slowly improve but then worsen again after 5 or 6 days, usually with a high fever, headache, or nasal discharge.   What to expect   If you follow this treatment plan, you should start to feel better within a few days.   When to seek care   Call us at [phone]   with any sudden or unexpected symptoms.    Symptoms that last longer than 10 to 14 days.    Symptoms that get better for a few days, and then suddenly get worse.    Fever that measures over 103F or continues for more than 3 days.    Any vision changes.    A worsening headache.    Stiff neck.    Swelling of your forehead or eyes.    Coughing up red or bloody mucus.    Swallowing becomes extremely difficult or impossible.    More than 5 episodes of diarrhea in a day.    More than 5 episodes of vomiting in a day.    Severe shortness of breath.    Severe chest pain   Other treatment    Rest! Your body needs rest to recover and fight infection.    Drink plenty of water to stay hydrated.    Use steam to soothe your sinuses: Breathe it in from a shower or a bowl of hot water. Placing a warm, moist washcloth over your nose and forehead may help relieve the sinus pain and pressure.    Try non-prescription saline nasal sprays to help your nasal symptoms. Try using a Neti Pot to flush out your stuffy nose and sinuses. Neti Pots are available at any drugstore without a prescription.    Avoid smoke and air pollution. Smoke can make infections worse.   Prevention    Avoid close contact with other people when you're sick.    Cover your mouth and nose when you cough or sneeze. Use a tissue or cough into your elbow. Make sure that used tissues go directly into the trash.    Avoid touching your eyes, nose, or mouth while you're sick.    Wash your hands often, especially after coughing, sneezing, or blowing your nose. If soap and water are not available, use an alcohol-based hand  .    If you or someone in your home or workplace is sick, disinfect commonly used items. This includes door handles, tables, computers, remotes, and pens.    Coronavirus (COVID-19) information   Common symptoms of COVID-19 include fever, cough, shortness of breath, fatigue, muscle or body aches, headaches, new loss of sense of taste or smell, sore throat, stuffy or runny nose, nausea or vomiting, and diarrhea. Most people who get COVID-19 have mild symptoms and can rest at home until they get better. Elderly people and those with chronic medical problems may be at risk for more serious complications.   FAQs about the COVID-19 vaccine   Are the vaccines safe?   Yes. Hundreds of millions of people in the [place] have already safely received COVID-19 vaccines under the most intense safety monitoring in the history of the [place].   Do I need the vaccine if I've already had COVID?   Yes. Vaccination helps protect you even if you've already had COVID.   If you had COVID-19 and had symptoms, wait to get vaccinated until you've recovered and completed your isolation period.   If you tested positive for COVID-19 but did not have symptoms, you can get vaccinated after 5 full days have passed since you had a positive test, as long as you don't develop symptoms.   How many doses of the vaccine do I need?   Visit www.cdc.gov/coronavirus/2019-ncov/vaccines/stay-up-to-date.html   to find out how to stay up to date with your COVID-19 vaccines.   I'm immunocompromised. How many doses of the vaccine do I need?   For information on how immunocompromised people can stay up to date with their COVID-19 vaccines, visit www.cdc.gov/coronavirus/2019-ncov/vaccines/recommendations/immuno.html  .   What are the common side effects of the vaccine?   A sore arm, tiredness, headache, and muscle pain may occur within two days of getting the vaccine and last a day or two. For the Moderna or Pfizer vaccines, side effects are more common after the  second dose. People over the age of 55 are less likely to have side effects than younger people.   After I'm up to date on vaccines, can I still get or spread COVID?   Yes, you can still get COVID, but your disease should be milder. And your risk of serious illness, hospitalization, and complications will be much lower, especially if you're up to date. Unfortunately, you can still spread COVID if you've been vaccinated. That's why it's important to follow isolation guidelines if you get sick or test positive.   After I'm up to date on vaccines, can I go back to normal?   You should still wear a mask indoors in public if:    It's required by laws, rules, regulations, or local guidance.    You have a weakened immune system.    Your age puts you at increased risk of severe disease.    You have a medical condition that puts you at increased risk of severe disease.    Someone in your household has a weakened immune system, is at increased risk for severe disease, or is unvaccinated.    You're in an area of high transmission.   Where can I get a COVID-19 vaccine?   Visit [facility]'s website for more information. To find a COVID-19 vaccination site near you, visit www.vaccines.gov/  , call [phone]  , or text your zip code to 808729 ([place]). Message and data rates may apply.   I've had close contact with someone who has COVID. Do I need to quarantine, and if so, for how long?   For the most current answer, including a calculator to determine whether you need to stay home and for how long, visit www.cdc.gov/coronavirus/2019-ncov/your-health/isolation.html  .   I've tested positive for COVID. How long do I need to isolate?   For the latest recommendations, including a calculator to determine how long you need to stay home, visit www.cdc.gov/coronavirus/2019-ncov/your-health/isolation.html  .   What if I develop symptoms that might be from COVID?   For the latest recommendations on what to do if you're sick,  including when to seek emergency care, visit www.cdc.gov/coronavirus/2019-ncov/if-you-are-sick/index.html  .    Flu vaccine information   Who should get a flu vaccine?   Everyone 6 months of age and older should get a yearly flu vaccine.   When should I get vaccinated?   You should get a flu vaccine by the end of October. Once you're vaccinated, it takes about two weeks for antibodies to develop and protect you against the flu. That's why it's important to get vaccinated as soon as possible.   After October, is it too late to get vaccinated?   No. You should still get vaccinated. As long as the flu viruses are still in your community, flu vaccines will remain available, even into January of next year or later.   Why do I need a flu vaccine EVERY year?   Flu viruses are constantly changing, so flu vaccines are usually updated from one season to the next. Your protection from the flu vaccine also lessens over time.   Is the flu vaccine safe?   Yes. Over the last 50 years, hundreds of millions of Americans have safely received the flu vaccines.   What are the side effects of flu vaccines?   You CANNOT get the flu from a flu vaccine. Common side effects of the flu shot include soreness, redness and/or swelling where the shot was given, low grade fever, and aches. Common side effects of the nasal spray flu vaccine for adults include runny nose, headaches, sore throat, and cough. For children, side effects include wheezing, vomiting, muscle aches, and fever.   Does the flu vaccine increase your risk of getting COVID-19?   No. There is no evidence that getting a flu vaccine increases your risk of getting COVID-19.   Is it safe to get the flu vaccine along with a COVID-19 vaccine?   Yes. It's safe to get the flu vaccine with a COVID-19 vaccine or booster.   Contact your healthcare provider TODAY for details on when and where to get your flu vaccine.   Your provider   Your diagnosis was provided by COLLIN Elias,  FNSIDNEY-BC, a member of your trusted care team at [facility].   If you have any questions, call us at [phone]  .

## 2024-03-15 ENCOUNTER — E-VISIT (OUTPATIENT)
Dept: FAMILY MEDICINE CLINIC | Facility: TELEHEALTH | Age: 39
End: 2024-03-15
Payer: COMMERCIAL

## 2024-03-15 PROCEDURE — BRIGHTMDVISIT: Performed by: NURSE PRACTITIONER

## 2024-03-15 NOTE — E-VISIT TREATED
Chief Complaint: Mouth sores   Patient introduction   Patient is 38-year-old female who presents with bumps or lumps, burning, itching, pain/tenderness, redness, sores or blisters, and swelling on or above their upper lip.   Has tightness or discomfort when opening their mouth wide.   No loss of taste.   Number of lesions: 1.   Symptoms have been present for 1 to 3 days.   General presentation   No fever.   Condition does not interfere with talking, eating, or drinking.   No facial edema. No chills, loss of appetite, sore throat, headache, or muscle aches. No swollen lymph nodes.   Felt tingling/pain/burning sensation in same location prior to symptom onset. Patient had viral infection or fever just prior to symptom onset.   Does not wear dentures.   Not taking non-prescription acetaminophen, clotrimazole, ibuprofen, miconazole, topical benzocaine, or zinc oxide for current symptoms.   History of similar mouth symptoms in the past, with 1 instance of similar symptoms in the past 1 year. Did not see a healthcare provider for similar symptoms in the past.   Sexual history: Has had vaginal, anal, or oral sex in past 3 months. Has not had multiple partners in past 3 months. Does not use injection drugs. Sexual partner does not use injection drugs.   Review of red flags/alarm symptoms:    No premalignant mouth lesions    No oral cancer    No unexplained sores or rash-like blisters on other parts of the body    No celiac disease    No neutropenia    No inflammatory bowel disease    No lupus    No Behcet's syndrome    No Azra's syndrome   Pregnancy/menstrual status/breastfeeding:   Not pregnant. Not breastfeeding. Regarding date of last menstrual period, patient writes: 3 weeks ago.   Current medications   Not taking other medications or supplements.   Medication allergies   None.   Medication contraindication review   No history of aspirin triad, NSAID-induced asthma/urticaria, or CABG surgery.   Past medical  [facility]  Discharge Reassessment  Plan to discharge 3/8 with family.  Will follow up with [facility].  Primary Care Provider: [facility]    Expected Discharge Date: 3/8/2023    Reassessment (most recent)       Discharge Reassessment - 03/07/23 1542          Discharge Reassessment    Assessment Type Discharge Planning Reassessment     Did the patient's condition or plan change since previous assessment? No     Discharge Plan A Home     Discharge Plan B Home     DME Needed Upon Discharge  none     Discharge Barriers Identified None     Why the patient remains in the hospital Requires continued medical care        Post-Acute Status    Discharge Delays None known at this time                        history   Immune conditions: Regarding an autoimmune disorder they have, patient writes: Undifferentiated autoimmune arthritis. Patient takes medications for flare-ups only.   No history of cancer.   Social history   Patient does not use tobacco. Does not drink alcohol.   Patient-submitted comments   Patient was asked if they had anything to add about their symptoms. Patient writes: Had stomach virus 3 days ago, then cold sore started last night.   Patient did not request an excuse note.   Assessment   Herpes labialis.   Plan   Medications:    valacyclovir 1 gram tablet RX 1000mg 1 tab PO q12h 7d until the full prescribed amount is finished. Drink plenty of water while taking this medication. Amount is 14 tab.   The patient's prescription will be sent to:   [facility] [id number]   [address] [id number]   Phone: [phone]     Fax: [phone]   Education:    Condition and causes    Prevention    Treatment and self-care    When to call provider   ----------   Electronically signed by COLLIN Raymond on 2024-03-15 at 10:11AM   ----------   Patient Interview Transcript:   Are your symptoms on the inside or the outside of your mouth? - Inside includes your tongue, inner cheek or lips, gums, or roof of mouth - Outside includes your lips, skin surrounding lips, or corners of mouth Select all that apply.    Outside   Not selected:    Inside   Which areas outside your mouth are affected? Select all that apply.    On or just above my upper lip   Not selected:    On or just below my lower lip    The corners of my mouth   Which of these best describe the symptoms outside your mouth? Select all that apply.    Bumps or lumps    Burning    Itching    Pain or tenderness    Redness    Sores or blisters    Swelling   Not selected:    A cottony feeling    Cracking    Darkening of the skin    Scaling or flaking skin    Other (specify)   How long have you had these symptoms? Select one.    1 to 3  days   Not selected:    4 to 6 days    7 to 10 days    More than 10 days   How many mouth lesions, sores, or bumps do you have? Select one.    1   Not selected:    2 to 4    5 to 10    More than 10   Mouth sores can interfere with talking, eating, or drinking. How much does the mouth sore affect your ability to do these things? Select one.    Not at all   Not selected:    Somewhat, but I can still talk, eat, and drink    It makes talking, eating, or drinking impossible   Do you have any loss of taste? Select one.    No   Not selected:    Yes   Is there any swelling, redness, or warmth on your cheek or any other part of your face (not including the mouth area)? Select one.    No, not that I can tell   Not selected:    Yes   Before your symptoms began, did you feel any tingling, pain, or burning in the area? Select one.    Yes   Not selected:    No, not that I remember   To recommend the best treatment for you, we need to see photos of the affected area of your mouth.   [image: /[email or web address]]   This shows the size and location of a sore on the upper lip.   [image: /[email or web address]-[email or web address]]   This shows close-up detail of a sore on the upper lip.   If you choose not to send photos, you'll need to speak with a provider to get care.    Select one.    OK, I'll send photos   Not selected:    I'd rather not send photos. Show me my care options.   Send up to three photos for the provider to review: - Don't use a flash. - Make sure the photos are in focus. - Take at least one close-up photo of the affected area. - Take a photo showing the location in or around your mouth. - Take a photo showing your entire face.    Upload 1   Not selected:    Upload 2    Upload 3   Do you have tightness or discomfort when opening your mouth wide? Select one.    Yes   Not selected:    No   Do you have any sores (or rash-like blisters) on other parts of your body that can't be explained by another  condition? Select all that apply.    No   Not selected:    Chin    Nose    Cheeks    Forehead    Arms, legs, or upper body    Palms of hands    Soles of feet    Genitals   Do you have any of these symptoms? Select all that apply.    None of the above   Not selected:    Chills    Loss of appetite    Sore throat    Headache    Muscle aches   Have you had a fever along with your mouth symptoms? Select one.    No, not that I know of   Not selected:    Yes   Are your glands/lymph nodes swollen or painful to the touch?    No, not that I can tell   Not selected:    Yes   Just before your symptoms began, did any of these apply to you? Select all that apply.    Fever or viral infection (such as cold or flu)   Not selected:    Dental work    Extended sun exposure (or sunburn)    Increased stress or major life change    Onset of menstrual period    None of the above   Do you wear dentures? Select one.    No   Not selected:    Yes   Have you had these symptoms before? Select one.    Yes   Not selected:    No, not that I remember   In the past year, how many times have you had these symptoms? Select one.    1   Not selected:    0    2    3    _More than 3 _    I'm not sure   When you had these symptoms before, did you see a healthcare provider? Select one.    No   Not selected:    Yes   In the last 3 months, have either of these applied to you? Select all that apply.    Neither of the above   Not selected:    Weight loss of 10 pounds or more without trying to lose weight    Severe fatigue or tiredness that doesn't improve with rest   In the last 3 months, have you had any of these symptoms that couldn't be explained by another condition? Select all that apply.    None of the above   Not selected:    Repeated episodes of diarrhea or bloody stools    Eye symptoms (pain, redness, discharge, sensitivity to light, or vision changes)    Urinary symptoms (burning with urination, blood in the urine, or frequent urination)    Joint  symptoms (pain, redness, warmth, or swelling)   Have you ever been diagnosed with any of these conditions? Select all that apply.    None of the above   Not selected:    Behcet's syndrome    Celiac disease    Diabetes    Mouth cancer, such as squamous cell cancer or melanoma    Neutropenia    Nutritional deficiency (vitamin B-12, zinc, folic acid, iron deficiency, or malnutrition)    Premalignant (precancerous) mouth lesions (leukoplakia, erythroplakia)    Reactive arthritis (Azra's syndrome)   Have you had any of these conditions? Select all that apply.    No, not that I know of   Not selected:    Aspirin triad (Samter's triad, or aspirin-sensitive asthma)    History of asthma or hives caused by NSAID drugs (aspirin, ibuprofen, or naproxen)    Coronary artery bypass graft (CABG) surgery   Do you have any of these conditions that can affect the immune system? Scroll to see all options. Select all that apply.    An autoimmune disorder not listed here (specify): Undifferentiated autoimmune arthritis   Not selected:    History of bone marrow transplant    Chronic kidney disease    Chronic liver disease (including cirrhosis)    HIV/AIDS    Inflammatory bowel disease (Crohn's disease or ulcerative colitis)    Lupus    Moderate to severe plaque psoriasis    Multiple sclerosis    Rheumatoid arthritis    Sickle cell anemia    Alpha or beta thalassemia    History of kidney, liver, heart, or other solid organ transplant    History of liver, heart, or other solid organ transplant    History of spleen removal    A condition requiring treatment with long-term use of oral steroids (such as prednisone, prednisolone, or dexamethasone) (specify)    None of these   Do you take medications for your condition? This includes oral and injectable medications that are taken daily, weekly, or monthly. Select one.    Yes, for flare-ups only   Not selected:    Yes, regularly    No   Have you ever been diagnosed with cancer? Select one.     No   Not selected:    Yes, I have cancer now    Yes, but I'm in remission   Are you pregnant? Select one.    No   Not selected:    Yes   When was your last menstrual period? If you don't currently have periods or no longer have periods, please briefly explain.    3 weeks ago   Are you breastfeeding? Select one.    No   Not selected:    Yes   Using tobacco can put you at risk for more serious mouth conditions. Do you use any tobacco products? This includes smoking, vaping, snorting, or chewing tobacco. Select one.    No, never   Not selected:    Yes, every day    Yes, some days    No, I quit   Do you drink alcohol? Select one.    No   Not selected:    1 drink or less per week    2 to 5 drinks per week    1 to 2 drinks per day    3 or more drinks per day   In the last 3 months, have you used any injection drugs, such as heroin, cocaine, crystal meth, amphetamines, or opiates? Your response to this question will only be shared with your care provider. Select one.    No   Not selected:    Yes   In the last 3 months, have you had vaginal, anal, or oral sex? Select one.    Yes   Not selected:    No   In the last 3 months, have you had sex with more than one partner? Select one.    No   Not selected:    Yes   In the last 3 months, has your partner used any injection drugs, such as heroin, cocaine, crystal meth, amphetamines, or opiates? Select one.    No, not that I know of   Not selected:    Yes   The next set of questions is about medications and medication-related allergies. Have you used any of these non-prescription medications for your current symptoms? Select all that apply.    None of the above   Not selected:    Acetaminophen (Tylenol)    Benzocaine topical gel (Anbesol, Zilactin-B)    Clotrimazole (Desenex, Micotrin)    Ibuprofen (Advil, Motrin)    Miconazole (Micaderm, Micatin)    Zinc oxide   Are you taking any other medications, vitamins, or supplements? Select one.    No   Not selected:    Yes   Have you  ever had an allergic or bad reaction to any medication? Select one.    No   Not selected:    Yes   Do you need a doctor's note? A doctor's note confirms that you received care today and states when you can return to school or work. It does not contain information about your diagnosis or treatment plan. Your provider will make the final decision on whether to give you a doctor's note. Doctor's notes CANNOT be backdated. Select one.    No   Not selected:    Today only (1 day)    Today and tomorrow (2 days)    3 days   Is there anything you'd like to add about your symptoms? Please limit your comments to the symptoms covered in this interview. If you include comments about other concerns, your provider may recommend that you be seen in person.    Had stomach virus 3 days ago, then cold sore started last night   ----------   Medical history   Medical history data does not currently exist for this patient.

## 2024-03-15 NOTE — EXTERNAL PATIENT INSTRUCTIONS
"   Note   Hi Kelly. Feel better soon.   Diagnosis   Cold sores (herpes labialis)   My name is COLLIN Raymond, and I'm a healthcare provider at [facility]. I've reviewed your photos and responses. Your symptoms suggest you have a cold sore. Cold sores usually go away on their own in a week or two.   Medications   Your pharmacy   [facility] [id number] [facility] [id number] [phone]     Prescription   Valacyclovir oral tablet (1000mg): Take 1 tablet by mouth every 12 hours for 7 days, until the full prescribed amount is finished. Drink plenty of water while taking this medication.   About your diagnosis   Cold sores are small, fluid-filled blisters that develop on or around the lips. They're also commonly known as \"fever blisters.\" Other areas around the mouth can be affected, including under the nose and just above the chin. Cold sores often come grouped together in patches. As the blisters break open, the shallow, open sores will ooze and then crust over.   Cold sores are caused by the herpes simplex virus (HSV). Cold sores are contagious. The virus is easily spread through contact with the cold sore itself or the fluid in the blisters. Kissing, sharing utensils, or sharing a razor can all spread the virus.   There are two types of herpes simplex virus: HSV-1 and HSV-2. Both types of HSV virus can cause sores around the mouth (cold sores) and on the genitals (genital herpes). For this reason, herpes can be spread through oral sex.   Cold sores tend to come back in the same place. The first time you get one, you may have a fever, swollen lymph nodes, a sore throat, or a headache. These symptoms usually don't happen again when cold sores return. Pain and blisters are usually less severe with repeat outbreaks. Some people can tell when their cold sores are about to come back because they feel tingling, pain, or burning in the same spot. This is known as the \"prodrome.\"   Common " triggers of cold sores include:    Increased stress or a life change    Extended sun exposure    A recent viral infection, such as a cold    Hormonal changes, such as before or during your menstrual period    Dental work   Common signs and symptoms of cold sores include:    Pain or tenderness at the affected area.    Fluid-filled blisters.    Lesions above or below the lips or at the corners of the mouth. These are the most common areas affected by cold sores.    Having 1 to 5 sores. Cold sore outbreaks usually involve fewer than 5 sores.    A bumpy surface on the affected area.    A previous history of cold sores.   What to expect   There is no cure for cold sores, and you'll likely have flares throughout your lifetime. However, you can manage your symptoms with this treatment plan. Even without treatment, cold sores aren't dangerous and most go away on their own within 7 to 14 days.   When to seek care   Call us at [phone]   with any sudden or unexpected symptoms.    Sores lasting longer than 2 weeks.    Sores that often return.    Sores that become unbearably painful.    Extreme difficulty eating or drinking.    A fever higher than 103F or lasting longer than 4 days.   Other treatment   There are many home remedies for cold sores. However, the evidence is mixed on whether they help healing. Lysine, propolis (synthetic beeswax), and a cream combining rhubarb and usama may help soothe the sore and shorten healing time. At the very least, these remedies won't cause harm.   Putting an ice cube on the cold sore may also help reduce pain and swelling.   Prevention   Since cold sores may be triggered by stress, think about changes you can make to manage stress. Many people find regular exercise, meditation, and yoga helpful for managing stress.   While you have cold sores, avoid kissing and sharing utensils, cups, lip balm, or razors. If you're sexually active, avoid oral sex while you have cold sores.   The  virus can spread even when you don't have any sores. However, it spreads most easily when you have moist, active sores.   Your provider   Your diagnosis was provided by COLLIN Raymond, a member of your trusted care team at [facility].   If you have any questions, call us at [phone]  .

## 2024-03-21 ENCOUNTER — OFFICE VISIT (OUTPATIENT)
Dept: ENDOCRINOLOGY | Facility: CLINIC | Age: 39
End: 2024-03-21
Payer: COMMERCIAL

## 2024-03-21 VITALS
HEIGHT: 65 IN | DIASTOLIC BLOOD PRESSURE: 82 MMHG | SYSTOLIC BLOOD PRESSURE: 120 MMHG | WEIGHT: 228.2 LBS | BODY MASS INDEX: 38.02 KG/M2 | RESPIRATION RATE: 16 BRPM | OXYGEN SATURATION: 100 % | HEART RATE: 84 BPM

## 2024-03-21 DIAGNOSIS — E66.09 CLASS 2 OBESITY DUE TO EXCESS CALORIES WITHOUT SERIOUS COMORBIDITY WITH BODY MASS INDEX (BMI) OF 38.0 TO 38.9 IN ADULT: ICD-10-CM

## 2024-03-21 LAB
EXPIRATION DATE: ABNORMAL
EXPIRATION DATE: NORMAL
GLUCOSE BLDC GLUCOMTR-MCNC: 67 MG/DL (ref 70–130)
HBA1C MFR BLD: 5.3 % (ref 4.5–5.7)
Lab: ABNORMAL
Lab: NORMAL

## 2024-03-21 PROCEDURE — 83036 HEMOGLOBIN GLYCOSYLATED A1C: CPT | Performed by: INTERNAL MEDICINE

## 2024-03-21 PROCEDURE — 82947 ASSAY GLUCOSE BLOOD QUANT: CPT | Performed by: INTERNAL MEDICINE

## 2024-03-21 PROCEDURE — 99213 OFFICE O/P EST LOW 20 MIN: CPT | Performed by: INTERNAL MEDICINE

## 2024-03-21 RX ORDER — NORETHINDRONE 0.35 MG/1
1 TABLET ORAL DAILY
COMMUNITY
Start: 2024-03-20

## 2024-03-21 NOTE — PROGRESS NOTES
"Chief Complaint   Patient presents with    Gestational Diabetes     3mo follow up           HPI   Kelly Astorga is a 38 y.o. female had concerns including Gestational Diabetes (3mo follow up ).      Is hoping for another pregnancy ASAP.   This was her fourth.   Delivered baby 12/1/23. Baby girl was 7 lb 7 oz.   Sleep is good.     Would like to lose weight but has struggled with central obesity. This runs in her family.   Gastric sleeve was in 2016.   Doesn't eat much.   Has been recently following the diet she was following in pregnancy.   AM will have sausage balls or sausage/egg croissant  Lunch: chicken nuggets, sometimes chips, cheese whisps, tater tots  Dinner: steak sandwich, meatloaf, pasta rarely, eats out a lot - [employer] - nuggets fries, culvers - burger and fries - eats out at least once daily    Weight is stable currently.   Cycles have returned and are regular. Is not breastfeeding.     The following portions of the patient's history were reviewed and updated as appropriate: allergies, current medications, past family history, past medical history, past social history, past surgical history, and problem list.      Review of Systems   Constitutional: Negative.    Endocrine: Negative.         Physical Exam  Vitals reviewed.   Constitutional:       Appearance: Normal appearance. She is obese.      Comments: Body mass index is 37.97 kg/m².     Cardiovascular:      Rate and Rhythm: Normal rate.   Pulmonary:      Effort: Pulmonary effort is normal.   Neurological:      General: No focal deficit present.      Mental Status: She is alert. Mental status is at baseline.   Psychiatric:         Mood and Affect: Mood normal.         Behavior: Behavior normal.        /82   Pulse 84   Resp 16   Ht 165.1 cm (65\")   Wt 104 kg (228 lb 3.2 oz)   LMP 03/20/2024 (Exact Date)   SpO2 100%   Breastfeeding No   BMI 37.97 kg/m²      Labs and imaging    CMP:  Lab Results   Component Value Date    BUN " 7 11/20/2023    CREATININE 0.65 11/20/2023    EGFR 117.2 11/20/2023    BCR 10.8 11/20/2023     11/20/2023    K 4.0 11/20/2023    CO2 21.6 (L) 11/20/2023    CALCIUM 9.1 11/20/2023    ALBUMIN 3.5 11/20/2023    BILITOT <0.2 11/20/2023    ALKPHOS 105 11/20/2023    AST 14 11/20/2023    ALT 10 11/20/2023     Lipid Panel:  Lab Results   Component Value Date    CHOL 206 (H) 08/07/2020    TRIG 242 (H) 09/20/2022    HDL 66 (H) 09/20/2022    VLDL 40 09/20/2022    LDL 92 09/20/2022     HbA1c:  Lab Results   Component Value Date    HGBA1C 5.3 03/21/2024    HGBA1C 5.3 10/02/2023     Glucose:  Lab Results   Component Value Date    POCGLU 67 (A) 03/21/2024     TSH:  Lab Results   Component Value Date    TSH 1.140 08/28/2023       Assessment and plan  Diagnoses and all orders for this visit:    1. Gestational diabetes mellitus (GDM), postpartum (Primary)  Delivered 12/1/23, girl, 7 lb 7 oz, no postpartum complications.   Was on basal/bolus during pregnancy, > 100 units total daily.   Is hoping for more children soon.   For potential future pregnancies, start the diet and glucose monitoring advised for gestational diabetes at the first sign of pregnancy as her risk for GDM is quite high. Risk for DM 2 is also about 2 times normal.   Call the office for any fasting glucose levels >126 or anytime above 200.   -     POC Glucose, Blood  -     POC Glycosylated Hemoglobin (Hb A1C)    2. Class 2 obesity due to excess calories without serious comorbidity with body mass index (BMI) of 38.0 to 38.9 in adult  BMI 38. Struggling with weight management. Has central adiposity - runs in her family. S/p sleeve 2016. Not eating large volume but often the wrong types of food. Eating out daily.   Decrease/stop fast food. Try to eat less processed foods. Monitor intake, count calories for baseline assessment. Needs to be in a caloric deficit for weight loss. Exercise as able.        Return as needed. The patient was instructed to contact the  clinic with any interval questions or concerns.    Electronically signed by: Angelique Danielson DO   Endocrinologist    Please note that portions of this note were completed with a voice recognition program.

## 2024-04-09 ENCOUNTER — OFFICE VISIT (OUTPATIENT)
Dept: INTERNAL MEDICINE | Facility: CLINIC | Age: 39
End: 2024-04-09
Payer: COMMERCIAL

## 2024-04-09 VITALS
HEART RATE: 70 BPM | SYSTOLIC BLOOD PRESSURE: 120 MMHG | WEIGHT: 228 LBS | TEMPERATURE: 98 F | OXYGEN SATURATION: 99 % | BODY MASS INDEX: 37.99 KG/M2 | HEIGHT: 65 IN | DIASTOLIC BLOOD PRESSURE: 80 MMHG

## 2024-04-09 DIAGNOSIS — E66.9 OBESITY, UNSPECIFIED CLASSIFICATION, UNSPECIFIED OBESITY TYPE, UNSPECIFIED WHETHER SERIOUS COMORBIDITY PRESENT: ICD-10-CM

## 2024-04-09 DIAGNOSIS — Z13.220 SCREENING, LIPID: ICD-10-CM

## 2024-04-09 DIAGNOSIS — M25.50 ARTHRALGIA, UNSPECIFIED JOINT: ICD-10-CM

## 2024-04-09 DIAGNOSIS — R22.1 MASS OF RIGHT SIDE OF NECK: ICD-10-CM

## 2024-04-09 DIAGNOSIS — M79.10 MYALGIA: ICD-10-CM

## 2024-04-09 DIAGNOSIS — Z13.29 SCREENING FOR ENDOCRINE DISORDER: ICD-10-CM

## 2024-04-09 DIAGNOSIS — Z00.00 WELL ADULT EXAM: Primary | ICD-10-CM

## 2024-04-09 DIAGNOSIS — E79.0 ELEVATED URIC ACID IN BLOOD: ICD-10-CM

## 2024-04-09 DIAGNOSIS — Z13.0 SCREENING FOR BLOOD DISEASE: ICD-10-CM

## 2024-04-09 PROCEDURE — 99395 PREV VISIT EST AGE 18-39: CPT | Performed by: FAMILY MEDICINE

## 2024-04-09 RX ORDER — METHYLPREDNISOLONE 4 MG/1
TABLET ORAL
Qty: 21 EACH | Refills: 0 | Status: SHIPPED | OUTPATIENT
Start: 2024-04-09

## 2024-04-09 RX ORDER — SCOLOPAMINE TRANSDERMAL SYSTEM 1 MG/1
1 PATCH, EXTENDED RELEASE TRANSDERMAL
Qty: 10 EACH | Refills: 0 | Status: SHIPPED | OUTPATIENT
Start: 2024-04-09

## 2024-04-09 RX ORDER — CETIRIZINE HYDROCHLORIDE 10 MG/1
10 TABLET ORAL DAILY
COMMUNITY

## 2024-04-09 RX ORDER — ONDANSETRON 8 MG/1
8 TABLET, ORALLY DISINTEGRATING ORAL EVERY 8 HOURS PRN
Qty: 20 TABLET | Refills: 0 | Status: SHIPPED | OUTPATIENT
Start: 2024-04-09

## 2024-04-09 RX ORDER — OMEPRAZOLE 20 MG/1
20 CAPSULE, DELAYED RELEASE ORAL DAILY
COMMUNITY

## 2024-04-09 NOTE — PROGRESS NOTES
Kelly Astorga is a 38 y.o. female.    Chief Complaint   Patient presents with    Annual Exam       HPI     Kelly Astorga is a 38-year-old female who presents today for an annual physical exam.    Patient reports her last Pap smear was conducted in 05/2023. She is currently under the care of Dr. Ibarra. Her dietary habits have improved, and she consulted her endocrinologist approximately 2 weeks ago. Despite adhering to her diabetic diet, her weight remains stable. She has been engaging in home-based physical therapy. She attributes her heartburn to diet Mt. Dew drinks. She is current with her dental and eye exams, with the most recent one conducted last week. Her dental exam is due in 06/2024. She reports joint pain, particularly in her shoulders, back, arms, elbows, wrists, and fingers. She experienced severe joint pain last week.    Patient reports a bulge in her neck, which she describes as feeling tight. An ultrasound was conducted a few years ago, which did not reveal any abnormalities. The bulge is slightly tender upon palpation. She does not believe it has increased in size. She denies any unusual lymph node swelling under her arms. She was initially diagnosed with a cyst at [facility], but the cyst had resolved.    Patient confirms she was prescribed birth control, but due to her desire for one more child, she did not begin the medication. She expresses no desire to consult with rheumatology or commence any new medications at the present time. She is open to the idea of seeing orthopedics, but is hesitant to receive a steroid injection due to uncertainty about her pregnancy. She continues to take Prilosec, but is attempting to discontinue its use.     The following portions of the patient's history were reviewed and updated as appropriate: allergies, current medications, past family history, past medical history, past social history, past surgical history and problem list.     Past Medical  "History:   Diagnosis Date    Abnormal Pap smear of cervix 2013    Follow up clear     GERD (gastroesophageal reflux disease)     Headache     Hyperlipidemia     PONV (postoperative nausea and vomiting)     Pre-eclampsia in third trimester     pt states \"diagnosed after delivery with 1st baby\"    Vitamin D deficiency        Past Surgical History:   Procedure Laterality Date    GASTRIC RESTRICTION SURGERY  9/19/16    Gastric Sleeve    GASTRIC SLEEVE LAPAROSCOPIC  09/2016    LEEP  2013    OTOPLASTY  2005    WISDOM TOOTH EXTRACTION         Family History   Problem Relation Age of Onset    Hyperlipidemia Mother     Hypertension Mother     Hypertension Father     Hyperlipidemia Father     No Known Problems Brother     Hydrocephalus Maternal Grandmother     Hypertension Maternal Grandmother     Hyperlipidemia Maternal Grandmother     Other Maternal Grandmother         Normal Pressure Hydrocephalus    Heart disease Maternal Grandfather     Hyperlipidemia Maternal Grandfather     Hypertension Maternal Grandfather     Heart attack Maternal Grandfather     Diabetes Paternal Grandmother     Liver disease Neg Hx     Liver cancer Neg Hx     Colon cancer Neg Hx        Social History     Socioeconomic History    Marital status:    Tobacco Use    Smoking status: Never    Smokeless tobacco: Never   Vaping Use    Vaping status: Never Used   Substance and Sexual Activity    Alcohol use: No    Drug use: No    Sexual activity: Yes     Partners: Male     Birth control/protection: None       No Known Allergies      Current Outpatient Medications:     cetirizine (zyrTEC) 10 MG tablet, Take 1 tablet by mouth Daily., Disp: , Rfl:     omeprazole (priLOSEC) 20 MG capsule, Take 1 capsule by mouth Daily., Disp: , Rfl:     Prenatal Vit-Fe Fumarate-FA (PNV PRENATAL PLUS MULTIVITAMIN) 27-1 MG tablet, Take 1 tablet by mouth Daily. 200001, Disp: , Rfl: 3    Cyndie 0.35 MG tablet, Take 1 tablet by mouth Daily. (Patient not taking: Reported on " "4/9/2024), Disp: , Rfl:     ROS    Review of Systems   Constitutional:  Negative for chills, fatigue and fever.   HENT:  Negative for congestion and rhinorrhea.    Eyes:  Negative for blurred vision and visual disturbance.   Respiratory:  Negative for cough and shortness of breath.    Cardiovascular:  Negative for chest pain.   Gastrointestinal:  Positive for diarrhea and GERD. Negative for abdominal pain, constipation, nausea and vomiting.   Genitourinary:  Negative for dysuria and frequency.   Musculoskeletal:  Positive for arthralgias and neck pain. Negative for back pain.   Skin:  Positive for skin lesions.   Neurological:  Negative for weakness, numbness and headache.   Hematological:  Does not bruise/bleed easily.   Psychiatric/Behavioral:  Negative for depressed mood. The patient is not nervous/anxious.        Vitals:    04/09/24 0808   BP: 120/80   BP Location: Right arm   Patient Position: Sitting   Cuff Size: Adult   Pulse: 70   Temp: 98 °F (36.7 °C)   SpO2: 99%   Weight: 103 kg (228 lb)   Height: 165.1 cm (65\")   PainSc:   5     Body mass index is 37.94 kg/m².    Physical Exam     Physical Exam  Constitutional:       General: She is not in acute distress.     Appearance: Normal appearance. She is well-developed.   HENT:      Head: Normocephalic and atraumatic.      Right Ear: Tympanic membrane and external ear normal.      Left Ear: Tympanic membrane and external ear normal.      Mouth/Throat:      Pharynx: No posterior oropharyngeal erythema.   Eyes:      Extraocular Movements: Extraocular movements intact.      Conjunctiva/sclera: Conjunctivae normal.      Pupils: Pupils are equal, round, and reactive to light.   Cardiovascular:      Rate and Rhythm: Normal rate and regular rhythm.      Heart sounds: No murmur heard.  Pulmonary:      Effort: Pulmonary effort is normal. No respiratory distress.      Breath sounds: Normal breath sounds. No wheezing.   Abdominal:      General: Bowel sounds are normal. " There is no distension.      Palpations: Abdomen is soft.      Tenderness: There is no abdominal tenderness.   Musculoskeletal:      Cervical back: Neck supple.      Right lower leg: No edema.      Left lower leg: No edema.   Lymphadenopathy:      Cervical: No cervical adenopathy.   Skin:     General: Skin is warm and dry.      Findings: Lesion present.      Comments: A large mass-like lesion is noted in the right postauricular region of the neck.   Neurological:      Mental Status: She is alert and oriented to person, place, and time.      Cranial Nerves: No cranial nerve deficit.   Psychiatric:         Mood and Affect: Mood normal.         Behavior: Behavior normal.         Assessment/Plan    Diagnoses and all orders for this visit:    1. Well adult exam (Primary)  Assessment & Plan:  A comprehensive discussion was held with the patient regarding routine health maintenance, inclusive of vaccinations, dental/eye health, a balanced diet, regular physical activity, and Pap smears. Mental health was also addressed during this visit. Routine laboratory tests have been ordered.      2. Arthralgia, unspecified joint  -     CBC & Differential  -     Vitamin D,25-Hydroxy  -     LUZ ELENA With / DsDNA, RNP, Sjogrens A / B, Smith  -     C-reactive Protein  -     Sedimentation Rate    3. Myalgia  -     Magnesium  -     Folate  -     Vitamin B12    4. Screening for endocrine disorder  -     Comprehensive Metabolic Panel  -     TSH    5. Screening for blood disease  -     CBC & Differential    6. Screening, lipid  -     Lipid Panel    7. Obesity, unspecified classification, unspecified obesity type, unspecified whether serious comorbidity present  -     Vitamin D,25-Hydroxy  -     TSH    8. Elevated uric acid in blood  -     Uric Acid    9. Mass of right side of neck  -     CT soft tissue neck w wo contrast    Other orders  -     methylPREDNISolone (MEDROL) 4 MG dose pack; Take as directed on package instructions.  Dispense: 21 each;  Refill: 0  -     ondansetron ODT (ZOFRAN-ODT) 8 MG disintegrating tablet; Place 1 tablet on the tongue Every 8 (Eight) Hours As Needed for Nausea.  Dispense: 20 tablet; Refill: 0  -     Scopolamine 1 MG/3DAYS patch; Place 1 patch on the skin as directed by provider Every 72 (Seventy-Two) Hours.  Dispense: 10 each; Refill: 0        No orders of the defined types were placed in this encounter.      No orders of the defined types were placed in this encounter.      Return in about 1 year (around 4/9/2025) for Annual.      Alyx Sy DO    Transcribed from ambient dictation for Alyx Sy DO by Deacon Ramires.  04/09/24   10:01 EDT    Patient or patient representative verbalized consent to the visit recording.  I have personally performed the services described in this document as transcribed by the above individual, and it is both accurate and complete.  Alyx Sy DO  4/9/2024  17:48 EDT

## 2024-04-09 NOTE — ASSESSMENT & PLAN NOTE
A comprehensive discussion was held with the patient regarding routine health maintenance, inclusive of vaccinations, dental/eye health, a balanced diet, regular physical activity, and Pap smears. Mental health was also addressed during this visit. Routine laboratory tests have been ordered.

## 2024-04-12 ENCOUNTER — TELEPHONE (OUTPATIENT)
Dept: INTERNAL MEDICINE | Facility: CLINIC | Age: 39
End: 2024-04-12
Payer: COMMERCIAL

## 2024-04-12 LAB
25(OH)D3+25(OH)D2 SERPL-MCNC: 22.7 NG/ML (ref 30–100)
ALBUMIN SERPL-MCNC: 4.1 G/DL (ref 3.5–5.2)
ALBUMIN/GLOB SERPL: 2 G/DL
ALP SERPL-CCNC: 89 U/L (ref 39–117)
ALT SERPL-CCNC: 28 U/L (ref 1–33)
ANA SER QL: NEGATIVE
AST SERPL-CCNC: 19 U/L (ref 1–32)
BASOPHILS # BLD AUTO: 0.03 10*3/MM3 (ref 0–0.2)
BASOPHILS NFR BLD AUTO: 0.5 % (ref 0–1.5)
BILIRUB SERPL-MCNC: 0.3 MG/DL (ref 0–1.2)
BUN SERPL-MCNC: 9 MG/DL (ref 6–20)
BUN/CREAT SERPL: 12 (ref 7–25)
CALCIUM SERPL-MCNC: 9.1 MG/DL (ref 8.6–10.5)
CHLORIDE SERPL-SCNC: 104 MMOL/L (ref 98–107)
CHOLEST SERPL-MCNC: 211 MG/DL (ref 0–200)
CO2 SERPL-SCNC: 25 MMOL/L (ref 22–29)
CREAT SERPL-MCNC: 0.75 MG/DL (ref 0.57–1)
CRP SERPL-MCNC: 1.28 MG/DL (ref 0–0.5)
EGFRCR SERPLBLD CKD-EPI 2021: 105.3 ML/MIN/1.73
EOSINOPHIL # BLD AUTO: 0.19 10*3/MM3 (ref 0–0.4)
EOSINOPHIL NFR BLD AUTO: 3.2 % (ref 0.3–6.2)
ERYTHROCYTE [DISTWIDTH] IN BLOOD BY AUTOMATED COUNT: 12.9 % (ref 12.3–15.4)
ERYTHROCYTE [SEDIMENTATION RATE] IN BLOOD BY WESTERGREN METHOD: 3 MM/HR (ref 0–20)
FOLATE SERPL-MCNC: >20 NG/ML (ref 4.78–24.2)
GLOBULIN SER CALC-MCNC: 2.1 GM/DL
GLUCOSE SERPL-MCNC: 90 MG/DL (ref 65–99)
HCT VFR BLD AUTO: 36.9 % (ref 34–46.6)
HDLC SERPL-MCNC: 54 MG/DL (ref 40–60)
HGB BLD-MCNC: 12 G/DL (ref 12–15.9)
IMM GRANULOCYTES # BLD AUTO: 0.02 10*3/MM3 (ref 0–0.05)
IMM GRANULOCYTES NFR BLD AUTO: 0.3 % (ref 0–0.5)
LDLC SERPL CALC-MCNC: 116 MG/DL (ref 0–100)
LYMPHOCYTES # BLD AUTO: 1.84 10*3/MM3 (ref 0.7–3.1)
LYMPHOCYTES NFR BLD AUTO: 31.3 % (ref 19.6–45.3)
MAGNESIUM SERPL-MCNC: 1.9 MG/DL (ref 1.6–2.6)
MCH RBC QN AUTO: 28 PG (ref 26.6–33)
MCHC RBC AUTO-ENTMCNC: 32.5 G/DL (ref 31.5–35.7)
MCV RBC AUTO: 86.2 FL (ref 79–97)
MONOCYTES # BLD AUTO: 0.31 10*3/MM3 (ref 0.1–0.9)
MONOCYTES NFR BLD AUTO: 5.3 % (ref 5–12)
NEUTROPHILS # BLD AUTO: 3.48 10*3/MM3 (ref 1.7–7)
NEUTROPHILS NFR BLD AUTO: 59.4 % (ref 42.7–76)
NRBC BLD AUTO-RTO: 0 /100 WBC (ref 0–0.2)
PLATELET # BLD AUTO: 246 10*3/MM3 (ref 140–450)
POTASSIUM SERPL-SCNC: 3.9 MMOL/L (ref 3.5–5.2)
PROT SERPL-MCNC: 6.2 G/DL (ref 6–8.5)
RBC # BLD AUTO: 4.28 10*6/MM3 (ref 3.77–5.28)
SODIUM SERPL-SCNC: 141 MMOL/L (ref 136–145)
TRIGL SERPL-MCNC: 236 MG/DL (ref 0–150)
TSH SERPL DL<=0.005 MIU/L-ACNC: 1.6 UIU/ML (ref 0.27–4.2)
URATE SERPL-MCNC: 8.6 MG/DL (ref 2.4–5.7)
VIT B12 SERPL-MCNC: 683 PG/ML (ref 211–946)
VLDLC SERPL CALC-MCNC: 41 MG/DL (ref 5–40)
WBC # BLD AUTO: 5.87 10*3/MM3 (ref 3.4–10.8)

## 2024-04-12 NOTE — TELEPHONE ENCOUNTER
"Relay     \"Results have been reviewed. Please notify patient of abnormal results.  Uric acid elevated again.  If haven't tried tart black cherry juice or a tablet form, I would recommend trying that.  CRP is mildly elevated, less than it has been.  Cholesterol is okay.  Triglycerides are elevated, which can be managed with lifestyle changes.  Vitamin D is low. I would recommend a vitamin D supplement at 2,000IU daily. No other remarkable abnormalities. \"        "

## 2024-04-12 NOTE — TELEPHONE ENCOUNTER
----- Message from Alyx Sy DO sent at 4/12/2024  2:49 PM EDT -----  Results have been reviewed. Please notify patient of abnormal results.  Uric acid elevated again.  If haven't tried tart black cherry juice or a tablet form, I would recommend trying that.  CRP is mildly elevated, less than it has been.  Cholesterol is okay.  Triglycerides are elevated, which can be managed with lifestyle changes.  Vitamin D is low. I would recommend a vitamin D supplement at 2,000IU daily. No other remarkable abnormalities.

## 2024-04-23 ENCOUNTER — HOSPITAL ENCOUNTER (OUTPATIENT)
Dept: CT IMAGING | Facility: HOSPITAL | Age: 39
Discharge: HOME | End: 2024-04-23
Admitting: FAMILY MEDICINE
Payer: COMMERCIAL

## 2024-04-23 PROCEDURE — 70492 CT SFT TSUE NCK W/O & W/DYE: CPT

## 2024-04-23 PROCEDURE — 25510000001 IOPAMIDOL 61 % SOLUTION: Performed by: FAMILY MEDICINE

## 2024-04-23 RX ADMIN — IOPAMIDOL 95 ML: 612 INJECTION, SOLUTION INTRAVENOUS at 08:54

## 2024-05-02 RX ORDER — ERYTHROMYCIN 5 MG/G
OINTMENT OPHTHALMIC NIGHTLY
Qty: 3.5 G | Refills: 0 | Status: SHIPPED | OUTPATIENT
Start: 2024-05-02 | End: 2024-05-09

## 2024-05-02 RX ORDER — PREDNISONE 10 MG/1
TABLET ORAL
Qty: 30 TABLET | Refills: 0 | Status: SHIPPED | OUTPATIENT
Start: 2024-05-02

## 2024-06-19 ENCOUNTER — TRANSCRIBE ORDERS (OUTPATIENT)
Facility: HOSPITAL | Age: 39
End: 2024-06-19
Payer: COMMERCIAL

## 2024-06-19 ENCOUNTER — LAB (OUTPATIENT)
Facility: HOSPITAL | Age: 39
End: 2024-06-19
Payer: COMMERCIAL

## 2024-06-19 DIAGNOSIS — Z32.01 PREGNANCY EXAMINATION OR TEST, POSITIVE RESULT: Primary | ICD-10-CM

## 2024-06-19 DIAGNOSIS — Z32.01 PREGNANCY EXAMINATION OR TEST, POSITIVE RESULT: ICD-10-CM

## 2024-06-19 LAB
ABO GROUP BLD: NORMAL
AMPHET+METHAMPHET UR QL: NEGATIVE
AMPHETAMINES UR QL: NEGATIVE
BARBITURATES UR QL SCN: NEGATIVE
BENZODIAZ UR QL SCN: NEGATIVE
BLD GP AB SCN SERPL QL: NEGATIVE
BUPRENORPHINE SERPL-MCNC: NEGATIVE NG/ML
CANNABINOIDS SERPL QL: NEGATIVE
COCAINE UR QL: NEGATIVE
FENTANYL UR-MCNC: NEGATIVE NG/ML
METHADONE UR QL SCN: NEGATIVE
OPIATES UR QL: NEGATIVE
OXYCODONE UR QL SCN: NEGATIVE
PCP UR QL SCN: NEGATIVE
RH BLD: POSITIVE
TRICYCLICS UR QL SCN: NEGATIVE

## 2024-06-19 PROCEDURE — 36415 COLL VENOUS BLD VENIPUNCTURE: CPT

## 2024-06-19 PROCEDURE — G0432 EIA HIV-1/HIV-2 SCREEN: HCPCS

## 2024-06-19 PROCEDURE — 86780 TREPONEMA PALLIDUM: CPT

## 2024-06-19 PROCEDURE — 85027 COMPLETE CBC AUTOMATED: CPT

## 2024-06-19 PROCEDURE — 83036 HEMOGLOBIN GLYCOSYLATED A1C: CPT

## 2024-06-19 PROCEDURE — 87340 HEPATITIS B SURFACE AG IA: CPT

## 2024-06-19 PROCEDURE — 80053 COMPREHEN METABOLIC PANEL: CPT

## 2024-06-19 PROCEDURE — 86850 RBC ANTIBODY SCREEN: CPT

## 2024-06-19 PROCEDURE — 84156 ASSAY OF PROTEIN URINE: CPT

## 2024-06-19 PROCEDURE — 86762 RUBELLA ANTIBODY: CPT

## 2024-06-19 PROCEDURE — 86900 BLOOD TYPING SEROLOGIC ABO: CPT

## 2024-06-19 PROCEDURE — 87086 URINE CULTURE/COLONY COUNT: CPT

## 2024-06-19 PROCEDURE — 84550 ASSAY OF BLOOD/URIC ACID: CPT

## 2024-06-19 PROCEDURE — 83615 LACTATE (LD) (LDH) ENZYME: CPT

## 2024-06-19 PROCEDURE — 82570 ASSAY OF URINE CREATININE: CPT

## 2024-06-19 PROCEDURE — 80307 DRUG TEST PRSMV CHEM ANLYZR: CPT

## 2024-06-19 PROCEDURE — 86901 BLOOD TYPING SEROLOGIC RH(D): CPT

## 2024-06-19 PROCEDURE — 86803 HEPATITIS C AB TEST: CPT

## 2024-06-20 LAB
ALBUMIN SERPL-MCNC: 4.1 G/DL (ref 3.5–5.2)
ALBUMIN/GLOB SERPL: 1.6 G/DL
ALP SERPL-CCNC: 84 U/L (ref 39–117)
ALT SERPL W P-5'-P-CCNC: 15 U/L (ref 1–33)
ANION GAP SERPL CALCULATED.3IONS-SCNC: 11 MMOL/L (ref 5–15)
AST SERPL-CCNC: 12 U/L (ref 1–32)
BILIRUB SERPL-MCNC: 0.2 MG/DL (ref 0–1.2)
BUN SERPL-MCNC: 7 MG/DL (ref 6–20)
BUN/CREAT SERPL: 11.9 (ref 7–25)
CALCIUM SPEC-SCNC: 9.1 MG/DL (ref 8.6–10.5)
CHLORIDE SERPL-SCNC: 104 MMOL/L (ref 98–107)
CO2 SERPL-SCNC: 23 MMOL/L (ref 22–29)
CREAT SERPL-MCNC: 0.59 MG/DL (ref 0.57–1)
CREAT UR-MCNC: 103.2 MG/DL
DEPRECATED RDW RBC AUTO: 39 FL (ref 37–54)
EGFRCR SERPLBLD CKD-EPI 2021: 119.2 ML/MIN/1.73
ERYTHROCYTE [DISTWIDTH] IN BLOOD BY AUTOMATED COUNT: 12.4 % (ref 12.3–15.4)
GLOBULIN UR ELPH-MCNC: 2.5 GM/DL
GLUCOSE SERPL-MCNC: 75 MG/DL (ref 65–99)
HBA1C MFR BLD: 5.6 % (ref 4.8–5.6)
HBV SURFACE AG SERPL QL IA: NORMAL
HCT VFR BLD AUTO: 38.3 % (ref 34–46.6)
HCV AB SER QL: NORMAL
HGB BLD-MCNC: 12.6 G/DL (ref 12–15.9)
HIV 1+2 AB+HIV1 P24 AG SERPL QL IA: NORMAL
LDH SERPL-CCNC: 171 U/L (ref 135–214)
MCH RBC QN AUTO: 28.4 PG (ref 26.6–33)
MCHC RBC AUTO-ENTMCNC: 32.9 G/DL (ref 31.5–35.7)
MCV RBC AUTO: 86.5 FL (ref 79–97)
PLATELET # BLD AUTO: 303 10*3/MM3 (ref 140–450)
PMV BLD AUTO: 9.6 FL (ref 6–12)
POTASSIUM SERPL-SCNC: 3.9 MMOL/L (ref 3.5–5.2)
PROT ?TM UR-MCNC: 6.8 MG/DL
PROT SERPL-MCNC: 6.6 G/DL (ref 6–8.5)
PROT/CREAT UR: 65.9 MG/G CREA (ref 0–200)
RBC # BLD AUTO: 4.43 10*6/MM3 (ref 3.77–5.28)
SODIUM SERPL-SCNC: 138 MMOL/L (ref 136–145)
URATE SERPL-MCNC: 6.6 MG/DL (ref 2.4–5.7)
WBC NRBC COR # BLD AUTO: 7.46 10*3/MM3 (ref 3.4–10.8)

## 2024-06-21 LAB
BACTERIA SPEC AEROBE CULT: NO GROWTH
RUBV IGG SERPL IA-ACNC: 4.05 INDEX

## 2024-06-24 LAB — TREPONEMA PALLIDUM IGG+IGM AB [PRESENCE] IN SERUM OR PLASMA BY IMMUNOASSAY: NON REACTIVE

## 2024-07-15 ENCOUNTER — LAB REQUISITION (OUTPATIENT)
Dept: LAB | Facility: HOSPITAL | Age: 39
End: 2024-07-15
Payer: COMMERCIAL

## 2024-07-15 DIAGNOSIS — O02.1 MISSED ABORTION: ICD-10-CM

## 2024-07-15 PROCEDURE — 88305 TISSUE EXAM BY PATHOLOGIST: CPT | Performed by: OBSTETRICS & GYNECOLOGY

## 2024-07-16 LAB
CYTO UR: NORMAL
LAB AP CASE REPORT: NORMAL
LAB AP CLINICAL INFORMATION: NORMAL
PATH REPORT.FINAL DX SPEC: NORMAL
PATH REPORT.GROSS SPEC: NORMAL

## 2024-07-17 ENCOUNTER — TELEPHONE (OUTPATIENT)
Dept: LABOR AND DELIVERY | Facility: HOSPITAL | Age: 39
End: 2024-07-17
Payer: COMMERCIAL

## 2024-07-17 NOTE — TELEPHONE ENCOUNTER
Spoke with Kelly after calling [facility]. We discussed various options for arrangements such as self-transport to  home or family arranged burial or cremation and process for all. Kelly said she will discuss with Jitendra and they will notify hospital. I emphasized that they do not have to make a decision right away or feel rushed. I mentioned support group and Kelly states she did receive a schedule at [facility]. Encouraged her to contact [facility] office if she would like to attend in the future. Discussed [facility] services and she states she will contact [facility] program if she would like more information.  Denies other questions at this time and will contact [facility] office or [facility] when decisions are made regarding arrangements.  Encouraged to contact the  Bereavement office if those arise in the future or if they would like to talk in the future..

## 2024-07-17 NOTE — TELEPHONE ENCOUNTER
Message left on number identified voicemail with [facility] office number, hours and request to return call for assistance with arrangements.

## 2024-07-29 ENCOUNTER — OFFICE VISIT (OUTPATIENT)
Dept: ORTHOPEDIC SURGERY | Facility: CLINIC | Age: 39
End: 2024-07-29
Payer: COMMERCIAL

## 2024-07-29 ENCOUNTER — OFFICE VISIT (OUTPATIENT)
Dept: INTERNAL MEDICINE | Facility: CLINIC | Age: 39
End: 2024-07-29
Payer: COMMERCIAL

## 2024-07-29 VITALS
HEART RATE: 79 BPM | BODY MASS INDEX: 37.65 KG/M2 | HEIGHT: 65 IN | TEMPERATURE: 97.5 F | WEIGHT: 226 LBS | OXYGEN SATURATION: 97 % | SYSTOLIC BLOOD PRESSURE: 130 MMHG | DIASTOLIC BLOOD PRESSURE: 84 MMHG

## 2024-07-29 VITALS — TEMPERATURE: 98.3 F | BODY MASS INDEX: 37.82 KG/M2 | HEIGHT: 65 IN | WEIGHT: 227 LBS

## 2024-07-29 DIAGNOSIS — M70.61 GREATER TROCHANTERIC BURSITIS OF RIGHT HIP: Primary | ICD-10-CM

## 2024-07-29 DIAGNOSIS — N64.4 BREAST PAIN, RIGHT: Primary | ICD-10-CM

## 2024-07-29 DIAGNOSIS — M76.31 ILIOTIBIAL BAND SYNDROME OF RIGHT SIDE: ICD-10-CM

## 2024-07-29 PROCEDURE — 20610 DRAIN/INJ JOINT/BURSA W/O US: CPT | Performed by: PHYSICIAN ASSISTANT

## 2024-07-29 PROCEDURE — 99213 OFFICE O/P EST LOW 20 MIN: CPT | Performed by: STUDENT IN AN ORGANIZED HEALTH CARE EDUCATION/TRAINING PROGRAM

## 2024-07-29 RX ORDER — PREDNISONE 20 MG/1
20 TABLET ORAL 2 TIMES DAILY
Qty: 10 TABLET | Refills: 0 | Status: SHIPPED | OUTPATIENT
Start: 2024-07-29

## 2024-07-29 RX ORDER — METHYLPREDNISOLONE ACETATE 40 MG/ML
40 INJECTION, SUSPENSION INTRA-ARTICULAR; INTRALESIONAL; INTRAMUSCULAR; SOFT TISSUE
Status: COMPLETED | OUTPATIENT
Start: 2024-07-29 | End: 2024-07-29

## 2024-07-29 RX ORDER — LIDOCAINE HYDROCHLORIDE 20 MG/ML
2 INJECTION, SOLUTION INFILTRATION; PERINEURAL
Status: COMPLETED | OUTPATIENT
Start: 2024-07-29 | End: 2024-07-29

## 2024-07-29 RX ADMIN — LIDOCAINE HYDROCHLORIDE 2 ML: 20 INJECTION, SOLUTION INFILTRATION; PERINEURAL at 14:21

## 2024-07-29 RX ADMIN — METHYLPREDNISOLONE ACETATE 40 MG: 40 INJECTION, SUSPENSION INTRA-ARTICULAR; INTRALESIONAL; INTRAMUSCULAR; SOFT TISSUE at 14:21

## 2024-07-29 NOTE — PROGRESS NOTES
Subjective   Kelly Astorga is a 38 y.o. female.     History of Present Illness  Patient presents with complaints of right breast pain that has been ongoing for 6 weeks.  States that it feels like a soreness in the breast.  When this first started she was pregnant.  She has had a spontaneous  with D&C.  D&C was performed on 715.  States that the pain has not changed since then.  States that it comes and goes.  There are days when it lasts all day long.  There are days when she only has it once or twice.  It is in the inner upper quadrant of the right breast.  States that she has never had pain like this before.  Aunt with breast cancer.  Denies fever or chills.  Denies redness or swelling.      The following portions of the patient's history were reviewed and updated as appropriate: allergies, current medications, past family history, past medical history, past social history, past surgical history, and problem list.    Review of Systems   All other systems reviewed and are negative.      Objective   Physical Exam  Vitals and nursing note reviewed. Exam conducted with a chaperone present.   Constitutional:       Appearance: Normal appearance.   HENT:      Head: Normocephalic and atraumatic.      Right Ear: External ear normal.      Left Ear: External ear normal.      Nose: Nose normal.      Mouth/Throat:      Mouth: Mucous membranes are moist.      Pharynx: Oropharynx is clear. No oropharyngeal exudate or posterior oropharyngeal erythema.   Eyes:      Extraocular Movements: Extraocular movements intact.      Conjunctiva/sclera: Conjunctivae normal.      Pupils: Pupils are equal, round, and reactive to light.   Cardiovascular:      Rate and Rhythm: Normal rate and regular rhythm.      Pulses: Normal pulses.      Heart sounds: Normal heart sounds.   Pulmonary:      Effort: Pulmonary effort is normal.      Breath sounds: Normal breath sounds.   Chest:      Chest wall: No mass, lacerations,  deformity, swelling, tenderness, crepitus or edema. There is no dullness to percussion.   Breasts:     Breasts are symmetrical.      Right: Normal. No swelling, bleeding, inverted nipple, mass, nipple discharge, skin change or tenderness.      Left: Normal.   Abdominal:      General: Abdomen is flat. Bowel sounds are normal.      Palpations: Abdomen is soft.   Musculoskeletal:         General: Normal range of motion.      Cervical back: Normal range of motion.   Skin:     General: Skin is warm.      Capillary Refill: Capillary refill takes less than 2 seconds.   Neurological:      General: No focal deficit present.      Mental Status: She is alert and oriented to person, place, and time. Mental status is at baseline.   Psychiatric:         Mood and Affect: Mood normal.         Behavior: Behavior normal.         Thought Content: Thought content normal.         Judgment: Judgment normal.         Assessment & Plan   Diagnoses and all orders for this visit:    1. Breast pain, right (Primary)  -     US breast right complete; Future    Pain likely due to hormonal changes with pregnancy and D&C.  Will get ultrasound of right breast to rule out any other causes

## 2024-08-12 ENCOUNTER — PATIENT MESSAGE (OUTPATIENT)
Dept: INTERNAL MEDICINE | Facility: CLINIC | Age: 39
End: 2024-08-12
Payer: COMMERCIAL

## 2024-08-12 DIAGNOSIS — N64.4 BREAST PAIN, RIGHT: Primary | ICD-10-CM

## 2024-08-13 LAB
NCCN CRITERIA FLAG: NORMAL
TYRER CUZICK SCORE: 18.6

## 2024-08-20 ENCOUNTER — HOSPITAL ENCOUNTER (OUTPATIENT)
Facility: HOSPITAL | Age: 39
Discharge: HOME | End: 2024-08-20
Payer: COMMERCIAL

## 2024-08-20 DIAGNOSIS — N64.4 BREAST PAIN, RIGHT: ICD-10-CM

## 2024-08-20 PROCEDURE — G0279 TOMOSYNTHESIS, MAMMO: HCPCS

## 2024-08-20 PROCEDURE — 76642 ULTRASOUND BREAST LIMITED: CPT | Performed by: RADIOLOGY

## 2024-08-20 PROCEDURE — 77066 DX MAMMO INCL CAD BI: CPT | Performed by: RADIOLOGY

## 2024-08-20 PROCEDURE — 76642 ULTRASOUND BREAST LIMITED: CPT

## 2024-08-20 PROCEDURE — 77066 DX MAMMO INCL CAD BI: CPT

## 2024-08-20 PROCEDURE — 77062 BREAST TOMOSYNTHESIS BI: CPT | Performed by: RADIOLOGY

## 2024-09-03 ENCOUNTER — E-VISIT (OUTPATIENT)
Dept: FAMILY MEDICINE CLINIC | Facility: TELEHEALTH | Age: 39
End: 2024-09-03
Payer: COMMERCIAL

## 2024-09-03 NOTE — E-VISIT ESCALATED
Date: 2024 15:57:12  Clinician: Kayla Flores  Clinician NPI: [id number]  Patient: Kelly Astorga  Patient : 1985  Patient Address[facility] [address] [place] [address]  Patient Phone: [phone]  Visit Protocol: URI  Patient Summary:  Kelly is a 38 year old ( : 1985 ) female who initiated a visit for cold, sinus infection, or influenza.     Kelly states the symptoms started gradually 10-13 days ago. After the symptoms started, they improved and then   got worse again.   Symptom start date: 2024   The symptoms consist of myalgia, rhinitis, a headache, malaise, nasal congestion, a sore throat, facial pain or pressure, and a cough.   Symptom details     Nasal secretions: The color of the mucus is yellow.    Cough: Kelly coughs a few times an hour and the cough is more bothersome at night. Phlegm comes into the throat when coughing. Kelly believes the cough is caused by post-nasal drip. The color of the phlegm is green.     Sore throat: Kelly reports having mild throat pain (1-3 on a 10 point pain scale), does not have exudate on the tonsils, and can swallow liquids without any difficulty. The lymph nodes in the neck are not enlarged. A rash has not appeared on the skin   since the sore throat started.     Facial pain or pressure: The facial pain or pressure feels worse when bending over or leaning forward.     Headache: The headache is mild (1-3 on a 10 point pain scale).      Kelly denies having anosmia, enlarged lymph nodes, ear pain, nausea, teeth pain, chills, fever, ageusia, wheezing, vomiting, and diarrhea. Kelly also denies taking antibiotic medication in the past month and having recent facial or sinus surgery   in the past 60 days. Kelly is not experiencing dyspnea.   Precipitating events  Within the past week, Kelly has not been exposed to someone with strep throat. Kelly has not recently been exposed to someone with influenza.  Kelly has been in   close contact with the following high risk individuals: children under the age of 5, adults 65 or older, and people with asthma, heart disease or diabetes.    Kelly has not received the influenza vaccination.   Pertinent COVID-19 (Coronavirus)   information  Since the symptoms started, Kelly has tested for COVID-19. The result of the test was negative.   Kelly has not had COVID-19 in the last 3 months.   Kelly has not received a COVID-19 vaccine in the past year.   Pertinent medical   history     Kelly had 1 sinus infection within the past year.   Kelly typically gets a yeast infection when an antibiotic is taken. Kelly has successfully used Diflucan to treat previous yeast infections. 2 doses of fluconazole (Diflucan) has   typically been needed for symptoms to resolve in the past.  A provider has not told Kelly to avoid NSAIDs.   Kelly does not have diabetes. Kelly denies having immunosuppressive conditions (e.g., chemotherapy, HIV, organ transplant, long-term use   of steroids or other immunosuppressive medications, splenectomy). Kelly does not have asthma.   Kelly denies having chronic lung disease, cystic fibrosis, hypertension, long-term disabilities, mental health conditions, sickle cell disease or   thalassemia, stroke or other cardiovascular disease, substance use disorders, or tuberculosis (TB).  Kelly does not smoke or use smokeless tobacco. Kelly does not vape or use other e-cigarette products.   Kelly denies pregnancy and denies   breastfeeding.   Additional information as reported by the patient (free text): Steroid taper pack always helps as well   Weight: 224 lbs (101.6 kg)    MEDICATIONS: cetirizine oral, omeprazole oral, ondansetron oral, labetalol intravenous, methylprednisolone sod succ (PF) injection, methylprednisolone acetate injection, diphenhydramine injection, cefdinir oral, prednisone oral, diltiazem intravenous,    metoclopramide injection, potassium chloride oral, erythromycin ophthalmic (eye), labetalol intravenous, lidocaine (PF) injection, methylergonovine injection, ipratropium-albuterol inhalation, ketorolac injection, lidocaine HCl mucous membrane,   ALLERGIES: NKDA  Clinician Response:  Dear Kelly,  I am sorry you are not feeling well. To determine the most appropriate care for you, I would like you to be seen in person to further discuss your health history and symptoms.  You will not be charged for this visit.   Thank you for trusting us with your care.  For the latest updates on COVID-19 (Coronavirus), please visit the Centers for Disease Control and Prevention (CDC). Also, your state and local health department websites may provide additional guidance   regarding testing and isolation recommendations for your location.   Diagnosis: Refer for additional evaluation  Diagnosis ICD: R69

## 2024-09-25 ENCOUNTER — LAB (OUTPATIENT)
Facility: HOSPITAL | Age: 39
End: 2024-09-25
Payer: COMMERCIAL

## 2024-09-25 ENCOUNTER — TRANSCRIBE ORDERS (OUTPATIENT)
Facility: HOSPITAL | Age: 39
End: 2024-09-25
Payer: COMMERCIAL

## 2024-09-25 DIAGNOSIS — Z3A.14 14 WEEKS GESTATION OF PREGNANCY: ICD-10-CM

## 2024-09-25 DIAGNOSIS — O02.1 MISSED ABORTION: Primary | ICD-10-CM

## 2024-09-25 DIAGNOSIS — O02.1 MISSED ABORTION: ICD-10-CM

## 2024-09-25 PROCEDURE — 36415 COLL VENOUS BLD VENIPUNCTURE: CPT

## 2024-09-25 PROCEDURE — 84144 ASSAY OF PROGESTERONE: CPT

## 2024-09-25 PROCEDURE — 84702 CHORIONIC GONADOTROPIN TEST: CPT

## 2024-09-26 LAB
HCG INTACT+B SERPL-ACNC: 43.9 MIU/ML
PROGEST SERPL-MCNC: 0.32 NG/ML

## 2024-09-27 ENCOUNTER — LAB (OUTPATIENT)
Dept: LAB | Facility: HOSPITAL | Age: 39
End: 2024-09-27
Payer: COMMERCIAL

## 2024-09-27 ENCOUNTER — TRANSCRIBE ORDERS (OUTPATIENT)
Dept: LAB | Facility: HOSPITAL | Age: 39
End: 2024-09-27
Payer: COMMERCIAL

## 2024-09-27 DIAGNOSIS — O02.1 MISSED ABORTION: ICD-10-CM

## 2024-09-27 DIAGNOSIS — Z3A.14 14 WEEKS GESTATION OF PREGNANCY: ICD-10-CM

## 2024-09-27 DIAGNOSIS — O02.1 MISSED ABORTION: Primary | ICD-10-CM

## 2024-09-27 LAB — HCG INTACT+B SERPL-ACNC: 99 MIU/ML

## 2024-09-27 PROCEDURE — 84702 CHORIONIC GONADOTROPIN TEST: CPT

## 2024-09-27 PROCEDURE — 36415 COLL VENOUS BLD VENIPUNCTURE: CPT

## 2024-09-30 ENCOUNTER — TRANSCRIBE ORDERS (OUTPATIENT)
Facility: HOSPITAL | Age: 39
End: 2024-09-30
Payer: COMMERCIAL

## 2024-09-30 ENCOUNTER — LAB (OUTPATIENT)
Facility: HOSPITAL | Age: 39
End: 2024-09-30
Payer: COMMERCIAL

## 2024-09-30 DIAGNOSIS — Z3A.14 14 WEEKS GESTATION OF PREGNANCY: ICD-10-CM

## 2024-09-30 DIAGNOSIS — O02.1 MISSED ABORTION: ICD-10-CM

## 2024-09-30 DIAGNOSIS — O02.1 MISSED ABORTION: Primary | ICD-10-CM

## 2024-09-30 LAB — PROGEST SERPL-MCNC: 1.12 NG/ML

## 2024-09-30 PROCEDURE — 84144 ASSAY OF PROGESTERONE: CPT

## 2024-09-30 PROCEDURE — 84702 CHORIONIC GONADOTROPIN TEST: CPT

## 2024-09-30 PROCEDURE — 36415 COLL VENOUS BLD VENIPUNCTURE: CPT

## 2024-10-01 LAB — HCG INTACT+B SERPL-ACNC: 214 MIU/ML

## 2024-10-02 ENCOUNTER — TRANSCRIBE ORDERS (OUTPATIENT)
Facility: HOSPITAL | Age: 39
End: 2024-10-02
Payer: COMMERCIAL

## 2024-10-02 ENCOUNTER — LAB (OUTPATIENT)
Facility: HOSPITAL | Age: 39
End: 2024-10-02
Payer: COMMERCIAL

## 2024-10-02 DIAGNOSIS — N92.6 IRREGULAR MENSTRUAL CYCLE: ICD-10-CM

## 2024-10-02 DIAGNOSIS — N92.6 IRREGULAR MENSTRUAL CYCLE: Primary | ICD-10-CM

## 2024-10-02 PROCEDURE — 36415 COLL VENOUS BLD VENIPUNCTURE: CPT

## 2024-10-02 PROCEDURE — 84702 CHORIONIC GONADOTROPIN TEST: CPT

## 2024-10-03 LAB — HCG INTACT+B SERPL-ACNC: 325 MIU/ML

## 2024-10-04 ENCOUNTER — LAB (OUTPATIENT)
Facility: HOSPITAL | Age: 39
End: 2024-10-04
Payer: COMMERCIAL

## 2024-10-04 ENCOUNTER — TRANSCRIBE ORDERS (OUTPATIENT)
Facility: HOSPITAL | Age: 39
End: 2024-10-04
Payer: COMMERCIAL

## 2024-10-04 DIAGNOSIS — N92.6 IRREGULAR MENSTRUAL CYCLE: Primary | ICD-10-CM

## 2024-10-04 DIAGNOSIS — N92.6 IRREGULAR MENSTRUAL CYCLE: ICD-10-CM

## 2024-10-04 LAB — HCG INTACT+B SERPL-ACNC: 569 MIU/ML

## 2024-10-04 PROCEDURE — 84702 CHORIONIC GONADOTROPIN TEST: CPT

## 2024-10-04 PROCEDURE — 36415 COLL VENOUS BLD VENIPUNCTURE: CPT

## 2024-11-04 PROCEDURE — 88305 TISSUE EXAM BY PATHOLOGIST: CPT | Performed by: OBSTETRICS & GYNECOLOGY

## 2024-11-05 ENCOUNTER — LAB REQUISITION (OUTPATIENT)
Dept: LAB | Facility: HOSPITAL | Age: 39
End: 2024-11-05
Payer: COMMERCIAL

## 2024-11-05 DIAGNOSIS — O02.1 MISSED ABORTION: ICD-10-CM

## 2024-11-13 ENCOUNTER — E-VISIT (OUTPATIENT)
Dept: FAMILY MEDICINE CLINIC | Facility: TELEHEALTH | Age: 39
End: 2024-11-13
Payer: COMMERCIAL

## 2024-11-13 PROCEDURE — FABRICHEALTHVISIT: Performed by: NURSE PRACTITIONER

## 2024-11-13 NOTE — E-VISIT TREATED
Date: 2024 16:10:13  Clinician: Kayla Flores  Clinician NPI: [id number]  Patient: Kelly Astorga  Patient : 1985  Patient Address[facility] [address] [place] [address]  Patient Phone: [phone]  Visit Protocol: URI  Patient Summary:  Kelly is a 38 year old ( : 1985 ) female who initiated a visit for cold, sinus infection, or influenza.     Kelly states the symptoms started 1-2 days ago.   Symptom start date: 24   The symptoms consist of a   headache, myalgia, nasal congestion, a cough, a sore throat, rhinitis, and diarrhea.   Symptom details     Nasal secretions: The color of the mucus is yellow and clear.    Cough: Kelly coughs a few times an hour and the cough is not more bothersome at night. Phlegm does not come into the throat when coughing. Kelly believes the cough is caused by post-nasal drip.     Sore throat: Kelly reports having mild throat pain (1-3 on a 10 point pain scale), does not have exudate on the tonsils, and can swallow liquids without any difficulty. Kelly is not sure if the lymph nodes in the neck are enlarged. A rash has not   appeared on the skin since the sore throat started.     Headache: The headache is mild (1-3 on a 10 point pain scale).      Kelly denies having nausea, teeth pain, fever, ageusia, facial pain or pressure, wheezing, malaise, anosmia, chills, ear pain, and vomiting. Kelly also denies having recent facial or sinus surgery in the past 60 days and taking antibiotic   medication in the past month. Kelly is not experiencing dyspnea.   Precipitating events  Kelly is not sure if they have been exposed to someone with strep throat. Kelly has not recently been exposed to someone with influenza. Kelly has been   in close contact with the following high risk individuals: people with asthma, heart disease or diabetes and children under the age of 5.    Kelly has received the influenza vaccine more than  2 weeks ago.   Pertinent COVID-19 (Coronavirus) information    Since the symptoms started, Kelly has tested for COVID-19. The result of the test was negative.   Kelly has not had COVID-19 in the last 3 months.   Kelly has not received a COVID-19 vaccine in the past year.   Pertinent medical history       Kelly had 1 sinus infection within the past year.   Kelly typically gets a yeast infection when an antibiotic is taken. Kelly has successfully used Diflucan to treat previous yeast infections. 2 doses of fluconazole (Diflucan) has typically been   needed for symptoms to resolve in the past.  A provider has not told Kelly to avoid NSAIDs.   Kelly does not have diabetes. Kelly denies having immunosuppressive conditions (e.g., chemotherapy, HIV, organ transplant, long-term use of steroids   or other immunosuppressive medications, splenectomy). Kelly does not have asthma.   Kelly denies having chronic lung disease, cystic fibrosis, hypertension, long-term disabilities, mental health conditions, sickle cell disease or thalassemia,   stroke or other cardiovascular disease, substance use disorders, or tuberculosis (TB).  Kelly does not smoke or use smokeless tobacco. Kelly does not vape or use other e-cigarette products.   Kelly denies pregnancy and denies breastfeeding.     Additional information as reported by the patient (free text): Daughter has strep. Can I also get a steroid taper pack? I'm starting to have arthritis flare since having miscarriage.   Weight: 222 lbs (100.7 kg)    MEDICATIONS: lidocaine HCl mucous membrane, ipratropium-albuterol inhalation, labetalol intravenous, erythromycin ophthalmic (eye), potassium chloride oral, metoclopramide injection, diltiazem intravenous, methylprednisolone sod succ (PF) injection,   omeprazole oral, cetirizine oral, ondansetron oral, labetalol intravenous, methylprednisolone acetate injection, diphenhydramine injection, cefdinir  oral, prednisone oral, lidocaine (PF) injection, methylergonovine injection, ketorolac injection,   ALLERGIES: NKDA  Clinician Response:  Dear Kelly,  Based on the information provided, you have viral pharyngitis. This is a sore throat caused by a virus and is usually the first sign of a cold. Your sore throat should resolve in a couple days as other cold symptoms   develop.  Unfortunately, there are no medications that can cure a cold, so treatment is focused on controlling symptoms as much as possible until you recover. Most people gradually feel better in 1-2 weeks.  Medication information  Because you have a   viral infection, antibiotics will not help you get better. Treating a viral infection with antibiotics could actually make you feel worse.  Self care  Steps you can take to be as comfortable as possible:     Rest.    Drink plenty of fluids.    Take a warm shower to loosen congestion.    Use a cool-mist humidifier.    Use throat lozenges.    Suck on frozen items such as popsicles.    Drink hot tea with lemon and honey.    Gargle with warm salt water (1/4 teaspoon of salt per 8 ounce glass of water).    Take a spoonful of honey to reduce your cough.     When to seek care  Please be seen in a clinic or urgent care if any of the following occur:   New symptoms develop, or symptoms become worse   Call 911 or go to the emergency room if any of the following occur:     Difficulty breathing    If you feel that your throat is closing off    Suddenly develop a rash    Unable to swallow fluids or are drooling     For the latest updates on COVID-19 (Coronavirus), please visit the Centers for Disease Control and Prevention (CDC). Also, your state and local health department websites may provide additional guidance regarding testing and isolation recommendations   for your location.   Diagnosis: Viral pharyngitis  Diagnosis ICD: J02.9    Follow up instructions:  ATTENTION: If you have been prescribed medications,  your prescriptions will not be sent until you choose your pharmacy. To do so open the link within your notification, or go to [employer] and click eVisit in the menu to open your   treatment plan. From there, you can select your pharmacy at the bottom of your after visit summary. You can also go to [email or web address]   Prescriptions  Prescription: azithromycin (Zithromax) 250 mg oral tablet, take 2 tablets by mouth on day 1, then 1 tablet daily on days 2-5  Sent To: [facility] [id number] - [id number] - [address], [place] [address]  Prescription: prednisone 10 mg oral tablets,dose pack, take tablets,dose pack  Sent To: [facility] [id number] - [id number] - [address], [place] [address]

## 2024-11-14 ENCOUNTER — TELEPHONE (OUTPATIENT)
Dept: OBSTETRICS AND GYNECOLOGY | Facility: HOSPITAL | Age: 39
End: 2024-11-14
Payer: COMMERCIAL

## 2024-11-14 NOTE — TELEPHONE ENCOUNTER
I returned Kelly's call re: her most recent loss. She had questions about the remains, more specifically if there were enough for cremation. I assured her there was and informed her the  home can come for pick-up at any time. I asked Kelly how she was doing - she said she's doing okay as this loss was more expected than her previous one, so she felt she had more time to process and prepare. She has good support from her . She denied needing anything else at this time, but I encouraged her to call our office should that change.

## 2024-11-16 RX ORDER — FLUCONAZOLE 150 MG/1
TABLET ORAL
Qty: 2 TABLET | Refills: 0 | Status: SHIPPED | OUTPATIENT
Start: 2024-11-16

## 2024-11-16 RX ORDER — ONDANSETRON 8 MG/1
8 TABLET, ORALLY DISINTEGRATING ORAL EVERY 8 HOURS PRN
Qty: 21 TABLET | Refills: 0 | Status: SHIPPED | OUTPATIENT
Start: 2024-11-16

## 2025-01-19 ENCOUNTER — E-VISIT (OUTPATIENT)
Dept: FAMILY MEDICINE CLINIC | Facility: TELEHEALTH | Age: 40
End: 2025-01-19
Payer: COMMERCIAL

## 2025-01-19 PROCEDURE — FABRICHEALTHVISIT: Performed by: NURSE PRACTITIONER

## 2025-01-20 NOTE — E-VISIT TREATED
Date: 2025 22:53:57  Clinician: Cora Salas  Clinician NPI: [id number]  Patient: Kelly Astorga  Patient : 1985  Patient Address: [address] [place] [address]  Patient Phone: [phone]  Visit Protocol: URI  Patient Summary:  Kelly is a 39 year old ( : 1985 ) female who initiated a visit for cold, sinus infection, or influenza.     Kelly states the symptoms started gradually 7-9 days ago. After the symptoms started, they improved and then got   worse again.   Symptom start date: Unknown   The symptoms consist of a headache, rhinitis, nasal congestion, nausea, diarrhea, a sore throat, a cough, and facial pain or pressure.   Symptom details     Nasal secretions: The color of the mucus is green.    Cough: Kelly coughs a few times an hour and the cough is not more bothersome at night. Phlegm does not come into the throat when coughing. Kelly believes the cough is caused by post-nasal drip.     Sore throat: Kelly reports having mild throat pain (1-3 on a 10 point pain scale), does not have exudate on the tonsils, and can swallow liquids without any difficulty. The lymph nodes in the neck are not enlarged. A rash has not appeared on the skin   since the sore throat started.     Facial pain or pressure: The facial pain or pressure feels worse when bending over or leaning forward.     Headache: The headache is mild (1-3 on a 10 point pain scale).      Kelly denies having ear pain, vomiting, myalgias, anosmia and ageusia, chills, enlarged lymph nodes, teeth pain, fever, wheezing, and malaise. Kelly also denies pain in the front of the neck that sometimes moves to the ear, experiencing   difficulty opening their mouth due to pain or swelling in the jaw muscles, having recent facial or sinus surgery in the past 60 days, and taking antibiotic medication in the past month. Kelly is not experiencing dyspnea.   Precipitating events  Within   the past week,  Kelly has not been exposed to someone with strep throat. Kelly has not recently been exposed to someone with influenza. Kelly has been in close contact with the following high risk individuals: children under the age of 5.      Kelly has received the influenza vaccine more than 2 weeks ago.   Pertinent COVID-19 (Coronavirus) information  Since the symptoms started, Kelly has not tested for COVID-19. Kelly was not able to re-test today.   Kelly does not need a   COVID-19 test.   Kelly has not received a COVID-19 vaccine in the past year.   Pertinent medical history     Kelly had 1 sinus infection within the past year.   A provider has not told Kelly to avoid NSAIDs.   Kelly does not get yeast   infections when an antibiotic is taken.   Kelly does not have diabetes. Kelly denies having immunosuppressive conditions (e.g., chemotherapy, HIV, organ transplant, long-term use of steroids or other immunosuppressive medications, splenectomy).   Kelly does not have asthma.   Kelly denies having chronic lung disease, cystic fibrosis, hypertension, long-term disabilities, mental health conditions, sickle cell disease or thalassemia, stroke or other cardiovascular disease, substance use   disorders, or tuberculosis (TB).  Kelly does not smoke or use smokeless tobacco. Kelly does not vape or use other e-cigarette products.   Kelly denies pregnancy and denies breastfeeding.   Additional information as reported by the patient (free   text): Zpak and prednisone taper work well for me. Can I also get Zofran 4mg and ofloxacin eye drops for goopy eyes? Thanks!   Weight: 223 lbs (101.15 kg)    MEDICATIONS: omeprazole oral, cetirizine oral, ALLERGIES: NKDA  Clinician Response:  Dear Kelly,  Based on the information provided, you have acute bacterial sinusitis, also known as a sinus infection. Most cases of sinus infections are caused by viruses and symptoms start to improve on  their own in 7-10 days.   Both viral and bacterial sinus infections usually resolve on its own. A bacterial infection may have developed if any of the following apply to you.      Symptoms of sinus infection lasting 10 days or more without showing any improvement    If you feel better after a viral upper respiratory infection such as, a cold but then suddenly feel worse with symptoms such as fever, headache, or increase in nasal discharge     Medication information  I am prescribing:     Azithromycin (Zithromax Z-Juan) 250 mg oral tablet. Take 2 tablets by mouth on day 1, then 1 tablet by mouth on days 2-5. There are no refills with this prescription.   Yeast infections can be a common side effect of antibiotics. The most common symptom   of a yeast infection is itchiness in and around the vagina. Other signs and symptoms include burning, redness of the vulva (the outer part of the female genitals), or a thick, white vaginal discharge that looks like cottage cheese and does not have a bad   smell.  If you become pregnant during this course of treatment, stop taking the medication and contact your primary care provider.  Self care  Steps you can take to be as comfortable as possible:     Rest.    Drink plenty of fluids.    Take a warm shower to loosen congestion.    Use a cool-mist humidifier.    Use throat lozenges.    Suck on frozen items such as popsicles.    Drink hot tea with lemon and honey.    Gargle with warm salt water (1/4 teaspoon of salt per 8 ounce glass of water).    Take a spoonful of honey to reduce your cough.     When to seek care  Please be seen in a clinic or urgent care if any of the following occur:     New symptoms develop, or symptoms become worse    Symptoms do not start to improve after 3 days of treatment     Call 911 or go to the emergency room if any of the following occur:     Difficulty breathing    If you feel that your throat is closing off    Suddenly develop a rash    Unable to  swallow fluids or are drooling     It is possible to have an allergic reaction to an antibiotic even if you have not had one in the past. If you notice a new rash, significant swelling, or difficulty breathing, stop taking this medication immediately and go to a clinic or urgent care.    For the latest updates on COVID-19 (Coronavirus), please visit the Centers for Disease Control and Prevention (CDC). Also, your state and local health department websites may provide additional guidance regarding testing and isolation recommendations for   your location.   Diagnosis: Acute bacterial sinusitis  Diagnosis ICD: J01.90    Follow up instructions: ATTENTION: If you have been prescribed medications, your prescriptions will not be sent until you choose your pharmacy.  To do so open the link within your notification, or go to [employer] and click eVisit in the menu to open your   treatment plan. From there, you can select your pharmacy at the bottom of your after visit summary. You can also go to [email or web address]  Prescriptions  Prescription: ondansetron 4 mg oral tablet,disintegrating, place 2 tablets on top of the tongue where they will dissolve,then swallow 2 times per day for 5 days  Sent To: [facility] - [id number] - [address] [place] [address]  Prescription: azithromycin (Zithromax Z-Juan) 250 mg oral tablet, take 2 tablets by mouth on day 1, then 1 tablet by mouth on days 2-5  Sent To: [facility] - [id number] - [address] [place] [address]  Prescription: ofloxacin (Ocuflox) 0.3 % ophthalmic (eye) drops, apply 1 drop into affected eye(s) 4 times per day  Sent To: [facility] - [id number] - [address] [place] [address]  Prescription: prednisone 10 mg oral tablets,dose pack, take tablets,dose pack  Sent To: [facility] - [id number] - [address]  [place] [address]

## 2025-01-27 ENCOUNTER — OFFICE VISIT (OUTPATIENT)
Dept: INTERNAL MEDICINE | Facility: CLINIC | Age: 40
End: 2025-01-27
Payer: COMMERCIAL

## 2025-01-27 ENCOUNTER — LAB (OUTPATIENT)
Dept: LAB | Facility: HOSPITAL | Age: 40
End: 2025-01-27
Payer: COMMERCIAL

## 2025-01-27 VITALS
HEART RATE: 74 BPM | HEIGHT: 65 IN | BODY MASS INDEX: 38.32 KG/M2 | WEIGHT: 230 LBS | DIASTOLIC BLOOD PRESSURE: 82 MMHG | OXYGEN SATURATION: 98 % | SYSTOLIC BLOOD PRESSURE: 124 MMHG | TEMPERATURE: 98 F

## 2025-01-27 DIAGNOSIS — E79.0 ELEVATED URIC ACID IN BLOOD: ICD-10-CM

## 2025-01-27 DIAGNOSIS — M25.50 ARTHRALGIA, UNSPECIFIED JOINT: Primary | ICD-10-CM

## 2025-01-27 LAB — ERYTHROCYTE [SEDIMENTATION RATE] IN BLOOD: 8 MM/HR (ref 0–20)

## 2025-01-27 PROCEDURE — 36415 COLL VENOUS BLD VENIPUNCTURE: CPT | Performed by: FAMILY MEDICINE

## 2025-01-27 PROCEDURE — 86038 ANTINUCLEAR ANTIBODIES: CPT | Performed by: FAMILY MEDICINE

## 2025-01-27 PROCEDURE — 99213 OFFICE O/P EST LOW 20 MIN: CPT | Performed by: FAMILY MEDICINE

## 2025-01-27 PROCEDURE — 86140 C-REACTIVE PROTEIN: CPT | Performed by: FAMILY MEDICINE

## 2025-01-27 PROCEDURE — 85652 RBC SED RATE AUTOMATED: CPT | Performed by: FAMILY MEDICINE

## 2025-01-27 PROCEDURE — 96372 THER/PROPH/DIAG INJ SC/IM: CPT | Performed by: FAMILY MEDICINE

## 2025-01-27 PROCEDURE — 84550 ASSAY OF BLOOD/URIC ACID: CPT | Performed by: FAMILY MEDICINE

## 2025-01-27 RX ORDER — METHYLPREDNISOLONE SODIUM SUCCINATE 125 MG/2ML
125 INJECTION INTRAMUSCULAR; INTRAVENOUS ONCE
Status: COMPLETED | OUTPATIENT
Start: 2025-01-27 | End: 2025-01-27

## 2025-01-27 RX ADMIN — METHYLPREDNISOLONE SODIUM SUCCINATE 125 MG: 125 INJECTION INTRAMUSCULAR; INTRAVENOUS at 12:27

## 2025-01-27 NOTE — PROGRESS NOTES
Kelly Astorga is a 39 y.o. female.    Chief Complaint   Patient presents with    Joint Pain     On going for about a week in her fingers, both hands.        HPI   History of Present Illness  The patient presents with a flare of chronic body aches.    She reports severe pain in her hands and fingers, a new symptom previously limited to her wrists, elbows, and shoulders. She has an appointment with Dr. Garibay at the end of 02/2025. She is considering pregnancy after her [place] trip and is hesitant to start major medication. She is interested in lab tests or a steroid injection. No chest pain, shortness of breath, fevers, or chills. She has not taken prescribed steroids but has them for her [place] trip. Previously prescribed Plaquenil was never taken. She took a few doses of steroids last week, which provided some relief but did not complete the course.    Supplemental Information  She underwent D & C procedures in 07/2024 and at the end of 10/2024 or beginning of 11/2024 due to miscarriage. She was on progesterone-only birth control but discontinued it after a few months. Her last menstrual period started on 01/12/2025, and she stopped birth control on 01/13/2025.        The following portions of the patient's history were reviewed and updated as appropriate: allergies, current medications, past family history, past medical history, past social history, past surgical history and problem list.     No Known Allergies      Current Outpatient Medications:     cetirizine (zyrTEC) 10 MG tablet, Take 1 tablet by mouth Daily., Disp: , Rfl:     omeprazole (priLOSEC) 20 MG capsule, Take 1 capsule by mouth Daily., Disp: , Rfl:     ondansetron ODT (ZOFRAN-ODT) 8 MG disintegrating tablet, Place 1 tablet on the tongue Every 8 (Eight) Hours As Needed for Nausea or Vomiting., Disp: 21 tablet, Rfl: 0    Prenatal Vit-Fe Fumarate-FA (PNV PRENATAL PLUS MULTIVITAMIN) 27-1 MG tablet, Take 1 tablet by mouth Daily. 200001, Disp:  ", Rfl: 3  No current facility-administered medications for this visit.    ROS    Review of Systems   Constitutional:  Negative for chills and fever.   Respiratory:  Negative for cough and shortness of breath.    Cardiovascular:  Negative for chest pain.   Gastrointestinal:  Negative for diarrhea, nausea and vomiting.   Musculoskeletal:  Positive for arthralgias (hands).       Vitals:    01/27/25 1158   BP: 124/82   Pulse: 74   Temp: 98 °F (36.7 °C)   SpO2: 98%   Weight: 104 kg (230 lb)   Height: 165.1 cm (65\")   PainSc:   4         Physical Exam     Physical Exam  Constitutional:       General: She is not in acute distress.     Appearance: Normal appearance. She is well-developed.   HENT:      Head: Normocephalic and atraumatic.      Right Ear: External ear normal.      Left Ear: External ear normal.   Eyes:      Extraocular Movements: Extraocular movements intact.      Conjunctiva/sclera: Conjunctivae normal.   Cardiovascular:      Rate and Rhythm: Normal rate.   Pulmonary:      Effort: Pulmonary effort is normal. No respiratory distress.   Musculoskeletal:         General: Swelling (hands) present.   Skin:     General: Skin is warm and dry.   Neurological:      Mental Status: She is alert and oriented to person, place, and time.      Cranial Nerves: No cranial nerve deficit.   Psychiatric:         Mood and Affect: Mood normal.         Behavior: Behavior normal.         Assessment/Plan    Diagnoses and all orders for this visit:    1. Arthralgia, unspecified joint (Primary)  -     LUZ ELENA With / DsDNA, RNP, Sjogrens A / B, Smith  -     C-reactive Protein  -     Sedimentation Rate  -     methylPREDNISolone sodium succinate (SOLU-Medrol) injection 125 mg    2. Elevated uric acid in blood  -     Uric Acid  -     methylPREDNISolone sodium succinate (SOLU-Medrol) injection 125 mg        Assessment & Plan  1. Arthralgia.  Reports chronic body aches with a recent flare, particularly in hands and fingers. History of elevated " uric acid levels.  Pain improved during pregnancy and while on oral contraceptives. Administer steroid injection today. May take a steroid pack next week at [place]. Ordered labs: sed rate, CRP, uric acid, LUZ ELENA. Consider x-ray if symptoms persist.        New Medications Ordered This Visit   Medications    methylPREDNISolone sodium succinate (SOLU-Medrol) injection 125 mg       No orders of the defined types were placed in this encounter.      Return if symptoms worsen or fail to improve.    Alyx Sy, DO

## 2025-01-28 LAB
CRP SERPL-MCNC: 1.1 MG/DL (ref 0–0.5)
URATE SERPL-MCNC: 6.9 MG/DL (ref 2.4–5.7)

## 2025-01-29 LAB — ANA SER QL: NEGATIVE

## 2025-03-04 NOTE — PROGRESS NOTES
"Thad Astorga is a 38 y.o. female here today for injection therapy.    Chief Complaint   Patient presents with    Right Hip - Injections     Last injection 9/1/2022.     Patient presents for right hip repeat greater trochanter bursa injection.  Past Medical History:   Diagnosis Date    Abnormal Pap smear of cervix 2013    Follow up clear     GERD (gastroesophageal reflux disease)     Headache     Hyperlipidemia     PONV (postoperative nausea and vomiting)     Pre-eclampsia in third trimester     pt states \"diagnosed after delivery with 1st baby\"    Vitamin D deficiency          Past Surgical History:   Procedure Laterality Date    GASTRIC RESTRICTION SURGERY  9/19/16    Gastric Sleeve    GASTRIC SLEEVE LAPAROSCOPIC  09/2016    LEEP  2013    OTOPLASTY  2005    WISDOM TOOTH EXTRACTION         No Known Allergies    Objective   Temp 98.3 °F (36.8 °C)   Ht 165.1 cm (65\")   Wt 103 kg (227 lb)   LMP 04/09/2024   BMI 37.77 kg/m²    Physical Exam    Skin exam stable with no erythema, ecchymosis or rash.  No new swelling.  No motor or sensory changes.  Distal pulse intact.    Large Joint Arthrocentesis: R greater trochanteric bursa  Date/Time: 7/29/2024 2:21 PM  Consent given by: patient  Site marked: site marked  Timeout: Immediately prior to procedure a time out was called to verify the correct patient, procedure, equipment, support staff and site/side marked as required   Supporting Documentation  Indications: pain   Procedure Details  Location: hip - R greater trochanteric bursa  Preparation: Patient was prepped and draped in the usual sterile fashion  Needle size: 22 G  Approach: lateral  Medications administered: 40 mg methylPREDNISolone acetate 40 MG/ML; 2 mL lidocaine 2%  Patient tolerance: patient tolerated the procedure well with no immediate complications        Assessment & Plan      Diagnosis Plan   1. Greater trochanteric bursitis of right hip  predniSONE (DELTASONE) 20 MG tablet    " Ambulatory Referral to Physical Therapy      2. Iliotibial band syndrome of right side  Ambulatory Referral to Physical Therapy          Regular exercise as tolerated  Ice, heat, and/or modalities as beneficial  Watch for signs and symptoms of infection  Call or notify for any adverse effect from injection therapy    Recommendations:    Patient agreeable to call or return sooner for any concerns.        fair balance

## 2025-03-19 ENCOUNTER — LAB (OUTPATIENT)
Facility: HOSPITAL | Age: 40
End: 2025-03-19
Payer: COMMERCIAL

## 2025-03-19 ENCOUNTER — TRANSCRIBE ORDERS (OUTPATIENT)
Facility: HOSPITAL | Age: 40
End: 2025-03-19
Payer: COMMERCIAL

## 2025-03-19 DIAGNOSIS — Z32.01 PREGNANCY EXAMINATION OR TEST, POSITIVE RESULT: Primary | ICD-10-CM

## 2025-03-19 DIAGNOSIS — Z32.01 PREGNANCY EXAMINATION OR TEST, POSITIVE RESULT: ICD-10-CM

## 2025-03-19 LAB
HCG INTACT+B SERPL-ACNC: <1 MIU/ML
PROGEST SERPL-MCNC: 5.14 NG/ML

## 2025-03-19 PROCEDURE — 36415 COLL VENOUS BLD VENIPUNCTURE: CPT

## 2025-03-19 PROCEDURE — 84702 CHORIONIC GONADOTROPIN TEST: CPT

## 2025-03-19 PROCEDURE — 84144 ASSAY OF PROGESTERONE: CPT

## 2025-04-07 ENCOUNTER — OFFICE VISIT (OUTPATIENT)
Age: 40
End: 2025-04-07
Payer: COMMERCIAL

## 2025-04-07 ENCOUNTER — LAB (OUTPATIENT)
Facility: HOSPITAL | Age: 40
End: 2025-04-07
Payer: COMMERCIAL

## 2025-04-07 VITALS
BODY MASS INDEX: 37.25 KG/M2 | TEMPERATURE: 97.2 F | DIASTOLIC BLOOD PRESSURE: 82 MMHG | WEIGHT: 223.6 LBS | HEART RATE: 75 BPM | HEIGHT: 65 IN | SYSTOLIC BLOOD PRESSURE: 128 MMHG

## 2025-04-07 DIAGNOSIS — R20.0 NUMBNESS AND TINGLING: ICD-10-CM

## 2025-04-07 DIAGNOSIS — R20.2 NUMBNESS AND TINGLING: ICD-10-CM

## 2025-04-07 DIAGNOSIS — R59.1 LYMPHADENOPATHY: ICD-10-CM

## 2025-04-07 DIAGNOSIS — K76.0 FATTY LIVER: ICD-10-CM

## 2025-04-07 DIAGNOSIS — L71.9 ROSACEA: ICD-10-CM

## 2025-04-07 DIAGNOSIS — M25.50 ARTHRALGIA OF MULTIPLE SITES: ICD-10-CM

## 2025-04-07 DIAGNOSIS — E79.0 HYPERURICEMIA: ICD-10-CM

## 2025-04-07 DIAGNOSIS — M25.50 ARTHRALGIA OF MULTIPLE SITES: Primary | ICD-10-CM

## 2025-04-07 LAB
BASOPHILS # BLD AUTO: 0.04 10*3/MM3 (ref 0–0.2)
BASOPHILS NFR BLD AUTO: 0.6 % (ref 0–1.5)
BILIRUB UR QL STRIP: NEGATIVE
CLARITY UR: CLEAR
COLOR UR: YELLOW
DEPRECATED RDW RBC AUTO: 38.6 FL (ref 37–54)
EOSINOPHIL # BLD AUTO: 0.27 10*3/MM3 (ref 0–0.4)
EOSINOPHIL NFR BLD AUTO: 3.9 % (ref 0.3–6.2)
ERYTHROCYTE [DISTWIDTH] IN BLOOD BY AUTOMATED COUNT: 12.4 % (ref 12.3–15.4)
ERYTHROCYTE [SEDIMENTATION RATE] IN BLOOD: 18 MM/HR (ref 0–20)
GLUCOSE UR STRIP-MCNC: NEGATIVE MG/DL
HCT VFR BLD AUTO: 40.2 % (ref 34–46.6)
HGB BLD-MCNC: 13.5 G/DL (ref 12–15.9)
HGB UR QL STRIP.AUTO: NEGATIVE
HOLD SPECIMEN: NORMAL
IMM GRANULOCYTES # BLD AUTO: 0.03 10*3/MM3 (ref 0–0.05)
IMM GRANULOCYTES NFR BLD AUTO: 0.4 % (ref 0–0.5)
KETONES UR QL STRIP: NEGATIVE
LEUKOCYTE ESTERASE UR QL STRIP.AUTO: NEGATIVE
LYMPHOCYTES # BLD AUTO: 2.79 10*3/MM3 (ref 0.7–3.1)
LYMPHOCYTES NFR BLD AUTO: 40 % (ref 19.6–45.3)
MCH RBC QN AUTO: 28.8 PG (ref 26.6–33)
MCHC RBC AUTO-ENTMCNC: 33.6 G/DL (ref 31.5–35.7)
MCV RBC AUTO: 85.9 FL (ref 79–97)
MONOCYTES # BLD AUTO: 0.45 10*3/MM3 (ref 0.1–0.9)
MONOCYTES NFR BLD AUTO: 6.5 % (ref 5–12)
NEUTROPHILS NFR BLD AUTO: 3.39 10*3/MM3 (ref 1.7–7)
NEUTROPHILS NFR BLD AUTO: 48.6 % (ref 42.7–76)
NITRITE UR QL STRIP: NEGATIVE
NRBC BLD AUTO-RTO: 0 /100 WBC (ref 0–0.2)
PH UR STRIP.AUTO: 7.5 [PH] (ref 5–8)
PLATELET # BLD AUTO: 346 10*3/MM3 (ref 140–450)
PMV BLD AUTO: 9.4 FL (ref 6–12)
PROT UR QL STRIP: NEGATIVE
RBC # BLD AUTO: 4.68 10*6/MM3 (ref 3.77–5.28)
SP GR UR STRIP: 1.01 (ref 1–1.03)
UROBILINOGEN UR QL STRIP: NORMAL
WBC NRBC COR # BLD AUTO: 6.97 10*3/MM3 (ref 3.4–10.8)

## 2025-04-07 PROCEDURE — 85652 RBC SED RATE AUTOMATED: CPT

## 2025-04-07 PROCEDURE — 36415 COLL VENOUS BLD VENIPUNCTURE: CPT

## 2025-04-07 PROCEDURE — 82390 ASSAY OF CERULOPLASMIN: CPT

## 2025-04-07 PROCEDURE — 99215 OFFICE O/P EST HI 40 MIN: CPT | Performed by: INTERNAL MEDICINE

## 2025-04-07 PROCEDURE — 80053 COMPREHEN METABOLIC PANEL: CPT

## 2025-04-07 PROCEDURE — 85025 COMPLETE CBC W/AUTO DIFF WBC: CPT

## 2025-04-07 PROCEDURE — 81003 URINALYSIS AUTO W/O SCOPE: CPT

## 2025-04-07 PROCEDURE — 86140 C-REACTIVE PROTEIN: CPT

## 2025-04-07 PROCEDURE — 86431 RHEUMATOID FACTOR QUANT: CPT

## 2025-04-07 PROCEDURE — 82525 ASSAY OF COPPER: CPT

## 2025-04-07 PROCEDURE — 86200 CCP ANTIBODY: CPT

## 2025-04-07 NOTE — PROGRESS NOTES
Office Follow Up      Date: 04/07/2025   Patient Name: Kelly Astorga  MRN: [id number]  YOB: 1985    Referring Physician: No ref. provider found     Chief Complaint:   Chief Complaint   Patient presents with    Joint Pain       History of Present Illness: Kelly Astorga is a 39 y.o. female  with past medical history of fatty liver, gestational diabetes, migraine headaches, dyslipidemia, GERD, anemia with pregnancy seen for seronegative chronic polyarthralgias ongoing since 2020.   There is family history of psoriasis in her mother    She reports that starting in 2020 she developed pain and stiffness in the bilateral wrists, elbows, shoulders, neck, low back, hips.  She has not had any problems with her knees ankles or feet.  The seem to occur after the birth of her 2nd daughter in 2020. She tried allopurinol followed by probenecid without much improvement.  She was pregnant with her 3rd daughter in 2021 and joint pain resolved during pregnancy.  However after the birth of her 3rd daughter January 2021 about 2 weeks later she developed pain stiffness again bilateral wrists elbows shoulders neck back hips.  She takes ibuprofen and Tylenol which helps somewhat.  She has failed diclofenac before.   She does feel significantly improved when she is on steroids for joint pain and stiffness.  She has never had psoriasis herself but her mother has psoriasis.    Interim 6/29/22:  She felt great when she was on prednisone for 12 days but when she came off the joint pain returned.    She has developed new joint pain in the PIP joints of both hands.  No swollen joints.  Ibuprofen is somewhat helpful.  Recent labs remain unremarkable.  Workup for Alli's disease negative [facility].  24 hour urine copper was normal.    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    She has a history of recurrent miscarriages, with 4 instances miscarriage in the past year. 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 In October 2024, she lost one at 9 weeks, the sac was too small, baby had a heartbeat, but the baby had no room to grow. The last 2 months she had one right at 5 weeks. She is reluctant to undergo genetic testing due to concerns about potential anxiety. She is currently under the care of Dr. Aurora Ibarra for obstetrics. She reports feeling well during her pregnancies. She has a history of elevated copper levels, which were investigated by a gastroenterologist and retina specialist, both of whom found no abnormalities to suggest Alli's disease. She has no history of gout but reports a previous diagnosis of high uric acid levels by an on-call doctor, who prescribed prednisone. She has tried allopurinol in the past without success. She is currently taking Tylenol as she is attempting to conceive.    In January 2025, she experienced a flare-up of her condition, characterized by severe hand pain, which was a new symptom for her. She has a history of upper back, shoulder, elbow, and wrist pain. She discontinued her birth control in January 2025 to attempt conception, and within a week, she developed hand swelling and pain, described as a sensation of extreme pressure and inability to . Currently, she reports mild hand weakness, swelling, and aching. Her left hand is more affected than her right, despite being right-handed. She experiences morning stiffness, particularly in her upper body. She does not experience any color changes in her fingers during cold weather. She has no history of psoriasis but reports that her mother has a severe case. She was previously prescribed Plaquenil (hydroxychloroquine) in 2022, which she believes was beneficial, but discontinued it due to anxiety about  "potential effects on future pregnancies. She was treated with steroids January 2025, which provided relief.      FAMILY HISTORY  Her mother has severe psoriasis.  Her grandmother and her grandmother's sisters have severe rheumatoid arthritis.    MEDICATIONS  Current: Tylenol  Past: Plaquenil (hydroxychloroquine), allopurinol    Labs 1/27/2025: Negative LUZ ELENA, normal sed rate, CRP 1.10, uric acid 6.9    Subjective       Review of Systems: Review of Systems   Constitutional:  Negative for chills, fatigue, fever and unexpected weight loss.   HENT:  Negative for mouth sores, sinus pressure and sore throat.    Eyes:  Negative for pain and redness.   Respiratory:  Negative for cough and shortness of breath.    Cardiovascular:  Negative for chest pain.   Gastrointestinal:  Negative for abdominal pain, blood in stool, diarrhea, nausea, vomiting and GERD.   Endocrine: Negative for polydipsia and polyuria.   Genitourinary:  Negative for dysuria, genital sores and hematuria.   Musculoskeletal:  Negative for arthralgias, back pain, joint swelling, myalgias, neck pain and neck stiffness.   Skin:  Negative for rash and bruise.   Neurological:  Negative for seizures, weakness, numbness and memory problem.   Hematological:  Negative for adenopathy. Does not bruise/bleed easily.   Psychiatric/Behavioral:  Negative for depressed mood. The patient is not nervous/anxious.         Past Medical History:   Past Medical History:   Diagnosis Date    Abnormal Pap smear of cervix 2013    Follow up clear     Fatty liver 4/7/2025    GERD (gastroesophageal reflux disease)     Headache     Hyperlipidemia     PONV (postoperative nausea and vomiting)     Pre-eclampsia in third trimester     pt states \"diagnosed after delivery with 1st baby\"    Vitamin D deficiency        Past Surgical History:   Past Surgical History:   Procedure Laterality Date    GASTRIC RESTRICTION SURGERY  09/19/2016    Gastric Sleeve    GASTRIC SLEEVE LAPAROSCOPIC  09/2016    " "SAWYER  2013    OTOPLASTY  2005    WISDOM TOOTH EXTRACTION         Family History:   Family History   Problem Relation Age of Onset    Hyperlipidemia Mother     Hypertension Mother     Hypertension Father     Hyperlipidemia Father     No Known Problems Brother     Hydrocephalus Maternal Grandmother     Hypertension Maternal Grandmother     Hyperlipidemia Maternal Grandmother     Diabetes Paternal Grandmother     Breast cancer Maternal Aunt     Heart disease Maternal Grandfather     Hyperlipidemia Maternal Grandfather     Hypertension Maternal Grandfather     Heart attack Maternal Grandfather     Liver disease Neg Hx     Liver cancer Neg Hx     Colon cancer Neg Hx     Ovarian cancer Neg Hx        Social History:   Social History     Socioeconomic History    Marital status:    Tobacco Use    Smoking status: Never    Smokeless tobacco: Never   Vaping Use    Vaping status: Never Used   Substance and Sexual Activity    Alcohol use: No    Drug use: No    Sexual activity: Yes     Partners: Male     Birth control/protection: None       Medications:   Current Outpatient Medications:     cetirizine (zyrTEC) 10 MG tablet, Take 1 tablet by mouth Daily., Disp: , Rfl:     omeprazole (priLOSEC) 20 MG capsule, Take 1 capsule by mouth Daily., Disp: , Rfl:     Prenatal Vit-Fe Fumarate-FA (PNV PRENATAL PLUS MULTIVITAMIN) 27-1 MG tablet, Take 1 tablet by mouth Daily. 200001, Disp: , Rfl: 3    Allergies: No Known Allergies        Objective        Vital Signs:   Vitals:    04/07/25 0920   BP: 128/82   BP Location: Right arm   Patient Position: Sitting   Cuff Size: Large Adult   Pulse: 75   Temp: 97.2 °F (36.2 °C)   Weight: 101 kg (223 lb 9.6 oz)   Height: 165.1 cm (65\")   PainSc: 5      Body mass index is 37.21 kg/m².      Physical Exam:  Physical Exam   MUSCULOSKELETAL:   No peripheral synovitis.  No dactylitis.  No pitting of the nails.  No joint deformity.  No rheumatoid nodules or tophi.  Able to make 100 percent " "fists    Complete joint exam was performed including the MCPs, PIPs, DIPs of the hands, wrists, elbows, shoulders, hips, knees and ankles.  No soft tissue swelling or tenderness is present except as above.    General: The patient is well-developed and well nourished. Cooperative, alert and oriented. Affect is normal. Hydration appears normal.   HEENT: Normocephalic and atraumatic. Lids and conjunctiva are normal. Pupils are equal and sclera are clear. Oropharynx is clear   NECK neck is supple without adenopathy, masses or thyromegaly.   CARDIOVASCULAR: Regular rate and rhythm. No murmurs, rubs or gallops   LUNGS: Effort is normal. Lungs are clear bilateral   ABDOMEN: Not examined  EXTREMITIES: Peripheral pulses are intact. No clubbing.   SKIN: No rashes. No subcutaneous nodules. No digital ulcers. No sclerodactyly.   NEUROLOGIC: Gait is normal. Strength testing is normal.  No focal neurologic deficits    Results Review:   Labs:   Lab Results   Component Value Date    GLUCOSE 75 06/19/2024    BUN 7 06/19/2024    CREATININE 0.59 06/19/2024    EGFR 119.2 06/19/2024    BCR 11.9 06/19/2024    K 3.9 06/19/2024    CO2 23.0 06/19/2024    CALCIUM 9.1 06/19/2024    ALBUMIN 4.1 06/19/2024    BILITOT 0.2 06/19/2024    AST 12 06/19/2024    ALT 15 06/19/2024     Lab Results   Component Value Date    WBC 7.46 06/19/2024    HGB 12.6 06/19/2024    HCT 38.3 06/19/2024    MCV 86.5 06/19/2024     06/19/2024     Lab Results   Component Value Date    SEDRATE 8 01/27/2025     Lab Results   Component Value Date    CRP 1.10 (H) 01/27/2025     No results found for: \"QUANTIFERO\", \"QUANTITB1\", \"QUANTITB2\", \"QUANTIFERN\", \"QUANTIFERM\", \"QUANTITBGLDP\"  No results found for: \"RF\"  Lab Results   Component Value Date    HEPBSAG Non-Reactive 06/19/2024    HEPAIGM Non-Reactive 05/09/2022    HEPBIGMCORE Non-Reactive 05/09/2022    HEPCVIRUSABY Non-Reactive 06/19/2024         Procedures    Assessment / Plan            1. Arthralgia of multiple " sites    2. Rosacea    3. Lymphadenopathy    4. Hyperuricemia    5. Numbness and tingling    6. Fatty liver        - Arthralgia of multiple joint  ARPN, telehealth [facility], 4 daughters  Mother with psoriasis, but she has no psoriasis herself  Arthralgias hands, wrists, elbows, neck, back, hips started 2020 after the birth of her 2nd child; she felt great while pregnant, but recurrent arthralgias January 2022 after the birth of her 3rd child  Negative ULZ ELENA, RF, ACPA, HLA B27, RPR, inflammatory bowel panel, Lyme, [place] spotted fever, Ehrlichia labs  Elevated serum copper level unknown significance 2019/22 labs. Neg urine copper  Negative workup for [facility]i's disease with [facility] 20[facility]  Hyperuricemia but no history of gout/no episodic inflammatory arthritis/no lower extremity joint involved.      **Current: Tylenol, as needed prednisone  Prior treatment: Plaquenil 6/29/22(helped but anxiety taking it), ibuprofen.  No improvement on allopurinol      She returns to clinic after almost 3-year absence.  Last seen 6/29/2022.  Possible undifferentiated inflammatory polyarthritis causing mainly upper extremity chronic arthralgias involving wrists, elbows, hands, shoulders, neck, lumbar spine, hips ongoing since 2020.     -Differential includes undifferentiated inflammatory arthritis, reactive arthritis, seronegative rheumatoid arthritis verses psoriatic arthritis without psoriasis.    -Her mother has psoriasis  No synovitis on exam today.  She has developed PIP arthralgias symmetrically in both hands which were severe January 2025 but have largely abated after tapering course of prednisone.  Arthralgias basically resolved previously during her pregnancy but returned right after delivery of her 3rd child January 2022.  She does get significant improvement in arthralgias with steroids consistent with an inflammatory arthritis but no synovitis on exam  She is currently on as needed Tylenol and is  attempting to conceive.  She really does not want to add medication at this time.    She has tried diclofenac and Plaquenil previously    She has no synovitis on exam to suggest rheumatoid arthritis.  Rheumatoid markers have been negative to date.  Her mother has psoriasis but she does not have psoriasis.  I see no features of psoriatic arthritis such as pitting of the nails or dactylitis.  HLA B27 negative.  She has no Raynaud's, oral nasal ulcers, pleurisy/pericarditis, renal/hematologic abnormality, clotting disorder, seizure disorder to suggest a connective tissue disease/lupus.  Also her LUZ ELENA has been negative historically.  Dermatologist diagnosed rosacea  She has had reactive lymphadenopathy in her axilla.  This could be seen with lupus.  Screening test for syphilis, hepatitis-B/C and tick-borne illnesses through her PCP have been negative  Uric acid has been elevated but she has no clinical features suggestive of gout such as an episodic inflammatory arthritis.    - Update hand x-rays today  - Labs ordered today for monitoring as below  Differential at this point remains broad and uncertain as we discussed today in detail.  There is no active inflammatory arthritis on exam today, so reasonable not to pursue additional treatment which she is comfortable with that she is trying to conceive presently  Follow-up in 1 year or sooner if further flares or she wants to pursue therapy such as retrial of Plaquenil which she thought helped previous      -Elevated copper level  Elevated serum copper level on labs 2019/22 reviewed today  24 hour urine copper was normal  [facility] exam with [facility] showed no evidence of Edwards Fleischer rings  Negative GI workup 2022  Unspecified basis.  Not Alli's disease..    - Rosacea  KEVIN Coats  LUZ ELENA negative.    -Intermittent lymphadenopathy  Intermittent lymph node swelling axilla.  Not active today  Ultrasound axilla through [facility] Dr Jayy Coats showed changes of  reactive lymphadenopathy  Reactive lymphadenopathy is nonspecific    -Hyperuricemia  Hyperuricemia without clinical history consistent with gout.    No episodic inflammatory arthritis involving the lower extremities.  I doubt hyperuricemia accounts for her arthralgias.  She reports no improvement in joint pain previously on allopurinol.    -Numbness and tingling  intermittent numbness and tingling bridge of nose/cheek  Unspecified basis.  Brain MRI 4/22 was negative through her PCP  No evidence of MS  She has no history of shingles, but has had fever blisters that could represent an underlying HSV that might cause numbness and tingling.    -Fatty liver  Negative LUZ ELENA, negative anti mitochondrial antibody, negative inflammatory bowel panel.  Mild LFT elevation  History of fatty liver  Assessment & Plan  -Joint pain.  Her joints appear healthy today, with no signs of active inflammation. Laboratory results have been largely unremarkable, including negative LUZ ELENA and rheumatoid markers. There is no specific marker for psoriatic arthritis, but she is also negative for HLA-B27. The possibility of preclinical psoriatic arthritis or future development of psoriasis can not be ruled out. However, there is no evidence of pitting in the fingernails, swollen joints, or rheumatoid nodules. Additionally, there are no rheumatologic reasons for her miscarriages. Her condition remains undifferentiated at this time. It is reasonable to continue her current management approach and consider Plaquenil (hydroxychloroquine) in the future when she feels ready or comfortable. Prednisone may be used during flare-ups if it proves effective. Laboratory tests will be conducted today. X-rays of her hands will be obtained today. She will be informed of the results of the x-rays and blood tests. She is advised to contact the office if she wishes to start Plaquenil or requires prednisone.    - Recurrent miscarriages.  She has experienced four  miscarriages in the last year, with one case involving Crowell syndrome and another with a small sac despite a heartbeat. Genetic testing has not been pursued due to anxiety. There is no rheumatologic reason identified for the miscarriages.  SSA/B, anticardiolipin antibody, lupus anticoagulant, and beta-2 glycoprotein associated with clotting miscarriages are all negative.    Follow-up  The patient will follow up in 1 year.      Orders Placed This Encounter   Procedures    XR Hand 2 View Bilateral    CBC Auto Differential    Comprehensive Metabolic Panel    C-reactive Protein    Sedimentation Rate    Urinalysis With Culture If Indicated -    Rheumatoid Arthritis (RA) Profile with Reflex to SeroNeg RAdx4    Ceruloplasmin    Copper, Serum     No orders of the defined types were placed in this encounter.    TIME SPENT: I spent 40 minutes caring for the patient on this date of service.  This time includes time spent by me in the following activities: Preparing for the visit, obtaining records, reviewing/ordering tests and independently reviewing results, performing a medically appropriate history/exam, counseling and educating the patient/family/caregiver, ordering medications, tests, or procedures, and documenting information in the medical record.    Follow Up:   Return in about 1 year (around 4/7/2026).      Discussed plan of care in detail with the patient today.  Patient verbalized understanding and agrees.    I confirm accuracy of unchanged data/findings which have been carried forward from previous visit.  I have updated appropriately those that have changed.        Seven Garibay MD  [facility] – [facility]

## 2025-04-08 LAB
ALBUMIN SERPL-MCNC: 4.5 G/DL (ref 3.5–5.2)
ALBUMIN/GLOB SERPL: 1.5 G/DL
ALP SERPL-CCNC: 111 U/L (ref 39–117)
ALT SERPL W P-5'-P-CCNC: 20 U/L (ref 1–33)
ANION GAP SERPL CALCULATED.3IONS-SCNC: 12.2 MMOL/L (ref 5–15)
AST SERPL-CCNC: 21 U/L (ref 1–32)
BILIRUB SERPL-MCNC: 0.2 MG/DL (ref 0–1.2)
BUN SERPL-MCNC: 10 MG/DL (ref 6–20)
BUN/CREAT SERPL: 12 (ref 7–25)
CALCIUM SPEC-SCNC: 9.5 MG/DL (ref 8.6–10.5)
CERULOPLASMIN SERPL-MCNC: 34 MG/DL (ref 19–39)
CHLORIDE SERPL-SCNC: 103 MMOL/L (ref 98–107)
CO2 SERPL-SCNC: 22.8 MMOL/L (ref 22–29)
CREAT SERPL-MCNC: 0.83 MG/DL (ref 0.57–1)
CRP SERPL-MCNC: 1.34 MG/DL (ref 0–0.5)
EGFRCR SERPLBLD CKD-EPI 2021: 92.7 ML/MIN/1.73
GLOBULIN UR ELPH-MCNC: 3.1 GM/DL
GLUCOSE SERPL-MCNC: 88 MG/DL (ref 65–99)
POTASSIUM SERPL-SCNC: 4.3 MMOL/L (ref 3.5–5.2)
PROT SERPL-MCNC: 7.6 G/DL (ref 6–8.5)
SODIUM SERPL-SCNC: 138 MMOL/L (ref 136–145)

## 2025-04-12 LAB — COPPER SERPL-MCNC: 134 UG/DL (ref 80–158)

## 2025-04-14 LAB
CCP IGA+IGG SERPL IA-ACNC: 21 UNITS
REFLEX INFORMATION: ABNORMAL
RHEUMATOID FACT SERPL-ACNC: <14 IU/ML

## 2025-04-15 ENCOUNTER — OFFICE VISIT (OUTPATIENT)
Dept: INTERNAL MEDICINE | Facility: CLINIC | Age: 40
End: 2025-04-15
Payer: COMMERCIAL

## 2025-04-15 VITALS
TEMPERATURE: 98 F | BODY MASS INDEX: 37.32 KG/M2 | WEIGHT: 224 LBS | DIASTOLIC BLOOD PRESSURE: 82 MMHG | HEIGHT: 65 IN | HEART RATE: 64 BPM | OXYGEN SATURATION: 99 % | SYSTOLIC BLOOD PRESSURE: 124 MMHG

## 2025-04-15 DIAGNOSIS — Z13.29 SCREENING FOR ENDOCRINE DISORDER: ICD-10-CM

## 2025-04-15 DIAGNOSIS — Z13.220 SCREENING, LIPID: ICD-10-CM

## 2025-04-15 DIAGNOSIS — Z00.00 WELL ADULT EXAM: Primary | ICD-10-CM

## 2025-04-15 DIAGNOSIS — E55.9 VITAMIN D DEFICIENCY: ICD-10-CM

## 2025-04-15 DIAGNOSIS — E79.0 ELEVATED URIC ACID IN BLOOD: ICD-10-CM

## 2025-04-15 PROCEDURE — 99395 PREV VISIT EST AGE 18-39: CPT | Performed by: FAMILY MEDICINE

## 2025-04-15 NOTE — PROGRESS NOTES
"Kelly Astorga is a 39 y.o. female.    Chief Complaint   Patient presents with    Annual Exam       HPI   History of Present Illness  The patient presents for an annual physical exam.    No current health concerns. Last Pap smear in 05/2024 during a pregnancy that ended in miscarriage. Actively trying to conceive, with a 5-week loss last month indicated by a positive pregnancy test followed by a delayed menstrual period. No chest pain, shortness of breath, nausea, vomiting, diarrhea, constipation, cough, congestion, or runny nose. Symptoms attributed to persistent allergies. No excessive fatigue, urinary issues, rashes, bruises, frequent headaches, hot flashes, cold chills, anxiety, or depression. Takes over-the-counter Prilosec; tapering off causes severe heartburn. Takes prenatal vitamins; considering adding vitamin D.    No regular exercise outside daily activities. Diet often consists of leftover toddler's meals. Up to date on dental and eye exams.  Received flu vaccine last fall.  Tdap received in 2023.  Did not receive the COVID vaccine.      The following portions of the patient's history were reviewed and updated as appropriate: allergies, current medications, past family history, past medical history, past social history, past surgical history and problem list.     Past Medical History:   Diagnosis Date    Abnormal Pap smear of cervix 2013    Follow up clear     Bursitis of hip     Fatty liver 04/07/2025    GERD (gastroesophageal reflux disease)     Headache     Hyperlipidemia     PONV (postoperative nausea and vomiting)     Pre-eclampsia in third trimester     pt states \"diagnosed after delivery with 1st baby\"    Vitamin D deficiency        Past Surgical History:   Procedure Laterality Date    GASTRIC RESTRICTION SURGERY  09/19/2016    Gastric Sleeve    GASTRIC SLEEVE LAPAROSCOPIC  09/2016    LEEP  2013    OTOPLASTY  2005    WISDOM TOOTH EXTRACTION         Family History   Problem Relation Age " of Onset    Hyperlipidemia Mother     Hypertension Mother     Hypertension Father     Hyperlipidemia Father     No Known Problems Brother     Hydrocephalus Maternal Grandmother     Hypertension Maternal Grandmother     Hyperlipidemia Maternal Grandmother     Diabetes Paternal Grandmother     Breast cancer Maternal Aunt     Heart disease Maternal Grandfather     Hyperlipidemia Maternal Grandfather     Hypertension Maternal Grandfather     Heart attack Maternal Grandfather     Liver disease Neg Hx     Liver cancer Neg Hx     Colon cancer Neg Hx     Ovarian cancer Neg Hx        Social History     Socioeconomic History    Marital status:    Tobacco Use    Smoking status: Never    Smokeless tobacco: Never   Vaping Use    Vaping status: Never Used   Substance and Sexual Activity    Alcohol use: No    Drug use: No    Sexual activity: Yes     Partners: Male     Birth control/protection: None       No Known Allergies      Current Outpatient Medications:     cetirizine (zyrTEC) 10 MG tablet, Take 1 tablet by mouth Daily., Disp: , Rfl:     omeprazole (priLOSEC) 20 MG capsule, Take 1 capsule by mouth Daily., Disp: , Rfl:     Prenatal Vit-Fe Fumarate-FA (PNV PRENATAL PLUS MULTIVITAMIN) 27-1 MG tablet, Take 1 tablet by mouth Daily. 200001, Disp: , Rfl: 3    ROS    Review of Systems   Constitutional:  Negative for chills, fatigue and fever.   HENT:  Negative for congestion, postnasal drip and sore throat.    Eyes:  Negative for visual disturbance.   Respiratory:  Negative for cough, shortness of breath and wheezing.    Cardiovascular:  Negative for chest pain and leg swelling.   Gastrointestinal:  Negative for abdominal pain, constipation, diarrhea, nausea and vomiting.   Endocrine: Negative for cold intolerance and heat intolerance.   Genitourinary:  Negative for difficulty urinating.   Musculoskeletal:  Positive for arthralgias.   Skin:  Negative for rash.   Allergic/Immunologic: Positive for environmental allergies.  "  Neurological:  Negative for headache.   Hematological:  Does not bruise/bleed easily.   Psychiatric/Behavioral:  Negative for depressed mood. The patient is not nervous/anxious.        Vitals:    04/15/25 0826   BP: 124/82   Pulse: 64   Temp: 98 °F (36.7 °C)   SpO2: 99%   Weight: 102 kg (224 lb)   Height: 165.1 cm (65\")   PainSc: 0-No pain     Body mass index is 37.28 kg/m².    Physical Exam     Physical Exam  Constitutional:       General: She is not in acute distress.     Appearance: Normal appearance. She is well-developed.   HENT:      Head: Normocephalic and atraumatic.      Right Ear: Tympanic membrane and external ear normal.      Left Ear: Tympanic membrane and external ear normal.      Mouth/Throat:      Pharynx: No posterior oropharyngeal erythema.   Eyes:      Extraocular Movements: Extraocular movements intact.      Conjunctiva/sclera: Conjunctivae normal.      Pupils: Pupils are equal, round, and reactive to light.   Cardiovascular:      Rate and Rhythm: Normal rate and regular rhythm.      Heart sounds: No murmur heard.  Pulmonary:      Effort: Pulmonary effort is normal. No respiratory distress.      Breath sounds: Normal breath sounds. No wheezing.   Abdominal:      General: Bowel sounds are normal. There is no distension.      Palpations: Abdomen is soft.      Tenderness: There is no abdominal tenderness.   Musculoskeletal:      Cervical back: Neck supple.      Right lower leg: No edema.      Left lower leg: No edema.   Lymphadenopathy:      Cervical: No cervical adenopathy.   Skin:     General: Skin is warm and dry.   Neurological:      Mental Status: She is alert and oriented to person, place, and time.      Cranial Nerves: No cranial nerve deficit.      Deep Tendon Reflexes: Reflexes normal.   Psychiatric:         Mood and Affect: Mood normal.         Behavior: Behavior normal.             Diagnoses and all orders for this visit:    1. Well adult exam (Primary)    2. Elevated uric acid in " blood  -     Cancel: Uric Acid  -     Uric Acid    3. Vitamin D deficiency  -     Vitamin D,25-Hydroxy    4. Screening, lipid  -     Cancel: Lipid Panel  -     Lipid Panel    5. Screening for endocrine disorder  -     Cancel: Hemoglobin A1c  -     Hemoglobin A1c        Assessment & Plan  1. Well adult exam.   Discussed routine health maintenance including vaccines, dental/eye health, healthy diet and exercise, and Pap smears. Addressed mental health. Ordered routine blood work including lipids and A1c.        No orders of the defined types were placed in this encounter.      No orders of the defined types were placed in this encounter.      Return in about 1 year (around 4/15/2026) for Annual.      Alyx Sy,

## 2025-04-16 LAB
25(OH)D3+25(OH)D2 SERPL-MCNC: 25.5 NG/ML (ref 30–100)
CHOLEST SERPL-MCNC: 221 MG/DL (ref 0–200)
HBA1C MFR BLD: 5.7 % (ref 4.8–5.6)
HDLC SERPL-MCNC: 46 MG/DL (ref 40–60)
LDLC SERPL CALC-MCNC: 146 MG/DL (ref 0–100)
TRIGL SERPL-MCNC: 161 MG/DL (ref 0–150)
URATE SERPL-MCNC: 8.9 MG/DL (ref 2.4–5.7)
VLDLC SERPL CALC-MCNC: 29 MG/DL (ref 5–40)

## 2025-05-28 ENCOUNTER — OFFICE VISIT (OUTPATIENT)
Age: 40
End: 2025-05-28
Payer: COMMERCIAL

## 2025-05-28 VITALS
WEIGHT: 224.5 LBS | BODY MASS INDEX: 37.4 KG/M2 | TEMPERATURE: 97.3 F | HEIGHT: 65 IN | DIASTOLIC BLOOD PRESSURE: 80 MMHG | HEART RATE: 80 BPM | SYSTOLIC BLOOD PRESSURE: 134 MMHG

## 2025-05-28 DIAGNOSIS — R76.8 POSITIVE ANTI-CCP TEST: ICD-10-CM

## 2025-05-28 DIAGNOSIS — M25.50 ARTHRALGIA OF MULTIPLE SITES: Primary | ICD-10-CM

## 2025-05-28 DIAGNOSIS — Z79.899 HIGH RISK MEDICATION USE: ICD-10-CM

## 2025-05-28 DIAGNOSIS — R59.1 LYMPHADENOPATHY: ICD-10-CM

## 2025-05-28 DIAGNOSIS — Z76.89 ENCOUNTER FOR NEW MEDICATION PRESCRIPTION: ICD-10-CM

## 2025-05-28 RX ORDER — CLOTRIMAZOLE 1 G/ML
SOLUTION TOPICAL
COMMUNITY
Start: 2025-05-06

## 2025-05-28 RX ORDER — AMMONIUM LACTATE 12 G/100G
CREAM TOPICAL
COMMUNITY
Start: 2025-05-06

## 2025-05-28 RX ORDER — HYDROXYCHLOROQUINE SULFATE 200 MG/1
200 TABLET, FILM COATED ORAL DAILY
Qty: 30 TABLET | Refills: 5 | Status: SHIPPED | OUTPATIENT
Start: 2025-05-28

## 2025-05-28 RX ORDER — PREDNISONE 5 MG/1
TABLET ORAL
Qty: 30 TABLET | Refills: 0 | Status: SHIPPED | OUTPATIENT
Start: 2025-05-28 | End: 2025-06-10

## 2025-05-28 NOTE — PROGRESS NOTES
Office Follow Up      Date: 05/28/2025   Patient Name: Kelly Astorga  MRN: [id number]  YOB: 1985    Referring Physician: No ref. provider found     Chief Complaint:   Chief Complaint   Patient presents with    Joint Pain     Stiff hands       History of Present Illness: Kelly Astorga is a 39 y.o. female with past medical history of fatty liver, gestational diabetes, migraine headaches, dyslipidemia, GERD, anemia with pregnancy seen for seronegative chronic polyarthralgias ongoing since 2020.   There is family history of psoriasis in her mother.  Labs have shown positive CCP antibody, negative rheumatoid factor     She reports that starting in 2020 she developed pain and stiffness in the bilateral wrists, elbows, shoulders, neck, low back, hips.  She has not had any problems with her knees ankles or feet.  The seem to occur after the birth of her 2nd daughter in 2020. She tried allopurinol followed by probenecid without much improvement.  She was pregnant with her 3rd daughter in 2021 and joint pain resolved during pregnancy.  However after the birth of her 3rd daughter January 2021 about 2 weeks later she developed pain stiffness again bilateral wrists elbows shoulders neck back hips.  She takes ibuprofen and Tylenol which helps somewhat.  She has failed diclofenac before.   She does feel significantly improved when she is on steroids for joint pain and stiffness.  She has never had psoriasis herself but her mother has psoriasis.     Interim 6/29/22:  She felt great when she was on prednisone for 12 days but when she came off the joint pain returned.    She has developed new joint pain in the PIP joints of both hands.  No swollen joints.  Ibuprofen is somewhat helpful.  Recent labs remain unremarkable.  Workup for Alli's disease [facility].  24 hour urine copper was normal.     Interim 4/7/2025:   She has a history of  recurrent miscarriages, with 4 instances miscarriage in the past year. 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 In October 2024, she lost one at 9 weeks, the sac was too small, baby had a heartbeat, but the baby had no room to grow. The last 2 months she had one right at 5 weeks. She is reluctant to undergo genetic testing due to concerns about potential anxiety. She is currently under the care of Dr. Aurora Ibarra for obstetrics. She reports feeling well during her pregnancies. She has a history of elevated copper levels, which were investigated by a gastroenterologist and retina specialist, both of whom found no abnormalities to suggest Alli's disease. She has no history of gout but reports a previous diagnosis of high uric acid levels by an on-call doctor, who prescribed prednisone. She has tried allopurinol in the past without success. She is currently taking Tylenol as she is attempting to conceive.     In January 2025, she experienced a flare-up of her condition, characterized by severe hand pain, which was a new symptom for her. She has a history of upper back, shoulder, elbow, and wrist pain. She discontinued her birth control in January 2025 to attempt conception, and within a week, she developed hand swelling and pain, described as a sensation of extreme pressure and inability to . Currently, she reports mild hand weakness, swelling, and aching. Her left hand is more affected than her right, despite being right-handed. She experiences morning stiffness, particularly in her upper body. She does not experience any color changes in her fingers during cold weather. She has no history of psoriasis but reports that her mother has a severe case. She was previously prescribed Plaquenil (hydroxychloroquine) in 2022, which she believes was beneficial, but discontinued it due to anxiety about potential effects on future pregnancies. She was treated with steroids January 2025, which provided  relief.       History of Present Illness  Interim 5/28/2025  She reports experiencing generalized body aches, which prompted her to schedule an appointment. She describes severe stiffness and soreness in her hands upon waking yesterday, with a slight improvement today. She is currently on day 14 of her menstrual cycle and suspects a correlation between her symptoms and ovulation.    She reports a deep ache in her neck but no associated tenderness. She has not consulted her gynecologist recently and is due for a Pap smear this month, with an appointment scheduled for 08/2025. She reports no rashes. She is not currently breastfeeding and is attempting to conceive. She expresses concern about her hand symptoms, given her family history of rheumatoid arthritis in a cousin who has undergone surgeries and fusions. She has been managing her pain and stiffness with Tylenol and ibuprofen, although she limits her ibuprofen intake due to a previous gastric sleeve procedure in 2016. She has found relief from prednisone in the past and is considering its use again.    She has been unable to palpate her lymph nodes, which she believes are located deeper than usual. She underwent a mammogram last summer following a miscarriage and subsequent breast pain, but the results were normal. She reports intermittent lymph node swelling, which has been a recurring issue for several years. She reports no systemic symptoms such as fever, weight loss, or night sweats. She has attempted to manage her symptoms by abstaining from shaving and switching deodorants.    She had a yearly checkup with Dr. Sy, who noted elevated cholesterol and A1c levels.    PAST SURGICAL HISTORY: Gastric sleeve procedure in 2016.    FAMILY HISTORY  Her cousin has severe rheumatoid arthritis and has undergone surgeries and fusions.      Subjective       Review of Systems: Review of Systems   Constitutional:  Negative for chills, fatigue, fever and unexpected  "weight loss.   HENT:  Negative for mouth sores, sinus pressure and sore throat.    Eyes:  Negative for pain and redness.   Respiratory:  Negative for cough and shortness of breath.    Cardiovascular:  Negative for chest pain.   Gastrointestinal:  Negative for abdominal pain, blood in stool, diarrhea, nausea, vomiting and GERD.   Endocrine: Negative for polydipsia and polyuria.   Genitourinary:  Negative for dysuria, genital sores and hematuria.   Musculoskeletal:  Negative for arthralgias, back pain, joint swelling, myalgias, neck pain and neck stiffness.   Skin:  Negative for rash and bruise.   Neurological:  Negative for seizures, weakness, numbness and memory problem.   Hematological:  Negative for adenopathy. Does not bruise/bleed easily.   Psychiatric/Behavioral:  Negative for depressed mood. The patient is not nervous/anxious.         Past Medical History:   Past Medical History:   Diagnosis Date    Abnormal Pap smear of cervix 2013    Follow up clear     Bursitis of hip     Fatty liver 04/07/2025    GERD (gastroesophageal reflux disease)     Headache     Hyperlipidemia     PONV (postoperative nausea and vomiting)     Pre-eclampsia in third trimester     pt states \"diagnosed after delivery with 1st baby\"    Vitamin D deficiency        Past Surgical History:   Past Surgical History:   Procedure Laterality Date    GASTRIC RESTRICTION SURGERY  09/19/2016    Gastric Sleeve    GASTRIC SLEEVE LAPAROSCOPIC  09/2016    LEEP  2013    OTOPLASTY  2005    WISDOM TOOTH EXTRACTION         Family History:   Family History   Problem Relation Age of Onset    Hyperlipidemia Mother     Hypertension Mother     Hypertension Father     Hyperlipidemia Father     No Known Problems Brother     Hydrocephalus Maternal Grandmother     Hypertension Maternal Grandmother     Hyperlipidemia Maternal Grandmother     Diabetes Paternal Grandmother     Breast cancer Maternal Aunt     Heart disease Maternal Grandfather     Hyperlipidemia Maternal " "Grandfather     Hypertension Maternal Grandfather     Heart attack Maternal Grandfather     Liver disease Neg Hx     Liver cancer Neg Hx     Colon cancer Neg Hx     Ovarian cancer Neg Hx        Social History:   Social History     Socioeconomic History    Marital status:    Tobacco Use    Smoking status: Never    Smokeless tobacco: Never   Vaping Use    Vaping status: Never Used   Substance and Sexual Activity    Alcohol use: No    Drug use: No    Sexual activity: Yes     Partners: Male     Birth control/protection: None       Medications:   Current Outpatient Medications:     ammonium lactate (AMLACTIN) 12 % cream, Apply twice daily for thickened skin on feet as directed, Disp: , Rfl:     cetirizine (zyrTEC) 10 MG tablet, Take 1 tablet by mouth Daily., Disp: , Rfl:     clotrimazole (LOTRIMIN) 1 % external solution, APPLY TO TOENAILS DAILY UNTIL ABNORMAL APPEARANCE RESOLVED, Disp: , Rfl:     omeprazole (priLOSEC) 20 MG capsule, Take 1 capsule by mouth Daily., Disp: , Rfl:     Prenatal Vit-Fe Fumarate-FA (PNV PRENATAL PLUS MULTIVITAMIN) 27-1 MG tablet, Take 1 tablet by mouth Daily. 200001, Disp: , Rfl: 3    hydroxychloroquine (Plaquenil) 200 MG tablet, Take 1 tablet by mouth Daily., Disp: 30 tablet, Rfl: 5    predniSONE (DELTASONE) 5 MG tablet, Take 4 tablets by mouth Every Morning for 3 days, THEN 3 tablets Every Morning for 3 days, THEN 2 tablets Every Morning for 3 days, THEN 1 tablet Every Morning for 3 days. Take 4 tablets every morning for 3 days, 3 tablets every morning for 3 days, 2 tablets every morning for 3 day, 1 tablet every morning for 3 days., Disp: 30 tablet, Rfl: 0    Allergies: No Known Allergies        Objective        Vital Signs:   Vitals:    05/28/25 1354   BP: 134/80   Pulse: 80   Temp: 97.3 °F (36.3 °C)   TempSrc: Infrared   Weight: 102 kg (224 lb 8 oz)   Height: 165.1 cm (65\")   PainSc: 4    PainLoc: Hand     Body mass index is 37.36 kg/m².      Physical Exam:  Physical Exam " "  MUSCULOSKELETAL:   No peripheral synovitis.  Scattered tenderness to the PIP MCP joints bilateral hands  No dactylitis.  No pitting of the nails.  No joint deformity.  No rheumatoid nodules or tophi.  Able to make 100 percent fists  No axillary lymphadenopathy    Complete joint exam was performed including the MCPs, PIPs, DIPs of the hands, wrists, elbows, shoulders, hips, knees and ankles.  No soft tissue swelling or tenderness is present except as above.    General: The patient is well-developed and well nourished. Cooperative, alert and oriented. Affect is normal. Hydration appears normal.   HEENT: Normocephalic and atraumatic. Lids and conjunctiva are normal. Pupils are equal and sclera are clear. Oropharynx is clear   NECK neck is supple without adenopathy, masses or thyromegaly.   CARDIOVASCULAR: Regular rate and rhythm. No murmurs, rubs or gallops   LUNGS: Effort is normal. Lungs are clear bilateral   ABDOMEN: Not examined  EXTREMITIES: Peripheral pulses are intact. No clubbing.   SKIN: No rashes. No subcutaneous nodules. No digital ulcers. No sclerodactyly.   NEUROLOGIC: Gait is normal. Strength testing is normal.  No focal neurologic deficits    Results Review:   Labs:   Lab Results   Component Value Date    GLUCOSE 88 04/07/2025    BUN 10 04/07/2025    CREATININE 0.83 04/07/2025    EGFR 92.7 04/07/2025    BCR 12.0 04/07/2025    K 4.3 04/07/2025    CO2 22.8 04/07/2025    CALCIUM 9.5 04/07/2025    ALBUMIN 4.5 04/07/2025    BILITOT 0.2 04/07/2025    AST 21 04/07/2025    ALT 20 04/07/2025     Lab Results   Component Value Date    WBC 6.97 04/07/2025    HGB 13.5 04/07/2025    HCT 40.2 04/07/2025    MCV 85.9 04/07/2025     04/07/2025     Lab Results   Component Value Date    SEDRATE 18 04/07/2025     Lab Results   Component Value Date    CRP 1.34 (H) 04/07/2025     No results found for: \"QUANTIFERO\", \"QUANTITB1\", \"QUANTITB2\", \"QUANTIFERN\", \"QUANTIFERM\", \"QUANTITBGLDP\"  Lab Results   Component Value Date "    RF <14 04/07/2025     Lab Results   Component Value Date    HEPBSAG Non-Reactive 06/19/2024    HEPAIGM Non-Reactive 05/09/2022    HEPBIGMCORE Non-Reactive 05/09/2022    HEPCVIRUSABY Non-Reactive 06/19/2024         Procedures    Assessment / Plan      - Arthralgia of multiple joint  - Clinically suspect arthralgias hands, concerning for inflammatory arthritis  - Positive CCP antibody  [facility], telehealth [facility], 4 daughters  -Her mother has severe psoriasis.  -Her grandmother and her grandmother's sisters have severe rheumatoid arthritis.  Arthralgias hands, wrists, elbows, neck, back, hips started 2020 after the birth of her 2nd child; she felt great while pregnant, but recurrent arthralgias January 2022 after the birth of her 3rd child  Serology: +CCP antibody 21, elevated CRP   -negative LUZ ELENA, RF, ACPA, HLA B27, RPR, inflammatory bowel panel, Lyme, [place] spotted fever, Ehrlichia labs  Elevated serum copper level unknown significance 2019/22 labs. Neg urine copper  Negative workup for [facility]'s disease with [facility] 20[facility]  Hyperuricemia but no history of gout/no episodic inflammatory arthritis/no lower extremity joint involved.       **Current: Plaquenil 5/28/2025, as needed prednisone  Prior treatment: Plaquenil 6/29/22(helped but anxiety taking it), ibuprofen.  No improvement on allopurinol        Possible undifferentiated inflammatory polyarthritis causing mainly upper extremity chronic arthralgias involving wrists, elbows, hands, shoulders, neck, lumbar spine, hips ongoing since 2020.      -Differential includes undifferentiated inflammatory arthritis, early rheumatoid arthritis verses psoriatic arthritis without psoriasis.    -Her mother has psoriasis, grandmother with RA      She returns early to clinic today due to intermittent lymph node swelling axilla which have resolved as well as pain and stiffness in the bilateral hands.  Recent labs have shown weakly positive CCP  antibody and elevated CRP  She has had recurrent PIP arthralgias symmetrically in both hands which were severe January 2025 and returned just last week     No synovitis on exam presently, so does not meet research criteria for rheumatoid arthritis at this time, but clinically suspect arthralgias with positive CCP antibody.  There is family history of rheumatoid.  Concern for an evolving rheumatoid picture    -We discussed there is no medication known to prevent rheumatoid arthritis, but it would be reasonable to trial Plaquenil 200 mg qd  -We also discussed the option of not starting any DMARD medication.  She prefers to go ahead and start Plaquenil which I think is reasonable  -Plaquenil 200 mg daily sent to her pharmacy along with prednisone taper starting at 20 mg over 12 days  Handout provided on Plaquenil     Return to clinic 4 months      -High risk medication  We discussed possible side effects of hydroxychloroquine including headache, nausea, diarrhea, rare retinal pigmentation, rare neuromuscular toxicity.  Recommend eye exams every 6 to 12 months to monitor for retinal toxicity.     -Intermittent lymphadenopathy  Intermittent lymph node swelling axilla.   Ultrasound axilla through [facility] Dr Jayy Coats showed changes of reactive lymphadenopathy  Reactive lymphadenopathy is nonspecific    No lymphadenopathy palpated on exam  -Obtain chest x-ray with her complaints of lymph node swelling axilla which could be reactive lymphadenopathy related to underlying inflammatory autoimmune condition such as evolving RA.  Less likely this is due to malignancy or infection.  No lymphadenopathy detected on exam today     -History of elevated copper level  Elevated serum copper level on labs 2019/22 reviewed today  24 hour urine copper was normal  [facility] exam with [facility] showed no evidence of Edwards Fleischer rings  Negative GI workup 2022  Unspecified basis.  Not Alli's disease..    Repeat serum copper  level normal     - Rosacea  KEVIN Coats  LUZ ELENA negative.        -Hyperuricemia  Hyperuricemia without clinical history consistent with gout.    No episodic inflammatory arthritis involving the lower extremities.  I doubt hyperuricemia accounts for her arthralgias.  She reports no improvement in joint pain previously on allopurinol.     -Numbness and tingling  intermittent numbness and tingling bridge of nose/cheek  Unspecified basis.  Brain MRI 4/22 was negative through her PCP  No evidence of MS  She has no history of shingles, but has had fever blisters that could represent an underlying HSV that might cause numbness and tingling.     -Fatty liver  Negative LUZ ELENA, negative anti mitochondrial antibody, negative inflammatory bowel panel.    Work on weight loss through a Mediterranean diet          1. Arthralgia of multiple sites    2. Positive anti-CCP test    3. Lymphadenopathy    4. Encounter for new medication prescription    5. High risk medication use          Orders Placed This Encounter   Procedures    XR Chest 2 View     New Medications Ordered This Visit   Medications    hydroxychloroquine (Plaquenil) 200 MG tablet     Sig: Take 1 tablet by mouth Daily.     Dispense:  30 tablet     Refill:  5    predniSONE (DELTASONE) 5 MG tablet     Sig: Take 4 tablets by mouth Every Morning for 3 days, THEN 3 tablets Every Morning for 3 days, THEN 2 tablets Every Morning for 3 days, THEN 1 tablet Every Morning for 3 days. Take 4 tablets every morning for 3 days, 3 tablets every morning for 3 days, 2 tablets every morning for 3 day, 1 tablet every morning for 3 days.     Dispense:  30 tablet     Refill:  0       Follow Up:   Return in about 4 months (around 9/28/2025).      Discussed plan of care in detail with the patient today.  Patient verbalized understanding and agrees.    I confirm accuracy of unchanged data/findings which have been carried forward from previous visit.  I have updated appropriately those that have  changed.    TIME SPENT: I spent 40 minutes caring for the patient on this date of service.  This time includes time spent by me in the following activities: Preparing for the visit, obtaining records, reviewing/ordering tests and independently reviewing results, performing a medically appropriate history/exam, counseling and educating the patient/family/caregiver, ordering medications, tests, or procedures, and documenting information in the medical record.    Seven Garibay MD  [facility] [place] [facility]

## 2025-05-28 NOTE — PATIENT INSTRUCTIONS
Hydroxychloroquine Tablets    What is this medication?  HYDROXYCHLOROQUINE (brandon drox ee KLOR oh kwin) treats autoimmune conditions, such as rheumatoid arthritis and lupus. It works by slowing down an overactive immune system. It may also be used to prevent and treat malaria. It works by killing the parasite that causes malaria. It belongs to a group of medications called DMARDs.  This medicine may be used for other purposes; ask your health care provider or pharmacist if you have questions.  COMMON BRAND NAME(S): Plaquenil, Quineprox    What should I tell my care team before I take this medication?  They need to know if you have any of these conditions:  Diabetes  Eye disease, vision problems  Frequently drink alcohol  G6PD deficiency  Heart disease  Irregular heartbeat or rhythm  Kidney disease  Liver disease  Porphyria  Psoriasis  An unusual or allergic reaction to hydroxychloroquine, other medications, foods, dyes, or preservatives  Pregnant or trying to get pregnant  Breastfeeding    How should I use this medication?  Take this medication by mouth with water. Take it as directed on the prescription label. Do not cut, crush, or chew this medication. Swallow the tablets whole. Take it with food. Do not take it more than directed. Take all of this medication unless your care team tells you to stop it early. Keep taking it even if you think you are better.  Take products with antacids in them at a different time of day than this medication. Take this medication 4 hours before or 4 hours after antacids. Talk to your care team if you have questions.  Talk to your care team about the use of this medication in children. While this medication may be prescribed for selected conditions, precautions do apply.  Overdosage: If you think you have taken too much of this medicine contact a poison control center or emergency room at once.  NOTE: This medicine is only for you. Do not share this medicine with others.    What if I  miss a dose?  If you miss a dose, take it as soon as you can. If it is almost time for your next dose, take only that dose. Do not take double or extra doses.    What may interact with this medication?  Do not take this medication with any of the following:  Cisapride  Dronedarone  Pimozide  Thioridazine  This medication may also interact with the following:  Ampicillin  Antacids  Cimetidine  Cyclosporine  Digoxin  Kaolin  Medications for diabetes, such as insulin, glipizide, glyburide  Medications for seizures, such as carbamazepine, phenobarbital, phenytoin  Mefloquine  Methotrexate  Other medications that cause heart rhythm changes  Praziquantel  This list may not describe all possible interactions. Give your health care provider a list of all the medicines, herbs, non-prescription drugs, or dietary supplements you use. Also tell them if you smoke, drink alcohol, or use illegal drugs. Some items may interact with your medicine.    What should I watch for while using this medication?  Visit your care team for regular checks on your progress. Tell your care team if your symptoms do not start to get better or if they get worse.  You may need blood work done while you are taking this medication. If you take other medications that can affect heart rhythm, you may need more testing. Talk to your care team if you have questions.  Your vision may be tested before and during use of this medication. Tell your care team right away if you have any change in your eyesight.  This medication may cause serious skin reactions. They can happen weeks to months after starting the medication. Contact your care team right away if you notice fevers or flu-like symptoms with a rash. The rash may be red or purple and then turn into blisters or peeling of the skin. Or, you might notice a red rash with swelling of the face, lips or lymph nodes in your neck or under your arms.  If you or your family notice any changes in your behavior, such  as new or worsening depression, thoughts of harming yourself, anxiety, or other unusual or disturbing thoughts, or memory loss, call your care team right away.    What side effects may I notice from receiving this medication?  Side effects that you should report to your care team as soon as possible:  Allergic reactions--skin rash, itching, hives, swelling of the face, lips, tongue, or throat  Aplastic anemia--unusual weakness or fatigue, dizziness, headache, trouble breathing, increased bleeding or bruising  Change in vision  Heart rhythm changes--fast or irregular heartbeat, dizziness, feeling faint or lightheaded, chest pain, trouble breathing  Infection--fever, chills, cough, or sore throat  Low blood sugar (hypoglycemia)--tremors or shaking, anxiety, sweating, cold or clammy skin, confusion, dizziness, rapid heartbeat  Muscle injury--unusual weakness or fatigue, muscle pain, dark yellow or brown urine, decrease in amount of urine  Pain, tingling, or numbness in the hands or feet  Rash, fever, and swollen lymph nodes  Redness, blistering, peeling, or loosening of the skin, including inside the mouth  Thoughts of suicide or self-harm, worsening mood, or feelings of depression  Unusual bruising or bleeding  Side effects that usually do not require medical attention (report to your care team if they continue or are bothersome):  Diarrhea  Headache  Nausea  Stomach pain  Vomiting  This list may not describe all possible side effects. Call your doctor for medical advice about side effects. You may report side effects to FDA at 5-313-FDA-0513.    Where should I keep my medication?  Keep out of the reach of children and pets.  Store at room temperature up to 30 degrees C (86 degrees F). Protect from light. Get rid of any unused medication after the expiration date.  To get rid of medications that are no longer needed or have :  Take the medication to a medication take-back program. Check with your pharmacy or  law enforcement to find a location.  If you cannot return the medication, check the label or package insert to see if the medication should be thrown out in the garbage or flushed down the toilet. If you are not sure, ask your care team. If it is safe to put it in the trash, empty the medication out of the container. Mix the medication with cat litter, dirt, coffee grounds, or other unwanted substance. Seal the mixture in a bag or container. Put it in the trash.  NOTE: This sheet is a summary. It may not cover all possible information. If you have questions about this medicine, talk to your doctor, pharmacist, or health care provider.  © 2024 [employer] (2023-06-26 00:00:00)

## 2025-08-05 ENCOUNTER — TRANSCRIBE ORDERS (OUTPATIENT)
Dept: ADMINISTRATIVE | Facility: HOSPITAL | Age: 40
End: 2025-08-05
Payer: COMMERCIAL

## 2025-08-05 DIAGNOSIS — R59.0 AXILLARY LYMPHADENOPATHY: Primary | ICD-10-CM

## 2025-08-12 ENCOUNTER — TRANSCRIBE ORDERS (OUTPATIENT)
Dept: ADMINISTRATIVE | Facility: HOSPITAL | Age: 40
End: 2025-08-12
Payer: COMMERCIAL

## 2025-08-12 DIAGNOSIS — R59.0 AXILLARY LYMPHADENOPATHY: Primary | ICD-10-CM
